# Patient Record
Sex: MALE | Race: WHITE | Employment: OTHER | URBAN - NONMETROPOLITAN AREA
[De-identification: names, ages, dates, MRNs, and addresses within clinical notes are randomized per-mention and may not be internally consistent; named-entity substitution may affect disease eponyms.]

---

## 2017-04-17 DIAGNOSIS — E11.9 TYPE 2 DIABETES MELLITUS WITHOUT COMPLICATION, WITHOUT LONG-TERM CURRENT USE OF INSULIN (HCC): ICD-10-CM

## 2017-05-05 ENCOUNTER — OFFICE VISIT (OUTPATIENT)
Dept: CARDIOLOGY | Age: 72
End: 2017-05-05
Payer: MEDICARE

## 2017-05-05 VITALS
HEIGHT: 68 IN | HEART RATE: 67 BPM | BODY MASS INDEX: 31.22 KG/M2 | DIASTOLIC BLOOD PRESSURE: 62 MMHG | SYSTOLIC BLOOD PRESSURE: 124 MMHG | RESPIRATION RATE: 16 BRPM | WEIGHT: 206 LBS

## 2017-05-05 DIAGNOSIS — E78.2 MIXED HYPERLIPIDEMIA: ICD-10-CM

## 2017-05-05 DIAGNOSIS — I77.9 BILATERAL CAROTID ARTERY DISEASE (HCC): ICD-10-CM

## 2017-05-05 DIAGNOSIS — H53.40 VISUAL FIELD DEFECTS: ICD-10-CM

## 2017-05-05 DIAGNOSIS — I10 ESSENTIAL HYPERTENSION: Primary | ICD-10-CM

## 2017-05-05 DIAGNOSIS — I25.10 CORONARY ARTERY DISEASE INVOLVING NATIVE CORONARY ARTERY OF NATIVE HEART WITHOUT ANGINA PECTORIS: ICD-10-CM

## 2017-05-05 PROCEDURE — G8419 CALC BMI OUT NRM PARAM NOF/U: HCPCS | Performed by: INTERNAL MEDICINE

## 2017-05-05 PROCEDURE — 99214 OFFICE O/P EST MOD 30 MIN: CPT | Performed by: INTERNAL MEDICINE

## 2017-05-05 PROCEDURE — 93000 ELECTROCARDIOGRAM COMPLETE: CPT | Performed by: INTERNAL MEDICINE

## 2017-05-05 PROCEDURE — 1123F ACP DISCUSS/DSCN MKR DOCD: CPT | Performed by: INTERNAL MEDICINE

## 2017-05-05 PROCEDURE — G8598 ASA/ANTIPLAT THER USED: HCPCS | Performed by: INTERNAL MEDICINE

## 2017-05-05 PROCEDURE — G8427 DOCREV CUR MEDS BY ELIG CLIN: HCPCS | Performed by: INTERNAL MEDICINE

## 2017-05-05 PROCEDURE — 1036F TOBACCO NON-USER: CPT | Performed by: INTERNAL MEDICINE

## 2017-05-05 PROCEDURE — 4040F PNEUMOC VAC/ADMIN/RCVD: CPT | Performed by: INTERNAL MEDICINE

## 2017-05-05 PROCEDURE — 3017F COLORECTAL CA SCREEN DOC REV: CPT | Performed by: INTERNAL MEDICINE

## 2017-05-05 RX ORDER — ASPIRIN 81 MG/1
81 TABLET ORAL DAILY
Status: ON HOLD | COMMUNITY
Start: 2017-05-05 | End: 2018-11-02 | Stop reason: HOSPADM

## 2017-05-05 ASSESSMENT — ENCOUNTER SYMPTOMS
ABDOMINAL DISTENTION: 0
VOMITING: 0
SHORTNESS OF BREATH: 0
CHEST TIGHTNESS: 1
NAUSEA: 0

## 2017-05-22 ENCOUNTER — TELEPHONE (OUTPATIENT)
Dept: CARDIOLOGY | Age: 72
End: 2017-05-22

## 2017-06-16 ENCOUNTER — NURSE ONLY (OUTPATIENT)
Dept: LAB | Age: 72
End: 2017-06-16
Payer: MEDICARE

## 2017-06-16 DIAGNOSIS — I10 ESSENTIAL HYPERTENSION: ICD-10-CM

## 2017-06-16 DIAGNOSIS — E11.9 TYPE 2 DIABETES MELLITUS WITHOUT COMPLICATION, WITHOUT LONG-TERM CURRENT USE OF INSULIN (HCC): ICD-10-CM

## 2017-06-16 DIAGNOSIS — R97.20 ELEVATED PSA: ICD-10-CM

## 2017-06-16 LAB
CREATININE URINE: 120.2 MG/DL (ref 39–259)
ESTIMATED AVERAGE GLUCOSE: 160 MG/DL
HBA1C MFR BLD: 7.2 % (ref 4.8–5.9)
HEMOGLOBIN: 12.3 G/DL (ref 13.5–17.5)
MICROALBUMIN/CREAT 24H UR: <12 MG/L
MICROALBUMIN/CREAT UR-RTO: NORMAL MCG/MG CREAT
PROSTATE SPECIFIC ANTIGEN: 11.17 UG/L

## 2017-06-16 PROCEDURE — 85018 HEMOGLOBIN: CPT | Performed by: INTERNAL MEDICINE

## 2017-06-16 PROCEDURE — 36415 COLL VENOUS BLD VENIPUNCTURE: CPT | Performed by: INTERNAL MEDICINE

## 2017-06-16 PROCEDURE — 84153 ASSAY OF PSA TOTAL: CPT | Performed by: UROLOGY

## 2017-06-16 PROCEDURE — 83036 HEMOGLOBIN GLYCOSYLATED A1C: CPT | Performed by: INTERNAL MEDICINE

## 2017-06-16 PROCEDURE — 82043 UR ALBUMIN QUANTITATIVE: CPT | Performed by: INTERNAL MEDICINE

## 2017-06-16 PROCEDURE — 82570 ASSAY OF URINE CREATININE: CPT | Performed by: INTERNAL MEDICINE

## 2017-06-23 ENCOUNTER — OFFICE VISIT (OUTPATIENT)
Dept: INTERNAL MEDICINE | Age: 72
End: 2017-06-23
Payer: MEDICARE

## 2017-06-23 VITALS
HEART RATE: 62 BPM | BODY MASS INDEX: 30.45 KG/M2 | SYSTOLIC BLOOD PRESSURE: 132 MMHG | DIASTOLIC BLOOD PRESSURE: 68 MMHG | HEIGHT: 68 IN | WEIGHT: 200.9 LBS | RESPIRATION RATE: 17 BRPM

## 2017-06-23 DIAGNOSIS — E11.9 TYPE 2 DIABETES MELLITUS WITHOUT COMPLICATION, WITHOUT LONG-TERM CURRENT USE OF INSULIN (HCC): Primary | ICD-10-CM

## 2017-06-23 DIAGNOSIS — I10 ESSENTIAL HYPERTENSION: ICD-10-CM

## 2017-06-23 DIAGNOSIS — D64.9 MILD ANEMIA: ICD-10-CM

## 2017-06-23 DIAGNOSIS — E78.5 HYPERLIPIDEMIA, UNSPECIFIED HYPERLIPIDEMIA TYPE: ICD-10-CM

## 2017-06-23 PROCEDURE — G8427 DOCREV CUR MEDS BY ELIG CLIN: HCPCS | Performed by: INTERNAL MEDICINE

## 2017-06-23 PROCEDURE — 3046F HEMOGLOBIN A1C LEVEL >9.0%: CPT | Performed by: INTERNAL MEDICINE

## 2017-06-23 PROCEDURE — 99214 OFFICE O/P EST MOD 30 MIN: CPT | Performed by: INTERNAL MEDICINE

## 2017-06-23 PROCEDURE — 4040F PNEUMOC VAC/ADMIN/RCVD: CPT | Performed by: INTERNAL MEDICINE

## 2017-06-23 PROCEDURE — G8598 ASA/ANTIPLAT THER USED: HCPCS | Performed by: INTERNAL MEDICINE

## 2017-06-23 PROCEDURE — 1036F TOBACCO NON-USER: CPT | Performed by: INTERNAL MEDICINE

## 2017-06-23 PROCEDURE — 1123F ACP DISCUSS/DSCN MKR DOCD: CPT | Performed by: INTERNAL MEDICINE

## 2017-06-23 PROCEDURE — 3017F COLORECTAL CA SCREEN DOC REV: CPT | Performed by: INTERNAL MEDICINE

## 2017-06-23 PROCEDURE — G8417 CALC BMI ABV UP PARAM F/U: HCPCS | Performed by: INTERNAL MEDICINE

## 2017-06-23 ASSESSMENT — ENCOUNTER SYMPTOMS
DIARRHEA: 0
SHORTNESS OF BREATH: 0
EYE PAIN: 0
VOMITING: 0
NAUSEA: 0
CONSTIPATION: 0
BLOOD IN STOOL: 0
COUGH: 0
ABDOMINAL PAIN: 0
BACK PAIN: 0

## 2017-06-23 ASSESSMENT — PATIENT HEALTH QUESTIONNAIRE - PHQ9
1. LITTLE INTEREST OR PLEASURE IN DOING THINGS: 0
SUM OF ALL RESPONSES TO PHQ QUESTIONS 1-9: 0
SUM OF ALL RESPONSES TO PHQ9 QUESTIONS 1 & 2: 0
2. FEELING DOWN, DEPRESSED OR HOPELESS: 0

## 2017-07-21 ENCOUNTER — OFFICE VISIT (OUTPATIENT)
Dept: UROLOGY | Age: 72
End: 2017-07-21
Payer: MEDICARE

## 2017-07-21 VITALS
HEIGHT: 68 IN | SYSTOLIC BLOOD PRESSURE: 118 MMHG | WEIGHT: 200 LBS | BODY MASS INDEX: 30.31 KG/M2 | DIASTOLIC BLOOD PRESSURE: 76 MMHG | HEART RATE: 60 BPM

## 2017-07-21 DIAGNOSIS — R97.20 ABNORMAL PSA: Primary | ICD-10-CM

## 2017-07-21 DIAGNOSIS — N40.0 BENIGN NON-NODULAR PROSTATIC HYPERPLASIA WITHOUT LOWER URINARY TRACT SYMPTOMS: ICD-10-CM

## 2017-07-21 PROCEDURE — G8598 ASA/ANTIPLAT THER USED: HCPCS | Performed by: UROLOGY

## 2017-07-21 PROCEDURE — 1123F ACP DISCUSS/DSCN MKR DOCD: CPT | Performed by: UROLOGY

## 2017-07-21 PROCEDURE — 3017F COLORECTAL CA SCREEN DOC REV: CPT | Performed by: UROLOGY

## 2017-07-21 PROCEDURE — G8417 CALC BMI ABV UP PARAM F/U: HCPCS | Performed by: UROLOGY

## 2017-07-21 PROCEDURE — 4040F PNEUMOC VAC/ADMIN/RCVD: CPT | Performed by: UROLOGY

## 2017-07-21 PROCEDURE — 99213 OFFICE O/P EST LOW 20 MIN: CPT | Performed by: UROLOGY

## 2017-07-21 PROCEDURE — 1036F TOBACCO NON-USER: CPT | Performed by: UROLOGY

## 2017-07-21 PROCEDURE — G8427 DOCREV CUR MEDS BY ELIG CLIN: HCPCS | Performed by: UROLOGY

## 2017-07-21 RX ORDER — ALFUZOSIN HYDROCHLORIDE 10 MG/1
10 TABLET, EXTENDED RELEASE ORAL DAILY
COMMUNITY
Start: 2017-06-13 | End: 2017-12-18 | Stop reason: SDUPTHER

## 2017-07-28 RX ORDER — NITROGLYCERIN 0.4 MG/1
0.4 TABLET SUBLINGUAL EVERY 5 MIN PRN
Qty: 25 TABLET | Refills: 3 | Status: SHIPPED | OUTPATIENT
Start: 2017-07-28 | End: 2018-07-30 | Stop reason: SDUPTHER

## 2017-08-07 ENCOUNTER — TELEPHONE (OUTPATIENT)
Dept: INTERNAL MEDICINE | Age: 72
End: 2017-08-07

## 2017-11-17 ENCOUNTER — TELEPHONE (OUTPATIENT)
Dept: INTERNAL MEDICINE | Age: 72
End: 2017-11-17

## 2017-11-17 VITALS — DIASTOLIC BLOOD PRESSURE: 66 MMHG | SYSTOLIC BLOOD PRESSURE: 138 MMHG

## 2017-12-11 ENCOUNTER — HOSPITAL ENCOUNTER (OUTPATIENT)
Dept: LAB | Age: 72
Setting detail: SPECIMEN
Discharge: HOME | End: 2017-12-11
Payer: MEDICARE

## 2017-12-11 DIAGNOSIS — E11.9 TYPE 2 DIABETES MELLITUS WITHOUT COMPLICATION, WITHOUT LONG-TERM CURRENT USE OF INSULIN (HCC): ICD-10-CM

## 2017-12-11 DIAGNOSIS — I10 ESSENTIAL HYPERTENSION: ICD-10-CM

## 2017-12-11 DIAGNOSIS — E78.5 HYPERLIPIDEMIA, UNSPECIFIED HYPERLIPIDEMIA TYPE: ICD-10-CM

## 2017-12-11 DIAGNOSIS — D64.9 MILD ANEMIA: ICD-10-CM

## 2017-12-11 LAB
ESTIMATED AVERAGE GLUCOSE: 163 MG/DL
HBA1C MFR BLD: 7.3 % (ref 4.8–5.9)
HEMOGLOBIN: 12.8 G/DL (ref 13.5–17.5)
IRON SATURATION: 36 % (ref 20–55)
IRON: 90 UG/DL (ref 59–158)
TOTAL IRON BINDING CAPACITY: 251 UG/DL (ref 250–450)
UNSATURATED IRON BINDING CAPACITY: 161 UG/DL (ref 112–347)

## 2017-12-11 PROCEDURE — 83036 HEMOGLOBIN GLYCOSYLATED A1C: CPT

## 2017-12-11 PROCEDURE — 83550 IRON BINDING TEST: CPT

## 2017-12-11 PROCEDURE — 83540 ASSAY OF IRON: CPT

## 2017-12-11 PROCEDURE — 36415 COLL VENOUS BLD VENIPUNCTURE: CPT

## 2017-12-11 PROCEDURE — 85018 HEMOGLOBIN: CPT

## 2017-12-11 RX ORDER — ALFUZOSIN HYDROCHLORIDE 10 MG/1
10 TABLET, EXTENDED RELEASE ORAL DAILY
Qty: 30 TABLET | Status: CANCELLED | OUTPATIENT
Start: 2017-12-11

## 2017-12-18 ENCOUNTER — OFFICE VISIT (OUTPATIENT)
Dept: INTERNAL MEDICINE | Age: 72
End: 2017-12-18
Payer: MEDICARE

## 2017-12-18 VITALS
WEIGHT: 201 LBS | BODY MASS INDEX: 30.46 KG/M2 | HEIGHT: 68 IN | HEART RATE: 60 BPM | SYSTOLIC BLOOD PRESSURE: 126 MMHG | DIASTOLIC BLOOD PRESSURE: 76 MMHG | RESPIRATION RATE: 16 BRPM

## 2017-12-18 DIAGNOSIS — E78.5 HYPERLIPIDEMIA, UNSPECIFIED HYPERLIPIDEMIA TYPE: ICD-10-CM

## 2017-12-18 DIAGNOSIS — D64.9 MILD ANEMIA: ICD-10-CM

## 2017-12-18 DIAGNOSIS — M10.00 IDIOPATHIC GOUT, UNSPECIFIED CHRONICITY, UNSPECIFIED SITE: ICD-10-CM

## 2017-12-18 DIAGNOSIS — E11.9 TYPE 2 DIABETES MELLITUS WITHOUT COMPLICATION, WITHOUT LONG-TERM CURRENT USE OF INSULIN (HCC): Primary | ICD-10-CM

## 2017-12-18 DIAGNOSIS — N40.0 BENIGN NON-NODULAR PROSTATIC HYPERPLASIA WITHOUT LOWER URINARY TRACT SYMPTOMS: ICD-10-CM

## 2017-12-18 DIAGNOSIS — I10 ESSENTIAL HYPERTENSION: ICD-10-CM

## 2017-12-18 DIAGNOSIS — E11.69 TYPE 2 DIABETES MELLITUS WITH OTHER SPECIFIED COMPLICATION, WITHOUT LONG-TERM CURRENT USE OF INSULIN (HCC): ICD-10-CM

## 2017-12-18 PROCEDURE — G8427 DOCREV CUR MEDS BY ELIG CLIN: HCPCS | Performed by: INTERNAL MEDICINE

## 2017-12-18 PROCEDURE — 1123F ACP DISCUSS/DSCN MKR DOCD: CPT | Performed by: INTERNAL MEDICINE

## 2017-12-18 PROCEDURE — 3017F COLORECTAL CA SCREEN DOC REV: CPT | Performed by: INTERNAL MEDICINE

## 2017-12-18 PROCEDURE — 4040F PNEUMOC VAC/ADMIN/RCVD: CPT | Performed by: INTERNAL MEDICINE

## 2017-12-18 PROCEDURE — G8598 ASA/ANTIPLAT THER USED: HCPCS | Performed by: INTERNAL MEDICINE

## 2017-12-18 PROCEDURE — 1036F TOBACCO NON-USER: CPT | Performed by: INTERNAL MEDICINE

## 2017-12-18 PROCEDURE — 3045F PR MOST RECENT HEMOGLOBIN A1C LEVEL 7.0-9.0%: CPT | Performed by: INTERNAL MEDICINE

## 2017-12-18 PROCEDURE — G8417 CALC BMI ABV UP PARAM F/U: HCPCS | Performed by: INTERNAL MEDICINE

## 2017-12-18 PROCEDURE — 99214 OFFICE O/P EST MOD 30 MIN: CPT | Performed by: INTERNAL MEDICINE

## 2017-12-18 PROCEDURE — G8484 FLU IMMUNIZE NO ADMIN: HCPCS | Performed by: INTERNAL MEDICINE

## 2017-12-18 RX ORDER — BETHANECHOL CHLORIDE 25 MG/1
25 TABLET ORAL 4 TIMES DAILY
Qty: 360 TABLET | Refills: 3 | Status: SHIPPED | OUTPATIENT
Start: 2017-12-18 | End: 2018-07-30

## 2017-12-18 RX ORDER — HYDROCHLOROTHIAZIDE 12.5 MG/1
12.5 TABLET ORAL DAILY
Qty: 90 TABLET | Refills: 3 | Status: SHIPPED | OUTPATIENT
Start: 2017-12-18 | End: 2018-12-05 | Stop reason: SDUPTHER

## 2017-12-18 RX ORDER — ATORVASTATIN CALCIUM 80 MG/1
80 TABLET, FILM COATED ORAL DAILY
Qty: 90 TABLET | Refills: 3 | Status: SHIPPED | OUTPATIENT
Start: 2017-12-18 | End: 2018-12-05 | Stop reason: SDUPTHER

## 2017-12-18 RX ORDER — METOPROLOL TARTRATE 50 MG/1
50 TABLET, FILM COATED ORAL 2 TIMES DAILY
Qty: 180 TABLET | Refills: 3 | Status: SHIPPED | OUTPATIENT
Start: 2017-12-18 | End: 2018-12-05 | Stop reason: SDUPTHER

## 2017-12-18 RX ORDER — LISINOPRIL 40 MG/1
40 TABLET ORAL DAILY
Qty: 90 TABLET | Refills: 3 | Status: SHIPPED | OUTPATIENT
Start: 2017-12-18 | End: 2018-12-05 | Stop reason: SDUPTHER

## 2017-12-18 RX ORDER — ALLOPURINOL 300 MG/1
300 TABLET ORAL DAILY
Qty: 90 TABLET | Refills: 3 | Status: SHIPPED | OUTPATIENT
Start: 2017-12-18 | End: 2018-12-05 | Stop reason: SDUPTHER

## 2017-12-18 RX ORDER — GLIMEPIRIDE 2 MG/1
2 TABLET ORAL EVERY MORNING
Qty: 90 TABLET | Refills: 3 | Status: SHIPPED | OUTPATIENT
Start: 2017-12-18 | End: 2018-07-18 | Stop reason: DRUGHIGH

## 2017-12-18 RX ORDER — GLIMEPIRIDE 1 MG/1
1 TABLET ORAL
Qty: 90 TABLET | Refills: 3 | Status: SHIPPED | OUTPATIENT
Start: 2017-12-18 | End: 2018-07-18 | Stop reason: DRUGHIGH

## 2017-12-18 RX ORDER — ALFUZOSIN HYDROCHLORIDE 10 MG/1
10 TABLET, EXTENDED RELEASE ORAL DAILY
Qty: 90 TABLET | Refills: 3 | Status: SHIPPED | OUTPATIENT
Start: 2017-12-18 | End: 2018-12-05 | Stop reason: ALTCHOICE

## 2017-12-18 ASSESSMENT — ENCOUNTER SYMPTOMS
NAUSEA: 0
EYE PAIN: 0
DIARRHEA: 0
ABDOMINAL PAIN: 0
COUGH: 0
BLOOD IN STOOL: 0
SHORTNESS OF BREATH: 0
BACK PAIN: 0
CONSTIPATION: 0
VOMITING: 0

## 2017-12-18 NOTE — PROGRESS NOTES
 Diabetic microalbuminuria test  06/16/2018    Diabetic hemoglobin A1C test  12/11/2018    Colon cancer screen colonoscopy  10/20/2020    Zostavax vaccine  Completed    Flu vaccine  Completed    Pneumococcal low/med risk  Completed

## 2017-12-18 NOTE — PROGRESS NOTES
PSA.    BPPV (benign paroxysmal positional vertigo) 2/2008    Coronary heart disease     Status post CABG.  Diverticulosis     Dupuytren's contracture     Mid finger, right hand.  Erectile dysfunction     Gout     Quiescent.  Hyperlipidemia     Hypertension     Type II or unspecified type diabetes mellitus without mention of complication, not stated as uncontrolled       Past Surgical History:   Procedure Laterality Date    CARDIAC CATHETERIZATION  6/2005    Triple vessel CAD with patent LIMA to LAD, patent saphenous vein graft to first obtuse marginal, patent saphenous vein graft to posterior descending artery, occluded jump graft from posterior descending artery to posterior left ventricular branch.  CARDIAC CATHETERIZATION  3/16/16    CATARACT REMOVAL Bilateral Nov. and Dec 2014    COLONOSCOPY  5/27/2008    Distal ileoscopy. Scattered diverticulosis and extensive diverticular disease of sigmoid colon and internal hemorrhoids.  CORONARY ARTERY BYPASS GRAFT      ELBOW SURGERY Right     Surgical repair.  KNEE ARTHROSCOPY Right 3/16/2007    Partial medial meniscectomy, partial medial and lateral synovectomy, light chondroplasty.     PROSTATE BIOPSY Bilateral 9/15/2010, 2007    [facility] Dr. Israel Glynn    PROSTATE BIOPSY Bilateral 05/10/2001    Benign-Dr. Natalya Beatty    PROSTATE BIOPSY Bilateral 04/08/1999    Benign-Dr. Carmen Hurley    PROSTATE BIOPSY Bilateral 06/18/1998    Benign-Dr. Vasquez Cap    PROSTATE BIOPSY Bilateral 01/07/1998    Benign-Dr. Vasquez Cap    PROSTATE BIOPSY Bilateral 10/04/1996    Benign-Dr. Breezy Godfrey    VASECTOMY         Family History   Problem Relation Age of Onset    Diabetes Mother     Kidney Disease Mother      Renal failure    Coronary Art Dis Mother     Hypertension Mother     Lung Cancer Father     Coronary Art Dis Father     Hypertension Brother     Other Brother      Hypernephroma       Social History   Substance Use Topics    Smoking status: Former Smoker     Types: Cigarettes     Quit date: 10/11/1992    Smokeless tobacco: Never Used    Alcohol use 0.6 oz/week     1 Standard drinks or equivalent per week      Comment: Social alcohol intake. Current Outpatient Prescriptions   Medication Sig Dispense Refill    glimepiride (AMARYL) 1 MG tablet Take 1 tablet by mouth Daily with supper 90 tablet 3    allopurinol (ZYLOPRIM) 300 MG tablet Take 1 tablet by mouth daily 90 tablet 3    lisinopril (PRINIVIL;ZESTRIL) 40 MG tablet Take 1 tablet by mouth daily 90 tablet 3    metoprolol tartrate (LOPRESSOR) 50 MG tablet Take 1 tablet by mouth 2 times daily 180 tablet 3    bethanechol (URECHOLINE) 25 MG tablet Take 1 tablet by mouth 4 times daily 360 tablet 3    atorvastatin (LIPITOR) 80 MG tablet Take 1 tablet by mouth daily 90 tablet 3    hydrochlorothiazide (HYDRODIURIL) 12.5 MG tablet Take 1 tablet by mouth daily 90 tablet 3    glimepiride (AMARYL) 2 MG tablet Take 1 tablet by mouth every morning 90 tablet 3    alfuzosin (UROXATRAL) 10 MG extended release tablet Take 1 tablet by mouth daily 90 tablet 3    nitroGLYCERIN (NITROSTAT) 0.4 MG SL tablet Place 1 tablet under the tongue every 5 minutes as needed for Chest pain 25 tablet 3    aspirin 81 MG EC tablet Take 4 tablets by mouth daily      glucose blood VI test strips (DURGA CONTOUR TEST) strip Tests blood sugar daily. 100 each 11     No current facility-administered medications for this visit.       No Known Allergies    Health Maintenance   Topic Date Due    AAA screen  1945    Lipid screen  12/14/2017    Diabetic foot exam  12/23/2017    DTaP/Tdap/Td vaccine (1 - Tdap) 06/23/2018 (Originally 7/23/1964)    Hepatitis C screen  12/18/2018 (Originally 1945)    Diabetic retinal exam  05/04/2018    Diabetic microalbuminuria test  06/16/2018    Diabetic hemoglobin A1C test  12/11/2018    Colon cancer screen colonoscopy  10/20/2020    Zostavax vaccine  Completed    Flu vaccine  Completed    Sitting, Cuff Size: Large Adult)   Pulse 60   Resp 16   Ht 5' 8\" (1.727 m)   Wt 201 lb (91.2 kg)   BMI 30.56 kg/m²     Assessment:      1. Type 2 diabetes mellitus without complication, without long-term current use of insulin ([facility])  Comprehensive Metabolic Panel    Lipid Panel    Hemoglobin A1C    Microalbumin, Ur    Hemoglobin    Iron And TIBC    glimepiride (AMARYL) 1 MG tablet   2. Essential hypertension  Comprehensive Metabolic Panel    Lipid Panel    Hemoglobin A1C    Microalbumin, Ur    Hemoglobin    Iron And TIBC    lisinopril (PRINIVIL;ZESTRIL) 40 MG tablet    metoprolol tartrate (LOPRESSOR) 50 MG tablet    hydrochlorothiazide (HYDRODIURIL) 12.5 MG tablet   3. Hyperlipidemia, unspecified hyperlipidemia type  Comprehensive Metabolic Panel    Lipid Panel    Hemoglobin A1C    Microalbumin, Ur    atorvastatin (LIPITOR) 80 MG tablet   4. Mild anemia  Comprehensive Metabolic Panel    Lipid Panel    Hemoglobin A1C    Microalbumin, Ur    Hemoglobin    Iron And TIBC   5. Idiopathic gout, unspecified chronicity, unspecified site  allopurinol (ZYLOPRIM) 300 MG tablet   6. Benign non-nodular prostatic hyperplasia without lower urinary tract symptoms  bethanechol (URECHOLINE) 25 MG tablet   7. Type 2 diabetes mellitus with other specified complication, without long-term current use of insulin (HCC)  glimepiride (AMARYL) 2 MG tablet             Plan:      Return in about 6 months (around 6/18/2018) for Diabetes, Hyperlipidemia, Hypertension.     Orders Placed This Encounter   Procedures    Comprehensive Metabolic Panel     Standing Status:   Future     Standing Expiration Date:   12/18/2018    Lipid Panel     Standing Status:   Future     Standing Expiration Date:   12/18/2018     Order Specific Question:   Is Patient Fasting?/# of Hours     Answer:   12    Hemoglobin A1C     Standing Status:   Future     Standing Expiration Date:   12/18/2018    Microalbumin, Ur     Standing Status:   Future     Standing Expiration Date:   12/18/2018    Hemoglobin     Standing Status:   Future     Standing Expiration Date:   12/18/2018    Iron And TIBC     Standing Status:   Future     Standing Expiration Date:   12/18/2018     Order Specific Question:   Is Patient Fasting? Answer:   no     Order Specific Question:   No of Hours? Answer:   no     Orders Placed This Encounter   Medications    glimepiride (AMARYL) 1 MG tablet     Sig: Take 1 tablet by mouth Daily with supper     Dispense:  90 tablet     Refill:  3    allopurinol (ZYLOPRIM) 300 MG tablet     Sig: Take 1 tablet by mouth daily     Dispense:  90 tablet     Refill:  3    lisinopril (PRINIVIL;ZESTRIL) 40 MG tablet     Sig: Take 1 tablet by mouth daily     Dispense:  90 tablet     Refill:  3    metoprolol tartrate (LOPRESSOR) 50 MG tablet     Sig: Take 1 tablet by mouth 2 times daily     Dispense:  180 tablet     Refill:  3    bethanechol (URECHOLINE) 25 MG tablet     Sig: Take 1 tablet by mouth 4 times daily     Dispense:  360 tablet     Refill:  3    atorvastatin (LIPITOR) 80 MG tablet     Sig: Take 1 tablet by mouth daily     Dispense:  90 tablet     Refill:  3    hydrochlorothiazide (HYDRODIURIL) 12.5 MG tablet     Sig: Take 1 tablet by mouth daily     Dispense:  90 tablet     Refill:  3    glimepiride (AMARYL) 2 MG tablet     Sig: Take 1 tablet by mouth every morning     Dispense:  90 tablet     Refill:  3    alfuzosin (UROXATRAL) 10 MG extended release tablet     Sig: Take 1 tablet by mouth daily     Dispense:  90 tablet     Refill:  3       Patient given educational materials - see patient instructions. Discussed use, benefit, and side effects of prescribed medications. All patient questions answered. Pt voiced understanding. Reviewed health maintenance. Instructed to continue current medications, diet and exercise. Patient agreed with treatment plan. Follow up as directed.      Electronically signed by Dagmar Zamorano MD on 12/18/2017 at 10:01

## 2018-02-13 DIAGNOSIS — E11.9 TYPE 2 DIABETES MELLITUS WITHOUT COMPLICATION, WITHOUT LONG-TERM CURRENT USE OF INSULIN (HCC): ICD-10-CM

## 2018-05-07 ENCOUNTER — HOSPITAL ENCOUNTER (OUTPATIENT)
Dept: INTERVENTIONAL RADIOLOGY/VASCULAR | Age: 73
Discharge: HOME | End: 2018-05-09
Payer: MEDICARE

## 2018-05-07 DIAGNOSIS — I65.23 CAROTID STENOSIS, BILATERAL: ICD-10-CM

## 2018-05-07 PROCEDURE — 93880 EXTRACRANIAL BILAT STUDY: CPT

## 2018-05-11 ENCOUNTER — OFFICE VISIT (OUTPATIENT)
Dept: CARDIOLOGY | Age: 73
End: 2018-05-11
Payer: MEDICARE

## 2018-05-11 VITALS
SYSTOLIC BLOOD PRESSURE: 138 MMHG | BODY MASS INDEX: 30.77 KG/M2 | WEIGHT: 203 LBS | HEART RATE: 52 BPM | HEIGHT: 68 IN | DIASTOLIC BLOOD PRESSURE: 70 MMHG

## 2018-05-11 DIAGNOSIS — I10 ESSENTIAL HYPERTENSION: Primary | ICD-10-CM

## 2018-05-11 DIAGNOSIS — I25.10 CORONARY ARTERY DISEASE INVOLVING NATIVE CORONARY ARTERY OF NATIVE HEART WITHOUT ANGINA PECTORIS: ICD-10-CM

## 2018-05-11 PROCEDURE — 99213 OFFICE O/P EST LOW 20 MIN: CPT | Performed by: INTERNAL MEDICINE

## 2018-05-11 PROCEDURE — 93000 ELECTROCARDIOGRAM COMPLETE: CPT | Performed by: INTERNAL MEDICINE

## 2018-05-11 ASSESSMENT — ENCOUNTER SYMPTOMS
NAUSEA: 0
SHORTNESS OF BREATH: 0
CHEST TIGHTNESS: 0
ABDOMINAL PAIN: 0
VOMITING: 0

## 2018-07-17 ENCOUNTER — HOSPITAL ENCOUNTER (OUTPATIENT)
Dept: LAB | Age: 73
Setting detail: SPECIMEN
Discharge: HOME | End: 2018-07-17
Payer: MEDICARE

## 2018-07-17 DIAGNOSIS — E78.5 HYPERLIPIDEMIA, UNSPECIFIED HYPERLIPIDEMIA TYPE: ICD-10-CM

## 2018-07-17 DIAGNOSIS — I10 ESSENTIAL HYPERTENSION: ICD-10-CM

## 2018-07-17 DIAGNOSIS — E11.9 TYPE 2 DIABETES MELLITUS WITHOUT COMPLICATION, WITHOUT LONG-TERM CURRENT USE OF INSULIN (HCC): ICD-10-CM

## 2018-07-17 DIAGNOSIS — N40.0 BENIGN NON-NODULAR PROSTATIC HYPERPLASIA WITHOUT LOWER URINARY TRACT SYMPTOMS: ICD-10-CM

## 2018-07-17 DIAGNOSIS — D64.9 MILD ANEMIA: ICD-10-CM

## 2018-07-17 DIAGNOSIS — R97.20 ABNORMAL PSA: ICD-10-CM

## 2018-07-17 LAB
ALBUMIN SERPL-MCNC: 4.2 G/DL (ref 3.5–5.2)
ALBUMIN/GLOBULIN RATIO: 1.6 (ref 1–2.5)
ALP BLD-CCNC: 66 U/L (ref 40–129)
ALT SERPL-CCNC: 29 U/L (ref 5–41)
ANION GAP SERPL CALCULATED.3IONS-SCNC: 11 MMOL/L (ref 9–17)
AST SERPL-CCNC: 26 U/L
BILIRUB SERPL-MCNC: 1.55 MG/DL (ref 0.3–1.2)
BUN BLDV-MCNC: 18 MG/DL (ref 8–23)
BUN/CREAT BLD: 18 (ref 9–20)
CALCIUM SERPL-MCNC: 9.2 MG/DL (ref 8.6–10.4)
CHLORIDE BLD-SCNC: 104 MMOL/L (ref 98–107)
CHOLESTEROL/HDL RATIO: 2.7
CHOLESTEROL: 92 MG/DL
CO2: 27 MMOL/L (ref 20–31)
CREAT SERPL-MCNC: 1 MG/DL (ref 0.7–1.2)
CREATININE URINE: 187.8 MG/DL (ref 39–259)
ESTIMATED AVERAGE GLUCOSE: 183 MG/DL
GFR AFRICAN AMERICAN: >60 ML/MIN
GFR NON-AFRICAN AMERICAN: >60 ML/MIN
GFR SERPL CREATININE-BSD FRML MDRD: ABNORMAL ML/MIN/{1.73_M2}
GFR SERPL CREATININE-BSD FRML MDRD: ABNORMAL ML/MIN/{1.73_M2}
GLUCOSE BLD-MCNC: 235 MG/DL (ref 70–99)
HBA1C MFR BLD: 8 % (ref 4.8–5.9)
HDLC SERPL-MCNC: 34 MG/DL
HEMOGLOBIN: 13.1 G/DL (ref 13.5–17.5)
IRON SATURATION: 38 % (ref 20–55)
IRON: 94 UG/DL (ref 59–158)
LDL CHOLESTEROL: 37 MG/DL (ref 0–130)
MICROALBUMIN/CREAT 24H UR: <12 MG/L
MICROALBUMIN/CREAT UR-RTO: NORMAL MCG/MG CREAT
POTASSIUM SERPL-SCNC: 4.3 MMOL/L (ref 3.7–5.3)
SODIUM BLD-SCNC: 142 MMOL/L (ref 135–144)
TOTAL IRON BINDING CAPACITY: 250 UG/DL (ref 250–450)
TOTAL PROTEIN: 6.9 G/DL (ref 6.4–8.3)
TRIGL SERPL-MCNC: 104 MG/DL
UNSATURATED IRON BINDING CAPACITY: 156 UG/DL (ref 112–347)
VLDLC SERPL CALC-MCNC: ABNORMAL MG/DL (ref 1–30)

## 2018-07-17 PROCEDURE — 84154 ASSAY OF PSA FREE: CPT

## 2018-07-17 PROCEDURE — 83550 IRON BINDING TEST: CPT

## 2018-07-17 PROCEDURE — 85018 HEMOGLOBIN: CPT

## 2018-07-17 PROCEDURE — 36415 COLL VENOUS BLD VENIPUNCTURE: CPT

## 2018-07-17 PROCEDURE — 83036 HEMOGLOBIN GLYCOSYLATED A1C: CPT

## 2018-07-17 PROCEDURE — 80061 LIPID PANEL: CPT

## 2018-07-17 PROCEDURE — 82043 UR ALBUMIN QUANTITATIVE: CPT

## 2018-07-17 PROCEDURE — 83540 ASSAY OF IRON: CPT

## 2018-07-17 PROCEDURE — 80053 COMPREHEN METABOLIC PANEL: CPT

## 2018-07-17 PROCEDURE — 82570 ASSAY OF URINE CREATININE: CPT

## 2018-07-18 LAB
PROSTATE SPECIFIC ANTIGEN FREE: 3 UG/L
PROSTATE SPECIFIC ANTIGEN PERCENT FREE: 27 %
PROSTATE SPECIFIC ANTIGEN: 11.1 UG/L (ref 0–4)

## 2018-07-18 RX ORDER — GLIMEPIRIDE 2 MG/1
2 TABLET ORAL 2 TIMES DAILY
COMMUNITY
End: 2018-08-31 | Stop reason: SDUPTHER

## 2018-07-30 ENCOUNTER — OFFICE VISIT (OUTPATIENT)
Dept: UROLOGY | Age: 73
End: 2018-07-30
Payer: MEDICARE

## 2018-07-30 ENCOUNTER — OFFICE VISIT (OUTPATIENT)
Dept: INTERNAL MEDICINE | Age: 73
End: 2018-07-30
Payer: MEDICARE

## 2018-07-30 VITALS
SYSTOLIC BLOOD PRESSURE: 132 MMHG | HEIGHT: 69 IN | RESPIRATION RATE: 16 BRPM | DIASTOLIC BLOOD PRESSURE: 70 MMHG | WEIGHT: 200.4 LBS | HEART RATE: 76 BPM | BODY MASS INDEX: 29.68 KG/M2

## 2018-07-30 VITALS
OXYGEN SATURATION: 99 % | WEIGHT: 200 LBS | SYSTOLIC BLOOD PRESSURE: 124 MMHG | HEART RATE: 54 BPM | HEIGHT: 68 IN | BODY MASS INDEX: 30.31 KG/M2 | DIASTOLIC BLOOD PRESSURE: 80 MMHG

## 2018-07-30 DIAGNOSIS — E11.9 TYPE 2 DIABETES MELLITUS WITHOUT COMPLICATION, WITHOUT LONG-TERM CURRENT USE OF INSULIN (HCC): Primary | ICD-10-CM

## 2018-07-30 DIAGNOSIS — N40.0 BENIGN PROSTATIC HYPERPLASIA WITHOUT LOWER URINARY TRACT SYMPTOMS: Primary | ICD-10-CM

## 2018-07-30 DIAGNOSIS — I10 ESSENTIAL HYPERTENSION: ICD-10-CM

## 2018-07-30 DIAGNOSIS — I25.119 CORONARY ARTERY DISEASE INVOLVING NATIVE HEART WITH ANGINA PECTORIS, UNSPECIFIED VESSEL OR LESION TYPE (HCC): ICD-10-CM

## 2018-07-30 DIAGNOSIS — E78.5 HYPERLIPIDEMIA, UNSPECIFIED HYPERLIPIDEMIA TYPE: ICD-10-CM

## 2018-07-30 DIAGNOSIS — N52.9 ERECTILE DYSFUNCTION, UNSPECIFIED ERECTILE DYSFUNCTION TYPE: ICD-10-CM

## 2018-07-30 DIAGNOSIS — R97.20 ELEVATED PSA: ICD-10-CM

## 2018-07-30 PROCEDURE — G8598 ASA/ANTIPLAT THER USED: HCPCS | Performed by: UROLOGY

## 2018-07-30 PROCEDURE — 4040F PNEUMOC VAC/ADMIN/RCVD: CPT | Performed by: UROLOGY

## 2018-07-30 PROCEDURE — G8417 CALC BMI ABV UP PARAM F/U: HCPCS | Performed by: UROLOGY

## 2018-07-30 PROCEDURE — 1036F TOBACCO NON-USER: CPT | Performed by: INTERNAL MEDICINE

## 2018-07-30 PROCEDURE — 99214 OFFICE O/P EST MOD 30 MIN: CPT | Performed by: INTERNAL MEDICINE

## 2018-07-30 PROCEDURE — 1123F ACP DISCUSS/DSCN MKR DOCD: CPT | Performed by: UROLOGY

## 2018-07-30 PROCEDURE — 99214 OFFICE O/P EST MOD 30 MIN: CPT | Performed by: UROLOGY

## 2018-07-30 PROCEDURE — 1101F PT FALLS ASSESS-DOCD LE1/YR: CPT | Performed by: INTERNAL MEDICINE

## 2018-07-30 PROCEDURE — 3045F PR MOST RECENT HEMOGLOBIN A1C LEVEL 7.0-9.0%: CPT | Performed by: INTERNAL MEDICINE

## 2018-07-30 PROCEDURE — G8427 DOCREV CUR MEDS BY ELIG CLIN: HCPCS | Performed by: UROLOGY

## 2018-07-30 PROCEDURE — 3017F COLORECTAL CA SCREEN DOC REV: CPT | Performed by: UROLOGY

## 2018-07-30 PROCEDURE — 2022F DILAT RTA XM EVC RTNOPTHY: CPT | Performed by: INTERNAL MEDICINE

## 2018-07-30 PROCEDURE — 1101F PT FALLS ASSESS-DOCD LE1/YR: CPT | Performed by: UROLOGY

## 2018-07-30 PROCEDURE — 3017F COLORECTAL CA SCREEN DOC REV: CPT | Performed by: INTERNAL MEDICINE

## 2018-07-30 PROCEDURE — G8417 CALC BMI ABV UP PARAM F/U: HCPCS | Performed by: INTERNAL MEDICINE

## 2018-07-30 PROCEDURE — 1123F ACP DISCUSS/DSCN MKR DOCD: CPT | Performed by: INTERNAL MEDICINE

## 2018-07-30 PROCEDURE — 4040F PNEUMOC VAC/ADMIN/RCVD: CPT | Performed by: INTERNAL MEDICINE

## 2018-07-30 PROCEDURE — 1036F TOBACCO NON-USER: CPT | Performed by: UROLOGY

## 2018-07-30 PROCEDURE — G8427 DOCREV CUR MEDS BY ELIG CLIN: HCPCS | Performed by: INTERNAL MEDICINE

## 2018-07-30 PROCEDURE — G8598 ASA/ANTIPLAT THER USED: HCPCS | Performed by: INTERNAL MEDICINE

## 2018-07-30 RX ORDER — NITROGLYCERIN 0.4 MG/1
0.4 TABLET SUBLINGUAL EVERY 5 MIN PRN
Qty: 25 TABLET | Refills: 3 | Status: SHIPPED | OUTPATIENT
Start: 2018-07-30 | End: 2020-01-11 | Stop reason: SDUPTHER

## 2018-07-30 ASSESSMENT — PATIENT HEALTH QUESTIONNAIRE - PHQ9
SUM OF ALL RESPONSES TO PHQ9 QUESTIONS 1 & 2: 0
SUM OF ALL RESPONSES TO PHQ QUESTIONS 1-9: 0
2. FEELING DOWN, DEPRESSED OR HOPELESS: 0
1. LITTLE INTEREST OR PLEASURE IN DOING THINGS: 0

## 2018-07-30 ASSESSMENT — ENCOUNTER SYMPTOMS
CONSTIPATION: 0
SHORTNESS OF BREATH: 0
EYE PAIN: 0
NAUSEA: 0
BLOOD IN STOOL: 0
VOMITING: 0
COUGH: 0
ABDOMINAL PAIN: 0
DIARRHEA: 0
BACK PAIN: 0

## 2018-07-30 NOTE — PROGRESS NOTES
Kasi Morgan MD        [address] [facility] [address] [id number]  Dept: [phone]  Dept Fax: [phone]  Loc: [phone]      [facility] Office Note  Follow up Visit    Patient:  Fuad Sesay  YOB: 1945  Date: 8/5/2018    The patient is a 68 y.o. male who presents today for evaluation of the following problems: elevated psa  Chief Complaint   Patient presents with    Elevated PSA     annual PSA- previous pt of [facility]         HISTORY OF PRESENT ILLNESS:     Onset was  Years ago  Overall, the problem(s) are unchanged. Severity is described as mild-moderate. Associated Symptoms: No dysuria, no gross hematuria. Current Pain Severity: 0    Overall the PSA level is: oscillating. Range 11-12  Recent history of urinary tract infection/prostatitis? no  Previous prostate biopsy? Yes x 6. NEG  Lower urinary tract symptoms: no    Nocturia x 2  Mild hesistancy  Not bothersome  Previous patient of Dr. Sarah Gutiérrez --[facility]  Has had episodes of retention after alcohol use    Requested/reviewed records from Pratik Acevedo MD office and/or outside physician/EMR    (Patient's old records have been requested, reviewed and pertinent findings summarized in today's note.)    Procedures Today: N/A    Last several PSA's:  Lab Results   Component Value Date    PSA 11.1 (H) 07/17/2018    PSA 11.17 (H) 06/16/2017    PSA 12.57 (H) 06/16/2016       Last total testosterone:  No results found for: TESTOSTERONE    Urinalysis today:  No results found for this visit on 07/30/18. Last BUN and creatinine:  Lab Results   Component Value Date    BUN 18 07/17/2018     Lab Results   Component Value Date    CREATININE 1.00 07/17/2018       Additional Lab/Culture results: none    Imaging Reviewed during this Office Visit:   Kasi Morgan MD independently reviewed the images and verified the radiology reports from:    No results found.     PAST MEDICAL, FAMILY AND SOCIAL HISTORY:  Past Medical History:   Diagnosis Date    Benign prostatic hypertrophy     with elevated PSA.  BPPV (benign paroxysmal positional vertigo) 2/2008    Coronary heart disease     Status post CABG.  Diverticulosis     Dupuytren's contracture     Mid finger, right hand.  Erectile dysfunction     Gout     Quiescent.  Hyperlipidemia     Hypertension     Type II or unspecified type diabetes mellitus without mention of complication, not stated as uncontrolled      Past Surgical History:   Procedure Laterality Date    CARDIAC CATHETERIZATION  6/2005    Triple vessel CAD with patent LIMA to LAD, patent saphenous vein graft to first obtuse marginal, patent saphenous vein graft to posterior descending artery, occluded jump graft from posterior descending artery to posterior left ventricular branch.  CARDIAC CATHETERIZATION  3/16/16    CATARACT REMOVAL Bilateral Nov. and Dec 2014    COLONOSCOPY  5/27/2008    Distal ileoscopy. Scattered diverticulosis and extensive diverticular disease of sigmoid colon and internal hemorrhoids.  CORONARY ARTERY BYPASS GRAFT      ELBOW SURGERY Right     Surgical repair.  KNEE ARTHROSCOPY Right 3/16/2007    Partial medial meniscectomy, partial medial and lateral synovectomy, light chondroplasty.     PROSTATE BIOPSY Bilateral 9/15/2010, 2007    [facility] Dr. Dominic Frances    PROSTATE BIOPSY Bilateral 05/10/2001    Benign-Dr. Kristin Gonzalez    PROSTATE BIOPSY Bilateral 04/08/1999    Benign-Dr. Jeffrey Smith    PROSTATE BIOPSY Bilateral 06/18/1998    Benign-Dr. Patsy Mojica    PROSTATE BIOPSY Bilateral 01/07/1998    Benign-Dr. Patsy Mojica    PROSTATE BIOPSY Bilateral 10/04/1996    Benign-Dr. Mancini Deal    VASECTOMY       Family History   Problem Relation Age of Onset    Diabetes Mother     Kidney Disease Mother         Renal failure    Coronary Art Dis Mother     Hypertension Mother     Lung Cancer Father     Coronary Art Dis Father     Hypertension Brother     Other Brother Hypernephroma     No outpatient prescriptions have been marked as taking for the 7/30/18 encounter (Office Visit) with Jonathan Mcmahon MD.       Patient has no known allergies. History   Smoking Status    Former Smoker    Types: Cigarettes    Quit date: 10/11/1992   Smokeless Tobacco    Never Used      (If patient a smoker, smoking cessation counseling offered)   History   Alcohol Use    0.6 oz/week    1 Standard drinks or equivalent per week     Comment: Social alcohol intake. REVIEW OF SYSTEMS:  Constitutional: negative  Eyes: negative  Respiratory: negative  Cardiovascular: negative  Gastrointestinal: negative  Genitourinary: see HPI  Musculoskeletal: negative  Skin: negative   Neurological: negative  Hematological/Lymphatic: negative  Psychological: negative        Physical Exam:    This a 68 y.o. male  Vitals:    07/30/18 1207   BP: 124/80   Pulse: 54   SpO2: 99%     Body mass index is 30.41 kg/m². Constitutional: Patient in no acute distress;         Assessment and Plan        1. Benign prostatic hyperplasia without lower urinary tract symptoms    2. Erectile dysfunction, unspecified erectile dysfunction type    3. Elevated PSA               Plan:      Hold off on MRI  uroxatrol 10 mg  Stop bethanchol  One year with psa  If more voiding issues, consider cystoscopy    Prescriptions Ordered:  No orders of the defined types were placed in this encounter.      Orders Placed:  Orders Placed This Encounter   Procedures    PSA, Diagnostic     Standing Status:   Future     Standing Expiration Date:   8/30/2020            Renny Kehr, MD

## 2018-07-30 NOTE — PROGRESS NOTES
Cigarettes     Quit date: 10/11/1992    Smokeless tobacco: Never Used    Alcohol use 0.6 oz/week     1 Standard drinks or equivalent per week      Comment: Social alcohol intake. Current Outpatient Prescriptions   Medication Sig Dispense Refill    nitroGLYCERIN (NITROSTAT) 0.4 MG SL tablet Place 1 tablet under the tongue every 5 minutes as needed for Chest pain 25 tablet 3    glimepiride (AMARYL) 2 MG tablet Take 2 mg by mouth 2 times daily      glucose blood VI test strips (DURGA CONTOUR TEST) strip Tests blood sugar daily. 300 each 3    allopurinol (ZYLOPRIM) 300 MG tablet Take 1 tablet by mouth daily 90 tablet 3    lisinopril (PRINIVIL;ZESTRIL) 40 MG tablet Take 1 tablet by mouth daily 90 tablet 3    metoprolol tartrate (LOPRESSOR) 50 MG tablet Take 1 tablet by mouth 2 times daily 180 tablet 3    atorvastatin (LIPITOR) 80 MG tablet Take 1 tablet by mouth daily 90 tablet 3    hydrochlorothiazide (HYDRODIURIL) 12.5 MG tablet Take 1 tablet by mouth daily 90 tablet 3    alfuzosin (UROXATRAL) 10 MG extended release tablet Take 1 tablet by mouth daily 90 tablet 3    aspirin 81 MG EC tablet Take 4 tablets by mouth daily       No current facility-administered medications for this visit.       No Known Allergies    Health Maintenance   Topic Date Due    AAA screen  1945    DTaP/Tdap/Td vaccine (1 - Tdap) 07/23/1964    Shingles Vaccine (1 of 2 - 2 Dose Series) 07/23/1995    Diabetic foot exam  12/23/2017    Diabetic retinal exam  05/04/2018    Colon cancer screen colonoscopy  05/29/2018    Hepatitis C screen  12/18/2018 (Originally 1945)    Flu vaccine (1) 09/01/2018    A1C test (Diabetic or Prediabetic)  07/17/2019    Diabetic microalbuminuria test  07/17/2019    Lipid screen  07/17/2019    Potassium monitoring  07/17/2019    Creatinine monitoring  07/17/2019    Pneumococcal low/med risk  Completed       Subjective:      Review of Systems   Constitutional: Negative for chills and

## 2018-07-30 NOTE — PATIENT INSTRUCTIONS
Patient Education        Learning About Physical Activity  What is physical activity? Physical activity is any kind of activity that gets your body moving. The types of physical activity that can help you get fit and stay healthy include:  · Aerobic or \"cardio\" activities that make your heart beat faster and make you breathe harder, such as brisk walking, riding a bike, or running. Aerobic activities strengthen your heart and lungs and build up your endurance. · Strength training activities that make your muscles work against, or \"resist,\" something, such as lifting weights or doing push-ups. These activities help tone and strengthen your muscles. · Stretches that allow you to move your joints and muscles through their full range of motion. Stretching helps you be more flexible and avoid injury. What are the benefits of physical activity? Being active is one of the best things you can do to get fit and stay healthy. It helps you to:  · Feel stronger and have more energy to do all the things you like to do. · Focus better at school or work and perform better in sports. · Feel, think, and sleep better. · Reach and stay at a healthy weight. · Lose fat and build lean muscle. · Lower your risk for serious health problems. · Keep your bones, muscles, and joints strong. Being fit lets you do more physical activity. And it lets you work out harder without as much effort. How can you make physical activity part of your life? Get at least 30 minutes of exercise on most days of the week. Walking is a good choice. You also may want to do other activities, such as running, swimming, cycling, or playing tennis or team sports. Pick activities that you like-ones that make your heart beat faster, your muscles stronger, and your muscles and joints more flexible. If you find more than one thing you like doing, do them all. You don't have to do the same thing every day. Get your heart pumping every day.  Any activity

## 2018-07-30 NOTE — PROGRESS NOTES
Chronic Disease Visit Information    BP Readings from Last 3 Encounters:   07/30/18 132/70   07/30/18 124/80   05/11/18 138/70          Hemoglobin A1C (%)   Date Value   07/17/2018 8.0 (H)   12/11/2017 7.3 (H)   06/16/2017 7.2 (H)     Microalb/Crt. Ratio (mcg/mg creat)   Date Value   07/17/2018 CANNOT BE CALCULATED     LDL Cholesterol (mg/dL)   Date Value   07/17/2018 37     HDL (mg/dL)   Date Value   07/17/2018 34 (L)     BUN (mg/dL)   Date Value   07/17/2018 18     CREATININE (mg/dL)   Date Value   07/17/2018 1.00     Glucose (mg/dL)   Date Value   07/17/2018 235 (H)            Have you changed or started any medications since your last visit including any over-the-counter medicines, vitamins, or herbal medicines? no   Are you having any side effects from any of your medications? -  no  Have you stopped taking any of your medications? Is so, why? -  no    Have you seen any other physician or provider since your last visit? Yes - Records Obtained  Have you had any other diagnostic tests since your last visit? No  Have you been seen in the emergency room and/or had an admission to a hospital since we last saw you? No  Have you had your annual diabetic retinal (eye) exam? No  Have you had your routine dental cleaning in the past 6 months? no    Have you activated your [employer] account? If not, what are your barriers?  Yes     Patient Care Team:  Yg Chu MD as PCP - General (Internal Medicine)  Yg Chu MD as PCP - S Attributed Provider         Medical History Review  Past Medical, Family, and Social History reviewed and does contribute to the patient presenting condition    Health Maintenance   Topic Date Due    AAA screen  1945    DTaP/Tdap/Td vaccine (1 - Tdap) 07/23/1964    Shingles Vaccine (1 of 2 - 2 Dose Series) 07/23/1995    Diabetic foot exam  12/23/2017    Diabetic retinal exam  05/04/2018    Colon cancer screen colonoscopy  05/29/2018    Hepatitis C screen  12/18/2018 (Originally 1945)    Flu vaccine (1) 09/01/2018    A1C test (Diabetic or Prediabetic)  07/17/2019    Diabetic microalbuminuria test  07/17/2019    Lipid screen  07/17/2019    Potassium monitoring  07/17/2019    Creatinine monitoring  07/17/2019    Pneumococcal low/med risk  Completed

## 2018-08-14 LAB — DIABETIC RETINOPATHY: NEGATIVE

## 2018-08-31 RX ORDER — GLIMEPIRIDE 2 MG/1
2 TABLET ORAL 2 TIMES DAILY
Qty: 180 TABLET | Refills: 3 | Status: SHIPPED | OUTPATIENT
Start: 2018-08-31 | End: 2019-03-06 | Stop reason: DRUGHIGH

## 2018-09-27 ENCOUNTER — OFFICE VISIT (OUTPATIENT)
Dept: PRIMARY CARE CLINIC | Age: 73
End: 2018-09-27
Payer: MEDICARE

## 2018-09-27 VITALS
WEIGHT: 200.2 LBS | HEART RATE: 68 BPM | RESPIRATION RATE: 18 BRPM | SYSTOLIC BLOOD PRESSURE: 136 MMHG | BODY MASS INDEX: 30.34 KG/M2 | OXYGEN SATURATION: 97 % | HEIGHT: 68 IN | DIASTOLIC BLOOD PRESSURE: 64 MMHG

## 2018-09-27 DIAGNOSIS — R33.9 URINE RETENTION: ICD-10-CM

## 2018-09-27 DIAGNOSIS — Z46.6 ENCOUNTER FOR REMOVAL OF URINARY CATHETER: Primary | ICD-10-CM

## 2018-09-27 PROCEDURE — G8598 ASA/ANTIPLAT THER USED: HCPCS | Performed by: NURSE PRACTITIONER

## 2018-09-27 PROCEDURE — 99213 OFFICE O/P EST LOW 20 MIN: CPT | Performed by: NURSE PRACTITIONER

## 2018-09-27 PROCEDURE — 3017F COLORECTAL CA SCREEN DOC REV: CPT | Performed by: NURSE PRACTITIONER

## 2018-09-27 PROCEDURE — 1101F PT FALLS ASSESS-DOCD LE1/YR: CPT | Performed by: NURSE PRACTITIONER

## 2018-09-27 PROCEDURE — G8427 DOCREV CUR MEDS BY ELIG CLIN: HCPCS | Performed by: NURSE PRACTITIONER

## 2018-09-27 PROCEDURE — 1123F ACP DISCUSS/DSCN MKR DOCD: CPT | Performed by: NURSE PRACTITIONER

## 2018-09-27 PROCEDURE — G8417 CALC BMI ABV UP PARAM F/U: HCPCS | Performed by: NURSE PRACTITIONER

## 2018-09-27 PROCEDURE — 4040F PNEUMOC VAC/ADMIN/RCVD: CPT | Performed by: NURSE PRACTITIONER

## 2018-09-27 PROCEDURE — 1036F TOBACCO NON-USER: CPT | Performed by: NURSE PRACTITIONER

## 2018-09-27 ASSESSMENT — ENCOUNTER SYMPTOMS
NAUSEA: 0
VOMITING: 0
RESPIRATORY NEGATIVE: 1
ABDOMINAL PAIN: 0

## 2018-09-27 NOTE — PATIENT INSTRUCTIONS
immediate medical care if:    · You cannot urinate at all, or it is getting harder to urinate.     · You have symptoms of a urinary tract infection. These may include:  ¨ Pain or burning when you urinate. ¨ A frequent need to urinate without being able to pass much urine. ¨ Pain in the flank, which is just below the rib cage and above the waist on either side of the back. ¨ Blood in your urine. ¨ A fever.    Watch closely for changes in your health, and be sure to contact your doctor if:    · You have any problems with your catheter.     · You do not get better as expected. Where can you learn more? Go to [email or web address] and sign in to your [employer] account. Enter M244 in the Gamar box to learn more about \"Urinary Retention: Care Instructions. \"     If you do not have an account, please click on the \"Sign Up Now\" link. Current as of: May 12, 2017  Content Version: 11.7  © 8416-1325 [employer]. Care instructions adapted under license by [facility] ([facility]). If you have questions about a medical condition or this instruction, always ask your healthcare professional. [place] [address] any warranty or liability for your use of this information.

## 2018-09-27 NOTE — PROGRESS NOTES
[facility]             [address] [place] [address], [facility] [place], [address]                        Telephone [phone]             Fax [phone]     Moiz De La O  1945  MRN:  [id number]   Date of visit:  9/27/2018    Subjective:    Moiz De La O is a 68 y.o.  male who presents to [facility] today (9/27/2018) for evaluation of:    Chief Complaint   Patient presents with    Other     was in the ER last friday for difficulty urinating, they placed a cath, has an appointment with pcp tomorrow, they called said to come to urgent care to remove the cath. also has an appiontment with the urologist 10/01/18       Other   This is a new problem. The current episode started 1 to 4 weeks ago (09/21/2018 urinary retention and urinary catheter placed in ER; Patient requested to have catheter removed due to increased irritation). The problem occurs constantly. The problem has been unchanged. Pertinent negatives include no abdominal pain, fever, nausea, rash, urinary symptoms or vomiting. He has tried nothing for the symptoms. The treatment provided no relief.        He has the following problem list:  Patient Active Problem List   Diagnosis    Hyperlipidemia    Erectile dysfunction    Coronary heart disease    Benign prostatic hyperplasia    Gout    History of elevated PSA    Hypertension    Benign prostatic hyperplasia    Elevated PSA    Type 2 diabetes mellitus without complication (HCC)    S/P CABG x 3    Coronary artery disease involving native coronary artery of native heart without angina pectoris    Coronary artery disease involving native heart with angina pectoris (HCC)        Current medications are:  Current Outpatient Prescriptions   Medication Sig Dispense Refill    glimepiride (AMARYL) 2 MG tablet Take 1 tablet by mouth 2 times daily 180 tablet 3    nitroGLYCERIN (NITROSTAT) 0.4 MG SL tablet Place 1 tablet under the tongue every 5 minutes as needed for Chest pain 25 tablet 3    glucose blood VI test strips (DURGA CONTOUR TEST) strip Tests blood sugar daily. 300 each 3    allopurinol (ZYLOPRIM) 300 MG tablet Take 1 tablet by mouth daily 90 tablet 3    lisinopril (PRINIVIL;ZESTRIL) 40 MG tablet Take 1 tablet by mouth daily 90 tablet 3    metoprolol tartrate (LOPRESSOR) 50 MG tablet Take 1 tablet by mouth 2 times daily 180 tablet 3    atorvastatin (LIPITOR) 80 MG tablet Take 1 tablet by mouth daily 90 tablet 3    hydrochlorothiazide (HYDRODIURIL) 12.5 MG tablet Take 1 tablet by mouth daily 90 tablet 3    alfuzosin (UROXATRAL) 10 MG extended release tablet Take 1 tablet by mouth daily 90 tablet 3    aspirin 81 MG EC tablet Take 4 tablets by mouth daily       No current facility-administered medications for this visit. He has No Known Allergies. Mame Vidal He  reports that he quit smoking about 25 years ago. His smoking use included Cigarettes. He has never used smokeless tobacco.      Objective:    Vitals:    09/27/18 1631   BP: 136/64   Pulse: 68   Resp: 18   SpO2: 97%     Body mass index is 30.44 kg/m². Review of Systems   Constitutional: Negative. Negative for fever. Respiratory: Negative. Cardiovascular: Negative. Gastrointestinal: Negative for abdominal pain, nausea and vomiting. Genitourinary: Negative for discharge, penile pain, penile swelling, scrotal swelling and testicular pain. Urinary catheter causing irritation and discomfort. Skin: Negative for rash. Physical Exam   Constitutional: He is oriented to person, place, and time. He appears well-developed and well-nourished. HENT:   Head: Normocephalic. Eyes: Pupils are equal, round, and reactive to light. Neck: Normal range of motion. Neck supple. Cardiovascular: Normal rate, regular rhythm and normal heart sounds. Pulmonary/Chest: Effort normal and breath sounds normal.   Abdominal: Soft.  Normal appearance and

## 2018-09-28 ENCOUNTER — HOSPITAL ENCOUNTER (EMERGENCY)
Age: 73
Discharge: HOME | End: 2018-09-28
Attending: EMERGENCY MEDICINE
Payer: MEDICARE

## 2018-09-28 VITALS
RESPIRATION RATE: 14 BRPM | DIASTOLIC BLOOD PRESSURE: 82 MMHG | HEART RATE: 71 BPM | OXYGEN SATURATION: 97 % | TEMPERATURE: 98.1 F | SYSTOLIC BLOOD PRESSURE: 164 MMHG

## 2018-09-28 DIAGNOSIS — R33.8 URINARY RETENTION DUE TO BENIGN PROSTATIC HYPERPLASIA: Primary | ICD-10-CM

## 2018-09-28 DIAGNOSIS — N40.1 URINARY RETENTION DUE TO BENIGN PROSTATIC HYPERPLASIA: Primary | ICD-10-CM

## 2018-09-28 LAB
-: ABNORMAL
AMORPHOUS: ABNORMAL
BACTERIA: ABNORMAL
BILIRUBIN URINE: NEGATIVE
CASTS UA: ABNORMAL /LPF (ref 0–2)
COLOR: ABNORMAL
COMMENT UA: ABNORMAL
CRYSTALS, UA: ABNORMAL /HPF
EPITHELIAL CELLS UA: ABNORMAL /HPF (ref 0–5)
GLUCOSE URINE: ABNORMAL
KETONES, URINE: NEGATIVE
LEUKOCYTE ESTERASE, URINE: NEGATIVE
MUCUS: ABNORMAL
NITRITE, URINE: NEGATIVE
OTHER OBSERVATIONS UA: ABNORMAL
PH UA: 5.5 (ref 5–6)
PROTEIN UA: ABNORMAL
RBC UA: >50 /HPF (ref 0–4)
RENAL EPITHELIAL, UA: ABNORMAL /HPF
SPECIFIC GRAVITY UA: 1.02 (ref 1.01–1.02)
TRICHOMONAS: ABNORMAL
TURBIDITY: ABNORMAL
URINE HGB: ABNORMAL
UROBILINOGEN, URINE: NORMAL
WBC UA: ABNORMAL /HPF (ref 0–4)
YEAST: ABNORMAL

## 2018-09-28 PROCEDURE — 99283 EMERGENCY DEPT VISIT LOW MDM: CPT

## 2018-09-28 PROCEDURE — 81001 URINALYSIS AUTO W/SCOPE: CPT

## 2018-09-28 PROCEDURE — 51702 INSERT TEMP BLADDER CATH: CPT

## 2018-09-28 ASSESSMENT — PAIN DESCRIPTION - FREQUENCY: FREQUENCY: CONTINUOUS

## 2018-09-28 ASSESSMENT — PAIN DESCRIPTION - ORIENTATION: ORIENTATION: LOWER

## 2018-09-28 ASSESSMENT — PAIN SCALES - GENERAL: PAINLEVEL_OUTOF10: 5

## 2018-09-28 ASSESSMENT — PAIN DESCRIPTION - ONSET: ONSET: ON-GOING

## 2018-09-28 ASSESSMENT — PAIN DESCRIPTION - LOCATION: LOCATION: ABDOMEN

## 2018-09-28 ASSESSMENT — PAIN DESCRIPTION - PAIN TYPE: TYPE: ACUTE PAIN

## 2018-09-28 ASSESSMENT — PAIN DESCRIPTION - DESCRIPTORS: DESCRIPTORS: PRESSURE

## 2018-10-01 ENCOUNTER — OFFICE VISIT (OUTPATIENT)
Dept: UROLOGY | Age: 73
End: 2018-10-01
Payer: MEDICARE

## 2018-10-01 VITALS
BODY MASS INDEX: 30.68 KG/M2 | WEIGHT: 202.4 LBS | DIASTOLIC BLOOD PRESSURE: 80 MMHG | HEART RATE: 62 BPM | OXYGEN SATURATION: 97 % | HEIGHT: 68 IN | SYSTOLIC BLOOD PRESSURE: 132 MMHG

## 2018-10-01 DIAGNOSIS — R33.9 URINARY RETENTION: Primary | ICD-10-CM

## 2018-10-01 PROCEDURE — 4040F PNEUMOC VAC/ADMIN/RCVD: CPT | Performed by: UROLOGY

## 2018-10-01 PROCEDURE — 1101F PT FALLS ASSESS-DOCD LE1/YR: CPT | Performed by: UROLOGY

## 2018-10-01 PROCEDURE — G8427 DOCREV CUR MEDS BY ELIG CLIN: HCPCS | Performed by: UROLOGY

## 2018-10-01 PROCEDURE — 1036F TOBACCO NON-USER: CPT | Performed by: UROLOGY

## 2018-10-01 PROCEDURE — 3017F COLORECTAL CA SCREEN DOC REV: CPT | Performed by: UROLOGY

## 2018-10-01 PROCEDURE — G8598 ASA/ANTIPLAT THER USED: HCPCS | Performed by: UROLOGY

## 2018-10-01 PROCEDURE — G8484 FLU IMMUNIZE NO ADMIN: HCPCS | Performed by: UROLOGY

## 2018-10-01 PROCEDURE — G8417 CALC BMI ABV UP PARAM F/U: HCPCS | Performed by: UROLOGY

## 2018-10-01 PROCEDURE — 99214 OFFICE O/P EST MOD 30 MIN: CPT | Performed by: UROLOGY

## 2018-10-01 PROCEDURE — 1123F ACP DISCUSS/DSCN MKR DOCD: CPT | Performed by: UROLOGY

## 2018-10-04 ENCOUNTER — TELEPHONE (OUTPATIENT)
Dept: PRIMARY CARE CLINIC | Age: 73
End: 2018-10-04

## 2018-10-04 ENCOUNTER — HOSPITAL ENCOUNTER (EMERGENCY)
Age: 73
Discharge: HOME | End: 2018-10-04
Attending: EMERGENCY MEDICINE
Payer: MEDICARE

## 2018-10-04 VITALS
TEMPERATURE: 98.1 F | SYSTOLIC BLOOD PRESSURE: 169 MMHG | DIASTOLIC BLOOD PRESSURE: 70 MMHG | OXYGEN SATURATION: 94 % | HEIGHT: 68 IN | WEIGHT: 200 LBS | BODY MASS INDEX: 30.31 KG/M2 | HEART RATE: 70 BPM | RESPIRATION RATE: 16 BRPM

## 2018-10-04 DIAGNOSIS — T83.9XXA PROBLEM WITH FOLEY CATHETER, INITIAL ENCOUNTER (HCC): Primary | ICD-10-CM

## 2018-10-04 LAB
-: ABNORMAL
AMORPHOUS: ABNORMAL
BACTERIA: ABNORMAL
BILIRUBIN URINE: NEGATIVE
CASTS UA: ABNORMAL /LPF (ref 0–2)
COLOR: ABNORMAL
COMMENT UA: ABNORMAL
CRYSTALS, UA: ABNORMAL /HPF
EPITHELIAL CELLS UA: ABNORMAL /HPF (ref 0–5)
GLUCOSE URINE: NEGATIVE
KETONES, URINE: NEGATIVE
LEUKOCYTE ESTERASE, URINE: ABNORMAL
MUCUS: ABNORMAL
NITRITE, URINE: NEGATIVE
OTHER OBSERVATIONS UA: ABNORMAL
PH UA: 5.5 (ref 5–6)
PROTEIN UA: ABNORMAL
RBC UA: ABNORMAL /HPF (ref 0–4)
RENAL EPITHELIAL, UA: ABNORMAL /HPF
SPECIFIC GRAVITY UA: 1.01 (ref 1.01–1.02)
TRICHOMONAS: ABNORMAL
TURBIDITY: ABNORMAL
URINE HGB: ABNORMAL
UROBILINOGEN, URINE: NORMAL
WBC UA: ABNORMAL /HPF (ref 0–4)
YEAST: ABNORMAL

## 2018-10-04 PROCEDURE — 99283 EMERGENCY DEPT VISIT LOW MDM: CPT

## 2018-10-04 PROCEDURE — 87186 SC STD MICRODIL/AGAR DIL: CPT

## 2018-10-04 PROCEDURE — 86403 PARTICLE AGGLUT ANTBDY SCRN: CPT

## 2018-10-04 PROCEDURE — 81001 URINALYSIS AUTO W/SCOPE: CPT

## 2018-10-04 PROCEDURE — 87086 URINE CULTURE/COLONY COUNT: CPT

## 2018-10-04 PROCEDURE — 87077 CULTURE AEROBIC IDENTIFY: CPT

## 2018-10-04 NOTE — TELEPHONE ENCOUNTER
Patient came to the window requesting an appointment. Patient stated he has a cathiter that was placed in ER last Thursday. Patient stated urine was leaking all around tubing but was not actually going down the tube into the bag, he was not sure if it needed removed or just flashed. Patient was taken to [facility] ER for evaluation and treatment.

## 2018-10-04 NOTE — ED PROVIDER NOTES
Coronary artery bypass graft; Cardiac catheterization (2005); Cataract removal (Bilateral, Nov. and Dec 2014); Cardiac catheterization (3/16/16); Prostate Biopsy (Bilateral, 1999); Prostate Biopsy (Bilateral, 1998); Prostate Biopsy (Bilateral, 1998); and Prostate Biopsy (Bilateral, 10/04/1996). CURRENT MEDICATIONS       Previous Medications    ALFUZOSIN (UROXATRAL) 10 MG EXTENDED RELEASE TABLET    Take 1 tablet by mouth daily    ALLOPURINOL (ZYLOPRIM) 300 MG TABLET    Take 1 tablet by mouth daily    ASPIRIN 81 MG EC TABLET    Take 4 tablets by mouth daily    ATORVASTATIN (LIPITOR) 80 MG TABLET    Take 1 tablet by mouth daily    GLIMEPIRIDE (AMARYL) 2 MG TABLET    Take 1 tablet by mouth 2 times daily    GLUCOSE BLOOD VI TEST STRIPS (DURGA CONTOUR TEST) STRIP    Tests blood sugar daily. HYDROCHLOROTHIAZIDE (HYDRODIURIL) 12.5 MG TABLET    Take 1 tablet by mouth daily    LISINOPRIL (PRINIVIL;ZESTRIL) 40 MG TABLET    Take 1 tablet by mouth daily    METOPROLOL TARTRATE (LOPRESSOR) 50 MG TABLET    Take 1 tablet by mouth 2 times daily    NITROGLYCERIN (NITROSTAT) 0.4 MG SL TABLET    Place 1 tablet under the tongue every 5 minutes as needed for Chest pain       ALLERGIES     has No Known Allergies. FAMILY HISTORY     indicated that his mother is . He indicated that his father is . family history includes Coronary Art Dis in his father and mother; Diabetes in his mother; Hypertension in his brother and mother; Kidney Disease in his mother; Kae Meehan in his father; Other in his brother. SOCIAL HISTORY      reports that he quit smoking about 25 years ago. His smoking use included Cigarettes. He has never used smokeless tobacco. He reports that he drinks about 0.6 oz of alcohol per week . He reports that he does not use drugs.     PHYSICAL EXAM       ED Triage Vitals [10/04/18 1716]   BP Temp Temp Source Pulse Resp SpO2 Height Weight   (!) 188/88 98.1 °F (36.7 °C) Tympanic 68 PM        PATIENT REFERRED TO:  Cristel Nicholas MD  [address]  [phone]    Go on 10/8/2018  as scheduled      DISCHARGE MEDICATIONS:  New Prescriptions    No medications on file       (Please note that portions of this note were completed with a voice recognition program.  Efforts were made to edit the dictations but occasionally words are mis-transcribed.)    Obed Shaw,   Attending Emergency Physician       Obed Shaw,   10/04/18 Robby Gonzalez DO  10/04/18 1762

## 2018-10-07 LAB
CULTURE: ABNORMAL
Lab: ABNORMAL
ORGANISM: ABNORMAL
SPECIMEN DESCRIPTION: ABNORMAL
STATUS: ABNORMAL

## 2018-10-08 ENCOUNTER — HOSPITAL ENCOUNTER (EMERGENCY)
Age: 73
Discharge: HOME | End: 2018-10-08
Attending: EMERGENCY MEDICINE
Payer: MEDICARE

## 2018-10-08 ENCOUNTER — HOSPITAL ENCOUNTER (OUTPATIENT)
Age: 73
Setting detail: SPECIMEN
Discharge: HOME | End: 2018-10-08
Payer: MEDICARE

## 2018-10-08 ENCOUNTER — PROCEDURE VISIT (OUTPATIENT)
Dept: UROLOGY | Age: 73
End: 2018-10-08
Payer: MEDICARE

## 2018-10-08 VITALS
HEART RATE: 77 BPM | BODY MASS INDEX: 29.4 KG/M2 | OXYGEN SATURATION: 99 % | DIASTOLIC BLOOD PRESSURE: 88 MMHG | SYSTOLIC BLOOD PRESSURE: 156 MMHG | WEIGHT: 194 LBS | HEIGHT: 68 IN

## 2018-10-08 VITALS
OXYGEN SATURATION: 95 % | RESPIRATION RATE: 14 BRPM | TEMPERATURE: 98.2 F | HEART RATE: 79 BPM | DIASTOLIC BLOOD PRESSURE: 89 MMHG | SYSTOLIC BLOOD PRESSURE: 148 MMHG

## 2018-10-08 DIAGNOSIS — N39.0 URINARY TRACT INFECTION WITHOUT HEMATURIA, SITE UNSPECIFIED: Primary | ICD-10-CM

## 2018-10-08 DIAGNOSIS — N40.0 BENIGN PROSTATIC HYPERPLASIA WITHOUT LOWER URINARY TRACT SYMPTOMS: ICD-10-CM

## 2018-10-08 DIAGNOSIS — R33.9 URINARY RETENTION: Primary | ICD-10-CM

## 2018-10-08 DIAGNOSIS — R33.9 INCOMPLETE BLADDER EMPTYING: ICD-10-CM

## 2018-10-08 DIAGNOSIS — N39.0 URINARY TRACT INFECTION WITHOUT HEMATURIA, SITE UNSPECIFIED: ICD-10-CM

## 2018-10-08 PROCEDURE — 51701 INSERT BLADDER CATHETER: CPT

## 2018-10-08 PROCEDURE — 51798 US URINE CAPACITY MEASURE: CPT | Performed by: UROLOGY

## 2018-10-08 PROCEDURE — 99999 PR OFFICE/OUTPT VISIT,PROCEDURE ONLY: CPT | Performed by: UROLOGY

## 2018-10-08 PROCEDURE — 87186 SC STD MICRODIL/AGAR DIL: CPT

## 2018-10-08 PROCEDURE — 52000 CYSTOURETHROSCOPY: CPT | Performed by: UROLOGY

## 2018-10-08 PROCEDURE — 87086 URINE CULTURE/COLONY COUNT: CPT

## 2018-10-08 PROCEDURE — 99283 EMERGENCY DEPT VISIT LOW MDM: CPT

## 2018-10-08 PROCEDURE — 51741 ELECTRO-UROFLOWMETRY FIRST: CPT | Performed by: UROLOGY

## 2018-10-08 PROCEDURE — 87077 CULTURE AEROBIC IDENTIFY: CPT

## 2018-10-08 RX ORDER — SULFAMETHOXAZOLE AND TRIMETHOPRIM 800; 160 MG/1; MG/1
1 TABLET ORAL 2 TIMES DAILY
COMMUNITY
End: 2018-10-24 | Stop reason: ALTCHOICE

## 2018-10-08 ASSESSMENT — PAIN SCALES - GENERAL: PAINLEVEL_OUTOF10: 9

## 2018-10-08 ASSESSMENT — PAIN DESCRIPTION - PAIN TYPE: TYPE: ACUTE PAIN

## 2018-10-08 ASSESSMENT — PAIN DESCRIPTION - DESCRIPTORS: DESCRIPTORS: PRESSURE

## 2018-10-08 ASSESSMENT — PAIN DESCRIPTION - ORIENTATION: ORIENTATION: LOWER

## 2018-10-08 ASSESSMENT — PAIN DESCRIPTION - LOCATION: LOCATION: ABDOMEN

## 2018-10-08 NOTE — ED PROVIDER NOTES
tablet by mouth 2 times daily, Disp-180 tablet, R-3Normal      nitroGLYCERIN (NITROSTAT) 0.4 MG SL tablet Place 1 tablet under the tongue every 5 minutes as needed for Chest pain, Disp-25 tablet, R-3Normal      allopurinol (ZYLOPRIM) 300 MG tablet Take 1 tablet by mouth daily, Disp-90 tablet, R-3Normal      lisinopril (PRINIVIL;ZESTRIL) 40 MG tablet Take 1 tablet by mouth daily, Disp-90 tablet, R-3Normal      metoprolol tartrate (LOPRESSOR) 50 MG tablet Take 1 tablet by mouth 2 times daily, Disp-180 tablet, R-3Normal      atorvastatin (LIPITOR) 80 MG tablet Take 1 tablet by mouth daily, Disp-90 tablet, R-3Normal      hydrochlorothiazide (HYDRODIURIL) 12.5 MG tablet Take 1 tablet by mouth daily, Disp-90 tablet, R-3Normal      alfuzosin (UROXATRAL) 10 MG extended release tablet Take 1 tablet by mouth daily, Disp-90 tablet, R-3Normal      aspirin 81 MG EC tablet Take 81 mg by mouth daily Historical Med      glucose blood VI test strips (DURGA CONTOUR TEST) strip Disp-300 each, R-3, NormalTests blood sugar daily. ALLERGIES     has No Known Allergies. FAMILY HISTORY     indicated that his mother is . He indicated that his father is . family history includes Coronary Art Dis in his father and mother; Diabetes in his mother; Hypertension in his brother and mother; Kidney Disease in his mother; Butch Felling in his father; Other in his brother. SOCIAL HISTORY      reports that he quit smoking about 26 years ago. His smoking use included Cigarettes. He has never used smokeless tobacco. He reports that he drinks about 0.6 oz of alcohol per week . He reports that he does not use drugs. PHYSICAL EXAM     INITIAL VITALS:  temperature is 98.2 °F (36.8 °C). His blood pressure is 148/89 (abnormal) and his pulse is 79. His respiration is 14 and oxygen saturation is 95%. Physical Exam   Constitutional: He is oriented to person, place, and time.  He appears well-developed and

## 2018-10-10 LAB
CULTURE: ABNORMAL
Lab: ABNORMAL
ORGANISM: ABNORMAL
SPECIMEN DESCRIPTION: ABNORMAL
STATUS: ABNORMAL

## 2018-10-14 NOTE — PROGRESS NOTES
Amina Durant MD        [address] [address]  Dept: [phone]  Dept Fax: [phone]  Loc: [facility] [facility] Urology Office Note - Follow up Visit    Patient:  Sudheer Vences  YOB: 1945  Date: 10/14/2018    The patient is a 68 y.o. male who presents today for evaluation of the following problems: elevated psa  Chief Complaint   Patient presents with    Benign Prostatic Hypertrophy     [place] had to have cath inserted, was in urgent care to hvae cath pulled got it pulled, but was in the ER due to not being able to urinate        HISTORY OF PRESENT ILLNESS:     Onset was  Years ago  Overall, the problem(s) are worsening  Severity is described as mild-moderate. Associated Symptoms: No dysuria, no gross hematuria. Current Pain Severity: 4      Overall the PSA level is: oscillating. Range 11-12  Recent history of urinary tract infection/prostatitis? no  Previous prostate biopsy? Yes x 6. NEG  Lower urinary tract symptoms: no    Previous patient of Dr. Adriana Cristobal --[facility]  Has had episodes of retention after alcohol use in past    Was in [place] on vacation and went into retention. Failed voiding trial. Complaining of painful bladder spasms today. Requested/reviewed records from Rachele Pollack MD office and/or outside physician/EMR    (Patient's old records have been requested, reviewed and pertinent findings summarized in today's note.)    Procedures Today: N/A    Last several PSA's:  Lab Results   Component Value Date    PSA 11.1 (H) 07/17/2018    PSA 11.17 (H) 06/16/2017    PSA 12.57 (H) 06/16/2016       Last total testosterone:  No results found for: TESTOSTERONE    Urinalysis today:  No results found for this visit on 10/01/18.     Last BUN and creatinine:  Lab Results   Component Value Date    BUN 18 07/17/2018     Lab Results   Component Value Date    CREATININE 1.00 07/17/2018 Age of Onset    Diabetes Mother     Kidney Disease Mother         Renal failure    Coronary Art Dis Mother     Hypertension Mother     Lung Cancer Father     Coronary Art Dis Father     Hypertension Brother     Other Brother         Hypernephroma     Outpatient Prescriptions Marked as Taking for the 10/1/18 encounter (Office Visit) with Alli Cabello MD   Medication Sig Dispense Refill    glimepiride (AMARYL) 2 MG tablet Take 1 tablet by mouth 2 times daily 180 tablet 3    nitroGLYCERIN (NITROSTAT) 0.4 MG SL tablet Place 1 tablet under the tongue every 5 minutes as needed for Chest pain 25 tablet 3    glucose blood VI test strips (DURGA CONTOUR TEST) strip Tests blood sugar daily. 300 each 3    allopurinol (ZYLOPRIM) 300 MG tablet Take 1 tablet by mouth daily 90 tablet 3    lisinopril (PRINIVIL;ZESTRIL) 40 MG tablet Take 1 tablet by mouth daily 90 tablet 3    metoprolol tartrate (LOPRESSOR) 50 MG tablet Take 1 tablet by mouth 2 times daily 180 tablet 3    atorvastatin (LIPITOR) 80 MG tablet Take 1 tablet by mouth daily 90 tablet 3    hydrochlorothiazide (HYDRODIURIL) 12.5 MG tablet Take 1 tablet by mouth daily 90 tablet 3    alfuzosin (UROXATRAL) 10 MG extended release tablet Take 1 tablet by mouth daily 90 tablet 3    aspirin 81 MG EC tablet Take 81 mg by mouth daily          Patient has no known allergies. History   Smoking Status    Former Smoker    Types: Cigarettes    Quit date: 10/11/1992   Smokeless Tobacco    Never Used      (If patient a smoker, smoking cessation counseling offered)   History   Alcohol Use    0.6 oz/week    1 Standard drinks or equivalent per week     Comment: Social alcohol intake.        REVIEW OF SYSTEMS:  Constitutional: negative  Eyes: negative  Respiratory: negative  Cardiovascular: negative  Gastrointestinal: negative  Genitourinary: see HPI  Musculoskeletal: negative  Skin: negative   Neurological: negative  Hematological/Lymphatic: negative  Psychological:

## 2018-10-22 ENCOUNTER — TELEPHONE (OUTPATIENT)
Dept: UROLOGY | Age: 73
End: 2018-10-22

## 2018-10-22 ENCOUNTER — HOSPITAL ENCOUNTER (OUTPATIENT)
Age: 73
Setting detail: SPECIMEN
Discharge: HOME | End: 2018-10-22
Payer: MEDICARE

## 2018-10-22 DIAGNOSIS — N39.0 URINARY TRACT INFECTION WITHOUT HEMATURIA, SITE UNSPECIFIED: Primary | ICD-10-CM

## 2018-10-22 DIAGNOSIS — N39.0 URINARY TRACT INFECTION WITHOUT HEMATURIA, SITE UNSPECIFIED: ICD-10-CM

## 2018-10-22 LAB
-: ABNORMAL
AMORPHOUS: ABNORMAL
BACTERIA: ABNORMAL
BILIRUBIN URINE: NEGATIVE
CASTS UA: ABNORMAL /LPF (ref 0–2)
COLOR: ABNORMAL
COMMENT UA: ABNORMAL
CRYSTALS, UA: ABNORMAL /HPF
EPITHELIAL CELLS UA: ABNORMAL /HPF (ref 0–5)
GLUCOSE URINE: ABNORMAL
KETONES, URINE: ABNORMAL
LEUKOCYTE ESTERASE, URINE: ABNORMAL
MUCUS: ABNORMAL
NITRITE, URINE: NEGATIVE
OTHER OBSERVATIONS UA: ABNORMAL
PH UA: 5.5 (ref 5–6)
PROTEIN UA: ABNORMAL
RBC UA: >100 /HPF (ref 0–4)
RENAL EPITHELIAL, UA: ABNORMAL /HPF
SPECIFIC GRAVITY UA: 1.01 (ref 1.01–1.02)
TRICHOMONAS: ABNORMAL
TURBIDITY: ABNORMAL
URINE HGB: ABNORMAL
UROBILINOGEN, URINE: NORMAL
WBC UA: ABNORMAL /HPF (ref 0–4)
YEAST: PRESENT

## 2018-10-22 PROCEDURE — 87086 URINE CULTURE/COLONY COUNT: CPT

## 2018-10-22 PROCEDURE — 87077 CULTURE AEROBIC IDENTIFY: CPT

## 2018-10-22 PROCEDURE — 81001 URINALYSIS AUTO W/SCOPE: CPT

## 2018-10-22 PROCEDURE — 87186 SC STD MICRODIL/AGAR DIL: CPT

## 2018-10-23 ENCOUNTER — HOSPITAL ENCOUNTER (EMERGENCY)
Age: 73
Discharge: HOME | End: 2018-10-23
Attending: SPECIALIST
Payer: MEDICARE

## 2018-10-23 VITALS
HEIGHT: 68 IN | TEMPERATURE: 99.6 F | RESPIRATION RATE: 14 BRPM | SYSTOLIC BLOOD PRESSURE: 152 MMHG | WEIGHT: 195 LBS | OXYGEN SATURATION: 96 % | HEART RATE: 98 BPM | DIASTOLIC BLOOD PRESSURE: 83 MMHG | BODY MASS INDEX: 29.55 KG/M2

## 2018-10-23 DIAGNOSIS — T83.511A URINARY TRACT INFECTION ASSOCIATED WITH INDWELLING URETHRAL CATHETER, INITIAL ENCOUNTER (HCC): Primary | ICD-10-CM

## 2018-10-23 DIAGNOSIS — N39.0 URINARY TRACT INFECTION ASSOCIATED WITH INDWELLING URETHRAL CATHETER, INITIAL ENCOUNTER (HCC): Primary | ICD-10-CM

## 2018-10-23 DIAGNOSIS — R31.0 GROSS HEMATURIA: ICD-10-CM

## 2018-10-23 PROCEDURE — 6370000000 HC RX 637 (ALT 250 FOR IP): Performed by: SPECIALIST

## 2018-10-23 PROCEDURE — 99282 EMERGENCY DEPT VISIT SF MDM: CPT

## 2018-10-23 RX ORDER — NITROFURANTOIN 25; 75 MG/1; MG/1
100 CAPSULE ORAL ONCE
Status: COMPLETED | OUTPATIENT
Start: 2018-10-23 | End: 2018-10-23

## 2018-10-23 RX ORDER — NITROFURANTOIN 25; 75 MG/1; MG/1
100 CAPSULE ORAL 2 TIMES DAILY
Qty: 20 CAPSULE | Refills: 0 | Status: SHIPPED | OUTPATIENT
Start: 2018-10-23 | End: 2018-10-26 | Stop reason: CLARIF

## 2018-10-23 RX ADMIN — NITROFURANTOIN MONOHYDRATE AND NITROFURANTOIN MACROCRYSTALLINE 100 MG: 75; 25 CAPSULE ORAL at 22:59

## 2018-10-24 ENCOUNTER — HOSPITAL ENCOUNTER (OUTPATIENT)
Dept: PREADMISSION TESTING | Age: 73
Discharge: HOME | End: 2018-10-28
Payer: MEDICARE

## 2018-10-24 ENCOUNTER — TELEPHONE (OUTPATIENT)
Dept: UROLOGY | Age: 73
End: 2018-10-24

## 2018-10-24 VITALS
HEART RATE: 75 BPM | BODY MASS INDEX: 29.07 KG/M2 | TEMPERATURE: 97.5 F | WEIGHT: 191.8 LBS | DIASTOLIC BLOOD PRESSURE: 82 MMHG | SYSTOLIC BLOOD PRESSURE: 159 MMHG | HEIGHT: 68 IN | RESPIRATION RATE: 18 BRPM | OXYGEN SATURATION: 96 %

## 2018-10-24 DIAGNOSIS — T83.511A URINARY TRACT INFECTION ASSOCIATED WITH INDWELLING URETHRAL CATHETER, INITIAL ENCOUNTER (HCC): Primary | ICD-10-CM

## 2018-10-24 DIAGNOSIS — N39.0 URINARY TRACT INFECTION ASSOCIATED WITH INDWELLING URETHRAL CATHETER, INITIAL ENCOUNTER (HCC): Primary | ICD-10-CM

## 2018-10-24 LAB
ANION GAP SERPL CALCULATED.3IONS-SCNC: 17 MMOL/L (ref 9–17)
BUN BLDV-MCNC: 20 MG/DL (ref 8–23)
CHLORIDE BLD-SCNC: 101 MMOL/L (ref 98–107)
CO2: 23 MMOL/L (ref 20–31)
CREAT SERPL-MCNC: 1.19 MG/DL (ref 0.7–1.2)
CULTURE: ABNORMAL
EKG ATRIAL RATE: 69 BPM
EKG P AXIS: 52 DEGREES
EKG P-R INTERVAL: 174 MS
EKG Q-T INTERVAL: 400 MS
EKG QRS DURATION: 86 MS
EKG QTC CALCULATION (BAZETT): 428 MS
EKG R AXIS: 2 DEGREES
EKG T AXIS: 11 DEGREES
EKG VENTRICULAR RATE: 69 BPM
GFR AFRICAN AMERICAN: >60 ML/MIN
GFR NON-AFRICAN AMERICAN: 60 ML/MIN
GFR SERPL CREATININE-BSD FRML MDRD: ABNORMAL ML/MIN/{1.73_M2}
GFR SERPL CREATININE-BSD FRML MDRD: ABNORMAL ML/MIN/{1.73_M2}
GLUCOSE BLD-MCNC: 295 MG/DL (ref 70–99)
HCT VFR BLD CALC: 38.8 % (ref 40.7–50.3)
HEMOGLOBIN: 12.8 G/DL (ref 13–17)
INR BLD: 0.9
Lab: ABNORMAL
MCH RBC QN AUTO: 30.8 PG (ref 25.2–33.5)
MCHC RBC AUTO-ENTMCNC: 33 G/DL (ref 28.4–34.8)
MCV RBC AUTO: 93.3 FL (ref 82.6–102.9)
NRBC AUTOMATED: 0 PER 100 WBC
ORGANISM: ABNORMAL
PARTIAL THROMBOPLASTIN TIME: 24.3 SEC (ref 20.5–30.5)
PDW BLD-RTO: 13.2 % (ref 11.8–14.4)
PLATELET # BLD: 166 K/UL (ref 138–453)
PMV BLD AUTO: 9.5 FL (ref 8.1–13.5)
POTASSIUM SERPL-SCNC: 4.5 MMOL/L (ref 3.7–5.3)
PROTHROMBIN TIME: 9.7 SEC (ref 9–12)
RBC # BLD: 4.16 M/UL (ref 4.21–5.77)
SODIUM BLD-SCNC: 141 MMOL/L (ref 135–144)
SPECIMEN DESCRIPTION: ABNORMAL
STATUS: ABNORMAL
WBC # BLD: 7.7 K/UL (ref 3.5–11.3)

## 2018-10-24 PROCEDURE — 85027 COMPLETE CBC AUTOMATED: CPT

## 2018-10-24 PROCEDURE — 36415 COLL VENOUS BLD VENIPUNCTURE: CPT

## 2018-10-24 PROCEDURE — 82565 ASSAY OF CREATININE: CPT

## 2018-10-24 PROCEDURE — 80051 ELECTROLYTE PANEL: CPT

## 2018-10-24 PROCEDURE — 93005 ELECTROCARDIOGRAM TRACING: CPT

## 2018-10-24 PROCEDURE — 82947 ASSAY GLUCOSE BLOOD QUANT: CPT

## 2018-10-24 PROCEDURE — 85730 THROMBOPLASTIN TIME PARTIAL: CPT

## 2018-10-24 PROCEDURE — 84520 ASSAY OF UREA NITROGEN: CPT

## 2018-10-24 PROCEDURE — 85610 PROTHROMBIN TIME: CPT

## 2018-10-24 RX ORDER — CIPROFLOXACIN 500 MG/1
500 TABLET, FILM COATED ORAL 2 TIMES DAILY
Qty: 14 TABLET | Refills: 0 | Status: SHIPPED | OUTPATIENT
Start: 2018-10-24 | End: 2018-10-31

## 2018-10-24 RX ORDER — SODIUM CHLORIDE, SODIUM LACTATE, POTASSIUM CHLORIDE, CALCIUM CHLORIDE 600; 310; 30; 20 MG/100ML; MG/100ML; MG/100ML; MG/100ML
1000 INJECTION, SOLUTION INTRAVENOUS CONTINUOUS
Status: CANCELLED | OUTPATIENT
Start: 2018-10-24

## 2018-10-24 ASSESSMENT — ENCOUNTER SYMPTOMS
ABDOMINAL DISTENTION: 0
NAUSEA: 0
ABDOMINAL PAIN: 0

## 2018-10-25 LAB
BUN BLDV-MCNC: 20 MG/DL (ref 8–23)
CREAT SERPL-MCNC: 1.19 MG/DL (ref 0.7–1.2)
GFR AFRICAN AMERICAN: >60 ML/MIN
GFR NON-AFRICAN AMERICAN: 60 ML/MIN
GFR SERPL CREATININE-BSD FRML MDRD: ABNORMAL ML/MIN/{1.73_M2}
GFR SERPL CREATININE-BSD FRML MDRD: ABNORMAL ML/MIN/{1.73_M2}

## 2018-10-26 ENCOUNTER — OFFICE VISIT (OUTPATIENT)
Dept: CARDIOLOGY CLINIC | Age: 73
End: 2018-10-26
Payer: MEDICARE

## 2018-10-26 VITALS
WEIGHT: 191.4 LBS | HEART RATE: 76 BPM | DIASTOLIC BLOOD PRESSURE: 72 MMHG | SYSTOLIC BLOOD PRESSURE: 138 MMHG | BODY MASS INDEX: 29.1 KG/M2

## 2018-10-26 DIAGNOSIS — I10 ESSENTIAL HYPERTENSION: ICD-10-CM

## 2018-10-26 DIAGNOSIS — Z01.810 PREOPERATIVE CARDIOVASCULAR EXAMINATION: ICD-10-CM

## 2018-10-26 DIAGNOSIS — Z95.1 S/P CABG X 3: Primary | ICD-10-CM

## 2018-10-26 DIAGNOSIS — R97.20 ELEVATED PSA: ICD-10-CM

## 2018-10-26 DIAGNOSIS — I25.10 CORONARY ARTERY DISEASE INVOLVING NATIVE CORONARY ARTERY OF NATIVE HEART WITHOUT ANGINA PECTORIS: ICD-10-CM

## 2018-10-26 DIAGNOSIS — E78.5 HYPERLIPIDEMIA, UNSPECIFIED HYPERLIPIDEMIA TYPE: ICD-10-CM

## 2018-10-26 PROCEDURE — 99213 OFFICE O/P EST LOW 20 MIN: CPT | Performed by: INTERNAL MEDICINE

## 2018-10-26 NOTE — PROGRESS NOTES
[address]  [address]  Dept: [phone]  Loc: [phone]    Subjective: The patient is a 68y.o. year old, , male is in the office for a follow up visit. Pt is getting prostrate  Surgery next wk. Patient is doing quit well from cardiac stand point. He blossom any chest pain or discomfort. No orthopnea or PND. Blossom any palpitation, dizziness or syncope. He is completely asymptomatic from cardiac stand point. Past Medical History:   has a past medical history of Benign prostatic hypertrophy; BPPV (benign paroxysmal positional vertigo); Coronary heart disease; Diverticulosis; Dupuytren's contracture; Erectile dysfunction; Gout; History of blood transfusion; Hyperlipidemia; Hypertension; and Type II or unspecified type diabetes mellitus without mention of complication, not stated as uncontrolled. Past Surgical History:   has a past surgical history that includes Prostate biopsy (Bilateral, 9/15/2010, 2007); Colonoscopy (5/27/2008); Prostate biopsy (Bilateral, 05/10/2001); Knee arthroscopy (Right, 3/16/2007); Elbow surgery (Right); Vasectomy; Coronary artery bypass graft; Cardiac catheterization (6/2005); Cataract removal (Bilateral, Nov. and Dec 2014); Cardiac catheterization (3/16/16); Prostate Biopsy (Bilateral, 04/08/1999); Prostate Biopsy (Bilateral, 06/18/1998); Prostate Biopsy (Bilateral, 01/07/1998); and Prostate Biopsy (Bilateral, 10/04/1996). Home Medications:  Prior to Admission medications    Medication Sig Start Date End Date Taking? Authorizing Provider   ciprofloxacin (CIPRO) 500 MG tablet Take 1 tablet by mouth 2 times daily for 7 days 10/24/18 10/31/18 Yes Josiane Arshad MD   glimepiride (AMARYL) 2 MG tablet Take 1 tablet by mouth 2 times daily 8/31/18  Yes Xochilt Thapa MD   glucose blood VI test strips (DURGA CONTOUR TEST) strip Tests blood sugar daily.  2/13/18  Yes Xochilt Thapa MD allopurinol (ZYLOPRIM) 300 MG tablet Take 1 tablet by mouth daily 12/18/17  Yes Alberto Adames MD   lisinopril (PRINIVIL;ZESTRIL) 40 MG tablet Take 1 tablet by mouth daily 12/18/17  Yes Alberto Adames MD   metoprolol tartrate (LOPRESSOR) 50 MG tablet Take 1 tablet by mouth 2 times daily 12/18/17  Yes Alberto Adames MD   atorvastatin (LIPITOR) 80 MG tablet Take 1 tablet by mouth daily 12/18/17  Yes Alberto Adames MD   hydrochlorothiazide (HYDRODIURIL) 12.5 MG tablet Take 1 tablet by mouth daily 12/18/17  Yes Alberto Adames MD   alfuzosin (UROXATRAL) 10 MG extended release tablet Take 1 tablet by mouth daily 12/18/17  Yes Alberto Adames MD   aspirin 81 MG EC tablet Take 81 mg by mouth daily  5/5/17  Yes Ubaldo Domingo MD   nitroGLYCERIN (NITROSTAT) 0.4 MG SL tablet Place 1 tablet under the tongue every 5 minutes as needed for Chest pain 7/30/18   Alberto Adames MD       Allergies:  Patient has no known allergies. Social History:   reports that he quit smoking about 26 years ago. His smoking use included Cigarettes. He has never used smokeless tobacco. He reports that he drinks about 0.6 oz of alcohol per week . He reports that he does not use drugs. Review of Systems:  · Constitutional: there has been no unanticipated weight loss. There's been No change in energy level, No change in activity level. · Eyes: No visual changes or diplopia. No scleral icterus. · ENT: No Headaches, hearing loss or vertigo. No mouth sores or sore throat. · Cardiovascular: As above. · Respiratory: No SOB, cough or hemoptysis. · Gastrointestinal: No abdominal pain, appetite loss, blood in stools. No change in bowel or bladder habits. · Genitourinary: No dysuria, trouble voiding, or hematuria. · Musculoskeletal:  No gait disturbance, No weakness or joint complaints. · Integumentary: No rash or pruritis. · Psychiatric: No anxiety, or depression.   · Hematologic/Lymphatic: No abnormal bruising or bleeding, blood clots or swollen lymph nodes. · Allergic/Immunologic: No nasal congestion or hives. Physical Exam:  /72   Pulse 76   Wt 191 lb 6.4 oz (86.8 kg)   BMI 29.10 kg/m²     Constitutional and General Appearance: alert, cooperative, no distress and appears stated age  HEENT: PERRL, no cervical lymphadenopathy. No masses palpable. Normal oral mucosa  Respiratory:  · Normal excursion and expansion without use of accessory muscles  · Resp Auscultation: Good respiratory effort. No for increased work of breathing. On auscultation: clear to auscultation bilaterally  Cardiovascular:  · The apical impulse is not displaced  · Heart tones are crisp and normal. regular S1 and S2.  · Jugular venous pulsation Normal  · The carotid upstroke is normal in amplitude and contour without delay or bruit  · Peripheral pulses are symmetrical and full   Abdomen:   · No masses or tenderness  · Bowel sounds present  Extremities:  ·  No Cyanosis or Clubbing  ·  Lower extremity edema: No  ·  Skin: Warm and dry    Cardiac Data:  EKG: 10/24/18 NSR no  st-t changes     Labs:     CBC:   Recent Labs      10/24/18   1206   WBC  7.7   HGB  12.8*   HCT  38.8*   PLT  166     BMP:   Recent Labs      10/24/18   1206   NA  141   K  4.5   CO2  23   BUN  20  20   CREATININE  1.19  1.19   LABGLOM  60*  60*   GLUCOSE  295*     PT/INR:   Recent Labs      10/24/18   1206   PROTIME  9.7   INR  0.9     FASTING LIPID PANEL:  Lab Results   Component Value Date    CHOL 92 07/17/2018    HDL 34 07/17/2018    LDLCHOLESTEROL 37 07/17/2018    TRIG 104 07/17/2018    CHOLHDLRATIO 2.7 07/17/2018     LIVER PROFILE:No results for input(s): AST, ALT, LABALBU in the last 72 hours.       IMPRESSION:    Patient Active Problem List   Diagnosis    Hyperlipidemia    Erectile dysfunction    Coronary heart disease    Benign prostatic hyperplasia    Gout    History of elevated PSA    Hypertension    Benign prostatic hyperplasia    Elevated PSA    Type

## 2018-11-02 ENCOUNTER — ANESTHESIA (OUTPATIENT)
Dept: OPERATING ROOM | Age: 73
End: 2018-11-02
Payer: MEDICARE

## 2018-11-02 ENCOUNTER — ANESTHESIA EVENT (OUTPATIENT)
Dept: OPERATING ROOM | Age: 73
End: 2018-11-02
Payer: MEDICARE

## 2018-11-02 ENCOUNTER — HOSPITAL ENCOUNTER (OUTPATIENT)
Age: 73
Setting detail: OUTPATIENT SURGERY
Discharge: HOME | End: 2018-11-02
Attending: UROLOGY | Admitting: UROLOGY
Payer: MEDICARE

## 2018-11-02 VITALS
RESPIRATION RATE: 16 BRPM | BODY MASS INDEX: 28.95 KG/M2 | DIASTOLIC BLOOD PRESSURE: 66 MMHG | TEMPERATURE: 97.7 F | HEIGHT: 68 IN | WEIGHT: 191 LBS | SYSTOLIC BLOOD PRESSURE: 150 MMHG | HEART RATE: 60 BPM | OXYGEN SATURATION: 95 %

## 2018-11-02 VITALS — TEMPERATURE: 94.2 F | OXYGEN SATURATION: 100 % | SYSTOLIC BLOOD PRESSURE: 135 MMHG | DIASTOLIC BLOOD PRESSURE: 77 MMHG

## 2018-11-02 DIAGNOSIS — N40.0 BENIGN PROSTATIC HYPERPLASIA WITHOUT LOWER URINARY TRACT SYMPTOMS: Primary | ICD-10-CM

## 2018-11-02 LAB
GLUCOSE BLD-MCNC: 151 MG/DL (ref 75–110)
GLUCOSE BLD-MCNC: 196 MG/DL (ref 75–110)

## 2018-11-02 PROCEDURE — 7100000001 HC PACU RECOVERY - ADDTL 15 MIN: Performed by: UROLOGY

## 2018-11-02 PROCEDURE — 82947 ASSAY GLUCOSE BLOOD QUANT: CPT

## 2018-11-02 PROCEDURE — 6360000002 HC RX W HCPCS: Performed by: ANESTHESIOLOGY

## 2018-11-02 PROCEDURE — 7100000010 HC PHASE II RECOVERY - FIRST 15 MIN: Performed by: UROLOGY

## 2018-11-02 PROCEDURE — 3600000014 HC SURGERY LEVEL 4 ADDTL 15MIN: Performed by: UROLOGY

## 2018-11-02 PROCEDURE — 2580000003 HC RX 258: Performed by: ANESTHESIOLOGY

## 2018-11-02 PROCEDURE — 87086 URINE CULTURE/COLONY COUNT: CPT

## 2018-11-02 PROCEDURE — 6360000002 HC RX W HCPCS: Performed by: STUDENT IN AN ORGANIZED HEALTH CARE EDUCATION/TRAINING PROGRAM

## 2018-11-02 PROCEDURE — 6360000002 HC RX W HCPCS: Performed by: NURSE ANESTHETIST, CERTIFIED REGISTERED

## 2018-11-02 PROCEDURE — 3700000000 HC ANESTHESIA ATTENDED CARE: Performed by: UROLOGY

## 2018-11-02 PROCEDURE — 2580000003 HC RX 258: Performed by: UROLOGY

## 2018-11-02 PROCEDURE — 2500000003 HC RX 250 WO HCPCS: Performed by: NURSE ANESTHETIST, CERTIFIED REGISTERED

## 2018-11-02 PROCEDURE — 7100000000 HC PACU RECOVERY - FIRST 15 MIN: Performed by: UROLOGY

## 2018-11-02 PROCEDURE — 2709999900 HC NON-CHARGEABLE SUPPLY: Performed by: UROLOGY

## 2018-11-02 PROCEDURE — 7100000011 HC PHASE II RECOVERY - ADDTL 15 MIN: Performed by: UROLOGY

## 2018-11-02 PROCEDURE — 3700000001 HC ADD 15 MINUTES (ANESTHESIA): Performed by: UROLOGY

## 2018-11-02 PROCEDURE — 2720000010 HC SURG SUPPLY STERILE: Performed by: UROLOGY

## 2018-11-02 PROCEDURE — 3600000004 HC SURGERY LEVEL 4 BASE: Performed by: UROLOGY

## 2018-11-02 RX ORDER — DOCUSATE SODIUM 100 MG/1
100 CAPSULE, LIQUID FILLED ORAL 2 TIMES DAILY
Qty: 30 CAPSULE | Refills: 0 | Status: SHIPPED | OUTPATIENT
Start: 2018-11-02 | End: 2018-12-05 | Stop reason: ALTCHOICE

## 2018-11-02 RX ORDER — ONDANSETRON 2 MG/ML
INJECTION INTRAMUSCULAR; INTRAVENOUS PRN
Status: DISCONTINUED | OUTPATIENT
Start: 2018-11-02 | End: 2018-11-02 | Stop reason: SDUPTHER

## 2018-11-02 RX ORDER — CIPROFLOXACIN 2 MG/ML
400 INJECTION, SOLUTION INTRAVENOUS
Status: COMPLETED | OUTPATIENT
Start: 2018-11-02 | End: 2018-11-02

## 2018-11-02 RX ORDER — CIPROFLOXACIN 500 MG/1
500 TABLET, FILM COATED ORAL 2 TIMES DAILY
Qty: 10 TABLET | Refills: 0 | Status: SHIPPED | OUTPATIENT
Start: 2018-11-02 | End: 2018-11-07

## 2018-11-02 RX ORDER — MEPERIDINE HYDROCHLORIDE 50 MG/ML
12.5 INJECTION INTRAMUSCULAR; INTRAVENOUS; SUBCUTANEOUS EVERY 5 MIN PRN
Status: DISCONTINUED | OUTPATIENT
Start: 2018-11-02 | End: 2018-11-02 | Stop reason: HOSPADM

## 2018-11-02 RX ORDER — FENTANYL CITRATE 50 UG/ML
INJECTION, SOLUTION INTRAMUSCULAR; INTRAVENOUS PRN
Status: DISCONTINUED | OUTPATIENT
Start: 2018-11-02 | End: 2018-11-02 | Stop reason: SDUPTHER

## 2018-11-02 RX ORDER — SODIUM CHLORIDE, SODIUM LACTATE, POTASSIUM CHLORIDE, CALCIUM CHLORIDE 600; 310; 30; 20 MG/100ML; MG/100ML; MG/100ML; MG/100ML
1000 INJECTION, SOLUTION INTRAVENOUS CONTINUOUS
Status: DISCONTINUED | OUTPATIENT
Start: 2018-11-02 | End: 2018-11-02 | Stop reason: HOSPADM

## 2018-11-02 RX ORDER — FENTANYL CITRATE 50 UG/ML
25 INJECTION, SOLUTION INTRAMUSCULAR; INTRAVENOUS EVERY 5 MIN PRN
Status: DISCONTINUED | OUTPATIENT
Start: 2018-11-02 | End: 2018-11-02 | Stop reason: HOSPADM

## 2018-11-02 RX ORDER — ROCURONIUM BROMIDE 10 MG/ML
INJECTION, SOLUTION INTRAVENOUS PRN
Status: DISCONTINUED | OUTPATIENT
Start: 2018-11-02 | End: 2018-11-02 | Stop reason: SDUPTHER

## 2018-11-02 RX ORDER — PROPOFOL 10 MG/ML
INJECTION, EMULSION INTRAVENOUS PRN
Status: DISCONTINUED | OUTPATIENT
Start: 2018-11-02 | End: 2018-11-02 | Stop reason: SDUPTHER

## 2018-11-02 RX ORDER — GLYCOPYRROLATE 1 MG/5 ML
SYRINGE (ML) INTRAVENOUS PRN
Status: DISCONTINUED | OUTPATIENT
Start: 2018-11-02 | End: 2018-11-02 | Stop reason: SDUPTHER

## 2018-11-02 RX ORDER — FENTANYL CITRATE 50 UG/ML
50 INJECTION, SOLUTION INTRAMUSCULAR; INTRAVENOUS EVERY 5 MIN PRN
Status: COMPLETED | OUTPATIENT
Start: 2018-11-02 | End: 2018-11-02

## 2018-11-02 RX ORDER — ONDANSETRON 2 MG/ML
4 INJECTION INTRAMUSCULAR; INTRAVENOUS
Status: DISCONTINUED | OUTPATIENT
Start: 2018-11-02 | End: 2018-11-02 | Stop reason: HOSPADM

## 2018-11-02 RX ORDER — MAGNESIUM HYDROXIDE 1200 MG/15ML
LIQUID ORAL CONTINUOUS PRN
Status: DISCONTINUED | OUTPATIENT
Start: 2018-11-02 | End: 2018-11-02 | Stop reason: HOSPADM

## 2018-11-02 RX ORDER — HYDROCODONE BITARTRATE AND ACETAMINOPHEN 5; 325 MG/1; MG/1
1 TABLET ORAL EVERY 6 HOURS PRN
Qty: 12 TABLET | Refills: 0 | Status: SHIPPED | OUTPATIENT
Start: 2018-11-02 | End: 2018-11-05

## 2018-11-02 RX ADMIN — ROCURONIUM BROMIDE 30 MG: 10 INJECTION INTRAVENOUS at 14:44

## 2018-11-02 RX ADMIN — FENTANYL CITRATE 50 MCG: 50 INJECTION, SOLUTION INTRAMUSCULAR; INTRAVENOUS at 16:50

## 2018-11-02 RX ADMIN — FENTANYL CITRATE 25 MCG: 50 INJECTION, SOLUTION INTRAMUSCULAR; INTRAVENOUS at 17:30

## 2018-11-02 RX ADMIN — FENTANYL CITRATE 100 MCG: 50 INJECTION INTRAMUSCULAR; INTRAVENOUS at 14:44

## 2018-11-02 RX ADMIN — ONDANSETRON 4 MG: 2 INJECTION, SOLUTION INTRAMUSCULAR; INTRAVENOUS at 16:08

## 2018-11-02 RX ADMIN — ROCURONIUM BROMIDE 10 MG: 10 INJECTION INTRAVENOUS at 15:30

## 2018-11-02 RX ADMIN — SODIUM CHLORIDE, POTASSIUM CHLORIDE, SODIUM LACTATE AND CALCIUM CHLORIDE 1000 ML: 600; 310; 30; 20 INJECTION, SOLUTION INTRAVENOUS at 13:17

## 2018-11-02 RX ADMIN — PROPOFOL 200 MG: 10 INJECTION, EMULSION INTRAVENOUS at 14:44

## 2018-11-02 RX ADMIN — FENTANYL CITRATE 50 MCG: 50 INJECTION, SOLUTION INTRAMUSCULAR; INTRAVENOUS at 16:45

## 2018-11-02 RX ADMIN — Medication 0.4 MG: at 15:38

## 2018-11-02 RX ADMIN — NEOSTIGMINE METHYLSULFATE 3 MG: 1 INJECTION, SOLUTION INTRAMUSCULAR; INTRAVENOUS; SUBCUTANEOUS at 16:22

## 2018-11-02 RX ADMIN — Medication 0.6 MG: at 16:22

## 2018-11-02 RX ADMIN — MEPERIDINE HYDROCHLORIDE 12.5 MG: 50 INJECTION, SOLUTION INTRAMUSCULAR; INTRAVENOUS; SUBCUTANEOUS at 17:38

## 2018-11-02 RX ADMIN — FENTANYL CITRATE 50 MCG: 50 INJECTION, SOLUTION INTRAMUSCULAR; INTRAVENOUS at 16:55

## 2018-11-02 RX ADMIN — PHENYLEPHRINE HYDROCHLORIDE 100 MCG: 10 INJECTION INTRAVENOUS at 15:56

## 2018-11-02 RX ADMIN — FENTANYL CITRATE 50 MCG: 50 INJECTION, SOLUTION INTRAMUSCULAR; INTRAVENOUS at 17:00

## 2018-11-02 RX ADMIN — CIPROFLOXACIN 400 MG: 2 INJECTION, SOLUTION INTRAVENOUS at 14:45

## 2018-11-02 ASSESSMENT — PAIN SCALES - GENERAL: PAINLEVEL_OUTOF10: 0

## 2018-11-02 ASSESSMENT — PULMONARY FUNCTION TESTS
PIF_VALUE: 16
PIF_VALUE: 5
PIF_VALUE: 17
PIF_VALUE: 3
PIF_VALUE: 19
PIF_VALUE: 19
PIF_VALUE: 17
PIF_VALUE: 17
PIF_VALUE: 19
PIF_VALUE: 19
PIF_VALUE: 4
PIF_VALUE: 17
PIF_VALUE: 16
PIF_VALUE: 17
PIF_VALUE: 19
PIF_VALUE: 17
PIF_VALUE: 16
PIF_VALUE: 16
PIF_VALUE: 24
PIF_VALUE: 17
PIF_VALUE: 18
PIF_VALUE: 17
PIF_VALUE: 19
PIF_VALUE: 19
PIF_VALUE: 17
PIF_VALUE: 0
PIF_VALUE: 16
PIF_VALUE: 17
PIF_VALUE: 19
PIF_VALUE: 1
PIF_VALUE: 19
PIF_VALUE: 18
PIF_VALUE: 1
PIF_VALUE: 17
PIF_VALUE: 19
PIF_VALUE: 17
PIF_VALUE: 18
PIF_VALUE: 17
PIF_VALUE: 19
PIF_VALUE: 19
PIF_VALUE: 2
PIF_VALUE: 17
PIF_VALUE: 19
PIF_VALUE: 19
PIF_VALUE: 17
PIF_VALUE: 19
PIF_VALUE: 17
PIF_VALUE: 18
PIF_VALUE: 17
PIF_VALUE: 17
PIF_VALUE: 23
PIF_VALUE: 2
PIF_VALUE: 16
PIF_VALUE: 17
PIF_VALUE: 4
PIF_VALUE: 1
PIF_VALUE: 19
PIF_VALUE: 17
PIF_VALUE: 17
PIF_VALUE: 19
PIF_VALUE: 17
PIF_VALUE: 3
PIF_VALUE: 16
PIF_VALUE: 17
PIF_VALUE: 16
PIF_VALUE: 16
PIF_VALUE: 1
PIF_VALUE: 17
PIF_VALUE: 17
PIF_VALUE: 19
PIF_VALUE: 17
PIF_VALUE: 26
PIF_VALUE: 17
PIF_VALUE: 18
PIF_VALUE: 18
PIF_VALUE: 17
PIF_VALUE: 16
PIF_VALUE: 18
PIF_VALUE: 19
PIF_VALUE: 19
PIF_VALUE: 2
PIF_VALUE: 19
PIF_VALUE: 18
PIF_VALUE: 17
PIF_VALUE: 18
PIF_VALUE: 17
PIF_VALUE: 27
PIF_VALUE: 2
PIF_VALUE: 17
PIF_VALUE: 28
PIF_VALUE: 19
PIF_VALUE: 16
PIF_VALUE: 20
PIF_VALUE: 17
PIF_VALUE: 20
PIF_VALUE: 17
PIF_VALUE: 16
PIF_VALUE: 19
PIF_VALUE: 17
PIF_VALUE: 18
PIF_VALUE: 17
PIF_VALUE: 0
PIF_VALUE: 1
PIF_VALUE: 18

## 2018-11-02 ASSESSMENT — PAIN SCALES - WONG BAKER
WONGBAKER_NUMERICALRESPONSE: 8

## 2018-11-02 ASSESSMENT — PAIN - FUNCTIONAL ASSESSMENT: PAIN_FUNCTIONAL_ASSESSMENT: 0-10

## 2018-11-02 NOTE — PROGRESS NOTES
Dr. Abilio Lake at bedside and updates patient and family. Discharge orders received. Instructions given to patient and wife. Discharged home with written instructions, leg bag, and prescriptions for cipro, colace, and noro. To follow up in physicians office on Monday.  Gayathri Magana

## 2018-11-02 NOTE — H&P
Bakari Gudino MD  History and Physical    Patient:  Celine Geronimo  MRN: [id number]  YOB: 1945    HISTORY OF PRESENT ILLNESS:     The patient is a 68 y.o. male who presents with BPH. Here for procedure. Patient's old records, notes and chart reviewed and summarized above. Bakari Gudino MD independently reviewed the images and verified the radiology reports from:    No results found. Past Medical History:    Past Medical History:   Diagnosis Date    Benign prostatic hypertrophy     with elevated PSA.  BPPV (benign paroxysmal positional vertigo) 2/2008    Coronary heart disease     Status post CABG.  Diverticulosis     Dupuytren's contracture     Mid finger, right hand.  Erectile dysfunction     Bowens catheter in place 11/02/2018    has had one in place for 6weeks, this one in place x 2 weeks    Gout     Quiescent.  History of blood transfusion     possible with open heart surgery 26years ago    Hyperlipidemia     Hypertension     Snores     Type II or unspecified type diabetes mellitus without mention of complication, not stated as uncontrolled        Past Surgical History:    Past Surgical History:   Procedure Laterality Date    CARDIAC CATHETERIZATION  6/2005    Triple vessel CAD with patent LIMA to LAD, patent saphenous vein graft to first obtuse marginal, patent saphenous vein graft to posterior descending artery, occluded jump graft from posterior descending artery to posterior left ventricular branch.  CARDIAC CATHETERIZATION  3/16/16    CATARACT REMOVAL Bilateral Nov. and Dec 2014    COLONOSCOPY  5/27/2008    Distal ileoscopy. Scattered diverticulosis and extensive diverticular disease of sigmoid colon and internal hemorrhoids.  CORONARY ARTERY BYPASS GRAFT      ELBOW SURGERY Right     Surgical repair.  KNEE ARTHROSCOPY Right 3/16/2007    Partial medial meniscectomy, partial medial and lateral synovectomy, light chondroplasty.     PROSTATE BIOPSY Bilateral 9/15/2010, 2007    [facility] Dr. Mary Alvares PROSTATE BIOPSY Bilateral 05/10/2001    Benign-Dr. Dhruv Barber    PROSTATE BIOPSY Bilateral 04/08/1999    Benign-Dr. Atif Rodriguez    PROSTATE BIOPSY Bilateral 06/18/1998    Benign-Dr. Deepthi Yip    PROSTATE BIOPSY Bilateral 01/07/1998    Benign-Dr. Deepthi Yip    PROSTATE BIOPSY Bilateral 10/04/1996    Benign-Dr. Lauren Carbajal    VASECTOMY         Medications Prior to Admission:    Prior to Admission medications    Medication Sig Start Date End Date Taking? Authorizing Provider   glimepiride (AMARYL) 2 MG tablet Take 1 tablet by mouth 2 times daily 8/31/18  Yes Gracy Higgins MD   allopurinol (ZYLOPRIM) 300 MG tablet Take 1 tablet by mouth daily 12/18/17  Yes Gracy Higgins MD   lisinopril (PRINIVIL;ZESTRIL) 40 MG tablet Take 1 tablet by mouth daily 12/18/17  Yes Gracy Higgins MD   metoprolol tartrate (LOPRESSOR) 50 MG tablet Take 1 tablet by mouth 2 times daily 12/18/17  Yes Gracy Higgins MD   atorvastatin (LIPITOR) 80 MG tablet Take 1 tablet by mouth daily 12/18/17  Yes Gracy Higgins MD   hydrochlorothiazide (HYDRODIURIL) 12.5 MG tablet Take 1 tablet by mouth daily 12/18/17  Yes Gracy Higgins MD   alfuzosin (UROXATRAL) 10 MG extended release tablet Take 1 tablet by mouth daily 12/18/17  Yes Gracy Higgins MD   nitroGLYCERIN (NITROSTAT) 0.4 MG SL tablet Place 1 tablet under the tongue every 5 minutes as needed for Chest pain 7/30/18   Gracy Higgins MD   glucose blood VI test strips (DURGA CONTOUR TEST) strip Tests blood sugar daily. 2/13/18   Gracy Higgins MD   aspirin 81 MG EC tablet Take 81 mg by mouth daily  5/5/17   Aviva Colon MD       Allergies:  Patient has no known allergies. Social History:    Social History     Social History    Marital status:      Spouse name: N/A    Number of children: N/A    Years of education: N/A     Occupational History    Not on file.      Social History Main Topics    Smoking status: Former Smoker

## 2018-11-03 LAB
CULTURE: NO GROWTH
Lab: NORMAL
SPECIMEN DESCRIPTION: NORMAL
STATUS: NORMAL

## 2018-11-05 ENCOUNTER — OFFICE VISIT (OUTPATIENT)
Dept: UROLOGY | Age: 73
End: 2018-11-05
Payer: MEDICARE

## 2018-11-05 VITALS
OXYGEN SATURATION: 98 % | WEIGHT: 190.92 LBS | DIASTOLIC BLOOD PRESSURE: 78 MMHG | SYSTOLIC BLOOD PRESSURE: 122 MMHG | BODY MASS INDEX: 28.94 KG/M2 | HEIGHT: 68 IN | HEART RATE: 75 BPM

## 2018-11-05 DIAGNOSIS — R33.9 INCOMPLETE BLADDER EMPTYING: Primary | ICD-10-CM

## 2018-11-05 PROCEDURE — 99024 POSTOP FOLLOW-UP VISIT: CPT | Performed by: UROLOGY

## 2018-11-05 PROCEDURE — 51798 US URINE CAPACITY MEASURE: CPT | Performed by: UROLOGY

## 2018-11-05 NOTE — PROGRESS NOTES
Jigar Tabor MD        [address] [address]  Dept: [phone]  Dept Fax: [phone]  Loc: [phone]      [facility] Urology Office Note - Follow up Visit    Patient:  Margarette Aquino  YOB: 1945  Date: 11/6/2018    The patient is a 68 y.o. male who presents today for evaluation of the following problems: elevated psa  Chief Complaint   Patient presents with    Other     post greenllight         HISTORY OF PRESENT ILLNESS:     Onset was  Years ago  Overall, the problem(s) are worsening  Severity is described as mild-moderate. Associated Symptoms: No dysuria, no gross hematuria. Current Pain Severity: 4      S/p greenlight. Here for catheter removal      Previous records:    Overall the PSA level is: oscillating. Range 11-12  Recent history of urinary tract infection/prostatitis? no  Previous prostate biopsy? Yes x 6. NEG  Lower urinary tract symptoms: no    Previous patient of Dr. Kolton Lay --[facility]  Has had episodes of retention after alcohol use in past    Was in [place] vacation and went into retention. Failed voiding trial. Complaining of painful bladder spasms today. Requested/reviewed records from Franklin Folres MD office and/or outside physician/EMR    (Patient's old records have been requested, reviewed and pertinent findings summarized in today's note.)    Procedures Today: N/A    Last several PSA's:  Lab Results   Component Value Date    PSA 11.1 (H) 07/17/2018    PSA 11.17 (H) 06/16/2017    PSA 12.57 (H) 06/16/2016       Last total testosterone:  No results found for: TESTOSTERONE    Urinalysis today:  No results found for this visit on 11/05/18.     Last BUN and creatinine:  Lab Results   Component Value Date    BUN 20 10/24/2018    BUN 20 10/24/2018     Lab Results   Component Value Date    CREATININE 1.19 10/24/2018    CREATININE 1.19 10/24/2018       Additional Lab/Culture results: none    Imaging Reviewed encounter.      Orders Placed:  Orders Placed This Encounter   Procedures   Robinson Fish MD

## 2018-11-06 NOTE — OP NOTE
Patient:  Leon Pascal  MRN: [id number]  YOB: 1945    FACILITY: [address] [place] [address]    DATE: 11/2/2018    SURGEON: Elias Castro MD     ASSISTANT: none    PREOPERATIVE DIAGNOSIS: BPH     BPH    PROCEDURE PERFORMED:     1. Cystourethroscopy  2. Greenlight photovaporization of prostate    ANESTHESIA: General    ESTIMATED BLOOD LOSS: * No values recorded between 11/2/2018  2:38 PM and 11/2/2018  5:21 PM *     COMPLICATIONS: None immediate    DRAINS: [address]    SPECIMENS:   ID Type Source Tests Collected by Time Destination   1 : [facility] Urine Bladder URINE CULTURE Veronica Snell MD 11/2/2018 1511        INDICATIONS FOR PROCEDURE:  The patient is a 68 y.o. male who presents today with BPH  here for CYSTOSCOPY, TRANSURETHRAL RESECTION PROSTATE [facility] # [id number] HAILEE). After risks, benefits and alternatives of the procedure were discussed with the patient, the patient elected to proceed. OPERATIVE SUMMARY:  The risks and benefits of the procedure were explained to the patient in the preoperative area. After informed consent was obtained, the patient was taken back to the operating room. The patient was transferred to the operating table and placed in a supine position. General anesthesia was induced and the patient was placed in the dorsal lithotomy position. He was prepped and draped in a sterile fashion and a time-out was performed to confirm patient identity and procedure. Prior to induction of anesthesia the patient was administered preoperative antibiotics and EPC cuffs were on and functioning. Our continuous flow sheath with obturator and lens was inserted through the patient's urethra and into the bladder. Upon entering the bladder we located both ureteral orifices, they were at a safe distance from the vesical neck. There were no stones noted in the bladder.  On evaluation of the prostate the patient was noted to have  Trilobar hyperplasia. The GreenLight fiber was then inserted after we removed our obturator and placed our working bridge through out continous flow sheath. GreenLight photovaporization was initiated beginning with the median lobe. After the median lobe was taken down, we then turned our attention to the patient's left lateral lobe. At the 5 O'clock position we made a groove from the vesical neck down to the level of the verumontanum, which was used as our distal landmark to protect the external sphincter. We vaporized adenomatous tissue down to the level of the capsule. During this part of the procedure the power level was increased to 180 raygoza. This was used as a guide of depth, and carried around in a counter-clockwise fashion to the 12 O'clock position. The process was then repeated on the contralateral side starting at the 7 O'clock position. We vaporized adenomatous tissue down to the level of the capsule, which again was used as a guide of depth, and carried around in a clockwise fashion to the 12 O'clock position. Finally we turned our attention to the apical tissue. Extensive photovaporization of the prostate was performed. We then incised the bladder neck with our laser. At this time the irrigation was turned off and the prostatic urethra appeared to be wide open and no longer obstructed. Hemostasis was achieved and persistent. Both ureteral orifices were noted to be uninvolved in the resection. There was no scatter of laser fiber into the bladder. We did not go distal to the verumontanum. We worked at 80 raygoza of power at the bladder neck and near the apex of the prostate. The scope was removed and a [address] 3-way Bowens catheter was inserted and irrigated to confirm position. Continuous bladder irrigation with normal saline was then initiated and 3 L of normal saline were allowed to infuse before the catheter was clamped. The patient was awoken and transferred to PACU.  All instruments and

## 2018-11-28 ENCOUNTER — HOSPITAL ENCOUNTER (OUTPATIENT)
Dept: LAB | Age: 73
Discharge: HOME | End: 2018-11-28
Payer: MEDICARE

## 2018-11-28 DIAGNOSIS — E11.9 TYPE 2 DIABETES MELLITUS WITHOUT COMPLICATION, WITHOUT LONG-TERM CURRENT USE OF INSULIN (HCC): ICD-10-CM

## 2018-11-28 LAB
ESTIMATED AVERAGE GLUCOSE: 197 MG/DL
HBA1C MFR BLD: 8.5 % (ref 4.8–5.9)

## 2018-11-28 PROCEDURE — 83036 HEMOGLOBIN GLYCOSYLATED A1C: CPT

## 2018-11-28 PROCEDURE — 36415 COLL VENOUS BLD VENIPUNCTURE: CPT

## 2018-12-05 ENCOUNTER — OFFICE VISIT (OUTPATIENT)
Dept: INTERNAL MEDICINE | Age: 73
End: 2018-12-05
Payer: MEDICARE

## 2018-12-05 VITALS
BODY MASS INDEX: 29.1 KG/M2 | RESPIRATION RATE: 16 BRPM | HEART RATE: 64 BPM | HEIGHT: 68 IN | DIASTOLIC BLOOD PRESSURE: 88 MMHG | WEIGHT: 192 LBS | SYSTOLIC BLOOD PRESSURE: 156 MMHG

## 2018-12-05 DIAGNOSIS — M10.00 IDIOPATHIC GOUT, UNSPECIFIED CHRONICITY, UNSPECIFIED SITE: ICD-10-CM

## 2018-12-05 DIAGNOSIS — I10 ESSENTIAL HYPERTENSION: ICD-10-CM

## 2018-12-05 DIAGNOSIS — E78.5 HYPERLIPIDEMIA, UNSPECIFIED HYPERLIPIDEMIA TYPE: ICD-10-CM

## 2018-12-05 DIAGNOSIS — E11.9 TYPE 2 DIABETES MELLITUS WITHOUT COMPLICATION, WITHOUT LONG-TERM CURRENT USE OF INSULIN (HCC): ICD-10-CM

## 2018-12-05 DIAGNOSIS — Z00.00 ROUTINE GENERAL MEDICAL EXAMINATION AT A HEALTH CARE FACILITY: Primary | ICD-10-CM

## 2018-12-05 DIAGNOSIS — Z00.00 MEDICARE ANNUAL WELLNESS VISIT, SUBSEQUENT: ICD-10-CM

## 2018-12-05 DIAGNOSIS — D64.9 MILD ANEMIA: ICD-10-CM

## 2018-12-05 PROCEDURE — 3017F COLORECTAL CA SCREEN DOC REV: CPT | Performed by: INTERNAL MEDICINE

## 2018-12-05 PROCEDURE — 99214 OFFICE O/P EST MOD 30 MIN: CPT | Performed by: INTERNAL MEDICINE

## 2018-12-05 PROCEDURE — 3045F PR MOST RECENT HEMOGLOBIN A1C LEVEL 7.0-9.0%: CPT | Performed by: INTERNAL MEDICINE

## 2018-12-05 PROCEDURE — 2022F DILAT RTA XM EVC RTNOPTHY: CPT | Performed by: INTERNAL MEDICINE

## 2018-12-05 PROCEDURE — G0439 PPPS, SUBSEQ VISIT: HCPCS | Performed by: INTERNAL MEDICINE

## 2018-12-05 PROCEDURE — G8427 DOCREV CUR MEDS BY ELIG CLIN: HCPCS | Performed by: INTERNAL MEDICINE

## 2018-12-05 PROCEDURE — G8482 FLU IMMUNIZE ORDER/ADMIN: HCPCS | Performed by: INTERNAL MEDICINE

## 2018-12-05 PROCEDURE — 1101F PT FALLS ASSESS-DOCD LE1/YR: CPT | Performed by: INTERNAL MEDICINE

## 2018-12-05 PROCEDURE — 1123F ACP DISCUSS/DSCN MKR DOCD: CPT | Performed by: INTERNAL MEDICINE

## 2018-12-05 PROCEDURE — 1036F TOBACCO NON-USER: CPT | Performed by: INTERNAL MEDICINE

## 2018-12-05 PROCEDURE — 4040F PNEUMOC VAC/ADMIN/RCVD: CPT | Performed by: INTERNAL MEDICINE

## 2018-12-05 PROCEDURE — G8417 CALC BMI ABV UP PARAM F/U: HCPCS | Performed by: INTERNAL MEDICINE

## 2018-12-05 PROCEDURE — G8598 ASA/ANTIPLAT THER USED: HCPCS | Performed by: INTERNAL MEDICINE

## 2018-12-05 RX ORDER — LISINOPRIL 40 MG/1
40 TABLET ORAL DAILY
Qty: 90 TABLET | Refills: 3 | Status: SHIPPED | OUTPATIENT
Start: 2018-12-05 | End: 2019-03-06 | Stop reason: SDUPTHER

## 2018-12-05 RX ORDER — GLIMEPIRIDE 4 MG/1
4 TABLET ORAL 2 TIMES DAILY
Qty: 180 TABLET | Refills: 3 | Status: SHIPPED | OUTPATIENT
Start: 2018-12-05 | End: 2019-03-06 | Stop reason: SDUPTHER

## 2018-12-05 RX ORDER — HYDROCHLOROTHIAZIDE 12.5 MG/1
12.5 TABLET ORAL DAILY
Qty: 90 TABLET | Refills: 3 | Status: SHIPPED | OUTPATIENT
Start: 2018-12-05 | End: 2019-03-06 | Stop reason: SDUPTHER

## 2018-12-05 RX ORDER — METOPROLOL TARTRATE 50 MG/1
50 TABLET, FILM COATED ORAL 2 TIMES DAILY
Qty: 180 TABLET | Refills: 3 | Status: SHIPPED | OUTPATIENT
Start: 2018-12-05 | End: 2019-03-06 | Stop reason: SDUPTHER

## 2018-12-05 RX ORDER — ATORVASTATIN CALCIUM 80 MG/1
80 TABLET, FILM COATED ORAL DAILY
Qty: 90 TABLET | Refills: 3 | Status: SHIPPED | OUTPATIENT
Start: 2018-12-05 | End: 2019-03-06 | Stop reason: SDUPTHER

## 2018-12-05 RX ORDER — ASPIRIN 81 MG/1
81 TABLET ORAL DAILY
COMMUNITY

## 2018-12-05 RX ORDER — ALLOPURINOL 300 MG/1
300 TABLET ORAL DAILY
Qty: 90 TABLET | Refills: 3 | Status: SHIPPED | OUTPATIENT
Start: 2018-12-05 | End: 2019-03-06 | Stop reason: SDUPTHER

## 2018-12-05 ASSESSMENT — LIFESTYLE VARIABLES
AUDIT-C TOTAL SCORE: 2
HOW OFTEN DURING THE LAST YEAR HAVE YOU FOUND THAT YOU WERE NOT ABLE TO STOP DRINKING ONCE YOU HAD STARTED: 0
HOW OFTEN DO YOU HAVE SIX OR MORE DRINKS ON ONE OCCASION: 0
HOW OFTEN DURING THE LAST YEAR HAVE YOU BEEN UNABLE TO REMEMBER WHAT HAPPENED THE NIGHT BEFORE BECAUSE YOU HAD BEEN DRINKING: 0
HOW MANY STANDARD DRINKS CONTAINING ALCOHOL DO YOU HAVE ON A TYPICAL DAY: 0
HAVE YOU OR SOMEONE ELSE BEEN INJURED AS A RESULT OF YOUR DRINKING: 0
HOW OFTEN DURING THE LAST YEAR HAVE YOU HAD A FEELING OF GUILT OR REMORSE AFTER DRINKING: 0
AUDIT TOTAL SCORE: 2
HOW OFTEN DURING THE LAST YEAR HAVE YOU NEEDED AN ALCOHOLIC DRINK FIRST THING IN THE MORNING TO GET YOURSELF GOING AFTER A NIGHT OF HEAVY DRINKING: 0
HOW OFTEN DO YOU HAVE A DRINK CONTAINING ALCOHOL: 2
HAS A RELATIVE, FRIEND, DOCTOR, OR ANOTHER HEALTH PROFESSIONAL EXPRESSED CONCERN ABOUT YOUR DRINKING OR SUGGESTED YOU CUT DOWN: 0
HOW OFTEN DURING THE LAST YEAR HAVE YOU FAILED TO DO WHAT WAS NORMALLY EXPECTED FROM YOU BECAUSE OF DRINKING: 0

## 2018-12-05 ASSESSMENT — ENCOUNTER SYMPTOMS
NAUSEA: 0
VOMITING: 0
DIARRHEA: 0
SHORTNESS OF BREATH: 0
EYE PAIN: 0
BACK PAIN: 0
COUGH: 0
ABDOMINAL PAIN: 0
BLOOD IN STOOL: 0
CONSTIPATION: 0

## 2018-12-05 ASSESSMENT — PATIENT HEALTH QUESTIONNAIRE - PHQ9
SUM OF ALL RESPONSES TO PHQ QUESTIONS 1-9: 0
SUM OF ALL RESPONSES TO PHQ QUESTIONS 1-9: 0

## 2018-12-05 ASSESSMENT — ANXIETY QUESTIONNAIRES: GAD7 TOTAL SCORE: 0

## 2018-12-05 NOTE — PROGRESS NOTES
[address] [employer] [facility] INTERNAL MED  [address] [address]  Dept: [phone]  Dept Fax: [phone]  Loc: [phone]    Jennifer Yanez is a 68 y.o. male who presents today for his medical conditions/complaintsas noted below. Jennifer Yanez is c/o of   Chief Complaint   Patient presents with    Medicare AWV    Diabetes    Hypertension    Hyperlipidemia         HPI:     Diabetes   He presents for his follow-up diabetic visit. He has type 2 (Without complication, without insulin use) diabetes mellitus. His disease course has been fluctuating. Pertinent negatives for hypoglycemia include no confusion, dizziness, headaches, nervousness/anxiousness or pallor. Pertinent negatives for diabetes include no chest pain, no polydipsia, no polyuria and no weakness. Hypertension   This is a chronic problem. The current episode started more than 1 year ago. The problem has been waxing and waning since onset. The problem is controlled. Pertinent negatives include no chest pain, headaches, neck pain, palpitations or shortness of breath. Hyperlipidemia   This is a chronic problem. The current episode started more than 1 year ago. The problem is controlled. Recent lipid tests were reviewed and are variable. Pertinent negatives include no chest pain or shortness of breath. Other   This is a recurrent (4-.  Gen. medical exam) problem. The current episode started today. The problem occurs intermittently. The problem has been waxing and waning. Pertinent negatives include no abdominal pain, arthralgias, chest pain, chills, coughing, fever, headaches, nausea, neck pain, numbness, rash, vomiting or weakness. Hemoglobin A1C (%)   Date Value   11/28/2018 8.5 (H)   07/17/2018 8.0 (H)   12/11/2017 7.3 (H)            Microalb/Crt.  Ratio (mcg/mg creat)   Date Value   07/17/2018 CANNOT BE CALCULATED     LDL Cholesterol (mg/dL)   Date Value   07/17/2018 37   12/14/2016 48   12/17/2015 50         AST light. EOM are normal.   Neck: Neck supple. Cardiovascular: Normal rate and regular rhythm. Exam reveals no gallop and no friction rub. No murmur heard. Pulmonary/Chest: Effort normal and breath sounds normal. He has no wheezes. He has no rales. Abdominal: Soft. He exhibits no distension and no mass. There is no tenderness. There is no rebound. Musculoskeletal: Normal range of motion. He exhibits no edema. Lymphadenopathy:     He has no cervical adenopathy. Neurological: He is alert and oriented to person, place, and time. No cranial nerve deficit (grossly). Diabetic foot exam is normal, including normal monofilament sensory testing normal vibration sense and normal pulses bilaterally   Skin: Skin is warm and dry. No rash noted. Psychiatric: He has a normal mood and affect. Thought content normal.   Vitals reviewed. BP (!) 156/88 (Site: Left Upper Arm, Position: Sitting, Cuff Size: Large Adult)   Pulse 64   Resp 16   Ht 5' 8\" (1.727 m)   Wt 192 lb (87.1 kg)   BMI 29.19 kg/m²     Assessment:       Diagnosis Orders   1. Routine general medical examination at a health care facility     2. Medicare annual wellness visit, subsequent     3. Type 2 diabetes mellitus without complication, without long-term current use of insulin ([facility])  Hemoglobin A1C    Hemoglobin    Iron And TIBC     DIABETES FOOT EXAM    glimepiride (AMARYL) 4 MG tablet   4. Essential hypertension  Hemoglobin A1C    Hemoglobin    Iron And TIBC    lisinopril (PRINIVIL;ZESTRIL) 40 MG tablet    metoprolol tartrate (LOPRESSOR) 50 MG tablet    hydrochlorothiazide (HYDRODIURIL) 12.5 MG tablet   5. Hyperlipidemia, unspecified hyperlipidemia type  Hemoglobin A1C    atorvastatin (LIPITOR) 80 MG tablet   6. Mild anemia  Hemoglobin    Iron And TIBC   7.  Idiopathic gout, unspecified chronicity, unspecified site  allopurinol (ZYLOPRIM) 300 MG tablet             Plan:      Return in about 3 months (around 3/5/2019) for Diabetes,

## 2018-12-05 NOTE — PROGRESS NOTES
hand.    Erectile dysfunction     Bowens catheter in place 11/02/2018    has had one in place for 6weeks, this one in place x 2 weeks    Gout     Quiescent.  History of blood transfusion     possible with open heart surgery 26years ago    Hyperlipidemia     Hypertension     Snores     Type II or unspecified type diabetes mellitus without mention of complication, not stated as uncontrolled      Past Surgical History:   Procedure Laterality Date    CARDIAC CATHETERIZATION  6/2005    Triple vessel CAD with patent LIMA to LAD, patent saphenous vein graft to first obtuse marginal, patent saphenous vein graft to posterior descending artery, occluded jump graft from posterior descending artery to posterior left ventricular branch.  CARDIAC CATHETERIZATION  3/16/16    CATARACT REMOVAL Bilateral Nov. and Dec 2014    COLONOSCOPY  5/27/2008    Distal ileoscopy. Scattered diverticulosis and extensive diverticular disease of sigmoid colon and internal hemorrhoids.  CORONARY ARTERY BYPASS GRAFT      ELBOW SURGERY Right     Surgical repair.  KNEE ARTHROSCOPY Right 3/16/2007    Partial medial meniscectomy, partial medial and lateral synovectomy, light chondroplasty.     CT LASER VAPORIZATION SURGERY PROSTATE, COMPLETE N/A 11/2/2018    CYSTOSCOPY, TRANSURETHRAL RESECTION PROSTATE - [facility] # [id number] HAILEE) performed by Elizabeth Mckenzie MD at [facility] Bilateral 9/15/2010, 2007    [facility] Dr. Patsy Morrison PROSTATE BIOPSY Bilateral 05/10/2001    Benign-Dr. Karen Guzman    PROSTATE BIOPSY Bilateral 04/08/1999    Benign-Dr. Teofilo Stern    PROSTATE BIOPSY Bilateral 06/18/1998    Benign-Dr. Chiquita Roberts    PROSTATE BIOPSY Bilateral 01/07/1998    Benign-Dr. Chiquita Roberts    PROSTATE BIOPSY Bilateral 10/04/1996    Benign-Dr. Valeri Zacarias    PROSTATE SURGERY  11/02/2018    CYSTOSCOPY, TRANSURETHRAL RESECTION PROSTATE - GREENLIGHT     VASECTOMY         Family History   Problem Relation Age of Onset    Diabetes

## 2018-12-10 ENCOUNTER — OFFICE VISIT (OUTPATIENT)
Dept: UROLOGY | Age: 73
End: 2018-12-10
Payer: MEDICARE

## 2018-12-10 VITALS
HEIGHT: 68 IN | BODY MASS INDEX: 28.4 KG/M2 | WEIGHT: 187.4 LBS | HEART RATE: 55 BPM | SYSTOLIC BLOOD PRESSURE: 124 MMHG | OXYGEN SATURATION: 99 % | DIASTOLIC BLOOD PRESSURE: 82 MMHG

## 2018-12-10 DIAGNOSIS — N40.1 BENIGN PROSTATIC HYPERPLASIA WITH INCOMPLETE BLADDER EMPTYING: ICD-10-CM

## 2018-12-10 DIAGNOSIS — R39.14 BENIGN PROSTATIC HYPERPLASIA WITH INCOMPLETE BLADDER EMPTYING: ICD-10-CM

## 2018-12-10 DIAGNOSIS — N40.0 BENIGN PROSTATIC HYPERPLASIA, UNSPECIFIED WHETHER LOWER URINARY TRACT SYMPTOMS PRESENT: Primary | ICD-10-CM

## 2018-12-10 PROCEDURE — 99024 POSTOP FOLLOW-UP VISIT: CPT | Performed by: UROLOGY

## 2018-12-10 PROCEDURE — 51798 US URINE CAPACITY MEASURE: CPT | Performed by: UROLOGY

## 2018-12-10 NOTE — PROGRESS NOTES
0.6 oz/week    1 Standard drinks or equivalent per week     Comment: 1 time per week       REVIEW OF SYSTEMS:  Constitutional: negative  Eyes: negative  Respiratory: negative  Cardiovascular: negative  Gastrointestinal: negative  Genitourinary: see HPI  Musculoskeletal: negative  Skin: negative   Neurological: negative  Hematological/Lymphatic: negative  Psychological: negative        Physical Exam:    This a 68 y.o. male  Vitals:    12/10/18 1127   BP: 124/82   Pulse: 55   SpO2: 99%     Body mass index is 28.5 kg/m². Constitutional: Patient in no acute distress;   Catheter in place draining      Assessment and Plan        1. Benign prostatic hyperplasia, unspecified whether lower urinary tract symptoms present    2. Benign prostatic hyperplasia with incomplete bladder emptying               Plan:      Some irritative symptoms. Otherwise doing well  Follow up in 6 months     Prescriptions Ordered:  No orders of the defined types were placed in this encounter.      Orders Placed:  Orders Placed This Encounter   Procedures   Hung Braun MD

## 2019-02-27 ENCOUNTER — HOSPITAL ENCOUNTER (OUTPATIENT)
Dept: LAB | Age: 74
Discharge: HOME | End: 2019-02-27
Payer: MEDICARE

## 2019-02-27 DIAGNOSIS — I10 ESSENTIAL HYPERTENSION: ICD-10-CM

## 2019-02-27 DIAGNOSIS — E11.9 TYPE 2 DIABETES MELLITUS WITHOUT COMPLICATION, WITHOUT LONG-TERM CURRENT USE OF INSULIN (HCC): ICD-10-CM

## 2019-02-27 DIAGNOSIS — E78.5 HYPERLIPIDEMIA, UNSPECIFIED HYPERLIPIDEMIA TYPE: ICD-10-CM

## 2019-02-27 DIAGNOSIS — D64.9 MILD ANEMIA: ICD-10-CM

## 2019-02-27 LAB
ESTIMATED AVERAGE GLUCOSE: 260 MG/DL
HBA1C MFR BLD: 10.7 % (ref 4.8–5.9)
HEMOGLOBIN: 13.6 G/DL (ref 13.5–17.5)
IRON SATURATION: 32 % (ref 20–55)
IRON: 87 UG/DL (ref 59–158)
TOTAL IRON BINDING CAPACITY: 275 UG/DL (ref 250–450)
UNSATURATED IRON BINDING CAPACITY: 188 UG/DL (ref 112–347)

## 2019-02-27 PROCEDURE — 83540 ASSAY OF IRON: CPT

## 2019-02-27 PROCEDURE — 83550 IRON BINDING TEST: CPT

## 2019-02-27 PROCEDURE — 83036 HEMOGLOBIN GLYCOSYLATED A1C: CPT

## 2019-02-27 PROCEDURE — 85018 HEMOGLOBIN: CPT

## 2019-02-27 PROCEDURE — 36415 COLL VENOUS BLD VENIPUNCTURE: CPT

## 2019-03-06 ENCOUNTER — OFFICE VISIT (OUTPATIENT)
Dept: INTERNAL MEDICINE | Age: 74
End: 2019-03-06
Payer: MEDICARE

## 2019-03-06 VITALS
HEART RATE: 60 BPM | BODY MASS INDEX: 28.79 KG/M2 | WEIGHT: 190 LBS | RESPIRATION RATE: 16 BRPM | DIASTOLIC BLOOD PRESSURE: 82 MMHG | TEMPERATURE: 97.8 F | SYSTOLIC BLOOD PRESSURE: 116 MMHG | HEIGHT: 68 IN

## 2019-03-06 DIAGNOSIS — I10 ESSENTIAL HYPERTENSION: ICD-10-CM

## 2019-03-06 DIAGNOSIS — I25.119 CORONARY ARTERY DISEASE INVOLVING NATIVE HEART WITH ANGINA PECTORIS, UNSPECIFIED VESSEL OR LESION TYPE (HCC): ICD-10-CM

## 2019-03-06 DIAGNOSIS — E78.5 HYPERLIPIDEMIA, UNSPECIFIED HYPERLIPIDEMIA TYPE: ICD-10-CM

## 2019-03-06 DIAGNOSIS — E11.9 TYPE 2 DIABETES MELLITUS WITHOUT COMPLICATION, WITHOUT LONG-TERM CURRENT USE OF INSULIN (HCC): Primary | ICD-10-CM

## 2019-03-06 DIAGNOSIS — M10.00 IDIOPATHIC GOUT, UNSPECIFIED CHRONICITY, UNSPECIFIED SITE: ICD-10-CM

## 2019-03-06 DIAGNOSIS — E78.2 MIXED HYPERLIPIDEMIA: ICD-10-CM

## 2019-03-06 PROCEDURE — G8427 DOCREV CUR MEDS BY ELIG CLIN: HCPCS | Performed by: INTERNAL MEDICINE

## 2019-03-06 PROCEDURE — 3046F HEMOGLOBIN A1C LEVEL >9.0%: CPT | Performed by: INTERNAL MEDICINE

## 2019-03-06 PROCEDURE — 99214 OFFICE O/P EST MOD 30 MIN: CPT | Performed by: INTERNAL MEDICINE

## 2019-03-06 PROCEDURE — 3017F COLORECTAL CA SCREEN DOC REV: CPT | Performed by: INTERNAL MEDICINE

## 2019-03-06 PROCEDURE — G8417 CALC BMI ABV UP PARAM F/U: HCPCS | Performed by: INTERNAL MEDICINE

## 2019-03-06 PROCEDURE — 1123F ACP DISCUSS/DSCN MKR DOCD: CPT | Performed by: INTERNAL MEDICINE

## 2019-03-06 PROCEDURE — 2022F DILAT RTA XM EVC RTNOPTHY: CPT | Performed by: INTERNAL MEDICINE

## 2019-03-06 PROCEDURE — G8598 ASA/ANTIPLAT THER USED: HCPCS | Performed by: INTERNAL MEDICINE

## 2019-03-06 PROCEDURE — 1101F PT FALLS ASSESS-DOCD LE1/YR: CPT | Performed by: INTERNAL MEDICINE

## 2019-03-06 PROCEDURE — 1036F TOBACCO NON-USER: CPT | Performed by: INTERNAL MEDICINE

## 2019-03-06 PROCEDURE — G8482 FLU IMMUNIZE ORDER/ADMIN: HCPCS | Performed by: INTERNAL MEDICINE

## 2019-03-06 PROCEDURE — 4040F PNEUMOC VAC/ADMIN/RCVD: CPT | Performed by: INTERNAL MEDICINE

## 2019-03-06 RX ORDER — GLIMEPIRIDE 4 MG/1
4 TABLET ORAL 2 TIMES DAILY
Qty: 180 TABLET | Refills: 3 | Status: SHIPPED | OUTPATIENT
Start: 2019-03-06 | End: 2019-03-07 | Stop reason: SDUPTHER

## 2019-03-06 RX ORDER — LISINOPRIL 40 MG/1
40 TABLET ORAL DAILY
Qty: 90 TABLET | Refills: 3 | Status: SHIPPED | OUTPATIENT
Start: 2019-03-06 | End: 2019-03-07 | Stop reason: SDUPTHER

## 2019-03-06 RX ORDER — METOPROLOL TARTRATE 50 MG/1
50 TABLET, FILM COATED ORAL 2 TIMES DAILY
Qty: 180 TABLET | Refills: 3 | Status: SHIPPED | OUTPATIENT
Start: 2019-03-06 | End: 2019-03-07 | Stop reason: SDUPTHER

## 2019-03-06 RX ORDER — ATORVASTATIN CALCIUM 80 MG/1
80 TABLET, FILM COATED ORAL DAILY
Qty: 90 TABLET | Refills: 3 | Status: SHIPPED | OUTPATIENT
Start: 2019-03-06 | End: 2019-03-07 | Stop reason: SDUPTHER

## 2019-03-06 RX ORDER — HYDROCHLOROTHIAZIDE 12.5 MG/1
12.5 TABLET ORAL DAILY
Qty: 90 TABLET | Refills: 3 | Status: SHIPPED | OUTPATIENT
Start: 2019-03-06 | End: 2019-03-07 | Stop reason: SDUPTHER

## 2019-03-06 RX ORDER — ALLOPURINOL 300 MG/1
300 TABLET ORAL DAILY
Qty: 90 TABLET | Refills: 3 | Status: SHIPPED | OUTPATIENT
Start: 2019-03-06 | End: 2019-03-07 | Stop reason: SDUPTHER

## 2019-03-06 ASSESSMENT — ENCOUNTER SYMPTOMS
DIARRHEA: 0
SHORTNESS OF BREATH: 0
CONSTIPATION: 0
NAUSEA: 0
EYE PAIN: 0
BLOOD IN STOOL: 0
ABDOMINAL PAIN: 0
BACK PAIN: 0
VOMITING: 0
COUGH: 0

## 2019-03-06 ASSESSMENT — PATIENT HEALTH QUESTIONNAIRE - PHQ9
2. FEELING DOWN, DEPRESSED OR HOPELESS: 0
SUM OF ALL RESPONSES TO PHQ QUESTIONS 1-9: 0
SUM OF ALL RESPONSES TO PHQ QUESTIONS 1-9: 0
SUM OF ALL RESPONSES TO PHQ9 QUESTIONS 1 & 2: 0
1. LITTLE INTEREST OR PLEASURE IN DOING THINGS: 0

## 2019-03-07 DIAGNOSIS — E78.5 HYPERLIPIDEMIA, UNSPECIFIED HYPERLIPIDEMIA TYPE: ICD-10-CM

## 2019-03-07 DIAGNOSIS — M10.00 IDIOPATHIC GOUT, UNSPECIFIED CHRONICITY, UNSPECIFIED SITE: ICD-10-CM

## 2019-03-07 DIAGNOSIS — E11.9 TYPE 2 DIABETES MELLITUS WITHOUT COMPLICATION, WITHOUT LONG-TERM CURRENT USE OF INSULIN (HCC): ICD-10-CM

## 2019-03-07 DIAGNOSIS — I10 ESSENTIAL HYPERTENSION: ICD-10-CM

## 2019-03-07 RX ORDER — ATORVASTATIN CALCIUM 80 MG/1
80 TABLET, FILM COATED ORAL DAILY
Qty: 90 TABLET | Refills: 3 | Status: SHIPPED | OUTPATIENT
Start: 2019-03-07 | End: 2020-01-11 | Stop reason: SDUPTHER

## 2019-03-07 RX ORDER — LISINOPRIL 40 MG/1
40 TABLET ORAL DAILY
Qty: 90 TABLET | Refills: 3 | Status: SHIPPED | OUTPATIENT
Start: 2019-03-07 | End: 2020-01-11 | Stop reason: SDUPTHER

## 2019-03-07 RX ORDER — HYDROCHLOROTHIAZIDE 12.5 MG/1
12.5 TABLET ORAL DAILY
Qty: 90 TABLET | Refills: 3 | Status: SHIPPED | OUTPATIENT
Start: 2019-03-07 | End: 2020-01-11 | Stop reason: SDUPTHER

## 2019-03-07 RX ORDER — METOPROLOL TARTRATE 50 MG/1
50 TABLET, FILM COATED ORAL 2 TIMES DAILY
Qty: 180 TABLET | Refills: 3 | Status: SHIPPED | OUTPATIENT
Start: 2019-03-07 | End: 2020-01-11 | Stop reason: SDUPTHER

## 2019-03-07 RX ORDER — ALLOPURINOL 300 MG/1
300 TABLET ORAL DAILY
Qty: 90 TABLET | Refills: 3 | Status: SHIPPED | OUTPATIENT
Start: 2019-03-07 | End: 2019-06-10

## 2019-03-07 RX ORDER — GLIMEPIRIDE 4 MG/1
4 TABLET ORAL 2 TIMES DAILY
Qty: 180 TABLET | Refills: 3 | Status: SHIPPED | OUTPATIENT
Start: 2019-03-07 | End: 2020-01-11 | Stop reason: SDUPTHER

## 2019-04-08 DIAGNOSIS — E11.9 TYPE 2 DIABETES MELLITUS WITHOUT COMPLICATION, WITHOUT LONG-TERM CURRENT USE OF INSULIN (HCC): ICD-10-CM

## 2019-04-17 ENCOUNTER — TELEPHONE (OUTPATIENT)
Dept: INTERNAL MEDICINE | Age: 74
End: 2019-04-17

## 2019-04-17 DIAGNOSIS — E11.9 TYPE 2 DIABETES MELLITUS WITHOUT COMPLICATION, WITHOUT LONG-TERM CURRENT USE OF INSULIN (HCC): ICD-10-CM

## 2019-05-15 ENCOUNTER — CARE COORDINATION (OUTPATIENT)
Dept: CARE COORDINATION | Age: 74
End: 2019-05-15

## 2019-05-15 NOTE — LETTER
5/15/2019    [address]      Dear Raymon Gibbs,    My name is Krystal Jhaveri and I am a registered nurse who partners with Samira Moore MD to improve patients' health. Samira Moore MD believes you would benefit from working with me. As a member of your health care team, I would work with other providers involved in your care, offer education for your specific health conditions, and connect you with additional resources as needed. I will collaborate with Samira Moore MD to support you in following your treatment plan. The additional support I provide is no additional cost to you. My primary focus is to help you achieve specific goals and improve your health. We are committed to walk with you on this journey and look forward to working with you. Please call me to further discuss your healthcare needs. I am available by phone or for appointments at the office. You can reach me at [phone].     In good health,     Krystal Jhaveri RN

## 2019-05-15 NOTE — CARE COORDINATION
Laurence Hoff was referred for [facility]. He has Type II Diabetes- uncontrolled- not on insulin, HTN, Hypertension, CAD, S/P CABG x 3, Hyperlipidemia. He was seen by PCP 2/17/2019 and diabetic medication was adjusted. He is due for labs 5/31/2019 and PCP f/u. Plan of Care : Enroll in [facility]    5/15/2019 2:21 pm  Unable to reach by phone. LM requesting return call. Letter mailed.      Lab Results   Component Value Date    LABA1C 10.7 (H) 02/27/2019    LABA1C 8.5 (H) 11/28/2018    LABA1C 8.0 (H) 07/17/2018     Lab Results   Component Value Date     02/27/2019     11/28/2018     07/17/2018     Future Appointments   Date Time Provider Kelly Brennan   5/31/2019  7:55 AM SCHEDULE, Gabriel Collins [address] [place] [facility] [place]   6/10/2019 11:00 AM MD SHY [place] DP   6/14/2019  1:00 PM MD JUJU Thrasher DP   7/29/2019 10:00 AM SCHEDULE, Gabriel Collins [address] [facility] [place] [facility] [place]

## 2019-05-15 NOTE — CARE COORDINATION
Ambulatory Care Coordination Note  5/15/2019  CM Risk Score: 9  Jayesh Mortality Risk Score: 6    ACC: Fan Belle RN    Summary Note: Yu Nuñez was referred for [facility]. He has Type II Diabetes- uncontrolled- not on insulin, HTN, Hypertension, CAD, S/P CABG x 3, Hyperlipidemia.      He was seen by PCP 2/17/2019 and diabetic medication was adjusted. He is due for labs 5/31/2019 and PCP f/u.         Plan of Care : Enrolled in [facility]    Continue assessments, education, and support     Ed returned call. Discussed care coordination. He is in agreement. Enrolled in [facility]. He is testing BS once every other day. Ed was restarted on Metformin at a lower dose- 500 mg BID (previously was on 1000 mg and had to stop d/t diarrhea). He continues on Amaryl. Lab Results   Component Value Date    LABA1C 10.7 (H) 02/27/2019    LABA1C 8.5 (H) 11/28/2018    LABA1C 8.0 (H) 07/17/2018     Lab Results   Component Value Date     02/27/2019     11/28/2018     07/17/2018       Ambulatory Care Coordination Assessment    Care Coordination Protocol  Program Enrollment:  Complex Care  Referral from Primary Care Provider:  No  Week 1 - Initial Assessment     Do you have all of your prescriptions and are they filled?:  Yes  Barriers to medication adherence:  None  Are you able to afford your medications?:  Yes  How often do you have trouble taking your medications the way you have been told to take them?:  I always take them as prescribed. Do you have Home O2 Therapy?:  No      Ability to seek help/take action for Emergent Urgent situations i.e. fire, crime, inclement weather or health crisis. :  Independent  Ability to ambulate to restroom:  Independent  Ability handle personal hygeine needs (bathing/dressing/grooming): Independent  Ability to manage Medications: Independent  Ability to prepare Food Preparation:  Independent  Ability to maintain home (clean home, laundry):   Independent  Ability to drive and/or has does the patient now understand their health and well-being (symptoms, signs or risk factors) and what they need to do to manage their health?:  Reasonable to good understanding and already engages in managing health or is willing to undertake better management   How well do you think your patient can engage in healthcare discussions? (Barriers include language, deafness, aphasia, alcohol or drug problems, learning difficulties, concentration):  Clear and open communication, no identified barriers   Do other services need to be involved to help this patient?:  Other care/services not required at this time   Are current services involved with this patient well-coordinated? (Include coordination with other services you are now recommendation): All required care/services in place and well-coordinated   Suggested Interventions and Community Resources   Registered Dietician:  Completed   Zone Management Tools:  Completed         Set up/Review Goals, Set up/Review an Education Plan              Prior to Admission medications    Medication Sig Start Date End Date Taking?  Authorizing Provider   glimepiride (AMARYL) 4 MG tablet Take 1 tablet by mouth 2 times daily 3/7/19  Yes Samira Moore MD   allopurinol (ZYLOPRIM) 300 MG tablet Take 1 tablet by mouth daily 3/7/19  Yes Samira Moore MD   hydrochlorothiazide (HYDRODIURIL) 12.5 MG tablet Take 1 tablet by mouth daily 3/7/19  Yes Samira Moore MD   atorvastatin (LIPITOR) 80 MG tablet Take 1 tablet by mouth daily 3/7/19  Yes Samira Moore MD   metoprolol tartrate (LOPRESSOR) 50 MG tablet Take 1 tablet by mouth 2 times daily 3/7/19  Yes Samira Moore MD   lisinopril (PRINIVIL;ZESTRIL) 40 MG tablet Take 1 tablet by mouth daily 3/7/19  Yes Samira Moore MD   metFORMIN (GLUCOPHAGE) 500 MG tablet Take 1 tablet by mouth 2 times daily (with meals) 3/7/19  Yes Samira Moore MD   aspirin 81 MG EC tablet Take 81 mg by mouth daily   Yes Historical Provider, MD nitroGLYCERIN (NITROSTAT) 0.4 MG SL tablet Place 1 tablet under the tongue every 5 minutes as needed for Chest pain 7/30/18  Yes Iza Langford MD   blood glucose test strips (DURGA CONTOUR TEST) strip Tests blood sugar daily.   DX: E11.09 4/17/19   Iza Langford MD       Future Appointments   Date Time Provider Kelly Anu   5/31/2019  7:55 AM SCHEDULE, Gabriel Collins [address] [facility] [place] [facility] [place]   6/10/2019 11:00 AM MD SHY Jimenez [facility]   6/14/2019  1:00 PM MD JUJU Mallory [facility]   7/29/2019 10:00 AM SCHEDULE, Gabriel Collins [address] [facility] [place] [facility] [place]

## 2019-05-23 ENCOUNTER — CARE COORDINATION (OUTPATIENT)
Dept: CARE COORDINATION | Age: 74
End: 2019-05-23

## 2019-05-23 NOTE — CARE COORDINATION
Ambulatory Care Coordination Note  5/31/2019  CM Risk Score: 9  Jayesh Mortality Risk Score: 6    ACC: Chelsey Holm, RN    Summary Note: Carlos Holden was referred for [facility]. He has Type II Diabetes- uncontrolled- not on insulin, HTN, Hypertension, CAD, S/P CABG x 3, Hyperlipidemia.      He was seen by PCP 2/17/2019 and diabetic medication was adjusted. He is due for labs 5/31/2019 and PCP f/u.         Plan of Care : Continue assessments, education, and support    F/U on cardiology appointment- needs for October. F/U lab work 5/31/2019. Spoke with spouse. Ed was not available at this time. No concerns. This writer will f/u next week with Ed.     5/28/2019 LM for Ed to return call. 5/29/2019- Called and spoke with spouse. Ed was out [employer]. This writer will try latter. 5/31/2019 Spoke with Ed. He had labs drawn today. He is to see cardiologist once a year- due in October per Patient. BS 2 hrs after meal- 180. Updated care team.   He denied any concerns at this time.        General Assessment    Do you have any symptoms that are causing concern?:  No       Diabetes Assessment    How often do you test your blood sugar?:  Daily (Comment: QOD)   Do you have barriers with adherence to non-pharmacologic self-management interventions?  (Nutrition/Exercise/Self-Monitoring):  No   Have you ever had to go to the ED for symptoms of low blood sugar?:  No       No patient-reported symptoms, Increase or Decrease trend in Blood Sugars   Do you have hyperglycemia symptoms?:  No   Do you have hypoglycemia symptoms?:  No   Last Blood Sugar Value:  180   Blood Sugar Monitoring Regimen:  2 Hours Post Meal   Blood Sugar Trends:  Steady Decrease            Care Coordination Interventions    Program Enrollment:  Complex Care  Referral from Primary Care Provider:  No  Suggested Interventions and Community Resources  Registered Dietician:  Completed (Comment: in the past )  Zone Management Tools:  Completed (Comment: DM ) Goals Addressed     None          Prior to Admission medications    Medication Sig Start Date End Date Taking? Authorizing Provider   blood glucose test strips (DURGA CONTOUR TEST) strip Tests blood sugar daily.   DX: E11.09 4/17/19   Moira Wick MD   glimepiride (AMARYL) 4 MG tablet Take 1 tablet by mouth 2 times daily 3/7/19   Moira Wick MD   allopurinol (ZYLOPRIM) 300 MG tablet Take 1 tablet by mouth daily 3/7/19   Moira Wick MD   hydrochlorothiazide (HYDRODIURIL) 12.5 MG tablet Take 1 tablet by mouth daily 3/7/19   Moira Wick MD   atorvastatin (LIPITOR) 80 MG tablet Take 1 tablet by mouth daily 3/7/19   Moira Wick MD   metoprolol tartrate (LOPRESSOR) 50 MG tablet Take 1 tablet by mouth 2 times daily 3/7/19   Moira Wick MD   lisinopril (PRINIVIL;ZESTRIL) 40 MG tablet Take 1 tablet by mouth daily 3/7/19   Moira Wick MD   metFORMIN (GLUCOPHAGE) 500 MG tablet Take 1 tablet by mouth 2 times daily (with meals) 3/7/19   Moira Wick MD   aspirin 81 MG EC tablet Take 81 mg by mouth daily    Historical Provider, MD   nitroGLYCERIN (NITROSTAT) 0.4 MG SL tablet Place 1 tablet under the tongue every 5 minutes as needed for Chest pain 7/30/18   Moira Wick MD       Future Appointments   Date Time Provider Kelly Brennan   6/10/2019 11:00 AM MD SHY Alcantar DPP   6/14/2019  1:00 PM MD JUJU Palomo CLARA   7/29/2019 10:00 AM SCHEDULE, Gabriel Collins [facility] LAB [address] [place] [address] LAB [place]

## 2019-05-31 ENCOUNTER — HOSPITAL ENCOUNTER (OUTPATIENT)
Dept: LAB | Age: 74
Discharge: HOME | End: 2019-05-31
Payer: MEDICARE

## 2019-05-31 DIAGNOSIS — R97.20 ELEVATED PSA: ICD-10-CM

## 2019-05-31 DIAGNOSIS — E11.9 TYPE 2 DIABETES MELLITUS WITHOUT COMPLICATION, WITHOUT LONG-TERM CURRENT USE OF INSULIN (HCC): ICD-10-CM

## 2019-05-31 DIAGNOSIS — I10 ESSENTIAL HYPERTENSION: ICD-10-CM

## 2019-05-31 DIAGNOSIS — E78.2 MIXED HYPERLIPIDEMIA: ICD-10-CM

## 2019-05-31 LAB
ESTIMATED AVERAGE GLUCOSE: 169 MG/DL
HBA1C MFR BLD: 7.5 % (ref 4.8–5.9)
PROSTATE SPECIFIC ANTIGEN: 14.17 UG/L

## 2019-05-31 PROCEDURE — 36415 COLL VENOUS BLD VENIPUNCTURE: CPT

## 2019-05-31 PROCEDURE — 83036 HEMOGLOBIN GLYCOSYLATED A1C: CPT

## 2019-05-31 PROCEDURE — 84153 ASSAY OF PSA TOTAL: CPT

## 2019-06-10 ENCOUNTER — OFFICE VISIT (OUTPATIENT)
Dept: UROLOGY | Age: 74
End: 2019-06-10
Payer: MEDICARE

## 2019-06-10 VITALS
WEIGHT: 193.2 LBS | BODY MASS INDEX: 29.28 KG/M2 | DIASTOLIC BLOOD PRESSURE: 70 MMHG | SYSTOLIC BLOOD PRESSURE: 132 MMHG | HEIGHT: 68 IN

## 2019-06-10 DIAGNOSIS — R97.20 ELEVATED PSA: ICD-10-CM

## 2019-06-10 DIAGNOSIS — N40.0 BENIGN PROSTATIC HYPERPLASIA, UNSPECIFIED WHETHER LOWER URINARY TRACT SYMPTOMS PRESENT: Primary | ICD-10-CM

## 2019-06-10 DIAGNOSIS — R39.14 BENIGN PROSTATIC HYPERPLASIA WITH INCOMPLETE BLADDER EMPTYING: ICD-10-CM

## 2019-06-10 DIAGNOSIS — N41.1 CHRONIC PROSTATITIS: ICD-10-CM

## 2019-06-10 DIAGNOSIS — N40.1 BENIGN PROSTATIC HYPERPLASIA WITH INCOMPLETE BLADDER EMPTYING: ICD-10-CM

## 2019-06-10 PROCEDURE — 4040F PNEUMOC VAC/ADMIN/RCVD: CPT | Performed by: UROLOGY

## 2019-06-10 PROCEDURE — 51798 US URINE CAPACITY MEASURE: CPT | Performed by: UROLOGY

## 2019-06-10 PROCEDURE — G8417 CALC BMI ABV UP PARAM F/U: HCPCS | Performed by: UROLOGY

## 2019-06-10 PROCEDURE — 3017F COLORECTAL CA SCREEN DOC REV: CPT | Performed by: UROLOGY

## 2019-06-10 PROCEDURE — G8427 DOCREV CUR MEDS BY ELIG CLIN: HCPCS | Performed by: UROLOGY

## 2019-06-10 PROCEDURE — 99214 OFFICE O/P EST MOD 30 MIN: CPT | Performed by: UROLOGY

## 2019-06-10 PROCEDURE — 1036F TOBACCO NON-USER: CPT | Performed by: UROLOGY

## 2019-06-10 PROCEDURE — G8598 ASA/ANTIPLAT THER USED: HCPCS | Performed by: UROLOGY

## 2019-06-10 PROCEDURE — 1123F ACP DISCUSS/DSCN MKR DOCD: CPT | Performed by: UROLOGY

## 2019-06-10 NOTE — PROGRESS NOTES
Liliane Aquino MD        [place] [address]  [address] [facility] [address] [address]  Dept: [phone]  Dept Fax: [phone]  Loc: [phone]      [facility] Urology Office Note - Follow up Visit    Patient:  Ketty Mason  YOB: 1945  Date: 6/10/2019    The patient is a 68 y.o. male who presents today for evaluation of the following problems: BPH  Chief Complaint   Patient presents with    Benign Prostatic Hypertrophy     4 mo         HISTORY OF PRESENT ILLNESS:     Onset was  Years ago  Overall, the problem(s) are stable  Severity is described as mild-moderate. Associated Symptoms: No dysuria, no gross hematuria. Current Pain Severity: 0    Some irritative voiding symptoms  No incontinence  Stream is strong. PVR 47cc in office today. Previous records:  Overall the PSA level is: oscillating. Range 11-12  Recent history of urinary tract infection/prostatitis? no  Previous prostate biopsy? Yes x 6. NEG  Lower urinary tract symptoms: no  Previous patient of Dr. Clare Sadler --[facility]  Has had episodes of retention after alcohol use in past    Was in [place] and went into retention. Failed voiding trial.    Requested/reviewed records from Luis Gonzalez MD office and/or outside physician/EMR    (Patient's old records have been requested, reviewed and pertinent findings summarized in today's note.)    Procedures Today: N/A    Last several PSA's:  Lab Results   Component Value Date    PSA 14.17 (H) 05/31/2019    PSA 11.1 (H) 07/17/2018    PSA 11.17 (H) 06/16/2017       Last total testosterone:  No results found for: TESTOSTERONE    Urinalysis today:  No results found for this visit on 06/10/19.     Last BUN and creatinine:  Lab Results   Component Value Date    BUN 20 10/24/2018    BUN 20 10/24/2018     Lab Results   Component Value Date    CREATININE 1.19 10/24/2018    CREATININE 1.19 10/24/2018       Additional Lab/Culture results: none    Imaging Reviewed Used      (If patient a smoker, smoking cessation counseling offered)   Social History     Substance and Sexual Activity   Alcohol Use Yes    Alcohol/week: 0.6 oz    Types: 1 Standard drinks or equivalent per week    Comment: 1 time per week       REVIEW OF SYSTEMS:  Constitutional: negative  Eyes: negative  Respiratory: negative  Cardiovascular: negative  Gastrointestinal: negative  Genitourinary: see HPI  Musculoskeletal: negative  Skin: negative   Neurological: negative  Hematological/Lymphatic: negative  Psychological: negative        Physical Exam:    This a 68 y.o. male  Vitals:    06/10/19 1121   BP: 132/70     Body mass index is 29.38 kg/m². Constitutional: Patient in no acute distress;         Assessment and Plan        1. Benign prostatic hyperplasia, unspecified whether lower urinary tract symptoms present    2. Benign prostatic hyperplasia with incomplete bladder emptying    3. Elevated PSA    4. Chronic prostatitis               Plan:      Some irritative symptoms. Otherwise doing well  PSA in six months. PSA rising. Did discuss this with patient. If still elevated in 14 range or above will get prostate MRI in six months      Prescriptions Ordered:  No orders of the defined types were placed in this encounter.      Orders Placed:  Orders Placed This Encounter   Procedures   Randall Cortez MD

## 2019-06-12 ENCOUNTER — CARE COORDINATION (OUTPATIENT)
Dept: CARE COORDINATION | Age: 74
End: 2019-06-12

## 2019-06-12 NOTE — CARE COORDINATION
Ambulatory Care Coordination Note  6/12/2019  CM Risk Score: 9  Jayesh Mortality Risk Score: 6    ACC: Chelsey Holm RN    Summary Note:  Carlos Holden was referred for [facility]. He has Type II Diabetes- uncontrolled- not on insulin, HTN, Hypertension, CAD, S/P CABG x 3, Hyperlipidemia. He follows with cardiologist, urologist     He was seen by PCP 2/17/2019 and diabetic medication was adjusted.        Plan of Care : Continue assessments, education, and support                          F/U on cardiology appointment- needs for October. Hgb A1c has improved. F/U PCP appt 6/14/2019    Per chart review- he did see urologist- f/u scheduled in 6 months. Unable to reach by phone- LM requesting return call and also information on PCP appt 6/14/2019. He is aware that this writer will not be at appt- scheduling conflict. Lab Results   Component Value Date    LABA1C 7.5 (H) 05/31/2019    LABA1C 10.7 (H) 02/27/2019    LABA1C 8.5 (H) 11/28/2018     Lab Results   Component Value Date     05/31/2019     02/27/2019     11/28/2018       Care Coordination Interventions    Program Enrollment:  Complex Care  Referral from Primary Care Provider:  No  Suggested Interventions and Community Resources  Registered Dietician:  Completed (Comment: in the past )  Zone Management Tools:  Completed (Comment: DM )           Prior to Admission medications    Medication Sig Start Date End Date Taking? Authorizing Provider   blood glucose test strips (DURGA CONTOUR TEST) strip Tests blood sugar daily.   DX: E11.09 4/17/19   Tadeo Schreiber MD   glimepiride (AMARYL) 4 MG tablet Take 1 tablet by mouth 2 times daily 3/7/19   Tadeo Schreiber MD   hydrochlorothiazide (HYDRODIURIL) 12.5 MG tablet Take 1 tablet by mouth daily 3/7/19   Tadeo Schreiber MD   atorvastatin (LIPITOR) 80 MG tablet Take 1 tablet by mouth daily 3/7/19   Tadeo Schreiber MD   metoprolol tartrate (LOPRESSOR) 50 MG tablet Take 1 tablet by mouth 2 times daily 3/7/19   Roney Gloria MD   lisinopril (PRINIVIL;ZESTRIL) 40 MG tablet Take 1 tablet by mouth daily 3/7/19   Roney Gloria MD   metFORMIN (GLUCOPHAGE) 500 MG tablet Take 1 tablet by mouth 2 times daily (with meals) 3/7/19   Roney Gloria MD   aspirin 81 MG EC tablet Take 81 mg by mouth daily    Historical Provider, MD   nitroGLYCERIN (NITROSTAT) 0.4 MG SL tablet Place 1 tablet under the tongue every 5 minutes as needed for Chest pain 7/30/18   Roney Gloria MD       Future Appointments   Date Time Provider Kelly Janseni   6/14/2019  1:00 PM MD JUJU Ramírez [facility]   7/29/2019 10:00 AM SCHEDULE, Gabriel Collins [facility] LAB MD LAB [place]   12/9/2019  9:00 AM SCHEDULE, Gabriel Collins 112 LAB MD LAB [place]   12/16/2019  8:40 AM MD SYH Velarde [facility]

## 2019-06-14 ENCOUNTER — TELEPHONE (OUTPATIENT)
Dept: SURGERY | Age: 74
End: 2019-06-14

## 2019-06-14 ENCOUNTER — OFFICE VISIT (OUTPATIENT)
Dept: INTERNAL MEDICINE | Age: 74
End: 2019-06-14
Payer: MEDICARE

## 2019-06-14 VITALS
HEIGHT: 68 IN | BODY MASS INDEX: 29.4 KG/M2 | HEART RATE: 52 BPM | DIASTOLIC BLOOD PRESSURE: 82 MMHG | RESPIRATION RATE: 16 BRPM | WEIGHT: 194 LBS | SYSTOLIC BLOOD PRESSURE: 138 MMHG

## 2019-06-14 DIAGNOSIS — Z12.11 ENCOUNTER FOR SCREENING COLONOSCOPY: ICD-10-CM

## 2019-06-14 DIAGNOSIS — I10 ESSENTIAL HYPERTENSION: ICD-10-CM

## 2019-06-14 DIAGNOSIS — D64.9 ANEMIA, UNSPECIFIED TYPE: ICD-10-CM

## 2019-06-14 DIAGNOSIS — I25.10 CORONARY ARTERY DISEASE INVOLVING NATIVE CORONARY ARTERY OF NATIVE HEART WITHOUT ANGINA PECTORIS: ICD-10-CM

## 2019-06-14 DIAGNOSIS — E78.5 HYPERLIPIDEMIA, UNSPECIFIED HYPERLIPIDEMIA TYPE: ICD-10-CM

## 2019-06-14 DIAGNOSIS — E11.9 TYPE 2 DIABETES MELLITUS WITHOUT COMPLICATION, WITHOUT LONG-TERM CURRENT USE OF INSULIN (HCC): Primary | ICD-10-CM

## 2019-06-14 PROCEDURE — 3017F COLORECTAL CA SCREEN DOC REV: CPT | Performed by: INTERNAL MEDICINE

## 2019-06-14 PROCEDURE — G8598 ASA/ANTIPLAT THER USED: HCPCS | Performed by: INTERNAL MEDICINE

## 2019-06-14 PROCEDURE — 3045F PR MOST RECENT HEMOGLOBIN A1C LEVEL 7.0-9.0%: CPT | Performed by: INTERNAL MEDICINE

## 2019-06-14 PROCEDURE — G8417 CALC BMI ABV UP PARAM F/U: HCPCS | Performed by: INTERNAL MEDICINE

## 2019-06-14 PROCEDURE — G8427 DOCREV CUR MEDS BY ELIG CLIN: HCPCS | Performed by: INTERNAL MEDICINE

## 2019-06-14 PROCEDURE — 99214 OFFICE O/P EST MOD 30 MIN: CPT | Performed by: INTERNAL MEDICINE

## 2019-06-14 PROCEDURE — 1123F ACP DISCUSS/DSCN MKR DOCD: CPT | Performed by: INTERNAL MEDICINE

## 2019-06-14 PROCEDURE — 1036F TOBACCO NON-USER: CPT | Performed by: INTERNAL MEDICINE

## 2019-06-14 PROCEDURE — 4040F PNEUMOC VAC/ADMIN/RCVD: CPT | Performed by: INTERNAL MEDICINE

## 2019-06-14 PROCEDURE — 2022F DILAT RTA XM EVC RTNOPTHY: CPT | Performed by: INTERNAL MEDICINE

## 2019-06-14 ASSESSMENT — ENCOUNTER SYMPTOMS
BACK PAIN: 0
NAUSEA: 0
BLOOD IN STOOL: 0
CONSTIPATION: 0
SHORTNESS OF BREATH: 0
DIARRHEA: 0
EYE PAIN: 0
COUGH: 0
VOMITING: 0
ABDOMINAL PAIN: 0

## 2019-06-14 ASSESSMENT — PATIENT HEALTH QUESTIONNAIRE - PHQ9
SUM OF ALL RESPONSES TO PHQ QUESTIONS 1-9: 0
2. FEELING DOWN, DEPRESSED OR HOPELESS: 0
SUM OF ALL RESPONSES TO PHQ QUESTIONS 1-9: 0
1. LITTLE INTEREST OR PLEASURE IN DOING THINGS: 0
SUM OF ALL RESPONSES TO PHQ9 QUESTIONS 1 & 2: 0

## 2019-06-14 NOTE — PROGRESS NOTES
[address] [facility]  [place] [address] [address]  Dept: [phone]  Dept Fax: [phone]  Loc: [phone]     Angelita Holter is a 68 y.o. male who presents today for his medical conditions/complaintsas noted below. Angelita Holter is c/o of   Chief Complaint   Patient presents with    Diabetes     3 month appt    Hypertension    Hyperlipidemia         HPI:     Diabetes   He presents for his follow-up diabetic visit. He has type 2 (Without complication and without insulin use) diabetes mellitus. His disease course has been fluctuating. Pertinent negatives for hypoglycemia include no confusion, dizziness, headaches, nervousness/anxiousness or pallor. Pertinent negatives for diabetes include no chest pain, no polydipsia, no polyuria and no weakness. Hypertension   This is a chronic problem. The current episode started more than 1 year ago. The problem has been waxing and waning since onset. The problem is controlled. Pertinent negatives include no chest pain, headaches, neck pain, palpitations or shortness of breath. Hyperlipidemia   This is a chronic problem. The current episode started more than 1 year ago. The problem is controlled. Recent lipid tests were reviewed and are variable. Pertinent negatives include no chest pain or shortness of breath. Hemoglobin A1C (%)   Date Value   05/31/2019 7.5 (H)   02/27/2019 10.7 (H)   11/28/2018 8.5 (H)            Microalb/Crt.  Ratio (mcg/mg creat)   Date Value   07/17/2018 CANNOT BE CALCULATED     LDL Cholesterol (mg/dL)   Date Value   07/17/2018 37   12/14/2016 48   12/17/2015 50         AST (U/L)   Date Value   07/17/2018 26     ALT (U/L)   Date Value   07/17/2018 29     BUN (mg/dL)   Date Value   10/24/2018 20   10/24/2018 20     BP Readings from Last 3 Encounters:   06/14/19 138/82   06/10/19 132/70   03/06/19 116/82              Past Medical History:   Diagnosis Date    Benign prostatic hypertrophy     with elevated Mene    PROSTATE SURGERY  2018    CYSTOSCOPY, TRANSURETHRAL RESECTION PROSTATE - GREENLIGHT     VASECTOMY         Family History   Problem Relation Age of Onset    Diabetes Mother     Kidney Disease Mother         Renal failure    Coronary Art Dis Mother     Hypertension Mother     Lung Cancer Father     Coronary Art Dis Father     Hypertension Brother     Other Brother         Hypernephroma          Social History     Tobacco Use    Smoking status: Former Smoker     Packs/day: 0.50     Years: 27.00     Pack years: 13.50     Types: Cigarettes     Last attempt to quit: 10/11/1992     Years since quittin.6    Smokeless tobacco: Never Used   Substance Use Topics    Alcohol use: Yes     Alcohol/week: 0.6 oz     Types: 1 Standard drinks or equivalent per week     Comment: 1 time per week         Current Outpatient Medications   Medication Sig Dispense Refill    blood glucose test strips (DURGA CONTOUR TEST) strip Tests blood sugar daily. DX: E11.09 300 each 3    glimepiride (AMARYL) 4 MG tablet Take 1 tablet by mouth 2 times daily 180 tablet 3    hydrochlorothiazide (HYDRODIURIL) 12.5 MG tablet Take 1 tablet by mouth daily 90 tablet 3    atorvastatin (LIPITOR) 80 MG tablet Take 1 tablet by mouth daily 90 tablet 3    metoprolol tartrate (LOPRESSOR) 50 MG tablet Take 1 tablet by mouth 2 times daily 180 tablet 3    lisinopril (PRINIVIL;ZESTRIL) 40 MG tablet Take 1 tablet by mouth daily 90 tablet 3    metFORMIN (GLUCOPHAGE) 500 MG tablet Take 1 tablet by mouth 2 times daily (with meals) 180 tablet 3    aspirin 81 MG EC tablet Take 81 mg by mouth daily      nitroGLYCERIN (NITROSTAT) 0.4 MG SL tablet Place 1 tablet under the tongue every 5 minutes as needed for Chest pain 25 tablet 3     No current facility-administered medications for this visit.       No Known Allergies    Health Maintenance   Topic Date Due    DTaP/Tdap/Td vaccine (1 - Tdap) 1964    Colon cancer screen colonoscopy 05/29/2018    Shingles Vaccine (2 of 3) 03/06/2020 (Originally 6/23/2008)    Hepatitis C screen  03/06/2020 (Originally 1945)    Diabetic microalbuminuria test  07/17/2019    Lipid screen  07/17/2019    Potassium monitoring  10/24/2019    Creatinine monitoring  10/24/2019    Diabetic foot exam  12/05/2019    A1C test (Diabetic or Prediabetic)  05/31/2020    Diabetic retinal exam  08/14/2020    Flu vaccine  Completed    Pneumococcal 65+ years Vaccine  Completed    AAA screen  Completed       Subjective:      Review of Systems   Constitutional: Negative for chills and fever. HENT: Negative for hearing loss. Eyes: Negative for pain and visual disturbance. Respiratory: Negative for cough and shortness of breath. Cardiovascular: Negative for chest pain, palpitations and leg swelling. Gastrointestinal: Negative for abdominal pain, blood in stool, constipation, diarrhea, nausea and vomiting. Endocrine: Negative for cold intolerance, polydipsia and polyuria. Genitourinary: Negative for difficulty urinating, dysuria and hematuria. Musculoskeletal: Negative for arthralgias, back pain, gait problem and neck pain. Skin: Negative for pallor and rash. Neurological: Negative for dizziness, weakness, numbness and headaches. Hematological: Negative for adenopathy. Does not bruise/bleed easily. Psychiatric/Behavioral: Negative for confusion. The patient is not nervous/anxious. Objective:     Physical Exam   Constitutional: He is oriented to person, place, and time. He appears well-developed and well-nourished. HENT:   Head: Normocephalic and atraumatic. Eyes: Pupils are equal, round, and reactive to light. EOM are normal.   Neck: Neck supple. Cardiovascular: Normal rate and regular rhythm. Exam reveals no gallop and no friction rub. No murmur heard. Pulmonary/Chest: Effort normal and breath sounds normal. He has no wheezes. He has no rales. Abdominal: Soft.  He exhibits no

## 2019-06-14 NOTE — PATIENT INSTRUCTIONS
that will allow you to sit while showering. · Get into a tub or shower by putting the weaker leg in first. Get out of a tub or shower with your strong side first.  · Repair loose toilet seats and consider installing a raised toilet seat to make getting on and off the toilet easier. · Keep your bathroom door unlocked while you are in the shower. Where can you learn more? Go to [email or web address] and sign in to your [employer] account. Enter [phone] in the MakeSpace box to learn more about \"Preventing Falls: Care Instructions. \"     If you do not have an account, please click on the \"Sign Up Now\" link. Current as of: November 7, 2018  Content Version: 12.0  © 5478-1626 Healthwise, Incorporated. Care instructions adapted under license by [facility] ([facility]). If you have questions about a medical condition or this instruction, always ask your healthcare professional. [address] any warranty or liability for your use of this information.

## 2019-06-14 NOTE — PROGRESS NOTES
Will Sink received counseling on the following healthy behaviors: medication adherence  Reviewed prior labs and health maintenance  Continue current medications except where noted below, diet and exercise. Discussed use, benefit, and side effects of prescribed medications. Barriers to medication compliance addressed. Patient given educational materials - see patient instructions  Was a self-tracking handout given in paper form or via [employer]? No:     Requested Prescriptions      No prescriptions requested or ordered in this encounter       All patient questions answered. Patient voiced understanding. Quality Measures    Body mass index is 29.5 kg/m². Elevated. Weight control planned discussed: healthy diet and regular exercise. BP: 138/82. Blood pressure is normal. Treatment plan consists of: see progress note below. Fall Risk 6/14/2019 5/15/2019 3/6/2019 12/5/2018 7/30/2018 6/23/2017 12/23/2015   2 or more falls in past year? no no no no no no no   Fall with injury in past year? no no no no no no no     The patient does not have a history of falls. I did , complete a risk assessment for falls.  A plan of care for falls home safety tips provided    Lab Results   Component Value Date    LDLCHOLESTEROL 37 07/17/2018    (goal LDL reduction with dx if diabetes is 50% LDL reduction)    PHQ Scores 6/14/2019 3/6/2019 12/5/2018 7/30/2018 6/23/2017 12/23/2015 12/23/2015   PHQ2 Score 0 0 0 0 0 0 0   PHQ9 Score 0 0 0 0 0 0 0     Interpretation of Total Score Depression Severity: 1-4 = Minimal depression, 5-9 = Mild depression, 10-14 = Moderate depression, 15-19 = Moderately severe depression, 20-27 = Severe depression

## 2019-06-18 ENCOUNTER — CARE COORDINATION (OUTPATIENT)
Dept: CARE COORDINATION | Age: 74
End: 2019-06-18

## 2019-06-18 NOTE — CARE COORDINATION
Ambulatory Care Coordination Note  6/18/2019  CM Risk Score: 9  Jayesh Mortality Risk Score: 6    ACC: Saritha Rosenthal, RN    Summary Note: Lori Pedersen was referred for [facility]. He has Type II Diabetes- uncontrolled- not on insulin, HTN, Hypertension, CAD, S/P CABG x 3, Hyperlipidemia.      He follows with cardiologist, urologist     He was seen by PCP 2/17/2019 and diabetic medication was adjusted.       Plan of Care : Continue assessments, education, and support                          F/U on cardiology appointment- needs for October.                           F/U on colonoscopy- screening- he is to be scheduling with Dr. Bettina Artis after he completes paperwork. Spoke with Ed. He saw PCP- referred for screening colonoscopy. He is awaiting paperwork from Dr. Cary Ontiveros office and then he can schedule. He stated he was doing well. He is golfing later today. Lab Results   Component Value Date    LABA1C 7.5 (H) 05/31/2019    LABA1C 10.7 (H) 02/27/2019    LABA1C 8.5 (H) 11/28/2018     Lab Results   Component Value Date     05/31/2019     02/27/2019     11/28/2018     General Assessment    Do you have any symptoms that are causing concern?:  No       Diabetes Assessment    How often do you test your blood sugar?:  Daily (Comment: QOD)   Do you have barriers with adherence to non-pharmacologic self-management interventions?  (Nutrition/Exercise/Self-Monitoring):  No   Have you ever had to go to the ED for symptoms of low blood sugar?:  No       No patient-reported symptoms   Do you have hyperglycemia symptoms?:  No   Do you have hypoglycemia symptoms?:  No   Last Blood Sugar Value:  150   Blood Sugar Monitoring Regimen:  Once a Day   Blood Sugar Trends:  No Change          Care Coordination Interventions    Program Enrollment:  Complex Care  Referral from Primary Care Provider:  No  Suggested Interventions and Community Resources  Registered Dietician:  Completed (Comment: in the past )  Zone Management Tools:  Completed (Comment: DM )         Goals Addressed                 This Visit's Progress       Patient Stated     Conditions and Symptoms (pt-stated)   On track     I will schedule office visits, as directed by my provider. I will keep my appointment or reschedule if I have to cancel. I will notify my provider of any barriers to my plan of care. I will follow my Zone Management tool to seek urgent or emergent care. I will notify my provider of any symptoms that indicate a worsening of my condition. Barriers: lack of education  Plan for overcoming my barriers: Care Coordination Intervention  Confidence: 8/10  Anticipated Goal Completion Date: 8/15/2019              Prior to Admission medications    Medication Sig Start Date End Date Taking? Authorizing Provider   blood glucose test strips (DURGA CONTOUR TEST) strip Tests blood sugar daily.   DX: E11.09 4/17/19   Bowen Jimenez MD   glimepiride (AMARYL) 4 MG tablet Take 1 tablet by mouth 2 times daily 3/7/19   Bowen Jimenez MD   hydrochlorothiazide (HYDRODIURIL) 12.5 MG tablet Take 1 tablet by mouth daily 3/7/19   Bowen Jimenez MD   atorvastatin (LIPITOR) 80 MG tablet Take 1 tablet by mouth daily 3/7/19   Bowen Jimenez MD   metoprolol tartrate (LOPRESSOR) 50 MG tablet Take 1 tablet by mouth 2 times daily 3/7/19   Bowen Jimenez MD   lisinopril (PRINIVIL;ZESTRIL) 40 MG tablet Take 1 tablet by mouth daily 3/7/19   Bowen Jimenez MD   metFORMIN (GLUCOPHAGE) 500 MG tablet Take 1 tablet by mouth 2 times daily (with meals) 3/7/19   Bowen Jimenez MD   aspirin 81 MG EC tablet Take 81 mg by mouth daily    Historical Provider, MD   nitroGLYCERIN (NITROSTAT) 0.4 MG SL tablet Place 1 tablet under the tongue every 5 minutes as needed for Chest pain 7/30/18   Bowen Jimenez MD       Future Appointments   Date Time Provider Kelly Brennan   7/29/2019 10:00 AM SCHEDULE, Gabriel Collins 112 LAB MDH[facility] LAB [place]   9/10/2019  9:00 AM SCHEDULE, Gabriel Collins 112 LAB MD LAB [place]   9/17/2019  1:00 PM MD JUJU Salcido [facility]   12/9/2019  9:00 AM SCHEDULE, Gabriel Collins 112 LAB MD LAB [place]   12/16/2019  8:40 AM MD SHY Hayden [facility]

## 2019-07-03 ENCOUNTER — CARE COORDINATION (OUTPATIENT)
Dept: CARE COORDINATION | Age: 74
End: 2019-07-03

## 2019-07-10 ENCOUNTER — TELEPHONE (OUTPATIENT)
Dept: SURGERY | Age: 74
End: 2019-07-10

## 2019-07-10 DIAGNOSIS — Z12.11 COLON CANCER SCREENING: Primary | ICD-10-CM

## 2019-07-10 RX ORDER — POLYETHYLENE GLYCOL 3350, SODIUM CHLORIDE, SODIUM BICARBONATE, POTASSIUM CHLORIDE 420; 11.2; 5.72; 1.48 G/4L; G/4L; G/4L; G/4L
4000 POWDER, FOR SOLUTION ORAL ONCE
Qty: 4000 ML | Refills: 0 | Status: SHIPPED | OUTPATIENT
Start: 2019-07-10 | End: 2019-07-10

## 2019-07-10 NOTE — TELEPHONE ENCOUNTER
Patient is low risk for procedure. Hold aspirin and NSAIDs x7 days before procedure. Hold Amaryl and metformin morning of procedure.

## 2019-07-17 ENCOUNTER — CARE COORDINATION (OUTPATIENT)
Dept: CARE COORDINATION | Age: 74
End: 2019-07-17

## 2019-08-13 LAB — DIABETIC RETINOPATHY: NEGATIVE

## 2019-09-12 ENCOUNTER — HOSPITAL ENCOUNTER (OUTPATIENT)
Dept: LAB | Age: 74
Discharge: HOME | End: 2019-09-12
Payer: MEDICARE

## 2019-09-12 DIAGNOSIS — I10 ESSENTIAL HYPERTENSION: ICD-10-CM

## 2019-09-12 DIAGNOSIS — E11.9 TYPE 2 DIABETES MELLITUS WITHOUT COMPLICATION, WITHOUT LONG-TERM CURRENT USE OF INSULIN (HCC): ICD-10-CM

## 2019-09-12 DIAGNOSIS — E78.5 HYPERLIPIDEMIA, UNSPECIFIED HYPERLIPIDEMIA TYPE: ICD-10-CM

## 2019-09-12 DIAGNOSIS — D64.9 ANEMIA, UNSPECIFIED TYPE: ICD-10-CM

## 2019-09-12 DIAGNOSIS — I25.10 CORONARY ARTERY DISEASE INVOLVING NATIVE CORONARY ARTERY OF NATIVE HEART WITHOUT ANGINA PECTORIS: ICD-10-CM

## 2019-09-12 LAB
ALBUMIN SERPL-MCNC: 4.3 G/DL (ref 3.5–5.2)
ALBUMIN/GLOBULIN RATIO: 1.8 (ref 1–2.5)
ALP BLD-CCNC: 72 U/L (ref 40–129)
ALT SERPL-CCNC: 26 U/L (ref 5–41)
ANION GAP SERPL CALCULATED.3IONS-SCNC: 10 MMOL/L (ref 9–17)
AST SERPL-CCNC: 22 U/L
BILIRUB SERPL-MCNC: 1.58 MG/DL (ref 0.3–1.2)
BUN BLDV-MCNC: 24 MG/DL (ref 8–23)
BUN/CREAT BLD: 24 (ref 9–20)
CALCIUM SERPL-MCNC: 9.4 MG/DL (ref 8.6–10.4)
CHLORIDE BLD-SCNC: 106 MMOL/L (ref 98–107)
CHOLESTEROL/HDL RATIO: 3.1
CHOLESTEROL: 96 MG/DL
CO2: 26 MMOL/L (ref 20–31)
CREAT SERPL-MCNC: 1.02 MG/DL (ref 0.7–1.2)
CREATININE URINE: 201.5 MG/DL (ref 39–259)
ESTIMATED AVERAGE GLUCOSE: 157 MG/DL
GFR AFRICAN AMERICAN: >60 ML/MIN
GFR NON-AFRICAN AMERICAN: >60 ML/MIN
GFR SERPL CREATININE-BSD FRML MDRD: ABNORMAL ML/MIN/{1.73_M2}
GFR SERPL CREATININE-BSD FRML MDRD: ABNORMAL ML/MIN/{1.73_M2}
GLUCOSE BLD-MCNC: 255 MG/DL (ref 70–99)
HBA1C MFR BLD: 7.1 % (ref 4.8–5.9)
HDLC SERPL-MCNC: 31 MG/DL
HEMOGLOBIN: 13.4 G/DL (ref 13.5–17.5)
IRON SATURATION: 45 % (ref 20–55)
IRON: 112 UG/DL (ref 59–158)
LDL CHOLESTEROL: 43 MG/DL (ref 0–130)
MICROALBUMIN/CREAT 24H UR: 254 MG/L
MICROALBUMIN/CREAT UR-RTO: 126 MCG/MG CREAT
POTASSIUM SERPL-SCNC: 4.3 MMOL/L (ref 3.7–5.3)
SODIUM BLD-SCNC: 142 MMOL/L (ref 135–144)
TOTAL IRON BINDING CAPACITY: 247 UG/DL (ref 250–450)
TOTAL PROTEIN: 6.7 G/DL (ref 6.4–8.3)
TRIGL SERPL-MCNC: 110 MG/DL
UNSATURATED IRON BINDING CAPACITY: 135 UG/DL (ref 112–347)
VLDLC SERPL CALC-MCNC: ABNORMAL MG/DL (ref 1–30)

## 2019-09-12 PROCEDURE — 83540 ASSAY OF IRON: CPT

## 2019-09-12 PROCEDURE — 83036 HEMOGLOBIN GLYCOSYLATED A1C: CPT

## 2019-09-12 PROCEDURE — 80061 LIPID PANEL: CPT

## 2019-09-12 PROCEDURE — 82570 ASSAY OF URINE CREATININE: CPT

## 2019-09-12 PROCEDURE — 36415 COLL VENOUS BLD VENIPUNCTURE: CPT

## 2019-09-12 PROCEDURE — 85018 HEMOGLOBIN: CPT

## 2019-09-12 PROCEDURE — 80053 COMPREHEN METABOLIC PANEL: CPT

## 2019-09-12 PROCEDURE — 83550 IRON BINDING TEST: CPT

## 2019-09-12 PROCEDURE — 82043 UR ALBUMIN QUANTITATIVE: CPT

## 2019-09-17 ENCOUNTER — OFFICE VISIT (OUTPATIENT)
Dept: INTERNAL MEDICINE | Age: 74
End: 2019-09-17
Payer: MEDICARE

## 2019-09-17 VITALS
BODY MASS INDEX: 28.79 KG/M2 | RESPIRATION RATE: 16 BRPM | SYSTOLIC BLOOD PRESSURE: 138 MMHG | HEART RATE: 68 BPM | WEIGHT: 190 LBS | HEIGHT: 68 IN | DIASTOLIC BLOOD PRESSURE: 84 MMHG

## 2019-09-17 DIAGNOSIS — Z86.2 HISTORY OF ANEMIA: ICD-10-CM

## 2019-09-17 DIAGNOSIS — E11.9 TYPE 2 DIABETES MELLITUS WITHOUT COMPLICATION, WITHOUT LONG-TERM CURRENT USE OF INSULIN (HCC): Primary | ICD-10-CM

## 2019-09-17 DIAGNOSIS — E78.5 HYPERLIPIDEMIA, UNSPECIFIED HYPERLIPIDEMIA TYPE: ICD-10-CM

## 2019-09-17 DIAGNOSIS — I10 ESSENTIAL HYPERTENSION: ICD-10-CM

## 2019-09-17 PROCEDURE — 4040F PNEUMOC VAC/ADMIN/RCVD: CPT | Performed by: INTERNAL MEDICINE

## 2019-09-17 PROCEDURE — G8598 ASA/ANTIPLAT THER USED: HCPCS | Performed by: INTERNAL MEDICINE

## 2019-09-17 PROCEDURE — 3045F PR MOST RECENT HEMOGLOBIN A1C LEVEL 7.0-9.0%: CPT | Performed by: INTERNAL MEDICINE

## 2019-09-17 PROCEDURE — G8417 CALC BMI ABV UP PARAM F/U: HCPCS | Performed by: INTERNAL MEDICINE

## 2019-09-17 PROCEDURE — 2022F DILAT RTA XM EVC RTNOPTHY: CPT | Performed by: INTERNAL MEDICINE

## 2019-09-17 PROCEDURE — 3017F COLORECTAL CA SCREEN DOC REV: CPT | Performed by: INTERNAL MEDICINE

## 2019-09-17 PROCEDURE — 1123F ACP DISCUSS/DSCN MKR DOCD: CPT | Performed by: INTERNAL MEDICINE

## 2019-09-17 PROCEDURE — 1036F TOBACCO NON-USER: CPT | Performed by: INTERNAL MEDICINE

## 2019-09-17 PROCEDURE — 99214 OFFICE O/P EST MOD 30 MIN: CPT | Performed by: INTERNAL MEDICINE

## 2019-09-17 PROCEDURE — G8427 DOCREV CUR MEDS BY ELIG CLIN: HCPCS | Performed by: INTERNAL MEDICINE

## 2019-09-17 ASSESSMENT — ENCOUNTER SYMPTOMS
BLOOD IN STOOL: 0
DIARRHEA: 0
EYE PAIN: 0
COUGH: 0
CONSTIPATION: 0
NAUSEA: 0
ABDOMINAL PAIN: 0
VOMITING: 0
SHORTNESS OF BREATH: 0
BACK PAIN: 0

## 2019-09-17 ASSESSMENT — PATIENT HEALTH QUESTIONNAIRE - PHQ9
SUM OF ALL RESPONSES TO PHQ QUESTIONS 1-9: 0
SUM OF ALL RESPONSES TO PHQ QUESTIONS 1-9: 0
1. LITTLE INTEREST OR PLEASURE IN DOING THINGS: 0
SUM OF ALL RESPONSES TO PHQ9 QUESTIONS 1 & 2: 0
2. FEELING DOWN, DEPRESSED OR HOPELESS: 0

## 2019-09-17 NOTE — PROGRESS NOTES
[address] [employer] [facility] INTERNAL [facility]  [address]  [facility] [address]  Dept: [phone]  Dept Fax: [phone]  Loc: [phone]     Valentino Myers is a 76 y.o. male who presents today for his medical conditions/complaintsas noted below. Valentino Myers is c/o of   Chief Complaint   Patient presents with    Diabetes     3 month appt    Hypertension    Hyperlipidemia         HPI:     Diabetes   He presents for his follow-up diabetic visit. He has type 2 (Without complication and without insulin) diabetes mellitus. His disease course has been fluctuating. Pertinent negatives for hypoglycemia include no confusion, dizziness, headaches, nervousness/anxiousness or pallor. Pertinent negatives for diabetes include no chest pain, no polydipsia, no polyuria and no weakness. Hypertension   This is a chronic problem. The current episode started more than 1 year ago. The problem has been waxing and waning since onset. The problem is controlled. Pertinent negatives include no chest pain, headaches, neck pain, palpitations or shortness of breath. Hyperlipidemia   This is a chronic problem. The current episode started more than 1 year ago. The problem is controlled. Recent lipid tests were reviewed and are variable. Pertinent negatives include no chest pain or shortness of breath. Hemoglobin A1C (%)   Date Value   09/12/2019 7.1 (H)   05/31/2019 7.5 (H)   02/27/2019 10.7 (H)            Microalb/Crt. Ratio (mcg/mg creat)   Date Value   09/12/2019 126 (H)     LDL Cholesterol (mg/dL)   Date Value   09/12/2019 43   07/17/2018 37   12/14/2016 48         AST (U/L)   Date Value   09/12/2019 22     ALT (U/L)   Date Value   09/12/2019 26     BUN (mg/dL)   Date Value   09/12/2019 24 (H)     BP Readings from Last 3 Encounters:   09/17/19 138/84   06/14/19 138/82   06/10/19 132/70              Past Medical History:   Diagnosis Date    Benign prostatic hypertrophy     with elevated PSA.     BPPV (benign questions answered. Ptvoiced understanding. Reviewed health maintenance. Instructed to continue currentmedications, diet and exercise. Patient agreed with treatment plan. Follow up asdirected.      Electronically signed by Biju Le MD on 9/17/2019 at 1:16 PM

## 2019-09-17 NOTE — PATIENT INSTRUCTIONS
you can lose your balance and fall. · Talk to your doctor if you have numbness in your feet. Preventing falls at home  · Remove raised doorway thresholds, throw rugs, and clutter. Repair loose carpet or raised areas in the floor. · Move furniture and electrical cords to keep them out of walking paths. · Use nonskid floor wax, and wipe up spills right away, especially on ceramic tile floors. · If you use a walker or cane, put rubber tips on it. If you use crutches, clean the bottoms of them regularly with an abrasive pad, such as steel wool. · Keep your house well lit, especially [place]uc[place] Standard, and outside walkways. Use night-lights in areas such as hallways and bathrooms. Add extra light switches or use remote switches (such as switches that go on or off when you clap your hands) to make it easier to turn lights on if you have to get up during the night. · Install sturdy handrails on stairways. · Move items in your cabinets so that the things you use a lot are on the lower shelves (about waist level). · Keep a cordless phone and a flashlight with new batteries by your bed. If possible, put a phone in each of the main rooms of your house, or carry a cell phone in case you fall and cannot reach a phone. Or, you can wear a device around your neck or wrist. You push a button that sends a signal for help. · Wear low-heeled shoes that fit well and give your feet good support. Use footwear with nonskid soles. Check the heels and soles of your shoes for wear. Repair or replace worn heels or soles. · Do not wear socks without shoes on wood floors. · Walk on the grass when the sidewalks are slippery. If you live in an area that gets snow and ice in the winter, sprinkle salt on slippery steps and sidewalks. Preventing falls in the bath  · Install grab bars and nonskid mats inside and outside your shower or tub and near the toilet and sinks. · Use shower chairs and bath benches.   · Use a hand-held shower head

## 2019-10-21 ENCOUNTER — TELEPHONE (OUTPATIENT)
Dept: SURGERY | Age: 74
End: 2019-10-21

## 2019-10-28 ENCOUNTER — HOSPITAL ENCOUNTER (OUTPATIENT)
Age: 74
Setting detail: OUTPATIENT SURGERY
Discharge: HOME | End: 2019-10-28
Attending: SURGERY | Admitting: SURGERY
Payer: MEDICARE

## 2019-10-28 ENCOUNTER — ANESTHESIA EVENT (OUTPATIENT)
Dept: OPERATING ROOM | Age: 74
End: 2019-10-28
Payer: MEDICARE

## 2019-10-28 ENCOUNTER — ANESTHESIA (OUTPATIENT)
Dept: OPERATING ROOM | Age: 74
End: 2019-10-28
Payer: MEDICARE

## 2019-10-28 VITALS
WEIGHT: 189.2 LBS | RESPIRATION RATE: 16 BRPM | SYSTOLIC BLOOD PRESSURE: 154 MMHG | BODY MASS INDEX: 28.67 KG/M2 | OXYGEN SATURATION: 95 % | TEMPERATURE: 97.6 F | HEIGHT: 68 IN | DIASTOLIC BLOOD PRESSURE: 67 MMHG | HEART RATE: 66 BPM

## 2019-10-28 VITALS — SYSTOLIC BLOOD PRESSURE: 149 MMHG | OXYGEN SATURATION: 100 % | DIASTOLIC BLOOD PRESSURE: 63 MMHG

## 2019-10-28 LAB — GLUCOSE BLD-MCNC: 101 MG/DL (ref 75–110)

## 2019-10-28 PROCEDURE — 82947 ASSAY GLUCOSE BLOOD QUANT: CPT

## 2019-10-28 PROCEDURE — 7100000011 HC PHASE II RECOVERY - ADDTL 15 MIN: Performed by: SURGERY

## 2019-10-28 PROCEDURE — 3609027000 HC COLONOSCOPY: Performed by: SURGERY

## 2019-10-28 PROCEDURE — 7100000010 HC PHASE II RECOVERY - FIRST 15 MIN: Performed by: SURGERY

## 2019-10-28 PROCEDURE — 3700000000 HC ANESTHESIA ATTENDED CARE: Performed by: SURGERY

## 2019-10-28 PROCEDURE — 2500000003 HC RX 250 WO HCPCS: Performed by: NURSE ANESTHETIST, CERTIFIED REGISTERED

## 2019-10-28 PROCEDURE — 2580000003 HC RX 258: Performed by: SURGERY

## 2019-10-28 PROCEDURE — 6360000002 HC RX W HCPCS: Performed by: NURSE ANESTHETIST, CERTIFIED REGISTERED

## 2019-10-28 PROCEDURE — 2709999900 HC NON-CHARGEABLE SUPPLY: Performed by: SURGERY

## 2019-10-28 PROCEDURE — G0121 COLON CA SCRN NOT HI RSK IND: HCPCS | Performed by: SURGERY

## 2019-10-28 PROCEDURE — 3700000001 HC ADD 15 MINUTES (ANESTHESIA): Performed by: SURGERY

## 2019-10-28 RX ORDER — PROPOFOL 10 MG/ML
INJECTION, EMULSION INTRAVENOUS PRN
Status: DISCONTINUED | OUTPATIENT
Start: 2019-10-28 | End: 2019-10-28 | Stop reason: SDUPTHER

## 2019-10-28 RX ORDER — LIDOCAINE HYDROCHLORIDE 40 MG/ML
INJECTION, SOLUTION RETROBULBAR; TOPICAL PRN
Status: DISCONTINUED | OUTPATIENT
Start: 2019-10-28 | End: 2019-10-28 | Stop reason: SDUPTHER

## 2019-10-28 RX ORDER — SODIUM CHLORIDE, SODIUM LACTATE, POTASSIUM CHLORIDE, CALCIUM CHLORIDE 600; 310; 30; 20 MG/100ML; MG/100ML; MG/100ML; MG/100ML
INJECTION, SOLUTION INTRAVENOUS CONTINUOUS
Status: DISCONTINUED | OUTPATIENT
Start: 2019-10-28 | End: 2019-10-28 | Stop reason: HOSPADM

## 2019-10-28 RX ADMIN — PROPOFOL 30 MG: 10 INJECTION, EMULSION INTRAVENOUS at 08:14

## 2019-10-28 RX ADMIN — PROPOFOL 30 MG: 10 INJECTION, EMULSION INTRAVENOUS at 08:19

## 2019-10-28 RX ADMIN — PROPOFOL 100 MG: 10 INJECTION, EMULSION INTRAVENOUS at 08:08

## 2019-10-28 RX ADMIN — LIDOCAINE HYDROCHLORIDE 80 MG: 40 INJECTION, SOLUTION RETROBULBAR; TOPICAL at 08:05

## 2019-10-28 RX ADMIN — PROPOFOL 80 MG: 10 INJECTION, EMULSION INTRAVENOUS at 08:06

## 2019-10-28 RX ADMIN — SODIUM CHLORIDE, SODIUM LACTATE, POTASSIUM CHLORIDE, AND CALCIUM CHLORIDE: .6; .31; .03; .02 INJECTION, SOLUTION INTRAVENOUS at 07:35

## 2019-10-28 RX ADMIN — PROPOFOL 30 MG: 10 INJECTION, EMULSION INTRAVENOUS at 08:10

## 2019-10-28 ASSESSMENT — PAIN - FUNCTIONAL ASSESSMENT: PAIN_FUNCTIONAL_ASSESSMENT: 0-10

## 2019-10-28 ASSESSMENT — PAIN SCALES - GENERAL
PAINLEVEL_OUTOF10: 0
PAINLEVEL_OUTOF10: 0

## 2019-11-13 ENCOUNTER — TELEPHONE (OUTPATIENT)
Dept: SURGERY | Age: 74
End: 2019-11-13

## 2019-12-09 ENCOUNTER — HOSPITAL ENCOUNTER (OUTPATIENT)
Dept: LAB | Age: 74
Discharge: HOME | End: 2019-12-09
Payer: MEDICARE

## 2019-12-09 DIAGNOSIS — R97.20 ELEVATED PSA: ICD-10-CM

## 2019-12-09 LAB — PROSTATE SPECIFIC ANTIGEN: 11.03 UG/L

## 2019-12-09 PROCEDURE — 36415 COLL VENOUS BLD VENIPUNCTURE: CPT

## 2019-12-09 PROCEDURE — 84153 ASSAY OF PSA TOTAL: CPT

## 2019-12-16 ENCOUNTER — OFFICE VISIT (OUTPATIENT)
Dept: UROLOGY | Age: 74
End: 2019-12-16
Payer: MEDICARE

## 2019-12-16 VITALS
HEIGHT: 67 IN | OXYGEN SATURATION: 99 % | RESPIRATION RATE: 16 BRPM | DIASTOLIC BLOOD PRESSURE: 82 MMHG | SYSTOLIC BLOOD PRESSURE: 128 MMHG | WEIGHT: 189.15 LBS | HEART RATE: 74 BPM | BODY MASS INDEX: 29.69 KG/M2

## 2019-12-16 DIAGNOSIS — R97.20 ELEVATED PSA: ICD-10-CM

## 2019-12-16 DIAGNOSIS — N40.0 BENIGN PROSTATIC HYPERPLASIA, UNSPECIFIED WHETHER LOWER URINARY TRACT SYMPTOMS PRESENT: Primary | ICD-10-CM

## 2019-12-16 DIAGNOSIS — N41.1 CHRONIC PROSTATITIS: ICD-10-CM

## 2019-12-16 DIAGNOSIS — N52.9 ERECTILE DYSFUNCTION, UNSPECIFIED ERECTILE DYSFUNCTION TYPE: ICD-10-CM

## 2019-12-16 PROCEDURE — 4040F PNEUMOC VAC/ADMIN/RCVD: CPT | Performed by: UROLOGY

## 2019-12-16 PROCEDURE — 3017F COLORECTAL CA SCREEN DOC REV: CPT | Performed by: UROLOGY

## 2019-12-16 PROCEDURE — 99214 OFFICE O/P EST MOD 30 MIN: CPT | Performed by: UROLOGY

## 2019-12-16 PROCEDURE — 1123F ACP DISCUSS/DSCN MKR DOCD: CPT | Performed by: UROLOGY

## 2019-12-16 PROCEDURE — G8427 DOCREV CUR MEDS BY ELIG CLIN: HCPCS | Performed by: UROLOGY

## 2019-12-16 PROCEDURE — G8417 CALC BMI ABV UP PARAM F/U: HCPCS | Performed by: UROLOGY

## 2019-12-16 PROCEDURE — 1036F TOBACCO NON-USER: CPT | Performed by: UROLOGY

## 2019-12-16 PROCEDURE — G8598 ASA/ANTIPLAT THER USED: HCPCS | Performed by: UROLOGY

## 2019-12-16 PROCEDURE — G8484 FLU IMMUNIZE NO ADMIN: HCPCS | Performed by: UROLOGY

## 2019-12-16 SDOH — HEALTH STABILITY: MENTAL HEALTH: HOW MANY STANDARD DRINKS CONTAINING ALCOHOL DO YOU HAVE ON A TYPICAL DAY?: 1 OR 2

## 2019-12-16 SDOH — HEALTH STABILITY: MENTAL HEALTH: HOW OFTEN DO YOU HAVE A DRINK CONTAINING ALCOHOL?: MONTHLY OR LESS

## 2020-01-13 RX ORDER — HYDROCHLOROTHIAZIDE 12.5 MG/1
12.5 TABLET ORAL DAILY
Qty: 90 TABLET | Refills: 3 | Status: SHIPPED | OUTPATIENT
Start: 2020-01-13 | End: 2020-08-06 | Stop reason: DRUGHIGH

## 2020-01-13 RX ORDER — NITROGLYCERIN 0.4 MG/1
0.4 TABLET SUBLINGUAL EVERY 5 MIN PRN
Qty: 25 TABLET | Refills: 3 | Status: SHIPPED | OUTPATIENT
Start: 2020-01-13 | End: 2020-05-11 | Stop reason: SDUPTHER

## 2020-01-13 RX ORDER — METOPROLOL TARTRATE 50 MG/1
50 TABLET, FILM COATED ORAL 2 TIMES DAILY
Qty: 180 TABLET | Refills: 3 | Status: SHIPPED | OUTPATIENT
Start: 2020-01-13 | End: 2021-02-22 | Stop reason: SDUPTHER

## 2020-01-13 RX ORDER — GLIMEPIRIDE 4 MG/1
4 TABLET ORAL 2 TIMES DAILY
Qty: 180 TABLET | Refills: 3 | Status: SHIPPED | OUTPATIENT
Start: 2020-01-13 | End: 2021-02-22 | Stop reason: SDUPTHER

## 2020-01-13 RX ORDER — ATORVASTATIN CALCIUM 80 MG/1
80 TABLET, FILM COATED ORAL DAILY
Qty: 90 TABLET | Refills: 3 | Status: SHIPPED | OUTPATIENT
Start: 2020-01-13 | End: 2021-02-22 | Stop reason: SDUPTHER

## 2020-01-13 RX ORDER — LISINOPRIL 40 MG/1
40 TABLET ORAL DAILY
Qty: 90 TABLET | Refills: 3 | Status: SHIPPED | OUTPATIENT
Start: 2020-01-13 | End: 2021-02-22 | Stop reason: SDUPTHER

## 2020-03-16 ENCOUNTER — HOSPITAL ENCOUNTER (OUTPATIENT)
Dept: LAB | Age: 75
Discharge: HOME | End: 2020-03-16
Payer: MEDICARE

## 2020-03-16 LAB
ESTIMATED AVERAGE GLUCOSE: 192 MG/DL
HBA1C MFR BLD: 8.3 % (ref 4.8–5.9)
HEMOGLOBIN: 13.7 G/DL (ref 13–17)
IRON SATURATION: 20 % (ref 20–55)
IRON: 51 UG/DL (ref 59–158)
TOTAL IRON BINDING CAPACITY: 249 UG/DL (ref 250–450)
UNSATURATED IRON BINDING CAPACITY: 198 UG/DL (ref 112–347)

## 2020-03-16 PROCEDURE — 83036 HEMOGLOBIN GLYCOSYLATED A1C: CPT

## 2020-03-16 PROCEDURE — 36415 COLL VENOUS BLD VENIPUNCTURE: CPT

## 2020-03-16 PROCEDURE — 83540 ASSAY OF IRON: CPT

## 2020-03-16 PROCEDURE — 85018 HEMOGLOBIN: CPT

## 2020-03-16 PROCEDURE — 83550 IRON BINDING TEST: CPT

## 2020-03-17 ENCOUNTER — TELEPHONE (OUTPATIENT)
Dept: INTERNAL MEDICINE | Age: 75
End: 2020-03-17

## 2020-03-17 NOTE — TELEPHONE ENCOUNTER
Patient notified and would like to discuss this at his appt next week. Labs pended     ----- Message from Minnie Honeycutt MD sent at 3/17/2020 10:43 AM EDT -----  Slightly low iron .  hemoglobin still in the low normal range. He Already had colonoscopy last October. .  Tell Patient to increase iron rich foods, such as spinach and lean red meats.   Repeat hemoglobin and iron/TIBC in 3 months

## 2020-05-11 RX ORDER — LANCETS
1 EACH MISCELLANEOUS DAILY
Qty: 100 EACH | Refills: 3 | Status: SHIPPED | OUTPATIENT
Start: 2020-05-11 | End: 2022-01-03 | Stop reason: SDUPTHER

## 2020-05-11 RX ORDER — NITROGLYCERIN 0.4 MG/1
0.4 TABLET SUBLINGUAL EVERY 5 MIN PRN
Qty: 25 TABLET | Refills: 3 | Status: SHIPPED | OUTPATIENT
Start: 2020-05-11 | End: 2022-01-03 | Stop reason: SDUPTHER

## 2020-05-18 ENCOUNTER — OFFICE VISIT (OUTPATIENT)
Dept: INTERNAL MEDICINE | Age: 75
End: 2020-05-18
Payer: MEDICARE

## 2020-05-18 VITALS
HEIGHT: 68 IN | HEART RATE: 64 BPM | BODY MASS INDEX: 29.25 KG/M2 | SYSTOLIC BLOOD PRESSURE: 146 MMHG | DIASTOLIC BLOOD PRESSURE: 86 MMHG | WEIGHT: 193 LBS | RESPIRATION RATE: 16 BRPM

## 2020-05-18 PROCEDURE — 1036F TOBACCO NON-USER: CPT | Performed by: INTERNAL MEDICINE

## 2020-05-18 PROCEDURE — 3052F HG A1C>EQUAL 8.0%<EQUAL 9.0%: CPT | Performed by: INTERNAL MEDICINE

## 2020-05-18 PROCEDURE — 4040F PNEUMOC VAC/ADMIN/RCVD: CPT | Performed by: INTERNAL MEDICINE

## 2020-05-18 PROCEDURE — 1123F ACP DISCUSS/DSCN MKR DOCD: CPT | Performed by: INTERNAL MEDICINE

## 2020-05-18 PROCEDURE — 99214 OFFICE O/P EST MOD 30 MIN: CPT | Performed by: INTERNAL MEDICINE

## 2020-05-18 PROCEDURE — 99212 OFFICE O/P EST SF 10 MIN: CPT

## 2020-05-18 PROCEDURE — 3017F COLORECTAL CA SCREEN DOC REV: CPT | Performed by: INTERNAL MEDICINE

## 2020-05-18 PROCEDURE — G0439 PPPS, SUBSEQ VISIT: HCPCS | Performed by: INTERNAL MEDICINE

## 2020-05-18 PROCEDURE — G8427 DOCREV CUR MEDS BY ELIG CLIN: HCPCS | Performed by: INTERNAL MEDICINE

## 2020-05-18 PROCEDURE — 2022F DILAT RTA XM EVC RTNOPTHY: CPT | Performed by: INTERNAL MEDICINE

## 2020-05-18 PROCEDURE — G8417 CALC BMI ABV UP PARAM F/U: HCPCS | Performed by: INTERNAL MEDICINE

## 2020-05-18 ASSESSMENT — LIFESTYLE VARIABLES
HOW OFTEN DURING THE LAST YEAR HAVE YOU HAD A FEELING OF GUILT OR REMORSE AFTER DRINKING: 0
AUDIT-C TOTAL SCORE: 2
HOW OFTEN DURING THE LAST YEAR HAVE YOU NEEDED AN ALCOHOLIC DRINK FIRST THING IN THE MORNING TO GET YOURSELF GOING AFTER A NIGHT OF HEAVY DRINKING: 0
HOW OFTEN DO YOU HAVE SIX OR MORE DRINKS ON ONE OCCASION: 0
AUDIT TOTAL SCORE: 2
HOW MANY STANDARD DRINKS CONTAINING ALCOHOL DO YOU HAVE ON A TYPICAL DAY: 0
HOW OFTEN DURING THE LAST YEAR HAVE YOU FAILED TO DO WHAT WAS NORMALLY EXPECTED FROM YOU BECAUSE OF DRINKING: 0
HOW OFTEN DURING THE LAST YEAR HAVE YOU FOUND THAT YOU WERE NOT ABLE TO STOP DRINKING ONCE YOU HAD STARTED: 0
HOW OFTEN DO YOU HAVE A DRINK CONTAINING ALCOHOL: 2
HAS A RELATIVE, FRIEND, DOCTOR, OR ANOTHER HEALTH PROFESSIONAL EXPRESSED CONCERN ABOUT YOUR DRINKING OR SUGGESTED YOU CUT DOWN: 0
HOW OFTEN DURING THE LAST YEAR HAVE YOU BEEN UNABLE TO REMEMBER WHAT HAPPENED THE NIGHT BEFORE BECAUSE YOU HAD BEEN DRINKING: 0
HAVE YOU OR SOMEONE ELSE BEEN INJURED AS A RESULT OF YOUR DRINKING: 0

## 2020-05-18 ASSESSMENT — ENCOUNTER SYMPTOMS
VOMITING: 0
BACK PAIN: 0
CONSTIPATION: 0
SHORTNESS OF BREATH: 0
ABDOMINAL PAIN: 0
BLOOD IN STOOL: 0
DIARRHEA: 0
EYE PAIN: 0
COUGH: 0
NAUSEA: 0

## 2020-05-18 ASSESSMENT — PATIENT HEALTH QUESTIONNAIRE - PHQ9
SUM OF ALL RESPONSES TO PHQ QUESTIONS 1-9: 0
SUM OF ALL RESPONSES TO PHQ QUESTIONS 1-9: 0

## 2020-05-18 NOTE — PROGRESS NOTES
[place] [address]  Dept: [phone]  Dept Fax: [phone]  Loc: [phone]     Regina Espinoza is a 76 y.o. male who presents today for his medical conditions/complaintsas noted below. Regina Espinoza is c/o of   Chief Complaint   Patient presents with    Medicare AWV    Diabetes    Hypertension    Hyperlipidemia         HPI:     Diabetes   He presents for his follow-up diabetic visit. He has type 2 (Without complication and without insulin use) diabetes mellitus. His disease course has been fluctuating. Pertinent negatives for hypoglycemia include no confusion, dizziness, headaches, nervousness/anxiousness or pallor. Pertinent negatives for diabetes include no chest pain, no polydipsia, no polyuria and no weakness. Hypertension   This is a chronic problem. The current episode started more than 1 year ago. The problem has been waxing and waning since onset. The problem is controlled. Pertinent negatives include no chest pain, headaches, neck pain, palpitations or shortness of breath. Hyperlipidemia   This is a chronic problem. The current episode started more than 1 year ago. The problem is controlled. Recent lipid tests were reviewed and are variable. Pertinent negatives include no chest pain or shortness of breath. Other   This is a recurrent (4-General medical exam) problem. The current episode started today. The problem occurs intermittently. The problem has been waxing and waning. Pertinent negatives include no abdominal pain, arthralgias, chest pain, chills, coughing, fever, headaches, nausea, neck pain, numbness, rash, vomiting or weakness. Hemoglobin A1C (%)   Date Value   03/16/2020 8.3 (H)   09/12/2019 7.1 (H)   05/31/2019 7.5 (H)            Microalb/Crt.  Ratio (mcg/mg creat)   Date Value   09/12/2019 126 (H)     LDL Cholesterol (mg/dL)   Date Value   09/12/2019 43 07/17/2018 37   12/14/2016 48         AST (U/L)   Date Value   09/12/2019 22     ALT (U/L)   Date Value   09/12/2019 26     BUN (mg/dL)   Date Value   09/12/2019 24 (H)     BP Readings from Last 3 Encounters:   05/18/20 (!) 146/86   12/16/19 128/82   10/28/19 (!) 154/67              Past Medical History:   Diagnosis Date    Benign prostatic hypertrophy     with elevated PSA.  BPPV (benign paroxysmal positional vertigo) 2/2008    Coronary heart disease     Status post CABG.  Diverticulosis     Dupuytren's contracture     Mid finger, right hand.  Erectile dysfunction     Bownes catheter in place 11/02/2018    has had one in place for 6weeks, this one in place x 2 weeks    Gout     Quiescent.  History of blood transfusion     possible with open heart surgery 26years ago    Hyperlipidemia     Hypertension     Snores     Type II or unspecified type diabetes mellitus without mention of complication, not stated as uncontrolled       Past Surgical History:   Procedure Laterality Date    CARDIAC CATHETERIZATION  6/2005    Triple vessel CAD with patent LIMA to LAD, patent saphenous vein graft to first obtuse marginal, patent saphenous vein graft to posterior descending artery, occluded jump graft from posterior descending artery to posterior left ventricular branch.  CARDIAC CATHETERIZATION  3/16/16    CATARACT REMOVAL Bilateral Nov. and Dec 2014    COLONOSCOPY  5/27/2008    Distal ileoscopy. Scattered diverticulosis and extensive diverticular disease of sigmoid colon and internal hemorrhoids.  COLONOSCOPY N/A 10/28/2019    COL[facility] OR  severe diverticulosis    CORONARY ARTERY BYPASS GRAFT      ELBOW SURGERY Right     Surgical repair.  KNEE ARTHROSCOPY Right 3/16/2007    Partial medial meniscectomy, partial medial and lateral synovectomy, light chondroplasty.     [place] LASER VAPORIZATION SURGERY PROSTATE, COMPLETE N/A 11/2/2018    CYSTOSCOPY, TRANSURETHRAL RESECTION PROSTATE - [facility] # [id number] HAILEE) performed by Eulalio Candelario MD at [facility] Bilateral 9/15/2010, 2007    [facility] BIOPSY Bilateral 05/10/2001    Benign-Dr. Elvis Baker    PROSTATE BIOPSY Bilateral 1999    Benign-Dr. Brian Jewell    PROSTATE BIOPSY Bilateral 1998    Benign-Dr. Collin Vásquez    PROSTATE BIOPSY Bilateral 1998    Benign-Dr. Collin Vásquez    PROSTATE BIOPSY Bilateral 10/04/1996    Benign-Dr. Bharat Boykin    PROSTATE SURGERY  2018    CYSTOSCOPY, TRANSURETHRAL RESECTION PROSTATE - [facility]     VASECTOMY         Family History   Problem Relation Age of Onset    Diabetes Mother     Kidney Disease Mother         Renal failure    Coronary Art Dis Mother     Hypertension Mother     Lung Cancer Father     Coronary Art Dis Father     Hypertension Brother     Other Brother         Hypernephroma          Social History     Tobacco Use    Smoking status: Former Smoker     Packs/day: 0.50     Years: 27.00     Pack years: 13.50     Types: Cigarettes     Start date: 1968     Last attempt to quit: 10/11/1992     Years since quittin.6    Smokeless tobacco: Never Used   Substance Use Topics    Alcohol use:  Yes     Alcohol/week: 1.0 standard drinks     Types: 1 Standard drinks or equivalent per week     Frequency: Monthly or less     Drinks per session: 1 or 2     Binge frequency: Never     Comment: 1 time per week         Current Outpatient Medications   Medication Sig Dispense Refill    metFORMIN (GLUCOPHAGE) 1000 MG tablet Take 1 tablet by mouth 2 times daily (with meals) 180 tablet 3    glimepiride (AMARYL) 4 MG tablet Take 1 tablet by mouth 2 times daily 180 tablet 3    hydrochlorothiazide (HYDRODIURIL) 12.5 MG tablet Take 1 tablet by mouth daily 90 tablet 3    atorvastatin (LIPITOR) 80 MG tablet Take 1 tablet by mouth daily 90 tablet 3    metoprolol tartrate (LOPRESSOR) 50 MG tablet Take 1 tablet by mouth diabetes mellitus without complication, without long-term current use of insulin ([facility])   DIABETES FOOT EXAM    metFORMIN (GLUCOPHAGE) 1000 MG tablet   4. Other hyperlipidemia     5. Medicare annual wellness visit, subsequent     6. Other iron deficiency anemia  Hemoglobin    Iron and TIBC             Plan:       Return in about 3 months (around 8/18/2020) for Hypertension, Diabetes, Hyperlipidemia. Orders Placed This Encounter   Procedures    Hemoglobin     Standing Status:   Future     Standing Expiration Date:   5/18/2021    Iron and TIBC     Standing Status:   Future     Standing Expiration Date:   5/18/2021     Order Specific Question:   Is Patient Fasting? Answer:   no     Order Specific Question:   No of Hours? Answer:   no     DIABETES FOOT EXAM     Orders Placed This Encounter   Medications    metFORMIN (GLUCOPHAGE) 1000 MG tablet     Sig: Take 1 tablet by mouth 2 times daily (with meals)     Dispense:  180 tablet     Refill:  3       Patientgiven educational materials - see patient instructions. Discussed use, benefit,and side effects of prescribed medications. All patient questions answered. Ptvoiced understanding. Reviewed health maintenance. Instructed to continue currentmedications, diet and exercise. Patient agreed with treatment plan. Follow up asdirected.      Electronically signed by Ca Hayden MD on 5/18/2020 at 11:09 AM

## 2020-05-18 NOTE — PATIENT INSTRUCTIONS
Personalized Preventive Plan for Satnam Lindsey - 5/18/2020  Medicare offers a range of preventive health benefits. Some of the tests and screenings are paid in full while other may be subject to a deductible, co-insurance, and/or copay. Some of these benefits include a comprehensive review of your medical history including lifestyle, illnesses that may run in your family, and various assessments and screenings as appropriate. After reviewing your medical record and screening and assessments performed today your provider may have ordered immunizations, labs, imaging, and/or referrals for you. A list of these orders (if applicable) as well as your Preventive Care list are included within your After Visit Summary for your review. Other Preventive Recommendations:    · A preventive eye exam performed by an eye specialist is recommended every 1-2 years to screen for glaucoma; cataracts, macular degeneration, and other eye disorders. · A preventive dental visit is recommended every 6 months. · Try to get at least 150 minutes of exercise per week or 10,000 steps per day on a pedometer . · Order or download the FREE \"Exercise & Physical Activity: Your Everyday Guide\" from [employer]. Call 7-343.692.5499 or search [employer] online. · You need 4207-6027 mg of calcium and 6217-4277 IU of vitamin D per day. It is possible to meet your calcium requirement with diet alone, but a vitamin D supplement is usually necessary to meet this goal.  · When exposed to the sun, use a sunscreen that protects against both UVA and UVB radiation with an SPF of 30 or greater. Reapply every 2 to 3 hours or after sweating, drying off with a towel, or swimming. · Always wear a seat belt when traveling in a car. Always wear a helmet when riding a bicycle or motorcycle.

## 2020-05-18 NOTE — PROGRESS NOTES
 PROSTATE SURGERY  11/02/2018    CYSTOSCOPY, TRANSURETHRAL RESECTION PROSTATE - [facility]     VASECTOMY           Family History   Problem Relation Age of Onset    Diabetes Mother     Kidney Disease Mother         Renal failure    Coronary Art Dis Mother     Hypertension Mother     Lung Cancer Father     Coronary Art Dis Father     Hypertension Brother     Other Brother         Hypernephroma       CareTeam (Including outside providers/suppliers regularly involved in providing care):   Patient Care Team:  Constantine Cushing, MD as PCP - General (Internal Medicine)  Constantine Cushing, MD as PCP [facility]  Blayne De Los Santos MD as Consulting Physician (Urology)  Raudel Oliver MD as Consulting Physician (Cardiology)    Wt Readings from Last 3 Encounters:   05/18/20 193 lb (87.5 kg)   12/16/19 189 lb 2.5 oz (85.8 kg)   10/28/19 189 lb 3.2 oz (85.8 kg)     Vitals:    05/18/20 1030 05/18/20 1044   BP: (!) 162/74 (!) 146/86   Site: Left Upper Arm Left Upper Arm   Position: Sitting Sitting   Cuff Size: Medium Adult Medium Adult   Pulse: 64    Resp: 16    Weight: 193 lb (87.5 kg)    Height: 5' 8\" (1.727 m)      Body mass index is 29.35 kg/m². Based upon direct observation of the patient, evaluation of cognition reveals none. Patient's complete Health Risk Assessment and screening values have been reviewed and are found in Flowsheets. The following problems were reviewed today and where indicated follow up appointments were made and/or referrals ordered. Positive Risk Factor Screenings with Interventions:     General Health:  General  In general, how would you say your health is?: Very Good  In the past 7 days, have you experienced any of the following?  New or Increased Pain, New or Increased Fatigue, Loneliness, Social Isolation, Stress or Anger?: None of These  Do you get the social and emotional support that you need?: Yes  Do you have a Living Will?: (!) No  General Health Risk

## 2020-08-06 ENCOUNTER — TELEPHONE (OUTPATIENT)
Dept: INTERNAL MEDICINE | Age: 75
End: 2020-08-06

## 2020-08-06 VITALS — HEART RATE: 64 BPM | DIASTOLIC BLOOD PRESSURE: 80 MMHG | SYSTOLIC BLOOD PRESSURE: 158 MMHG

## 2020-08-06 RX ORDER — HYDROCHLOROTHIAZIDE 25 MG/1
25 TABLET ORAL EVERY MORNING
Qty: 90 TABLET | Refills: 1 | Status: SHIPPED | OUTPATIENT
Start: 2020-08-06 | End: 2021-02-08 | Stop reason: SDUPTHER

## 2020-08-06 NOTE — TELEPHONE ENCOUNTER
Pended new medication. Updated med list. Left patient voice message to return our call. Please instruct to take 2 tablets of his hydrochlorothiazide until new prescription comes in.

## 2020-08-06 NOTE — TELEPHONE ENCOUNTER
Pt presented to office for a BP check. Pt reports his readings \"have been running high at home. \" See readings (scanned). Pt denies HA, dizziness, chest pain, palpitations, or SOB. He does report having tinnitus, but says this has been \"going on for a long time\"- approx 6 months. Rested in waiting room x 10 minutes. PT checked own BP with home meter in office (left arm)- 164/75 p. 66  Nurse performed manual BP (left arm) after resting another 5 minutes- 158/80, p 64    All meds confirmed on EMR. Pt uses [employer] if you make any changes.   Call pt back on cell- [phone]

## 2020-09-11 ENCOUNTER — HOSPITAL ENCOUNTER (OUTPATIENT)
Dept: LAB | Age: 75
Discharge: HOME | End: 2020-09-11
Payer: MEDICARE

## 2020-09-11 LAB
ALBUMIN SERPL-MCNC: 4.2 G/DL (ref 3.5–5.2)
ALBUMIN/GLOBULIN RATIO: 1.7 (ref 1–2.5)
ALP BLD-CCNC: 58 U/L (ref 40–129)
ALT SERPL-CCNC: 31 U/L (ref 5–41)
ANION GAP SERPL CALCULATED.3IONS-SCNC: 13 MMOL/L (ref 9–17)
AST SERPL-CCNC: 24 U/L
BILIRUB SERPL-MCNC: 1.39 MG/DL (ref 0.3–1.2)
BUN BLDV-MCNC: 24 MG/DL (ref 8–23)
BUN/CREAT BLD: 18 (ref 9–20)
CALCIUM SERPL-MCNC: 9.6 MG/DL (ref 8.6–10.4)
CHLORIDE BLD-SCNC: 103 MMOL/L (ref 98–107)
CHOLESTEROL/HDL RATIO: 3.1
CHOLESTEROL: 100 MG/DL
CO2: 25 MMOL/L (ref 20–31)
CREAT SERPL-MCNC: 1.32 MG/DL (ref 0.7–1.2)
ESTIMATED AVERAGE GLUCOSE: 177 MG/DL
GFR AFRICAN AMERICAN: >60 ML/MIN
GFR NON-AFRICAN AMERICAN: 53 ML/MIN
GFR SERPL CREATININE-BSD FRML MDRD: ABNORMAL ML/MIN/{1.73_M2}
GFR SERPL CREATININE-BSD FRML MDRD: ABNORMAL ML/MIN/{1.73_M2}
GLUCOSE BLD-MCNC: 167 MG/DL (ref 70–99)
HBA1C MFR BLD: 7.8 % (ref 4.8–5.9)
HDLC SERPL-MCNC: 32 MG/DL
HEMOGLOBIN: 12.6 G/DL (ref 13–17)
IRON SATURATION: 35 % (ref 20–55)
IRON: 89 UG/DL (ref 59–158)
LDL CHOLESTEROL: 51 MG/DL (ref 0–130)
POTASSIUM SERPL-SCNC: 3.9 MMOL/L (ref 3.7–5.3)
SODIUM BLD-SCNC: 141 MMOL/L (ref 135–144)
TOTAL IRON BINDING CAPACITY: 257 UG/DL (ref 250–450)
TOTAL PROTEIN: 6.7 G/DL (ref 6.4–8.3)
TRIGL SERPL-MCNC: 85 MG/DL
UNSATURATED IRON BINDING CAPACITY: 168 UG/DL (ref 112–347)
VLDLC SERPL CALC-MCNC: ABNORMAL MG/DL (ref 1–30)

## 2020-09-11 PROCEDURE — 36415 COLL VENOUS BLD VENIPUNCTURE: CPT

## 2020-09-11 PROCEDURE — 85018 HEMOGLOBIN: CPT

## 2020-09-11 PROCEDURE — 80061 LIPID PANEL: CPT

## 2020-09-11 PROCEDURE — 83036 HEMOGLOBIN GLYCOSYLATED A1C: CPT

## 2020-09-11 PROCEDURE — 83550 IRON BINDING TEST: CPT

## 2020-09-11 PROCEDURE — 80053 COMPREHEN METABOLIC PANEL: CPT

## 2020-09-11 PROCEDURE — 83540 ASSAY OF IRON: CPT

## 2020-09-21 ENCOUNTER — OFFICE VISIT (OUTPATIENT)
Dept: INTERNAL MEDICINE | Age: 75
End: 2020-09-21
Payer: MEDICARE

## 2020-09-21 VITALS
HEIGHT: 68 IN | RESPIRATION RATE: 16 BRPM | WEIGHT: 187 LBS | HEART RATE: 68 BPM | BODY MASS INDEX: 28.34 KG/M2 | SYSTOLIC BLOOD PRESSURE: 118 MMHG | DIASTOLIC BLOOD PRESSURE: 72 MMHG

## 2020-09-21 PROCEDURE — 1123F ACP DISCUSS/DSCN MKR DOCD: CPT | Performed by: INTERNAL MEDICINE

## 2020-09-21 PROCEDURE — 90694 VACC AIIV4 NO PRSRV 0.5ML IM: CPT | Performed by: INTERNAL MEDICINE

## 2020-09-21 PROCEDURE — 99214 OFFICE O/P EST MOD 30 MIN: CPT | Performed by: INTERNAL MEDICINE

## 2020-09-21 PROCEDURE — G0008 ADMIN INFLUENZA VIRUS VAC: HCPCS | Performed by: INTERNAL MEDICINE

## 2020-09-21 PROCEDURE — 1036F TOBACCO NON-USER: CPT | Performed by: INTERNAL MEDICINE

## 2020-09-21 PROCEDURE — 2022F DILAT RTA XM EVC RTNOPTHY: CPT | Performed by: INTERNAL MEDICINE

## 2020-09-21 PROCEDURE — G8417 CALC BMI ABV UP PARAM F/U: HCPCS | Performed by: INTERNAL MEDICINE

## 2020-09-21 PROCEDURE — 99213 OFFICE O/P EST LOW 20 MIN: CPT

## 2020-09-21 PROCEDURE — 3051F HG A1C>EQUAL 7.0%<8.0%: CPT | Performed by: INTERNAL MEDICINE

## 2020-09-21 PROCEDURE — G8427 DOCREV CUR MEDS BY ELIG CLIN: HCPCS | Performed by: INTERNAL MEDICINE

## 2020-09-21 PROCEDURE — 4040F PNEUMOC VAC/ADMIN/RCVD: CPT | Performed by: INTERNAL MEDICINE

## 2020-09-21 PROCEDURE — 3017F COLORECTAL CA SCREEN DOC REV: CPT | Performed by: INTERNAL MEDICINE

## 2020-09-21 ASSESSMENT — ENCOUNTER SYMPTOMS
SHORTNESS OF BREATH: 0
BLOOD IN STOOL: 0
CONSTIPATION: 0
EYE PAIN: 0
NAUSEA: 0
COUGH: 0
VOMITING: 0
DIARRHEA: 0
ABDOMINAL PAIN: 0
BACK PAIN: 0

## 2020-09-21 NOTE — PROGRESS NOTES
[place] [address]  Dept: [phone]  Dept Fax: [phone]  Loc: [phone]     Jair Gardner is a 76 y.o. male who presents today for his medical conditions/complaintsas noted below. Jair Gardner is c/o of   Chief Complaint   Patient presents with    Hypertension    Diabetes    Hyperlipidemia         HPI:     Diabetes   He presents for his follow-up diabetic visit. He has type 2 (Without complication and without insulin) diabetes mellitus. His disease course has been fluctuating. Pertinent negatives for hypoglycemia include no confusion, dizziness, headaches, nervousness/anxiousness or pallor. Pertinent negatives for diabetes include no chest pain, no polydipsia, no polyuria and no weakness. Hypertension   This is a chronic problem. The current episode started more than 1 year ago. The problem has been waxing and waning since onset. The problem is controlled. Pertinent negatives include no chest pain, headaches, neck pain, palpitations or shortness of breath. Hyperlipidemia   This is a chronic problem. The current episode started more than 1 year ago. The problem is controlled. Recent lipid tests were reviewed and are variable. Pertinent negatives include no chest pain or shortness of breath. Coronary Artery Disease   Presents for follow-up visit. Pertinent negatives include no chest pain, chest pressure, dizziness, leg swelling, palpitations or shortness of breath. Risk factors include hyperlipidemia. The symptoms have been stable. Compliance with diet is good. Compliance with exercise is good. Compliance with medications is good. Hemoglobin A1C (%)   Date Value   09/11/2020 7.8 (H)   03/16/2020 8.3 (H)   09/12/2019 7.1 (H)            Microalb/Crt.  Ratio (mcg/mg creat)   Date Value   09/12/2019 126 (H)     LDL Cholesterol (mg/dL)   Date Value   09/11/2020 51   09/12/2019 43 07/17/2018 37         AST (U/L)   Date Value   09/11/2020 24     ALT (U/L)   Date Value   09/11/2020 31     BUN (mg/dL)   Date Value   09/11/2020 24 (H)     BP Readings from Last 3 Encounters:   09/21/20 118/72   08/06/20 (!) 158/80   05/18/20 (!) 146/86              Past Medical History:   Diagnosis Date    Benign prostatic hypertrophy     with elevated PSA.  BPPV (benign paroxysmal positional vertigo) 2/2008    Coronary heart disease     Status post CABG.  Diverticulosis     Dupuytren's contracture     Mid finger, right hand.  Erectile dysfunction     Bowens catheter in place 11/02/2018    has had one in place for 6weeks, this one in place x 2 weeks    Gout     Quiescent.  History of blood transfusion     possible with open heart surgery 26years ago    Hyperlipidemia     Hypertension     Snores     Type II or unspecified type diabetes mellitus without mention of complication, not stated as uncontrolled       Past Surgical History:   Procedure Laterality Date    CARDIAC CATHETERIZATION  6/2005    Triple vessel CAD with patent LIMA to LAD, patent saphenous vein graft to first obtuse marginal, patent saphenous vein graft to posterior descending artery, occluded jump graft from posterior descending artery to posterior left ventricular branch.  CARDIAC CATHETERIZATION  3/16/16    CATARACT REMOVAL Bilateral Nov. and Dec 2014    COLONOSCOPY  5/27/2008    Distal ileoscopy. Scattered diverticulosis and extensive diverticular disease of sigmoid colon and internal hemorrhoids.  COLONOSCOPY N/A 10/28/2019    COLONOSCOPY performed by Jimmy Danielson MD at [facility] OR  severe diverticulosis    CORONARY ARTERY BYPASS GRAFT      ELBOW SURGERY Right     Surgical repair.  KNEE ARTHROSCOPY Right 3/16/2007    Partial medial meniscectomy, partial medial and lateral synovectomy, light chondroplasty.     [place] LASER VAPORIZATION SURGERY PROSTATE, COMPLETE N/A 11/2/2018    CYSTOSCOPY, TRANSURETHRAL RESECTION PROSTATE - [facility] # [id number] HAILEE) performed by Juan C Stiles MD at [address] [place] [address] Bilateral 9/15/2010, 2007    [facility] Dr. Kellie Kaye BIOPSY Bilateral 05/10/2001    Benign-Dr. Trinidad Cleveland    PROSTATE BIOPSY Bilateral 1999    Benign-Dr. Nikolas Shearer    PROSTATE BIOPSY Bilateral 1998    Benign-Dr. Pagan Pulse    PROSTATE BIOPSY Bilateral 1998    Benign-Dr. Latasha Collins    PROSTATE BIOPSY Bilateral 10/04/1996    Benign-Dr. Les Mart    PROSTATE SURGERY  2018    CYSTOSCOPY, TRANSURETHRAL RESECTION PROSTATE - [facility]     VASECTOMY         Family History   Problem Relation Age of Onset    Diabetes Mother     Kidney Disease Mother         Renal failure    Coronary Art Dis Mother     Hypertension Mother     Lung Cancer Father     Coronary Art Dis Father     Hypertension Brother     Other Brother         Hypernephroma          Social History     Tobacco Use    Smoking status: Former Smoker     Packs/day: 0.50     Years: 27.00     Pack years: 13.50     Types: Cigarettes     Start date: 1968     Last attempt to quit: 10/11/1992     Years since quittin.9    Smokeless tobacco: Never Used   Substance Use Topics    Alcohol use:  Yes     Alcohol/week: 1.0 standard drinks     Types: 1 Standard drinks or equivalent per week     Frequency: Monthly or less     Drinks per session: 1 or 2     Binge frequency: Never     Comment: 1 time per week         Current Outpatient Medications   Medication Sig Dispense Refill    hydroCHLOROthiazide (HYDRODIURIL) 25 MG tablet Take 1 tablet by mouth every morning 90 tablet 1    metFORMIN (GLUCOPHAGE) 1000 MG tablet Take 1 tablet by mouth 2 times daily (with meals) 180 tablet 3    glimepiride (AMARYL) 4 MG tablet Take 1 tablet by mouth 2 times daily 180 tablet 3    atorvastatin (LIPITOR) 80 MG tablet Take 1 tablet by mouth daily 90 tablet 3    metoprolol tartrate (LOPRESSOR) 50 MG tablet Take 1 tablet by mouth 2 times daily 180 tablet 3    lisinopril (PRINIVIL;ZESTRIL) 40 MG tablet Take 1 tablet by mouth daily 90 tablet 3    aspirin 81 MG EC tablet Take 81 mg by mouth daily      blood glucose test strips (DURGA CONTOUR TEST) strip Tests blood sugar daily. 300 each 3    nitroGLYCERIN (NITROSTAT) 0.4 MG SL tablet Place 1 tablet under the tongue every 5 minutes as needed for Chest pain (Patient not taking: Reported on 5/18/2020) 25 tablet 3    ONE TOUCH ULTRASOFT LANCETS MISC 1 each by Does not apply route daily 100 each 3     No current facility-administered medications for this visit. No Known Allergies    Health Maintenance   Topic Date Due    DTaP/Tdap/Td vaccine (1 - Tdap) 07/23/1964    Shingles Vaccine (2 of 3) 06/23/2008    Hepatitis C screen  05/18/2021 (Originally 1945)    PSA counseling  12/09/2020    Diabetic foot exam  05/18/2021    Annual Wellness Visit (AWV)  05/19/2021    Diabetic retinal exam  08/13/2021    A1C test (Diabetic or Prediabetic)  09/11/2021    Lipid screen  09/11/2021    Potassium monitoring  09/11/2021    Creatinine monitoring  09/11/2021    Colon cancer screen colonoscopy  10/28/2029    Flu vaccine  Completed    Pneumococcal 65+ years Vaccine  Completed    AAA screen  Completed    Hepatitis A vaccine  Aged Out    Hib vaccine  Aged Out    Meningococcal (ACWY) vaccine  Aged Out       Subjective:      Review of Systems   Constitutional: Negative for chills and fever. HENT: Negative for hearing loss. Eyes: Negative for pain and visual disturbance. Respiratory: Negative for cough and shortness of breath. Cardiovascular: Negative for chest pain, palpitations and leg swelling. Gastrointestinal: Negative for abdominal pain, blood in stool, constipation, diarrhea, nausea and vomiting. Endocrine: Negative for cold intolerance, polydipsia and polyuria. Genitourinary: Negative for difficulty urinating, dysuria and hematuria.    Musculoskeletal: Negative for arthralgias, back pain, gait problem and neck pain. Skin: Negative for pallor and rash. Neurological: Negative for dizziness, weakness, numbness and headaches. Hematological: Negative for adenopathy. Does not bruise/bleed easily. Psychiatric/Behavioral: Negative for confusion. The patient is not nervous/anxious. Objective:     Physical Exam  Vitals signs reviewed. Constitutional:       Appearance: He is well-developed. HENT:      Head: Normocephalic and atraumatic. Eyes:      Pupils: Pupils are equal, round, and reactive to light. Neck:      Musculoskeletal: Neck supple. Cardiovascular:      Rate and Rhythm: Normal rate and regular rhythm. Heart sounds: No murmur. No friction rub. No gallop. Pulmonary:      Effort: Pulmonary effort is normal.      Breath sounds: Normal breath sounds. No wheezing or rales. Abdominal:      General: There is no distension. Palpations: Abdomen is soft. There is no mass. Tenderness: There is no abdominal tenderness. There is no rebound. Musculoskeletal: Normal range of motion. Lymphadenopathy:      Cervical: No cervical adenopathy. Skin:     General: Skin is warm and dry. Findings: No rash. Neurological:      Mental Status: He is alert and oriented to person, place, and time. Cranial Nerves: No cranial nerve deficit (grossly). Psychiatric:         Thought Content: Thought content normal.        /72 (Site: Left Upper Arm, Position: Sitting, Cuff Size: Medium Adult)   Pulse 68   Resp 16   Ht 5' 8\" (1.727 m)   Wt 187 lb (84.8 kg)   BMI 28.43 kg/m²     Assessment:       Diagnosis Orders   1. Type 2 diabetes mellitus without complication, without long-term current use of insulin (HCC)  Hemoglobin A1C    Microalbumin, Ur   2. Essential hypertension     3. Coronary artery disease involving native coronary artery of native heart without angina pectoris  [facility], [place]   4.  Other hyperlipidemia

## 2020-10-20 ENCOUNTER — OFFICE VISIT (OUTPATIENT)
Dept: CARDIOLOGY | Age: 75
End: 2020-10-20
Payer: MEDICARE

## 2020-10-20 VITALS
SYSTOLIC BLOOD PRESSURE: 143 MMHG | HEART RATE: 59 BPM | BODY MASS INDEX: 28.34 KG/M2 | HEIGHT: 68 IN | WEIGHT: 187 LBS | DIASTOLIC BLOOD PRESSURE: 65 MMHG

## 2020-10-20 PROCEDURE — 1123F ACP DISCUSS/DSCN MKR DOCD: CPT | Performed by: INTERNAL MEDICINE

## 2020-10-20 PROCEDURE — 1036F TOBACCO NON-USER: CPT | Performed by: INTERNAL MEDICINE

## 2020-10-20 PROCEDURE — G8427 DOCREV CUR MEDS BY ELIG CLIN: HCPCS | Performed by: INTERNAL MEDICINE

## 2020-10-20 PROCEDURE — 99214 OFFICE O/P EST MOD 30 MIN: CPT

## 2020-10-20 PROCEDURE — G8417 CALC BMI ABV UP PARAM F/U: HCPCS | Performed by: INTERNAL MEDICINE

## 2020-10-20 PROCEDURE — 93010 ELECTROCARDIOGRAM REPORT: CPT | Performed by: INTERNAL MEDICINE

## 2020-10-20 PROCEDURE — 93005 ELECTROCARDIOGRAM TRACING: CPT | Performed by: INTERNAL MEDICINE

## 2020-10-20 PROCEDURE — G8484 FLU IMMUNIZE NO ADMIN: HCPCS | Performed by: INTERNAL MEDICINE

## 2020-10-20 PROCEDURE — 3017F COLORECTAL CA SCREEN DOC REV: CPT | Performed by: INTERNAL MEDICINE

## 2020-10-20 PROCEDURE — 99214 OFFICE O/P EST MOD 30 MIN: CPT | Performed by: INTERNAL MEDICINE

## 2020-10-20 PROCEDURE — 4040F PNEUMOC VAC/ADMIN/RCVD: CPT | Performed by: INTERNAL MEDICINE

## 2020-10-20 RX ORDER — ISOSORBIDE MONONITRATE 30 MG/1
30 TABLET, EXTENDED RELEASE ORAL DAILY
Qty: 90 TABLET | Refills: 3 | Status: SHIPPED | OUTPATIENT
Start: 2020-10-20 | End: 2021-04-26

## 2020-10-20 NOTE — PROGRESS NOTES
[address] [facility] [address]  Dept: [phone]  Loc: [phone]    CC: follow up for CAD, CABG    HPI:  The patient is a 76y.o. year old, , male is in the office for a follow up visit. Has been exercising few hours, 3 days a week. Occasionally during his walks after 0.5 miles gets some angina, it goes away with rest, but then is able to complete his work out. Past Medical History:   has a past medical history of Benign prostatic hypertrophy, BPPV (benign paroxysmal positional vertigo), Coronary heart disease, Diverticulosis, Dupuytren's contracture, Erectile dysfunction, Bowens catheter in place, Gout, History of blood transfusion, Hyperlipidemia, Hypertension, Snores, and Type II or unspecified type diabetes mellitus without mention of complication, not stated as uncontrolled. Past Surgical History:   has a past surgical history that includes Prostate biopsy (Bilateral, 9/15/2010, 2007); Colonoscopy (5/27/2008); Prostate biopsy (Bilateral, 05/10/2001); Knee arthroscopy (Right, 3/16/2007); Elbow surgery (Right); Vasectomy; Coronary artery bypass graft; Cardiac catheterization (6/2005); Cataract removal (Bilateral, Nov. and Dec 2014); Cardiac catheterization (3/16/16); Prostate Biopsy (Bilateral, 04/08/1999); Prostate Biopsy (Bilateral, 06/18/1998); Prostate Biopsy (Bilateral, 01/07/1998); Prostate Biopsy (Bilateral, 10/04/1996); Prostate surgery (11/02/2018); pr laser vaporization surgery prostate, complete (N/A, 11/2/2018); and Colonoscopy (N/A, 10/28/2019). Home Medications:  Prior to Admission medications    Medication Sig Start Date End Date Taking?  Authorizing Provider   hydroCHLOROthiazide (HYDRODIURIL) 25 MG tablet Take 1 tablet by mouth every morning 8/6/20  Yes Kiesha Mooney MD   metFORMIN (GLUCOPHAGE) 1000 MG tablet Take 1 tablet by mouth 2 times daily (with meals) 5/18/20 Yes Nitza Rodriguez MD   blood glucose test strips (DURGA CONTOUR TEST) strip Tests blood sugar daily. 5/11/20  Yes Nitza Rodriguez MD   nitroGLYCERIN (NITROSTAT) 0.4 MG SL tablet Place 1 tablet under the tongue every 5 minutes as needed for Chest pain 5/11/20  Yes Nitza Rodriguez MD   ONE TOUCH ULTRASOFT LANCETS MISC 1 each by Does not apply route daily 5/11/20  Yes Nitza Rodriguez MD   glimepiride (AMARYL) 4 MG tablet Take 1 tablet by mouth 2 times daily 1/13/20  Yes Nitza Rodriguez MD   atorvastatin (LIPITOR) 80 MG tablet Take 1 tablet by mouth daily 1/13/20  Yes Nitza Rodriguez MD   metoprolol tartrate (LOPRESSOR) 50 MG tablet Take 1 tablet by mouth 2 times daily 1/13/20  Yes Nitza Rodriguez MD   lisinopril (PRINIVIL;ZESTRIL) 40 MG tablet Take 1 tablet by mouth daily 1/13/20  Yes Nitza Rodriguez MD   aspirin 81 MG EC tablet Take 81 mg by mouth daily   Yes Historical Provider, MD       Allergies:  Patient has no known allergies. Social History:   reports that he quit smoking about 28 years ago. His smoking use included cigarettes. He started smoking about 52 years ago. He has a 13.50 pack-year smoking history. He has never used smokeless tobacco. He reports current alcohol use of about 1.0 standard drinks of alcohol per week. He reports that he does not use drugs. Review of Systems:  · Constitutional: there has been no unanticipated weight loss. There's been No change in energy level, No change in activity level. · Eyes: No visual changes or diplopia. No scleral icterus. · ENT: No Headaches, hearing loss or vertigo. No mouth sores or sore throat. · Cardiovascular: As above. · Respiratory: as hpi  · Gastrointestinal: No abdominal pain, appetite loss, blood in stools. No change in bowel or bladder habits. · Genitourinary: No dysuria, trouble voiding, or hematuria. · Musculoskeletal:  No gait disturbance, No weakness or joint complaints.   · Integumentary: No rash or pruritis. · Psychiatric: No anxiety, or depression. · Hematologic/Lymphatic: No abnormal bruising or bleeding, blood clots or swollen lymph nodes. · Allergic/Immunologic: No nasal congestion or hives. Physical Exam:  BP (!) 143/65   Pulse 59   Ht 5' 8\" (1.727 m)   Wt 187 lb (84.8 kg)   BMI 28.43 kg/m²     Constitutional and General Appearance: alert, cooperative, no distress and appears stated age  [de-identified]: PERRL, no cervical lymphadenopathy. No masses palpable. Normal oral mucosa  Respiratory:  · Normal excursion and expansion without use of accessory muscles  · Resp Auscultation: Good respiratory effort. No for increased work of breathing. On auscultation: clear to auscultation bilaterally  Cardiovascular:  · The apical impulse is not displaced  · Heart tones are crisp and normal. regular S1 and S2.  · Jugular venous pulsation Normal  · The carotid upstroke is normal in amplitude and contour without delay or bruit  · Peripheral pulses are symmetrical and full   Abdomen:   · No masses or tenderness  · Bowel sounds present  Extremities:  ·  No Cyanosis or Clubbing  ·  Lower extremity edema: No  ·  Skin: Warm and dry    Cardiac Data:  EKG: Sinus  Bradycardia   WITHIN NORMAL LIMITS    Cath 3/2016:    Severe native vessel disease   Patent LIMA-LAD and SVG-OM   Occluded SVG-RCA   CECY was not grafted.      Labs:   Lab Results   Component Value Date    CHOL 100 09/11/2020    TRIG 85 09/11/2020    HDL 32 (L) 09/11/2020    LDLCHOLESTEROL 51 09/11/2020    VLDL NOT REPORTED 09/11/2020    CHOLHDLRATIO 3.1 09/11/2020       Lab Results   Component Value Date     09/11/2020    K 3.9 09/11/2020     09/11/2020    CO2 25 09/11/2020    BUN 24 (H) 09/11/2020    CREATININE 1.32 (H) 09/11/2020    GLUCOSE 167 (H) 09/11/2020    CALCIUM 9.6 09/11/2020    PROT 6.7 09/11/2020    LABALBU 4.2 09/11/2020    BILITOT 1.39 (H) 09/11/2020    ALKPHOS 58 09/11/2020    AST 24 09/11/2020    ALT 31 09/11/2020    LABGLOM 53 (L) 09/11/2020    GFRAA >60 09/11/2020     IMPRESSION / RECOMMENDATIONS:  1. CP consistent with stable angina   - offered stress testing, doesn't want to proceed at this time, and understands risks  - will add imdur 30 mg po qd  - is still exercising 3x week for 2 hours a time    2. CAD- History of CABG- cath 2016-Severe native vessel disease, Patent LIMA-LAD and SVG-OM, Occluded SVG-RCA  - see plan above    3. HTN- a bit higher here, but controlled at home  - continue current meds    4. DL- LDL 51 on 9/20  - on statin     5. DM2- on metformin  - will talk to pcp about jardiance (for secondary prevention)    The patient is to continue heart healthy diet, weight loss and exercise as tolerated. Patient's medications and side effects were discussed. Medication refills were provided if needed. Follow up appointment timing was discussed. All questions and concerns were addressed to patient's satisfaction. The patient is to follow up in 6 months or sooner if necessary. Thank you for allowing me to participate in the care of this patient, please do not hesitate to call if you have any questions. Krisyt Pratt DO, [facility], [place] [facility]  [facility] [employer]  [phone]

## 2020-12-09 ENCOUNTER — HOSPITAL ENCOUNTER (OUTPATIENT)
Dept: LAB | Age: 75
Discharge: HOME | End: 2020-12-09
Payer: MEDICARE

## 2020-12-09 LAB
CREATININE URINE: 148.4 MG/DL (ref 39–259)
ESTIMATED AVERAGE GLUCOSE: 157 MG/DL
HBA1C MFR BLD: 7.1 % (ref 4.8–5.9)
MICROALBUMIN/CREAT 24H UR: 135 MG/L
MICROALBUMIN/CREAT UR-RTO: 91 MCG/MG CREAT
PROSTATE SPECIFIC ANTIGEN: 12.99 UG/L

## 2020-12-09 PROCEDURE — 36415 COLL VENOUS BLD VENIPUNCTURE: CPT

## 2020-12-09 PROCEDURE — 82043 UR ALBUMIN QUANTITATIVE: CPT

## 2020-12-09 PROCEDURE — 83036 HEMOGLOBIN GLYCOSYLATED A1C: CPT

## 2020-12-09 PROCEDURE — 82570 ASSAY OF URINE CREATININE: CPT

## 2020-12-09 PROCEDURE — 84153 ASSAY OF PSA TOTAL: CPT

## 2020-12-14 ENCOUNTER — OFFICE VISIT (OUTPATIENT)
Dept: UROLOGY | Age: 75
End: 2020-12-14
Payer: MEDICARE

## 2020-12-14 VITALS
HEIGHT: 68 IN | BODY MASS INDEX: 28.92 KG/M2 | WEIGHT: 190.8 LBS | DIASTOLIC BLOOD PRESSURE: 64 MMHG | HEART RATE: 61 BPM | OXYGEN SATURATION: 96 % | SYSTOLIC BLOOD PRESSURE: 118 MMHG

## 2020-12-14 PROCEDURE — 99214 OFFICE O/P EST MOD 30 MIN: CPT | Performed by: UROLOGY

## 2020-12-14 PROCEDURE — 3017F COLORECTAL CA SCREEN DOC REV: CPT | Performed by: UROLOGY

## 2020-12-14 PROCEDURE — G8417 CALC BMI ABV UP PARAM F/U: HCPCS | Performed by: UROLOGY

## 2020-12-14 PROCEDURE — 4040F PNEUMOC VAC/ADMIN/RCVD: CPT | Performed by: UROLOGY

## 2020-12-14 PROCEDURE — 99213 OFFICE O/P EST LOW 20 MIN: CPT

## 2020-12-14 PROCEDURE — G8484 FLU IMMUNIZE NO ADMIN: HCPCS | Performed by: UROLOGY

## 2020-12-14 PROCEDURE — 1123F ACP DISCUSS/DSCN MKR DOCD: CPT | Performed by: UROLOGY

## 2020-12-14 PROCEDURE — 1036F TOBACCO NON-USER: CPT | Performed by: UROLOGY

## 2020-12-14 PROCEDURE — G8427 DOCREV CUR MEDS BY ELIG CLIN: HCPCS | Performed by: UROLOGY

## 2020-12-14 NOTE — PROGRESS NOTES
Aranza Hanley MD        [address] [facility] [address] [address]  Dept: [phone]  Dept Fax: [phone]  Loc: [phone]      [facility] Urology Office Note  Follow up Visit    Patient:  Stone Mcdermott  YOB: 1945  Date: 12/14/2020    The patient is a 76 y.o. male who presents today for evaluation of the following problems: BPH  Chief Complaint   Patient presents with    Elevated PSA     1 year PSA          HISTORY OF PRESENT ILLNESS:     BPH  Onset was  Years ago  Overall, the problem(s) are stable  Severity is described as mild-moderate. Associated Symptoms: No dysuria, no gross hematuria. Current Pain Severity: 0    Some irritative voiding symptoms. Very satisfied. No new infections. No incontinence. Nocturia x 1. Stream is strong. greenlight in past      Summary of Previous Records:    Elevated PSA- stable. Oscillating    ED-  Not bothered. Tried medications in the past with no improvement. Previous records:  Overall the PSA level is: oscillating. Range 11-12  Recent history of urinary tract infection/prostatitis? no  Previous prostate biopsy? Yes x 6. NEG  Lower urinary tract symptoms: no  Previous patient of Dr. Vidya Mendoza --[facility]  Has had episodes of retention after alcohol use in past    Was in [place] on vacation and went into retention. Failed voiding trial.    Requested/reviewed records from Mikayla Cristobal MD office and/or outside physician/EMR    (Patient's old records have been requested, reviewed and pertinent findings summarized in today's note.)    Procedures Today: N/A    Last several PSA's:  Lab Results   Component Value Date    PSA 12.99 (H) 12/09/2020    PSA 11.03 (H) 12/09/2019    PSA 14.17 (H) 05/31/2019       Last total testosterone:  No results found for: TESTOSTERONE    Urinalysis today:  No results found for this visit on 12/14/20.     Last BUN and creatinine:  Lab Results   Component Value Date BUN 24 (H) 09/11/2020     Lab Results   Component Value Date    CREATININE 1.32 (H) 09/11/2020       Additional Lab/Culture results: none    Imaging Reviewed during this Office Visit:   Jose Maria Ramos MD independently reviewed the images and verified the radiology reports from:    No results found. PAST MEDICAL, FAMILY AND SOCIAL HISTORY:  Past Medical History:   Diagnosis Date    Benign prostatic hypertrophy     with elevated PSA.  BPPV (benign paroxysmal positional vertigo) 2/2008    Coronary heart disease     Status post CABG.  Diverticulosis     Dupuytren's contracture     Mid finger, right hand.  Erectile dysfunction     Bowens catheter in place 11/02/2018    has had one in place for 6weeks, this one in place x 2 weeks    Gout     Quiescent.  History of blood transfusion     possible with open heart surgery 26years ago    Hyperlipidemia     Hypertension     Snores     Type II or unspecified type diabetes mellitus without mention of complication, not stated as uncontrolled      Past Surgical History:   Procedure Laterality Date    CARDIAC CATHETERIZATION  6/2005    Triple vessel CAD with patent LIMA to LAD, patent saphenous vein graft to first obtuse marginal, patent saphenous vein graft to posterior descending artery, occluded jump graft from posterior descending artery to posterior left ventricular branch.  CARDIAC CATHETERIZATION  3/16/16    CATARACT REMOVAL Bilateral Nov. and Dec 2014    COLONOSCOPY  5/27/2008    Distal ileoscopy. Scattered diverticulosis and extensive diverticular disease of sigmoid colon and internal hemorrhoids.  COLONOSCOPY N/A 10/28/2019    COL[facility] OR  severe diverticulosis    CORONARY ARTERY BYPASS GRAFT      ELBOW SURGERY Right     Surgical repair.  KNEE ARTHROSCOPY Right 3/16/2007    Partial medial meniscectomy, partial medial and lateral synovectomy, light chondroplasty.  LA LASER VAPORIZATION SURGERY PROSTATE, COMPLETE N/A 2018    CYSTOSCOPY, TRANSURETHRAL RESECTION PROSTATE - [facility] # [id number] HAILEE) performed by Debra England MD at [facility] Bilateral 9/15/2010,     [facility] Dr. Tobias Speak BIOPSY Bilateral 05/10/2001    Benign-Dr. Stanislaw Ferreira    PROSTATE BIOPSY Bilateral 1999    Benign-Dr. Rupinder Schwartz    PROSTATE BIOPSY Bilateral 1998    Benign-Dr. Fiorella Sutton    PROSTATE BIOPSY Bilateral 1998    Benign-Dr. Fiorella Sutton    PROSTATE BIOPSY Bilateral 10/04/1996    Benign-Dr. Chana Irizarry    PROSTATE SURGERY  2018    CYSTOSCOPY, TRANSURETHRAL RESECTION PROSTATE - [facility]     VASECTOMY       Family History   Problem Relation Age of Onset    Diabetes Mother     Kidney Disease Mother         Renal failure    Coronary Art Dis Mother     Hypertension Mother     Lung Cancer Father     Coronary Art Dis Father     Hypertension Brother     Other Brother         Hypernephroma     No outpatient medications have been marked as taking for the 20 encounter (Office Visit) with Debra England MD.       Patient has no known allergies.   Social History     Tobacco Use   Smoking Status Former Smoker    Packs/day: 0.50    Years: 27.00    Pack years: 13.50    Types: Cigarettes    Start date: 1968   [facility] Quit date: 10/11/1992    Years since quittin.1   Smokeless Tobacco Never Used      (If patient a smoker, smoking cessation counseling offered)   Social History     Substance and Sexual Activity   Alcohol Use Yes    Alcohol/week: 1.0 standard drinks    Types: 1 Standard drinks or equivalent per week    Frequency: Monthly or less    Drinks per session: 1 or 2    Binge frequency: Never    Comment: 1 time per week       REVIEW OF SYSTEMS:  Constitutional: negative  Eyes: negative  Respiratory: negative  Cardiovascular: negative  Gastrointestinal: negative  Genitourinary: see HPI  Musculoskeletal: negative Skin: negative   Neurological: negative  Hematological/Lymphatic: negative  Psychological: negative        Physical Exam:    This a 76 y.o. male  Vitals:    12/14/20 1400   BP: 118/64   Pulse: 61   SpO2: 96%     Body mass index is 29.01 kg/m². Constitutional: Patient in no acute distress;         Assessment and Plan        1. Benign prostatic hyperplasia, unspecified whether lower urinary tract symptoms present    2. Elevated PSA    3. Chronic prostatitis    4. Erectile dysfunction, unspecified erectile dysfunction type               Plan:      Some irritative symptoms. Otherwise doing well  No treatment for ED. Follow up with PSA in one year. PSA oscillating. Did discuss this with patient. If still elevated in 15-20 range or above will get prostate MRI in six months      Prescriptions Ordered:  No orders of the defined types were placed in this encounter. Orders Placed:  No orders of the defined types were placed in this encounter.            Tino Sharma MD

## 2020-12-22 ENCOUNTER — OFFICE VISIT (OUTPATIENT)
Dept: INTERNAL MEDICINE | Age: 75
End: 2020-12-22
Payer: MEDICARE

## 2020-12-22 VITALS
SYSTOLIC BLOOD PRESSURE: 122 MMHG | HEIGHT: 68 IN | HEART RATE: 62 BPM | BODY MASS INDEX: 28.95 KG/M2 | WEIGHT: 191 LBS | RESPIRATION RATE: 16 BRPM | DIASTOLIC BLOOD PRESSURE: 70 MMHG

## 2020-12-22 PROCEDURE — 3017F COLORECTAL CA SCREEN DOC REV: CPT | Performed by: INTERNAL MEDICINE

## 2020-12-22 PROCEDURE — 3051F HG A1C>EQUAL 7.0%<8.0%: CPT | Performed by: INTERNAL MEDICINE

## 2020-12-22 PROCEDURE — G8427 DOCREV CUR MEDS BY ELIG CLIN: HCPCS | Performed by: INTERNAL MEDICINE

## 2020-12-22 PROCEDURE — 99214 OFFICE O/P EST MOD 30 MIN: CPT | Performed by: INTERNAL MEDICINE

## 2020-12-22 PROCEDURE — 2022F DILAT RTA XM EVC RTNOPTHY: CPT | Performed by: INTERNAL MEDICINE

## 2020-12-22 PROCEDURE — 4040F PNEUMOC VAC/ADMIN/RCVD: CPT | Performed by: INTERNAL MEDICINE

## 2020-12-22 PROCEDURE — G8484 FLU IMMUNIZE NO ADMIN: HCPCS | Performed by: INTERNAL MEDICINE

## 2020-12-22 PROCEDURE — 1036F TOBACCO NON-USER: CPT | Performed by: INTERNAL MEDICINE

## 2020-12-22 PROCEDURE — G8417 CALC BMI ABV UP PARAM F/U: HCPCS | Performed by: INTERNAL MEDICINE

## 2020-12-22 PROCEDURE — 99214 OFFICE O/P EST MOD 30 MIN: CPT

## 2020-12-22 PROCEDURE — 1123F ACP DISCUSS/DSCN MKR DOCD: CPT | Performed by: INTERNAL MEDICINE

## 2020-12-22 ASSESSMENT — ENCOUNTER SYMPTOMS
NAUSEA: 0
CONSTIPATION: 0
COUGH: 0
SHORTNESS OF BREATH: 0
BACK PAIN: 0
VOMITING: 0
ABDOMINAL PAIN: 0
EYE PAIN: 0
DIARRHEA: 0
BLOOD IN STOOL: 0

## 2020-12-22 NOTE — PROGRESS NOTES
AST (U/L)   Date Value   09/11/2020 24     ALT (U/L)   Date Value   09/11/2020 31     BUN (mg/dL)   Date Value   09/11/2020 24 (H)     BP Readings from Last 3 Encounters:   12/22/20 122/70   12/14/20 118/64   10/20/20 (!) 143/65              Past Medical History:   Diagnosis Date    Benign prostatic hypertrophy     with elevated PSA.  BPPV (benign paroxysmal positional vertigo) 2/2008    Coronary heart disease     Status post CABG.  Diverticulosis     Dupuytren's contracture     Mid finger, right hand.  Erectile dysfunction     Bowens catheter in place 11/02/2018    has had one in place for 6weeks, this one in place x 2 weeks    Gout     Quiescent.  History of blood transfusion     possible with open heart surgery 26years ago    Hyperlipidemia     Hypertension     Snores     Type II or unspecified type diabetes mellitus without mention of complication, not stated as uncontrolled       Past Surgical History:   Procedure Laterality Date    CARDIAC CATHETERIZATION  6/2005    Triple vessel CAD with patent LIMA to LAD, patent saphenous vein graft to first obtuse marginal, patent saphenous vein graft to posterior descending artery, occluded jump graft from posterior descending artery to posterior left ventricular branch.  CARDIAC CATHETERIZATION  3/16/16    CATARACT REMOVAL Bilateral Nov. and Dec 2014    COLONOSCOPY  5/27/2008    Distal ileoscopy. Scattered diverticulosis and extensive diverticular disease of sigmoid colon and internal hemorrhoids.  COLONOSCOPY N/A 10/28/2019    [facility] OR  severe diverticulosis    CORONARY ARTERY BYPASS GRAFT      ELBOW SURGERY Right     Surgical repair.  KNEE ARTHROSCOPY Right 3/16/2007    Partial medial meniscectomy, partial medial and lateral synovectomy, light chondroplasty.     LA LASER VAPORIZATION SURGERY PROSTATE, COMPLETE N/A 11/2/2018 CYSTOSCOPY, TRANSURETHRAL RESECTION PROSTATE - [facility] # [id number] HAILEE) performed by Rafaela Parra MD at [facility] [place], operated by [facility] Bilateral 9/15/2010, 2007    [facility] Dr. Natalia Porter BIOPSY Bilateral 05/10/2001    Benign-Dr. Wilfredo Mcnair    PROSTATE BIOPSY Bilateral 1999    Benign-Dr. Pranav Garza    PROSTATE BIOPSY Bilateral 1998    Benign-Dr. Jacob Gardner    PROSTATE BIOPSY Bilateral 1998    Benign-Dr. Jacob Gardner    PROSTATE BIOPSY Bilateral 10/04/1996    Benign-Dr. Aminah Lagos    PROSTATE SURGERY  2018    CYSTOSCOPY, TRANSURETHRAL RESECTION PROSTATE - [facility]     VASECTOMY         Family History   Problem Relation Age of Onset    Diabetes Mother     Kidney Disease Mother         Renal failure    Coronary Art Dis Mother     Hypertension Mother     Lung Cancer Father     Coronary Art Dis Father     Hypertension Brother     Other Brother         Hypernephroma          Social History     Tobacco Use    Smoking status: Former Smoker     Packs/day: 0.50     Years: 27.00     Pack years: 13.50     Types: Cigarettes     Start date: 1968     Quit date: 10/11/1992     Years since quittin.2    Smokeless tobacco: Never Used   Substance Use Topics    Alcohol use: Yes     Alcohol/week: 1.0 standard drinks     Types: 1 Standard drinks or equivalent per week     Frequency: Monthly or less     Drinks per session: 1 or 2     Binge frequency: Never     Comment: 1 time per week         Current Outpatient Medications   Medication Sig Dispense Refill    isosorbide mononitrate (IMDUR) 30 MG extended release tablet Take 1 tablet by mouth daily 90 tablet 3    hydroCHLOROthiazide (HYDRODIURIL) 25 MG tablet Take 1 tablet by mouth every morning 90 tablet 1    metFORMIN (GLUCOPHAGE) 1000 MG tablet Take 1 tablet by mouth 2 times daily (with meals) 180 tablet 3    blood glucose test strips (DURGA CONTOUR TEST) strip Tests blood sugar daily.  300 each 3  glimepiride (AMARYL) 4 MG tablet Take 1 tablet by mouth 2 times daily 180 tablet 3    atorvastatin (LIPITOR) 80 MG tablet Take 1 tablet by mouth daily 90 tablet 3    metoprolol tartrate (LOPRESSOR) 50 MG tablet Take 1 tablet by mouth 2 times daily 180 tablet 3    lisinopril (PRINIVIL;ZESTRIL) 40 MG tablet Take 1 tablet by mouth daily 90 tablet 3    aspirin 81 MG EC tablet Take 81 mg by mouth daily      nitroGLYCERIN (NITROSTAT) 0.4 MG SL tablet Place 1 tablet under the tongue every 5 minutes as needed for Chest pain 25 tablet 3    ONE TOUCH ULTRASOFT LANCETS MISC 1 each by Does not apply route daily 100 each 3     No current facility-administered medications for this visit. No Known Allergies    Health Maintenance   Topic Date Due    DTaP/Tdap/Td vaccine (1 - Tdap) 07/23/1964    Shingles Vaccine (2 of 3) 06/23/2008    Hepatitis C screen  05/18/2021 (Originally 1945)    Diabetic foot exam  05/18/2021    Annual Wellness Visit (AWV)  05/19/2021    Lipid screen  09/11/2021    Potassium monitoring  09/11/2021    Creatinine monitoring  09/11/2021    A1C test (Diabetic or Prediabetic)  12/09/2021    PSA counseling  12/09/2021    Diabetic retinal exam  11/09/2022    Colon cancer screen colonoscopy  10/28/2029    Flu vaccine  Completed    Pneumococcal 65+ years Vaccine  Completed    AAA screen  Completed    Hepatitis A vaccine  Aged Out    Hib vaccine  Aged Out    Meningococcal (ACWY) vaccine  Aged Out       Subjective:      Review of Systems   Constitutional: Negative for chills and fever. HENT: Negative for hearing loss. Eyes: Negative for pain and visual disturbance. Respiratory: Negative for cough and shortness of breath. Cardiovascular: Negative for chest pain, palpitations and leg swelling. Gastrointestinal: Negative for abdominal pain, blood in stool, constipation, diarrhea, nausea and vomiting. Endocrine: Negative for cold intolerance, polydipsia and polyuria. Genitourinary: Negative for difficulty urinating, dysuria and hematuria. Musculoskeletal: Negative for arthralgias, back pain, gait problem and neck pain. Skin: Negative for pallor and rash. Neurological: Negative for dizziness, weakness, numbness and headaches. Hematological: Negative for adenopathy. Does not bruise/bleed easily. Psychiatric/Behavioral: Negative for confusion. The patient is not nervous/anxious. Objective:     Physical Exam  Vitals signs reviewed. Constitutional:       Appearance: He is well-developed. HENT:      Head: Normocephalic and atraumatic. Eyes:      Pupils: Pupils are equal, round, and reactive to light. Neck:      Musculoskeletal: Neck supple. Cardiovascular:      Rate and Rhythm: Normal rate and regular rhythm. Heart sounds: No murmur. No friction rub. No gallop. Pulmonary:      Effort: Pulmonary effort is normal.      Breath sounds: Normal breath sounds. No wheezing or rales. Abdominal:      General: There is no distension. Palpations: Abdomen is soft. There is no mass. Tenderness: There is no abdominal tenderness. There is no rebound. Musculoskeletal: Normal range of motion. Lymphadenopathy:      Cervical: No cervical adenopathy. Skin:     General: Skin is warm and dry. Findings: No rash. Neurological:      Mental Status: He is alert and oriented to person, place, and time. Cranial Nerves: No cranial nerve deficit (grossly). Psychiatric:         Thought Content: Thought content normal.        /70 (Site: Left Upper Arm, Position: Sitting, Cuff Size: Medium Adult)   Pulse 62   Resp 16   Ht 5' 8\" (1.727 m)   Wt 191 lb (86.6 kg)   BMI 29.04 kg/m²     Assessment:       Diagnosis Orders   1. Essential hypertension     2.  Coronary artery disease involving native coronary artery of native heart without angina pectoris 3. Type 2 diabetes mellitus without complication, without long-term current use of insulin (HCC)  Hemoglobin A1C   4. Other hyperlipidemia     5. Iron deficiency anemia, unspecified iron deficiency anemia type  Hemoglobin    Iron And TIBC             Plan:       Return in about 3 months (around 3/22/2021) for Hypertension, Hyperlipidemia, Diabetes. Orders Placed This Encounter   Procedures    Hemoglobin     Standing Status:   Future     Standing Expiration Date:   12/22/2021    Iron And TIBC     Standing Status:   Future     Standing Expiration Date:   12/22/2021     Order Specific Question:   Is Patient Fasting? Answer:   na     Order Specific Question:   No of Hours? Answer:   na    Hemoglobin A1C     Standing Status:   Future     Standing Expiration Date:   12/22/2021     No orders of the defined types were placed in this encounter. Patientgiven educational materials - see patient instructions. Discussed use, benefit,and side effects of prescribed medications. All patient questions answered. Ptvoiced understanding. Reviewed health maintenance. Instructed to continue currentmedications, diet and exercise. Patient agreed with treatment plan. Follow up asdirected.      Electronically signed by Dorie Porter MD on 12/22/2020 at 1:27 PM

## 2021-02-09 RX ORDER — HYDROCHLOROTHIAZIDE 25 MG/1
25 TABLET ORAL EVERY MORNING
Qty: 90 TABLET | Refills: 1 | Status: SHIPPED | OUTPATIENT
Start: 2021-02-09 | End: 2021-06-28 | Stop reason: SDUPTHER

## 2021-02-22 DIAGNOSIS — E11.9 TYPE 2 DIABETES MELLITUS WITHOUT COMPLICATION, WITHOUT LONG-TERM CURRENT USE OF INSULIN (HCC): ICD-10-CM

## 2021-02-22 DIAGNOSIS — I10 ESSENTIAL HYPERTENSION: ICD-10-CM

## 2021-02-22 DIAGNOSIS — E78.5 HYPERLIPIDEMIA, UNSPECIFIED HYPERLIPIDEMIA TYPE: ICD-10-CM

## 2021-02-22 RX ORDER — LISINOPRIL 40 MG/1
40 TABLET ORAL DAILY
Qty: 90 TABLET | Refills: 3 | Status: SHIPPED | OUTPATIENT
Start: 2021-02-22 | End: 2022-04-05 | Stop reason: SDUPTHER

## 2021-02-22 RX ORDER — METOPROLOL TARTRATE 50 MG/1
50 TABLET, FILM COATED ORAL 2 TIMES DAILY
Qty: 180 TABLET | Refills: 3 | Status: SHIPPED | OUTPATIENT
Start: 2021-02-22 | End: 2022-04-05 | Stop reason: SDUPTHER

## 2021-02-22 RX ORDER — ATORVASTATIN CALCIUM 80 MG/1
80 TABLET, FILM COATED ORAL DAILY
Qty: 90 TABLET | Refills: 3 | Status: SHIPPED | OUTPATIENT
Start: 2021-02-22 | End: 2022-04-05 | Stop reason: SDUPTHER

## 2021-02-22 RX ORDER — GLIMEPIRIDE 4 MG/1
4 TABLET ORAL 2 TIMES DAILY
Qty: 180 TABLET | Refills: 3 | Status: SHIPPED | OUTPATIENT
Start: 2021-02-22 | End: 2022-01-03 | Stop reason: SDUPTHER

## 2021-03-16 ENCOUNTER — HOSPITAL ENCOUNTER (OUTPATIENT)
Dept: LAB | Age: 76
Discharge: HOME | End: 2021-03-16
Payer: MEDICARE

## 2021-03-16 DIAGNOSIS — D50.9 IRON DEFICIENCY ANEMIA, UNSPECIFIED IRON DEFICIENCY ANEMIA TYPE: ICD-10-CM

## 2021-03-16 DIAGNOSIS — E11.9 TYPE 2 DIABETES MELLITUS WITHOUT COMPLICATION, WITHOUT LONG-TERM CURRENT USE OF INSULIN (HCC): ICD-10-CM

## 2021-03-16 LAB
ESTIMATED AVERAGE GLUCOSE: 160 MG/DL
HBA1C MFR BLD: 7.2 % (ref 4–6)
HEMOGLOBIN: 11.8 G/DL (ref 13–17)
IRON SATURATION: 33 % (ref 20–55)
IRON: 75 UG/DL (ref 59–158)
TOTAL IRON BINDING CAPACITY: 226 UG/DL (ref 250–450)
UNSATURATED IRON BINDING CAPACITY: 151 UG/DL (ref 112–347)

## 2021-03-16 PROCEDURE — 83550 IRON BINDING TEST: CPT

## 2021-03-16 PROCEDURE — 83540 ASSAY OF IRON: CPT

## 2021-03-16 PROCEDURE — 83036 HEMOGLOBIN GLYCOSYLATED A1C: CPT

## 2021-03-16 PROCEDURE — 36415 COLL VENOUS BLD VENIPUNCTURE: CPT

## 2021-03-16 PROCEDURE — 85018 HEMOGLOBIN: CPT

## 2021-03-23 ENCOUNTER — OFFICE VISIT (OUTPATIENT)
Dept: INTERNAL MEDICINE | Age: 76
End: 2021-03-23
Payer: MEDICARE

## 2021-03-23 ENCOUNTER — HOSPITAL ENCOUNTER (OUTPATIENT)
Dept: LAB | Age: 76
Discharge: HOME | End: 2021-03-23
Payer: MEDICARE

## 2021-03-23 VITALS
HEIGHT: 68 IN | SYSTOLIC BLOOD PRESSURE: 122 MMHG | WEIGHT: 191 LBS | BODY MASS INDEX: 28.95 KG/M2 | DIASTOLIC BLOOD PRESSURE: 74 MMHG | HEART RATE: 62 BPM | RESPIRATION RATE: 16 BRPM

## 2021-03-23 DIAGNOSIS — E78.49 OTHER HYPERLIPIDEMIA: ICD-10-CM

## 2021-03-23 DIAGNOSIS — D64.9 ANEMIA, UNSPECIFIED TYPE: ICD-10-CM

## 2021-03-23 DIAGNOSIS — E11.9 TYPE 2 DIABETES MELLITUS WITHOUT COMPLICATION, WITHOUT LONG-TERM CURRENT USE OF INSULIN (HCC): ICD-10-CM

## 2021-03-23 DIAGNOSIS — I10 ESSENTIAL HYPERTENSION: Primary | ICD-10-CM

## 2021-03-23 DIAGNOSIS — R53.83 OTHER FATIGUE: ICD-10-CM

## 2021-03-23 LAB
HEMOGLOBIN: 12 G/DL (ref 13–17)
THYROXINE, FREE: 1.21 NG/DL (ref 0.93–1.7)
TSH SERPL DL<=0.05 MIU/L-ACNC: 2.16 MIU/L (ref 0.3–5)

## 2021-03-23 PROCEDURE — 2022F DILAT RTA XM EVC RTNOPTHY: CPT | Performed by: INTERNAL MEDICINE

## 2021-03-23 PROCEDURE — 84443 ASSAY THYROID STIM HORMONE: CPT

## 2021-03-23 PROCEDURE — 99214 OFFICE O/P EST MOD 30 MIN: CPT | Performed by: INTERNAL MEDICINE

## 2021-03-23 PROCEDURE — 3017F COLORECTAL CA SCREEN DOC REV: CPT | Performed by: INTERNAL MEDICINE

## 2021-03-23 PROCEDURE — 1036F TOBACCO NON-USER: CPT | Performed by: INTERNAL MEDICINE

## 2021-03-23 PROCEDURE — 3051F HG A1C>EQUAL 7.0%<8.0%: CPT | Performed by: INTERNAL MEDICINE

## 2021-03-23 PROCEDURE — 84439 ASSAY OF FREE THYROXINE: CPT

## 2021-03-23 PROCEDURE — 1123F ACP DISCUSS/DSCN MKR DOCD: CPT | Performed by: INTERNAL MEDICINE

## 2021-03-23 PROCEDURE — G8427 DOCREV CUR MEDS BY ELIG CLIN: HCPCS | Performed by: INTERNAL MEDICINE

## 2021-03-23 PROCEDURE — 85018 HEMOGLOBIN: CPT

## 2021-03-23 PROCEDURE — 82607 VITAMIN B-12: CPT

## 2021-03-23 PROCEDURE — 82746 ASSAY OF FOLIC ACID SERUM: CPT

## 2021-03-23 PROCEDURE — G8417 CALC BMI ABV UP PARAM F/U: HCPCS | Performed by: INTERNAL MEDICINE

## 2021-03-23 PROCEDURE — G8484 FLU IMMUNIZE NO ADMIN: HCPCS | Performed by: INTERNAL MEDICINE

## 2021-03-23 PROCEDURE — 4040F PNEUMOC VAC/ADMIN/RCVD: CPT | Performed by: INTERNAL MEDICINE

## 2021-03-23 PROCEDURE — 36415 COLL VENOUS BLD VENIPUNCTURE: CPT

## 2021-03-23 ASSESSMENT — ENCOUNTER SYMPTOMS
ABDOMINAL PAIN: 0
BACK PAIN: 0
COUGH: 0
SHORTNESS OF BREATH: 0
CONSTIPATION: 0
NAUSEA: 0
DIARRHEA: 0
BLOOD IN STOOL: 0
EYE PAIN: 0
VOMITING: 0

## 2021-03-23 ASSESSMENT — PATIENT HEALTH QUESTIONNAIRE - PHQ9
1. LITTLE INTEREST OR PLEASURE IN DOING THINGS: 0
SUM OF ALL RESPONSES TO PHQ9 QUESTIONS 1 & 2: 0
2. FEELING DOWN, DEPRESSED OR HOPELESS: 0
SUM OF ALL RESPONSES TO PHQ QUESTIONS 1-9: 0

## 2021-03-23 NOTE — PROGRESS NOTES
[place] [address]  Dept: [phone]  Dept Fax: [phone]  Loc: [phone]     Laxmi Butts is a 76 y.o. male who presents today for his medical conditions/complaintsas noted below. Laxmi Butts is c/o of   Chief Complaint   Patient presents with    Hypertension    Diabetes    Hyperlipidemia         HPI:     Hypertension  This is a chronic (essential htn) problem. The current episode started more than 1 year ago. The problem has been waxing and waning since onset. The problem is controlled. Pertinent negatives include no chest pain, headaches, neck pain, palpitations or shortness of breath. Diabetes  He presents for his follow-up diabetic visit. He has type 2 diabetes mellitus. His disease course has been fluctuating. Pertinent negatives for hypoglycemia include no confusion, dizziness, headaches, nervousness/anxiousness or pallor. Pertinent negatives for diabetes include no chest pain, no polydipsia, no polyuria and no weakness. Hyperlipidemia  This is a chronic problem. The current episode started more than 1 year ago. The problem is controlled. Recent lipid tests were reviewed and are variable. Pertinent negatives include no chest pain or shortness of breath. Hemoglobin A1C (%)   Date Value   03/16/2021 7.2 (H)   12/09/2020 7.1 (H)   09/11/2020 7.8 (H)            Microalb/Crt. Ratio (mcg/mg creat)   Date Value   12/09/2020 91 (H)     LDL Cholesterol (mg/dL)   Date Value   09/11/2020 51   09/12/2019 43   07/17/2018 37         AST (U/L)   Date Value   09/11/2020 24     ALT (U/L)   Date Value   09/11/2020 31     BUN (mg/dL)   Date Value   09/11/2020 24 (H)     BP Readings from Last 3 Encounters:   03/23/21 122/74   12/22/20 122/70   12/14/20 118/64              Past Medical History:   Diagnosis Date    Benign prostatic hypertrophy     with elevated PSA.     BPPV (benign paroxysmal positional vertigo) 2/2008    Coronary heart disease     Status post CABG.  Diverticulosis     Dupuytren's contracture     Mid finger, right hand.  Erectile dysfunction     Bowens catheter in place 11/02/2018    has had one in place for 6weeks, this one in place x 2 weeks    Gout     Quiescent.  History of blood transfusion     possible with open heart surgery 26years ago    Hyperlipidemia     Hypertension     Snores     Type II or unspecified type diabetes mellitus without mention of complication, not stated as uncontrolled       Past Surgical History:   Procedure Laterality Date    CARDIAC CATHETERIZATION  6/2005    Triple vessel CAD with patent LIMA to LAD, patent saphenous vein graft to first obtuse marginal, patent saphenous vein graft to posterior descending artery, occluded jump graft from posterior descending artery to posterior left ventricular branch.  CARDIAC CATHETERIZATION  3/16/16    CATARACT REMOVAL Bilateral Nov. and Dec 2014    COLONOSCOPY  5/27/2008    Distal ileoscopy. Scattered diverticulosis and extensive diverticular disease of sigmoid colon and internal hemorrhoids.  COLONOSCOPY N/A 10/28/2019    COLONOS[facility]ann [facility]con [facility] OR  severe diverticulosis    CORONARY ARTERY BYPASS GRAFT      ELBOW SURGERY Right     Surgical repair.  KNEE ARTHROSCOPY Right 3/16/2007    Partial medial meniscectomy, partial medial and lateral synovectomy, light chondroplasty.     FL LASER VAPORIZATION SURGERY PROSTATE, COMPLETE N/A 11/2/2018    CYSTOSCOPY, TRANSURETHRAL RESECTION PROSTATE - [facility] # [id number] HAILEE) performed by Ajay Glynn MD at [facility] Bilateral 9/15/2010, 2007    [facility] Dr. Barb Betancourt PROSTATE BIOPSY Bilateral 05/10/2001    Benign-Dr. Bernard Kim    PROSTATE BIOPSY Bilateral 04/08/1999    Benign-Dr. Jimmy Sultana    PROSTATE BIOPSY Bilateral 06/18/1998    Benign-Dr. Ronnell Kearns    PROSTATE BIOPSY Bilateral tablet Place 1 tablet under the tongue every 5 minutes as needed for Chest pain 25 tablet 3    ONE TOUCH ULTRASOFT LANCETS MISC 1 each by Does not apply route daily 100 each 3     No current facility-administered medications for this visit. No Known Allergies    Health Maintenance   Topic Date Due    DTaP/Tdap/Td vaccine (1 - Tdap) Never done    Shingles Vaccine (2 of 3) 06/23/2008    Hepatitis C screen  05/18/2021 (Originally 1945)    Diabetic foot exam  05/18/2021    Annual Wellness Visit (AWV)  05/19/2021    Lipid screen  09/11/2021    Potassium monitoring  09/11/2021    Creatinine monitoring  09/11/2021    PSA counseling  12/09/2021    A1C test (Diabetic or Prediabetic)  03/16/2022    Diabetic retinal exam  11/09/2022    Colon cancer screen colonoscopy  10/28/2029    Flu vaccine  Completed    Pneumococcal 65+ years Vaccine  Completed    COVID-19 Vaccine  Completed    AAA screen  Completed    Hepatitis A vaccine  Aged Out    Hib vaccine  Aged Out    Meningococcal (ACWY) vaccine  Aged Out       Subjective:      Review of Systems   Constitutional: Negative for chills and fever. HENT: Negative for hearing loss. Eyes: Negative for pain and visual disturbance. Respiratory: Negative for cough and shortness of breath. Cardiovascular: Negative for chest pain, palpitations and leg swelling. Gastrointestinal: Negative for abdominal pain, blood in stool, constipation, diarrhea, nausea and vomiting. Endocrine: Negative for cold intolerance, polydipsia and polyuria. Genitourinary: Negative for difficulty urinating, dysuria and hematuria. Musculoskeletal: Negative for arthralgias, back pain, gait problem and neck pain. Skin: Negative for pallor and rash. Neurological: Negative for dizziness, weakness, numbness and headaches. Hematological: Negative for adenopathy. Does not bruise/bleed easily. Psychiatric/Behavioral: Negative for confusion.  The patient is not nervous/anxious. Objective:     Physical Exam  Vitals signs reviewed. Constitutional:       Appearance: He is well-developed. HENT:      Head: Normocephalic and atraumatic. Eyes:      Pupils: Pupils are equal, round, and reactive to light. Neck:      Musculoskeletal: Neck supple. Cardiovascular:      Rate and Rhythm: Normal rate and regular rhythm. Heart sounds: No murmur. No friction rub. No gallop. Pulmonary:      Effort: Pulmonary effort is normal.      Breath sounds: Normal breath sounds. No wheezing or rales. Abdominal:      General: There is no distension. Palpations: Abdomen is soft. There is no mass. Tenderness: There is no abdominal tenderness. There is no rebound. Musculoskeletal: Normal range of motion. Lymphadenopathy:      Cervical: No cervical adenopathy. Skin:     General: Skin is warm and dry. Findings: No rash. Neurological:      Mental Status: He is alert and oriented to person, place, and time. Cranial Nerves: No cranial nerve deficit (grossly). Psychiatric:         Thought Content: Thought content normal.        /74 (Site: Left Upper Arm, Position: Sitting, Cuff Size: Medium Adult)   Pulse 62   Resp 16   Ht 5' 8\" (1.727 m)   Wt 191 lb (86.6 kg)   BMI 29.04 kg/m²     Assessment:       Diagnosis Orders   1. Essential hypertension     2. Type 2 diabetes mellitus without complication, without long-term current use of insulin ([facility])  Hemoglobin A1C   3. Other hyperlipidemia     4. Anemia, unspecified type  Vitamin B12 & Folate    TSH    T4, Free    Hemoglobin    Iron And TIBC    Hemoglobin   5. Other fatigue  TSH    T4, Free   Reviewed multiple test results for this appointment. 3/16/2021 okay hemoglobin A1c at 7.2.    1621 normal iron at 75    3/16/2021 hemoglobin slightly lower at 11.8. Patient told to continue Amaryl, Metformin and other medications.          Plan:       Return in about 3 months (around 6/23/2021) for medicare AWV, Hypertension, Hyperlipidemia, Diabetes. Orders Placed This Encounter   Procedures    Vitamin B12 & Folate     Standing Status:   Future     Standing Expiration Date:   3/23/2022    TSH     Standing Status:   Future     Standing Expiration Date:   3/23/2022    T4, Free     Standing Status:   Future     Standing Expiration Date:   3/23/2022    Hemoglobin     Standing Status:   Future     Standing Expiration Date:   3/23/2022    Iron And TIBC     Standing Status:   Future     Standing Expiration Date:   3/23/2022     Order Specific Question:   Is Patient Fasting? Answer:   na     Order Specific Question:   No of Hours? Answer:   na    Hemoglobin A1C     Standing Status:   Future     Standing Expiration Date:   3/23/2022    Hemoglobin     Standing Status:   Future     Standing Expiration Date:   3/23/2022     No orders of the defined types were placed in this encounter. Patientgiven educational materials - see patient instructions. Discussed use, benefit,and side effects of prescribed medications. All patient questions answered. Ptvoiced understanding. Reviewed health maintenance. Instructed to continue currentmedications, diet and exercise. Patient agreed with treatment plan. Follow up asdirected.      Electronically signed by Tami Painter MD on 3/23/2021 at 1:13 PM

## 2021-03-24 LAB
FOLATE: 10.4 NG/ML
VITAMIN B-12: 203 PG/ML (ref 232–1245)

## 2021-03-25 DIAGNOSIS — E53.8 VITAMIN B12 DEFICIENCY: Primary | ICD-10-CM

## 2021-04-26 ENCOUNTER — OFFICE VISIT (OUTPATIENT)
Dept: CARDIOLOGY | Age: 76
End: 2021-04-26
Payer: MEDICARE

## 2021-04-26 VITALS
OXYGEN SATURATION: 100 % | RESPIRATION RATE: 16 BRPM | DIASTOLIC BLOOD PRESSURE: 63 MMHG | SYSTOLIC BLOOD PRESSURE: 135 MMHG | BODY MASS INDEX: 28.79 KG/M2 | WEIGHT: 190 LBS | HEART RATE: 64 BPM | HEIGHT: 68 IN

## 2021-04-26 DIAGNOSIS — E78.2 MIXED HYPERLIPIDEMIA: ICD-10-CM

## 2021-04-26 DIAGNOSIS — I25.119 CHEST PAIN DUE TO CAD (HCC): ICD-10-CM

## 2021-04-26 DIAGNOSIS — Z95.1 S/P CABG X 3: ICD-10-CM

## 2021-04-26 DIAGNOSIS — I10 ESSENTIAL HYPERTENSION: Primary | ICD-10-CM

## 2021-04-26 PROCEDURE — 93005 ELECTROCARDIOGRAM TRACING: CPT | Performed by: INTERNAL MEDICINE

## 2021-04-26 PROCEDURE — 99214 OFFICE O/P EST MOD 30 MIN: CPT | Performed by: INTERNAL MEDICINE

## 2021-04-26 PROCEDURE — 1123F ACP DISCUSS/DSCN MKR DOCD: CPT | Performed by: INTERNAL MEDICINE

## 2021-04-26 PROCEDURE — 1036F TOBACCO NON-USER: CPT | Performed by: INTERNAL MEDICINE

## 2021-04-26 PROCEDURE — G8417 CALC BMI ABV UP PARAM F/U: HCPCS | Performed by: INTERNAL MEDICINE

## 2021-04-26 PROCEDURE — 4040F PNEUMOC VAC/ADMIN/RCVD: CPT | Performed by: INTERNAL MEDICINE

## 2021-04-26 PROCEDURE — 3017F COLORECTAL CA SCREEN DOC REV: CPT | Performed by: INTERNAL MEDICINE

## 2021-04-26 PROCEDURE — 93010 ELECTROCARDIOGRAM REPORT: CPT | Performed by: INTERNAL MEDICINE

## 2021-04-26 PROCEDURE — G8427 DOCREV CUR MEDS BY ELIG CLIN: HCPCS | Performed by: INTERNAL MEDICINE

## 2021-04-26 RX ORDER — ISOSORBIDE MONONITRATE 60 MG/1
60 TABLET, EXTENDED RELEASE ORAL DAILY
Qty: 90 TABLET | Refills: 1 | Status: SHIPPED | OUTPATIENT
Start: 2021-04-26 | End: 2021-05-07 | Stop reason: SDUPTHER

## 2021-04-26 NOTE — PROGRESS NOTES
[address] [facility] [address]  Dept: [phone]  Loc: [phone]    CC: follow up for CAD, CABG    HPI:  The patient is a 76y.o. year old, , male is in the office for a follow up visit. Still having some chest pain / heaviness, usually with walking, after about 1/4 mile he has to rest. Takes 1-2 minute rest, then can walk another 1/4 mile, then needs another rest.   After that it goes away with rest, but then is able to complete his work out. Past Medical History:   has a past medical history of Benign prostatic hypertrophy, BPPV (benign paroxysmal positional vertigo), Coronary heart disease, Diverticulosis, Dupuytren's contracture, Erectile dysfunction, Bowens catheter in place, Gout, History of blood transfusion, Hyperlipidemia, Hypertension, Snores, and Type II or unspecified type diabetes mellitus without mention of complication, not stated as uncontrolled. Past Surgical History:   has a past surgical history that includes Prostate biopsy (Bilateral, 9/15/2010, 2007); Colonoscopy (5/27/2008); Prostate biopsy (Bilateral, 05/10/2001); Knee arthroscopy (Right, 3/16/2007); Elbow surgery (Right); Vasectomy; Coronary artery bypass graft; Cardiac catheterization (6/2005); Cataract removal (Bilateral, Nov. and Dec 2014); Cardiac catheterization (3/16/16); Prostate Biopsy (Bilateral, 04/08/1999); Prostate Biopsy (Bilateral, 06/18/1998); Prostate Biopsy (Bilateral, 01/07/1998); Prostate Biopsy (Bilateral, 10/04/1996); Prostate surgery (11/02/2018); pr laser vaporization surgery prostate, complete (N/A, 11/2/2018); and Colonoscopy (N/A, 10/28/2019). Home Medications:  Prior to Admission medications    Medication Sig Start Date End Date Taking?  Authorizing Provider   Cyanocobalamin (VITAMIN B12 PO) Take by mouth   Yes Historical Provider, MD   glimepiride (AMARYL) 4 MG tablet Take 1 tablet by mouth 2 times daily 2/22/21  Yes Adrian Lund MD   atorvastatin (LIPITOR) 80 MG tablet Take 1 tablet by mouth daily 2/22/21  Yes Adrian Lund MD   metoprolol tartrate (LOPRESSOR) 50 MG tablet Take 1 tablet by mouth 2 times daily 2/22/21  Yes Adrian Lund MD   lisinopril (PRINIVIL;ZESTRIL) 40 MG tablet Take 1 tablet by mouth daily 2/22/21  Yes Adrian Lund MD   hydroCHLOROthiazide (HYDRODIURIL) 25 MG tablet Take 1 tablet by mouth every morning 2/9/21  Yes Adrian Lund MD   isosorbide mononitrate (IMDUR) 30 MG extended release tablet Take 1 tablet by mouth daily 10/20/20  Yes Mj Lepe DO   metFORMIN (GLUCOPHAGE) 1000 MG tablet Take 1 tablet by mouth 2 times daily (with meals) 5/18/20  Yes Adrian Lund MD   blood glucose test strips (DURGA CONTOUR TEST) strip Tests blood sugar daily. 5/11/20  Yes Adrian Lund MD   ONE TOUCH ULTRASOFT LANCETS MISC 1 each by Does not apply route daily 5/11/20  Yes Adrian Lund MD   aspirin 81 MG EC tablet Take 81 mg by mouth daily   Yes Haroldo Lux MD   nitroGLYCERIN (NITROSTAT) 0.4 MG SL tablet Place 1 tablet under the tongue every 5 minutes as needed for Chest pain  Patient not taking: Reported on 4/26/2021 5/11/20   Adrian Lund MD       Allergies:  Patient has no known allergies. Social History:   reports that he quit smoking about 28 years ago. His smoking use included cigarettes. He started smoking about 53 years ago. He has a 13.50 pack-year smoking history. He has never used smokeless tobacco. He reports current alcohol use of about 1.0 standard drinks of alcohol per week. He reports that he does not use drugs. Review of Systems:  · Constitutional: there has been no unanticipated weight loss. There's been No change in energy level, No change in activity level. · Eyes: No visual changes or diplopia. No scleral icterus. · ENT: No Headaches, hearing loss or vertigo. No mouth sores or sore throat.   · Cardiovascular: As above. · Respiratory: as hpi  · Gastrointestinal: No abdominal pain, appetite loss, blood in stools. No change in bowel or bladder habits. · Genitourinary: No dysuria, trouble voiding, or hematuria. · Musculoskeletal:  No gait disturbance, No weakness or joint complaints. · Integumentary: No rash or pruritis. · Psychiatric: No anxiety, or depression. · Hematologic/Lymphatic: No abnormal bruising or bleeding, blood clots or swollen lymph nodes. · Allergic/Immunologic: No nasal congestion or hives. Physical Exam:  /63 (Site: Right Upper Arm, Position: Sitting, Cuff Size: Large Adult)   Pulse 64   Resp 16   Ht 5' 8\" (1.727 m)   Wt 190 lb (86.2 kg)   SpO2 100%   BMI 28.89 kg/m²     Constitutional and General Appearance: alert, cooperative, no distress and appears stated age  HEENT: PERRL, no cervical lymphadenopathy. No masses palpable. Normal oral mucosa  Respiratory:  · Normal excursion and expansion without use of accessory muscles  · Resp Auscultation: Good respiratory effort. No for increased work of breathing. On auscultation: clear to auscultation bilaterally  Cardiovascular:  · The apical impulse is not displaced  · Heart tones are crisp and normal. regular S1 and S2.  · Jugular venous pulsation Normal  · The carotid upstroke is normal in amplitude and contour without delay or bruit  · Peripheral pulses are symmetrical and full   Abdomen:   · No masses or tenderness  · Bowel sounds present  Extremities:  ·  No Cyanosis or Clubbing  ·  Lower extremity edema: No  ·  Skin: Warm and dry    Cardiac Data:  EKG: Sinus  Rhythm   WITHIN NORMAL LIMITS    Cath 3/2016:    Severe native vessel disease   Patent LIMA-LAD and SVG-OM   Occluded SVG-RCA   CECY was not grafted.      Labs:   Lab Results   Component Value Date    CHOL 100 09/11/2020    TRIG 85 09/11/2020    HDL 32 (L) 09/11/2020    LDLCHOLESTEROL 51 09/11/2020    VLDL NOT REPORTED 09/11/2020    CHOLHDLRATIO 3.1 09/11/2020       Lab Results Component Value Date     09/11/2020    K 3.9 09/11/2020     09/11/2020    CO2 25 09/11/2020    BUN 24 (H) 09/11/2020    CREATININE 1.32 (H) 09/11/2020    GLUCOSE 167 (H) 09/11/2020    CALCIUM 9.6 09/11/2020    PROT 6.7 09/11/2020    LABALBU 4.2 09/11/2020    BILITOT 1.39 (H) 09/11/2020    ALKPHOS 58 09/11/2020    AST 24 09/11/2020    ALT 31 09/11/2020    LABGLOM 53 (L) 09/11/2020    GFRAA >60 09/11/2020     IMPRESSION / RECOMMENDATIONS:  1. CP consistent with stable angina   - he is now agreeable to at least a stress test (not quite yet agreeable to heart cath)  - will check treadmill myoview stress test to rule out ischemia/further risk stratify   - increase imdur 60 mg po qd    2. CAD- History of CABG- cath 2016-Severe native vessel disease, Patent LIMA-LAD and SVG-OM, Occluded SVG-RCA  - see plan above    3. HTN- at goal  - continue current meds    4. DL- LDL 51 on 9/20  - on statin     5. DM2- per pcp    The patient is to continue heart healthy diet, weight loss and exercise as tolerated. Patient's medications and side effects were discussed. Medication refills were provided if needed. Follow up appointment timing was discussed. All questions and concerns were addressed to patient's satisfaction. The patient is to follow up in 6 months or sooner if necessary. Thank you for allowing me to participate in the care of this patient, please do not hesitate to call if you have any questions. Junito Herrera DO, [facility] [place], [address] [facility]  [facility] [place]  [phone]

## 2021-05-06 ENCOUNTER — HOSPITAL ENCOUNTER (OUTPATIENT)
Dept: NUCLEAR MEDICINE | Age: 76
End: 2021-05-06
Payer: MEDICARE

## 2021-05-06 ENCOUNTER — HOSPITAL ENCOUNTER (OUTPATIENT)
Dept: NON INVASIVE DIAGNOSTICS | Age: 76
Discharge: HOME | End: 2021-05-06
Payer: MEDICARE

## 2021-05-06 ENCOUNTER — HOSPITAL ENCOUNTER (OUTPATIENT)
Dept: NUCLEAR MEDICINE | Age: 76
Discharge: HOME | End: 2021-05-08
Payer: MEDICARE

## 2021-05-06 DIAGNOSIS — I25.119 CHEST PAIN DUE TO CAD (HCC): ICD-10-CM

## 2021-05-06 DIAGNOSIS — R94.39 ABNORMAL CARDIOVASCULAR STRESS TEST: Primary | ICD-10-CM

## 2021-05-06 PROCEDURE — 78452 HT MUSCLE IMAGE SPECT MULT: CPT

## 2021-05-06 PROCEDURE — 3430000000 HC RX DIAGNOSTIC RADIOPHARMACEUTICAL: Performed by: INTERNAL MEDICINE

## 2021-05-06 PROCEDURE — A9500 TC99M SESTAMIBI: HCPCS | Performed by: INTERNAL MEDICINE

## 2021-05-06 PROCEDURE — 93017 CV STRESS TEST TRACING ONLY: CPT

## 2021-05-06 RX ADMIN — TETRAKIS(2-METHOXYISOBUTYLISOCYANIDE)COPPER(I) TETRAFLUOROBORATE 10 MILLICURIE: 1 INJECTION, POWDER, LYOPHILIZED, FOR SOLUTION INTRAVENOUS at 11:43

## 2021-05-06 RX ADMIN — TETRAKIS(2-METHOXYISOBUTYLISOCYANIDE)COPPER(I) TETRAFLUOROBORATE 30 MILLICURIE: 1 INJECTION, POWDER, LYOPHILIZED, FOR SOLUTION INTRAVENOUS at 11:43

## 2021-05-06 NOTE — PROGRESS NOTES
Roberth, DO     ------------------------------------------------------------------------------------------------    Predicted Heart Rate:  Maximum:  145   85%:  124     Heart Rate Restin   Standin     Blood Pressure Restin/76  Standin/70     Exercise Protocol Used:  DELORES    Exercise Duration/Stage:  4:13 (II)     Test terminated due to: target heart rate reached  Imaging: Cardiolite     Maximum Heart Rate:  130 (89%) Max BP: 156/70  Max Workload: 6.00 METS     Chest Pain: Yes           Onset:  Stage I  Severity:  3/10     Electronically signed by Myrtie Closs, RCP on 21 at 10:52 AM EDT    -------------------------------------------------------------------------------------------------  Baseline EKG : Normal sinus rhythm, normal ECG    EKG Changes: Diffuse ST depression with elevation in aVR suggestive of subendocardial ischemia Onset: Within 1 to 2 minutes of starting exercise. Leads with maximum changes:    Leads II, 3, aVF, V4 to V6    EKG returned to baseline 8-9 minutes in recovery. Arrhythmias: Frequent PVC on exercise     Interpretation:    1. Positive for ECG portion of stress test for severe ischemia. Diffuse ST-T depression along with ST elevation in aVR noted within 2 minutes of exercise which resolved 8 minutes into the recovery. Patient had chest heaviness and dyspnea on exercise. His symptoms totally resolved with rest.    2.  Recommend urgent left heart catheterization. Patient would like to do on Monday. Orders placed and this will be scheduled. 3.  Patient advised to go to ER in case there is recurrence or persistence of his symptoms. He verbalized understanding.     Supervising Physician: Marge Daniel MD

## 2021-05-06 NOTE — PROCEDURES
[facility] [address]                 [address] [place] [id number]                              CARDIAC STRESS TEST    PATIENT NAME: Guido Duran                     :        1945  MED REC NO:   [id number]                             ROOM:  ACCOUNT NO:   [de-identified]                           ADMIT DATE: 2021  PROVIDER:     Kayla Larsen    DATE OF STUDY:  2021    STRESS TEST    Ordering Provider:  Pauline Pace DO    Primary Care Provider:  Augusto Powers MD    Patient's Age: 76    Height: 5 feet 8 inches  Weight: 190 pounds    INDICATION:  Chest pain, coronary artery disease, angina. Predicted Heart Rate: Maximum 145     85%: 124    Heart Rate Restin   Standin    Blood pressure restin/76   Standin/70    Exercise protocol used:  Adrien  Exercise Duration/Stage: 4:13; stage II    Test terminated due to: Target heart rate reached. Imaging: Cardiolite    Maximum heart rate: 130  (89%)  Max blood pressure: 156/70  Max Workload: 6.00 METS    Chest Pain: Yes     Onset: Stage I Severity: 310    Baseline EKG:  Normal sinus rhythm, normal ECG. EKG changes:  Diffuse ST depression with elevation in aVR suggestive of  subendocardial ischemia. Onset: Within 1-2 minutes of starting  exercise. Leads with maximum changes:  II, III, aVF, V4 to V6. EKG returned to baseline 8-9 minutes in recovery. Arrhythmias:  Frequent PVCs on exercise. Interpretation:  1. Positive for ECG portion of stress test for severe ischemia. Diffuse  ST-T depression along with ST elevation in aVR noted within 2 minutes of  exercise which resolved 8 minutes into the recovery. Patient had chest  heaviness and dyspnea on exercise. His symptoms totally resolved with  rest.    2.  Recommend urgent left heart catheterization. Patient would like to  do on Monday. Orders placed and this will be scheduled.     3.  Patient advised to go to ER in case there is recurrence or  persistence of his symptoms. He verbalized understanding. Nuclear Myocardial Perfusion Imaging (SPECT)    TESTING METHOD  STRESS:   Treadmill  AGENT:    Cardiolite  REST:          Injection Date:  05/06/2021   Time: 0956   Amount: 11.49  mCi  STRESS:   Injection Date:  05/06/2021   Time: 1104   Amount: 34.7 mCi  IMAGE TIME:  Rest: 1034    Stress:  1145    EF:  60%  TID:  1.01  LHR:  0.36    FINDINGS:  Ischemia (Reversible Defect):           Yes, severe intensity, moderate  size anterior                                        and anteroseptal  Infarction (Irreversible Defect):       Yes, inferior  Ejection fraction Calculated:           Yes, 60%  Segmental wall motion:                  Yes, septal wall    High risk study. IMPRESSION:  1. Moderate size, severe intensity anterior and anteroseptal wall  hypokinesis. 2.  Normal LVEF with septal wall hypokinesis. 3.  Inferior infarct, small. 4.  High risk study.       Jay [facility]    D: 05/06/2021 14:12:08       T: 05/06/2021 14:13:23     [email or web address]  Job#: [id number]     Doc#: Unknown    CC:  Roby Lepe DO

## 2021-05-07 ENCOUNTER — HOSPITAL ENCOUNTER (OUTPATIENT)
Dept: CARDIAC CATH/INVASIVE PROCEDURES | Age: 76
Discharge: HOME | End: 2021-05-07
Payer: MEDICARE

## 2021-05-07 VITALS
DIASTOLIC BLOOD PRESSURE: 82 MMHG | RESPIRATION RATE: 20 BRPM | TEMPERATURE: 98.1 F | HEART RATE: 68 BPM | SYSTOLIC BLOOD PRESSURE: 156 MMHG | OXYGEN SATURATION: 99 % | WEIGHT: 185 LBS | BODY MASS INDEX: 28.04 KG/M2 | HEIGHT: 68 IN

## 2021-05-07 LAB
ANION GAP SERPL CALCULATED.3IONS-SCNC: 9 MMOL/L (ref 9–17)
BUN BLDV-MCNC: 40 MG/DL (ref 8–23)
BUN/CREAT BLD: ABNORMAL (ref 9–20)
CALCIUM SERPL-MCNC: 8.5 MG/DL (ref 8.6–10.4)
CHLORIDE BLD-SCNC: 111 MMOL/L (ref 98–107)
CO2: 21 MMOL/L (ref 20–31)
CREAT SERPL-MCNC: 1.72 MG/DL (ref 0.7–1.2)
GFR AFRICAN AMERICAN: 47 ML/MIN
GFR NON-AFRICAN AMERICAN: 30 ML/MIN
GFR NON-AFRICAN AMERICAN: 39 ML/MIN
GFR SERPL CREATININE-BSD FRML MDRD: 37 ML/MIN
GFR SERPL CREATININE-BSD FRML MDRD: ABNORMAL ML/MIN/{1.73_M2}
GLUCOSE BLD-MCNC: 167 MG/DL (ref 70–99)
GLUCOSE BLD-MCNC: 177 MG/DL (ref 74–100)
GLUCOSE BLD-MCNC: 227 MG/DL (ref 75–110)
PLATELET # BLD: 181 K/UL (ref 138–453)
POC BUN: 40 MG/DL (ref 8–26)
POC CHLORIDE: 110 MMOL/L (ref 98–107)
POC CREATININE: 2.14 MG/DL (ref 0.51–1.19)
POC HEMATOCRIT: 32 % (ref 41–53)
POC HEMOGLOBIN: 10.7 G/DL (ref 13.5–17.5)
POC POTASSIUM: 5 MMOL/L (ref 3.5–4.5)
POC SODIUM: 141 MMOL/L (ref 138–146)
POTASSIUM SERPL-SCNC: 5.2 MMOL/L (ref 3.7–5.3)
SODIUM BLD-SCNC: 141 MMOL/L (ref 135–144)

## 2021-05-07 PROCEDURE — 85014 HEMATOCRIT: CPT

## 2021-05-07 PROCEDURE — 6360000004 HC RX CONTRAST MEDICATION

## 2021-05-07 PROCEDURE — 2500000003 HC RX 250 WO HCPCS

## 2021-05-07 PROCEDURE — 80048 BASIC METABOLIC PNL TOTAL CA: CPT

## 2021-05-07 PROCEDURE — C1887 CATHETER, GUIDING: HCPCS

## 2021-05-07 PROCEDURE — C1894 INTRO/SHEATH, NON-LASER: HCPCS

## 2021-05-07 PROCEDURE — 6370000000 HC RX 637 (ALT 250 FOR IP): Performed by: STUDENT IN AN ORGANIZED HEALTH CARE EDUCATION/TRAINING PROGRAM

## 2021-05-07 PROCEDURE — C1769 GUIDE WIRE: HCPCS

## 2021-05-07 PROCEDURE — 7100000001 HC PACU RECOVERY - ADDTL 15 MIN

## 2021-05-07 PROCEDURE — 2709999900 HC NON-CHARGEABLE SUPPLY

## 2021-05-07 PROCEDURE — 85049 AUTOMATED PLATELET COUNT: CPT

## 2021-05-07 PROCEDURE — 84295 ASSAY OF SERUM SODIUM: CPT

## 2021-05-07 PROCEDURE — 82947 ASSAY GLUCOSE BLOOD QUANT: CPT

## 2021-05-07 PROCEDURE — 7100000000 HC PACU RECOVERY - FIRST 15 MIN

## 2021-05-07 PROCEDURE — 82435 ASSAY OF BLOOD CHLORIDE: CPT

## 2021-05-07 PROCEDURE — 84520 ASSAY OF UREA NITROGEN: CPT

## 2021-05-07 PROCEDURE — C1760 CLOSURE DEV, VASC: HCPCS

## 2021-05-07 PROCEDURE — 2580000003 HC RX 258

## 2021-05-07 PROCEDURE — 82565 ASSAY OF CREATININE: CPT

## 2021-05-07 PROCEDURE — 93459 L HRT ART/GRFT ANGIO: CPT | Performed by: INTERNAL MEDICINE

## 2021-05-07 PROCEDURE — 6360000002 HC RX W HCPCS

## 2021-05-07 PROCEDURE — 84132 ASSAY OF SERUM POTASSIUM: CPT

## 2021-05-07 RX ORDER — SODIUM CHLORIDE 0.9 % (FLUSH) 0.9 %
5-40 SYRINGE (ML) INJECTION PRN
Status: CANCELLED | OUTPATIENT
Start: 2021-05-07

## 2021-05-07 RX ORDER — DEXTROSE MONOHYDRATE 50 MG/ML
100 INJECTION, SOLUTION INTRAVENOUS PRN
Status: DISCONTINUED | OUTPATIENT
Start: 2021-05-07 | End: 2021-05-08 | Stop reason: HOSPADM

## 2021-05-07 RX ORDER — ISOSORBIDE MONONITRATE 60 MG/1
90 TABLET, EXTENDED RELEASE ORAL DAILY
Qty: 90 TABLET | Refills: 1 | Status: SHIPPED | OUTPATIENT
Start: 2021-05-07 | End: 2021-05-20 | Stop reason: SDUPTHER

## 2021-05-07 RX ORDER — SODIUM CHLORIDE 9 MG/ML
25 INJECTION, SOLUTION INTRAVENOUS PRN
Status: CANCELLED | OUTPATIENT
Start: 2021-05-07

## 2021-05-07 RX ORDER — DEXTROSE MONOHYDRATE 25 G/50ML
25 INJECTION, SOLUTION INTRAVENOUS ONCE
Status: COMPLETED | OUTPATIENT
Start: 2021-05-07 | End: 2021-05-07

## 2021-05-07 RX ORDER — RANOLAZINE 500 MG/1
500 TABLET, EXTENDED RELEASE ORAL 2 TIMES DAILY
Qty: 60 TABLET | Refills: 3 | Status: SHIPPED | OUTPATIENT
Start: 2021-05-07 | End: 2021-05-20 | Stop reason: SDUPTHER

## 2021-05-07 RX ORDER — DEXTROSE MONOHYDRATE 25 G/50ML
12.5 INJECTION, SOLUTION INTRAVENOUS PRN
Status: DISCONTINUED | OUTPATIENT
Start: 2021-05-07 | End: 2021-05-08 | Stop reason: HOSPADM

## 2021-05-07 RX ORDER — NICOTINE POLACRILEX 4 MG
15 LOZENGE BUCCAL PRN
Status: DISCONTINUED | OUTPATIENT
Start: 2021-05-07 | End: 2021-05-08 | Stop reason: HOSPADM

## 2021-05-07 RX ORDER — SODIUM CHLORIDE 9 MG/ML
INJECTION, SOLUTION INTRAVENOUS CONTINUOUS
Status: DISCONTINUED | OUTPATIENT
Start: 2021-05-07 | End: 2021-05-08 | Stop reason: HOSPADM

## 2021-05-07 RX ORDER — SODIUM CHLORIDE 0.9 % (FLUSH) 0.9 %
5-40 SYRINGE (ML) INJECTION EVERY 12 HOURS SCHEDULED
Status: CANCELLED | OUTPATIENT
Start: 2021-05-07

## 2021-05-07 RX ORDER — ACETAMINOPHEN 325 MG/1
650 TABLET ORAL EVERY 4 HOURS PRN
Status: CANCELLED | OUTPATIENT
Start: 2021-05-07

## 2021-05-07 RX ADMIN — SODIUM CHLORIDE: 9 INJECTION, SOLUTION INTRAVENOUS at 10:50

## 2021-05-07 RX ADMIN — DEXTROSE MONOHYDRATE 25 G: 25 INJECTION, SOLUTION INTRAVENOUS at 13:42

## 2021-05-07 RX ADMIN — INSULIN HUMAN 10 UNITS: 100 INJECTION, SOLUTION PARENTERAL at 13:41

## 2021-05-07 NOTE — H&P
Take 1 tablet by mouth 2 times daily 2/22/21   Honorio Geronimo MD   lisinopril (PRINIVIL;ZESTRIL) 40 MG tablet Take 1 tablet by mouth daily 2/22/21   Honorio Geronimo MD   hydroCHLOROthiazide (HYDRODIURIL) 25 MG tablet Take 1 tablet by mouth every morning 2/9/21   Honorio Geronimo MD   metFORMIN (GLUCOPHAGE) 1000 MG tablet Take 1 tablet by mouth 2 times daily (with meals) 5/18/20   Honorio Geronimo MD   blood glucose test strips (DURGA CONTOUR TEST) strip Tests blood sugar daily. 5/11/20   Honorio Geronimo MD   nitroGLYCERIN (NITROSTAT) 0.4 MG SL tablet Place 1 tablet under the tongue every 5 minutes as needed for Chest pain  Patient not taking: Reported on 4/26/2021 5/11/20   Honorio Geronimo MD   ONE TOUCH ULTRASOFT LANCETS MISC 1 each by Does not apply route daily 5/11/20   Honorio Geronimo MD   aspirin 81 MG EC tablet Take 81 mg by mouth daily    Historical Provider, MD       No Known Allergies      REVIEW OF SYSTEMS:     A detailed review of system was performed as already noted and is otherwise as above. PHYSICAL EXAM:     There were no vitals filed for this visit. Constitutional and General Appearance: alert, cooperative, no distress and appears stated age  [de-identified]: PERRL, no cervical lymphadenopathy. No masses palpable. Normal oral mucosa  Respiratory:  · Normal excursion and expansion without use of accessory muscles  · Resp Auscultation: Good respiratory effort. No for increased work of breathing.  On auscultation: clear to auscultation bilaterally  Cardiovascular:  · The apical impulse is not displaced  · Heart tones are crisp and normal. regular S1 and S2.  · Jugular venous pulsation Normal  · The carotid upstroke is normal in amplitude and contour without delay or bruit  · Peripheral pulses are symmetrical and full  Abdomen:  · No masses or tenderness  · Bowel sounds present  Extremities:  ·  No Cyanosis or Clubbing  ·  Lower extremity edema: No  · Skin: Warm and dry  Neurological:  · Alert and oriented. · Moves all extremities well  · No abnormalities of mood, affect, memory, mentation, or behavior are noted      Active Problems:    * No active hospital problems. *  Resolved Problems:    * No resolved hospital problems. *    Moderate size, severe intensity anterior and anteroseptal wall  hypokinesis  Assessment:  1. Stable angina with abnormal stress test: Moderate sized anterior and anteroseptal ischemia  2. CAD- History of CABG- cath 2016-Severe native vessel disease, Patent LIMA-LAD and SVG-OM, Occluded SVG-R  3. HTN  4. HLD  5. DM      Plan:  1. Proceed with planned cardiac cath. 2. Further orders to follow. The risks, benefits, and alternatives of the prcoedure were discussed in detail with the patient. The patient agrees to proceed with the procedure, verbalizes understanding and signed consent.      Pre Procedure Conscious Sedation Data:     ASA Class:                  [] I [] II [x] III [] IV     Mallampati Class:       [] I [] II [x] III [] Chitra Muhammad MD  Fellow, [address]

## 2021-05-07 NOTE — OP NOTE
graft that originates at the Aorta Right and attaches      to the Dist RCA. Patent with mild disease      Coronary Tree      Dominance: Right     Ventriculography Findings:  Not done due to elevated Creatinine. EBL: 10 ml      Conclusions:   1. Severe Native Multivessel CAD   2. Patent LIMA-LAD, SVG-OM and SVG-RPDA grafts with mild disease   3. No significant Change from last angiogram in 2016    Recommendation:   Routine Post Diagnostic Cath orders. optimize medical and antianginal therapy (Start Ranexa and increase Imdur)   Risk factor modification.        Electronically signed by Pete Espinoza MD on 5/7/2021 at 5:14 PM  Cardiovascular fellow  [id number]           Attending Physician    Goldie Lawson MD, [facility] [place]

## 2021-05-07 NOTE — PROGRESS NOTES
Received post procedure to [facility] to room 4. Assessment obtained. Restrictions reviewed with patient. Post procedure pathway initiated. Right femoral site soft , band aid dry and intact. Vasc band to right wrist. Site soft with no hematoma No hematoma noted. Family at side. Patient without complaints. Head of bed flat with right leg straight.

## 2021-05-07 NOTE — PROGRESS NOTES
Vasc band removed and pressure dressing and arm board applied. Femoral site soft with bandaid intact.  No C/O

## 2021-05-20 ENCOUNTER — OFFICE VISIT (OUTPATIENT)
Dept: CARDIOLOGY | Age: 76
End: 2021-05-20
Payer: MEDICARE

## 2021-05-20 VITALS
HEART RATE: 59 BPM | SYSTOLIC BLOOD PRESSURE: 144 MMHG | BODY MASS INDEX: 28.19 KG/M2 | DIASTOLIC BLOOD PRESSURE: 75 MMHG | WEIGHT: 186 LBS | HEIGHT: 68 IN

## 2021-05-20 DIAGNOSIS — I25.10 CORONARY ARTERY DISEASE INVOLVING NATIVE CORONARY ARTERY OF NATIVE HEART WITHOUT ANGINA PECTORIS: ICD-10-CM

## 2021-05-20 PROCEDURE — 4040F PNEUMOC VAC/ADMIN/RCVD: CPT | Performed by: INTERNAL MEDICINE

## 2021-05-20 PROCEDURE — 99214 OFFICE O/P EST MOD 30 MIN: CPT | Performed by: INTERNAL MEDICINE

## 2021-05-20 PROCEDURE — 3017F COLORECTAL CA SCREEN DOC REV: CPT | Performed by: INTERNAL MEDICINE

## 2021-05-20 PROCEDURE — 99213 OFFICE O/P EST LOW 20 MIN: CPT | Performed by: INTERNAL MEDICINE

## 2021-05-20 PROCEDURE — 1123F ACP DISCUSS/DSCN MKR DOCD: CPT | Performed by: INTERNAL MEDICINE

## 2021-05-20 PROCEDURE — 1036F TOBACCO NON-USER: CPT | Performed by: INTERNAL MEDICINE

## 2021-05-20 PROCEDURE — G8417 CALC BMI ABV UP PARAM F/U: HCPCS | Performed by: INTERNAL MEDICINE

## 2021-05-20 PROCEDURE — G8427 DOCREV CUR MEDS BY ELIG CLIN: HCPCS | Performed by: INTERNAL MEDICINE

## 2021-05-20 RX ORDER — RANOLAZINE 500 MG/1
500 TABLET, EXTENDED RELEASE ORAL 2 TIMES DAILY
Qty: 180 TABLET | Refills: 5 | Status: SHIPPED | OUTPATIENT
Start: 2021-05-20 | End: 2021-10-04 | Stop reason: ALTCHOICE

## 2021-05-20 RX ORDER — ISOSORBIDE MONONITRATE 60 MG/1
90 TABLET, EXTENDED RELEASE ORAL DAILY
Qty: 135 TABLET | Refills: 3 | Status: SHIPPED | OUTPATIENT
Start: 2021-05-20 | End: 2021-06-28 | Stop reason: SINTOL

## 2021-05-20 RX ORDER — RANOLAZINE 500 MG/1
500 TABLET, EXTENDED RELEASE ORAL 2 TIMES DAILY
Qty: 180 TABLET | Refills: 3 | Status: SHIPPED | OUTPATIENT
Start: 2021-05-20 | End: 2021-05-20 | Stop reason: SDUPTHER

## 2021-05-20 NOTE — PROGRESS NOTES
[address] [place] [address] [address] [address]  Dept: [phone]  Loc: [phone]    CC: follow up for CAD, CABG    HPI:  The patient is a 76y.o. year old, , male is in the office for a follow up visit post left heart catheterization for significantly abnormal stress test which showed patent 3 grafts with diffuse native vessel disease. Patient was started on Ranexa and Imdur. Symptoms have improved since then. Denies any further episodes of chest pain or discomfort  No shortness of breath unless he exerts heavy  Has started to play golf and denies any significant dyspnea. Past Medical History:   has a past medical history of Benign prostatic hypertrophy, BPPV (benign paroxysmal positional vertigo), Chest pain, Coronary heart disease, Diverticulosis, Dupuytren's contracture, Erectile dysfunction, Bowens catheter in place, Gout, History of blood transfusion, Hyperlipidemia, Hypertension, Positive cardiac stress test, Snores, and Type II or unspecified type diabetes mellitus without mention of complication, not stated as uncontrolled. Past Surgical History:   has a past surgical history that includes Prostate biopsy (Bilateral, 9/15/2010, 2007); Colonoscopy (5/27/2008); Prostate biopsy (Bilateral, 05/10/2001); Knee arthroscopy (Right, 3/16/2007); Elbow surgery (Right); Vasectomy; Coronary artery bypass graft; Cataract removal (Bilateral, Nov. and Dec 2014); Prostate Biopsy (Bilateral, 04/08/1999); Prostate Biopsy (Bilateral, 06/18/1998); Prostate Biopsy (Bilateral, 01/07/1998); Prostate Biopsy (Bilateral, 10/04/1996); Prostate surgery (11/02/2018); pr laser vaporization surgery prostate, complete (N/A, 11/2/2018); Colonoscopy (N/A, 10/28/2019); eye surgery; Cardiac catheterization (6/2005); Cardiac catheterization (3/16/16); and Cardiac catheterization (05/07/2021).     Home Medications:  Prior to Admission medications    Medication Sig Start Date End Date Taking? Authorizing Provider   isosorbide mononitrate (IMDUR) 60 MG extended release tablet Take 1.5 tablets by mouth daily 5/20/21  Yes Timothy Rodriguez MD   ranolazine (RANEXA) 500 MG extended release tablet Take 1 tablet by mouth 2 times daily 5/20/21  Yes Timothy Rodriguez MD   Cyanocobalamin (VITAMIN B12 PO) Take 1 tablet by mouth daily    Yes Historical Provider, MD   glimepiride (AMARYL) 4 MG tablet Take 1 tablet by mouth 2 times daily 2/22/21  Yes Santi Gray MD   atorvastatin (LIPITOR) 80 MG tablet Take 1 tablet by mouth daily 2/22/21  Yes Santi Gray MD   metoprolol tartrate (LOPRESSOR) 50 MG tablet Take 1 tablet by mouth 2 times daily 2/22/21  Yes Santi Gray MD   lisinopril (PRINIVIL;ZESTRIL) 40 MG tablet Take 1 tablet by mouth daily 2/22/21  Yes Santi Gray MD   hydroCHLOROthiazide (HYDRODIURIL) 25 MG tablet Take 1 tablet by mouth every morning 2/9/21  Yes Santi Gray MD   metFORMIN (GLUCOPHAGE) 1000 MG tablet Take 1 tablet by mouth 2 times daily (with meals) 5/18/20  Yes Santi Gray MD   blood glucose test strips (DURGA CONTOUR TEST) strip Tests blood sugar daily. 5/11/20  Yes Santi Gray MD   nitroGLYCERIN (NITROSTAT) 0.4 MG SL tablet Place 1 tablet under the tongue every 5 minutes as needed for Chest pain 5/11/20  Yes Santi Gray MD   ONE TOUCH ULTRASOFT LANCETS MISC 1 each by Does not apply route daily 5/11/20  Yes Santi Gray MD   aspirin 81 MG EC tablet Take 81 mg by mouth daily   Yes Historical Provider, MD       Allergies:  Patient has no known allergies. Social History:   reports that he quit smoking about 28 years ago. His smoking use included cigarettes. He started smoking about 53 years ago. He has a 13.50 pack-year smoking history. He has never used smokeless tobacco. He reports current alcohol use of about 1.0 standard drinks of alcohol per week.  He reports that he does not use drugs.    Review of Systems:  · Constitutional: there has been no unanticipated weight loss. There's been No change in energy level, No change in activity level. · Eyes: No visual changes or diplopia. No scleral icterus. · ENT: No Headaches, hearing loss or vertigo. No mouth sores or sore throat. · Cardiovascular: As above. · Respiratory: as hpi  · Gastrointestinal: No abdominal pain, appetite loss, blood in stools. No change in bowel or bladder habits. · Genitourinary: No dysuria, trouble voiding, or hematuria. · Musculoskeletal:  No gait disturbance, No weakness or joint complaints. · Integumentary: No rash or pruritis. · Psychiatric: No anxiety, or depression. · Hematologic/Lymphatic: No abnormal bruising or bleeding, blood clots or swollen lymph nodes. · Allergic/Immunologic: No nasal congestion or hives. Physical Exam:  BP (!) 153/71   Pulse 59   Ht 5' 8\" (1.727 m)   Wt 186 lb (84.4 kg)   BMI 28.28 kg/m²     Constitutional and General Appearance: alert, cooperative, no distress and appears stated age  [de-identified]: PERRL, no cervical lymphadenopathy. No masses palpable. Normal oral mucosa  Respiratory:  · Normal excursion and expansion without use of accessory muscles  · Resp Auscultation: Good respiratory effort. No for increased work of breathing. On auscultation: clear to auscultation bilaterally  Cardiovascular:  · The apical impulse is not displaced  · Heart tones are crisp and normal. regular S1 and S2.  · Jugular venous pulsation Normal  · The carotid upstroke is normal in amplitude and contour without delay or bruit  · Peripheral pulses are symmetrical and full   Abdomen:   · No masses or tenderness  · Bowel sounds present  Extremities:  ·  No Cyanosis or Clubbing  ·  Lower extremity edema: No  ·  Skin: Warm and dry    Cardiac Data:  EKG: Sinus  Rhythm, WITHIN NORMAL LIMITS    Cath 5/7/2021   1. Severe Native Multivessel CAD   2.  Patent LIMA-LAD, SVG-OM and SVG-RPDA grafts with mild

## 2021-06-17 ENCOUNTER — HOSPITAL ENCOUNTER (OUTPATIENT)
Dept: LAB | Age: 76
Discharge: HOME | End: 2021-06-17
Payer: MEDICARE

## 2021-06-17 DIAGNOSIS — E11.9 TYPE 2 DIABETES MELLITUS WITHOUT COMPLICATION, WITHOUT LONG-TERM CURRENT USE OF INSULIN (HCC): ICD-10-CM

## 2021-06-17 DIAGNOSIS — E53.8 VITAMIN B12 DEFICIENCY: ICD-10-CM

## 2021-06-17 DIAGNOSIS — D64.9 ANEMIA, UNSPECIFIED TYPE: ICD-10-CM

## 2021-06-17 LAB
ESTIMATED AVERAGE GLUCOSE: 163 MG/DL
HBA1C MFR BLD: 7.3 % (ref 4–6)
HEMOGLOBIN: 11.6 G/DL (ref 13–17)
IRON SATURATION: 37 % (ref 20–55)
IRON: 92 UG/DL (ref 59–158)
TOTAL IRON BINDING CAPACITY: 252 UG/DL (ref 250–450)
UNSATURATED IRON BINDING CAPACITY: 160 UG/DL (ref 112–347)
VITAMIN B-12: 725 PG/ML (ref 232–1245)

## 2021-06-17 PROCEDURE — 82607 VITAMIN B-12: CPT

## 2021-06-17 PROCEDURE — 85018 HEMOGLOBIN: CPT

## 2021-06-17 PROCEDURE — 83540 ASSAY OF IRON: CPT

## 2021-06-17 PROCEDURE — 83036 HEMOGLOBIN GLYCOSYLATED A1C: CPT

## 2021-06-17 PROCEDURE — 36415 COLL VENOUS BLD VENIPUNCTURE: CPT

## 2021-06-17 PROCEDURE — 83550 IRON BINDING TEST: CPT

## 2021-06-28 ENCOUNTER — OFFICE VISIT (OUTPATIENT)
Dept: INTERNAL MEDICINE | Age: 76
End: 2021-06-28
Payer: MEDICARE

## 2021-06-28 ENCOUNTER — TELEPHONE (OUTPATIENT)
Dept: CARDIOLOGY | Age: 76
End: 2021-06-28

## 2021-06-28 VITALS
RESPIRATION RATE: 16 BRPM | WEIGHT: 182 LBS | HEIGHT: 68 IN | DIASTOLIC BLOOD PRESSURE: 70 MMHG | BODY MASS INDEX: 27.58 KG/M2 | HEART RATE: 71 BPM | SYSTOLIC BLOOD PRESSURE: 118 MMHG

## 2021-06-28 DIAGNOSIS — Z00.00 ROUTINE GENERAL MEDICAL EXAMINATION AT A HEALTH CARE FACILITY: Primary | ICD-10-CM

## 2021-06-28 DIAGNOSIS — I10 ESSENTIAL HYPERTENSION: ICD-10-CM

## 2021-06-28 DIAGNOSIS — E11.9 TYPE 2 DIABETES MELLITUS WITHOUT COMPLICATION, WITHOUT LONG-TERM CURRENT USE OF INSULIN (HCC): ICD-10-CM

## 2021-06-28 DIAGNOSIS — Z00.00 MEDICARE ANNUAL WELLNESS VISIT, SUBSEQUENT: ICD-10-CM

## 2021-06-28 DIAGNOSIS — E78.49 OTHER HYPERLIPIDEMIA: ICD-10-CM

## 2021-06-28 DIAGNOSIS — I25.119 CORONARY ARTERY DISEASE INVOLVING NATIVE HEART WITH ANGINA PECTORIS, UNSPECIFIED VESSEL OR LESION TYPE (HCC): ICD-10-CM

## 2021-06-28 DIAGNOSIS — D64.9 ANEMIA, UNSPECIFIED TYPE: ICD-10-CM

## 2021-06-28 PROCEDURE — G0439 PPPS, SUBSEQ VISIT: HCPCS | Performed by: INTERNAL MEDICINE

## 2021-06-28 PROCEDURE — 4040F PNEUMOC VAC/ADMIN/RCVD: CPT | Performed by: INTERNAL MEDICINE

## 2021-06-28 PROCEDURE — 3051F HG A1C>EQUAL 7.0%<8.0%: CPT | Performed by: INTERNAL MEDICINE

## 2021-06-28 PROCEDURE — G8417 CALC BMI ABV UP PARAM F/U: HCPCS | Performed by: INTERNAL MEDICINE

## 2021-06-28 PROCEDURE — 1036F TOBACCO NON-USER: CPT | Performed by: INTERNAL MEDICINE

## 2021-06-28 PROCEDURE — 99214 OFFICE O/P EST MOD 30 MIN: CPT | Performed by: INTERNAL MEDICINE

## 2021-06-28 PROCEDURE — G8427 DOCREV CUR MEDS BY ELIG CLIN: HCPCS | Performed by: INTERNAL MEDICINE

## 2021-06-28 PROCEDURE — 2022F DILAT RTA XM EVC RTNOPTHY: CPT | Performed by: INTERNAL MEDICINE

## 2021-06-28 PROCEDURE — 1123F ACP DISCUSS/DSCN MKR DOCD: CPT | Performed by: INTERNAL MEDICINE

## 2021-06-28 PROCEDURE — G0463 HOSPITAL OUTPT CLINIC VISIT: HCPCS | Performed by: INTERNAL MEDICINE

## 2021-06-28 PROCEDURE — 99397 PER PM REEVAL EST PAT 65+ YR: CPT | Performed by: INTERNAL MEDICINE

## 2021-06-28 PROCEDURE — 3017F COLORECTAL CA SCREEN DOC REV: CPT | Performed by: INTERNAL MEDICINE

## 2021-06-28 RX ORDER — HYDROCHLOROTHIAZIDE 25 MG/1
25 TABLET ORAL EVERY MORNING
Qty: 90 TABLET | Refills: 3 | Status: SHIPPED | OUTPATIENT
Start: 2021-06-28 | End: 2022-07-07 | Stop reason: SDUPTHER

## 2021-06-28 SDOH — ECONOMIC STABILITY: FOOD INSECURITY: WITHIN THE PAST 12 MONTHS, THE FOOD YOU BOUGHT JUST DIDN'T LAST AND YOU DIDN'T HAVE MONEY TO GET MORE.: NEVER TRUE

## 2021-06-28 SDOH — ECONOMIC STABILITY: FOOD INSECURITY: WITHIN THE PAST 12 MONTHS, YOU WORRIED THAT YOUR FOOD WOULD RUN OUT BEFORE YOU GOT MONEY TO BUY MORE.: NEVER TRUE

## 2021-06-28 ASSESSMENT — LIFESTYLE VARIABLES
HOW OFTEN DO YOU HAVE A DRINK CONTAINING ALCOHOL: 3
HOW OFTEN DO YOU HAVE SIX OR MORE DRINKS ON ONE OCCASION: 0
HOW OFTEN DURING THE LAST YEAR HAVE YOU HAD A FEELING OF GUILT OR REMORSE AFTER DRINKING: 0
HOW OFTEN DURING THE LAST YEAR HAVE YOU BEEN UNABLE TO REMEMBER WHAT HAPPENED THE NIGHT BEFORE BECAUSE YOU HAD BEEN DRINKING: 0
HOW OFTEN DURING THE LAST YEAR HAVE YOU FOUND THAT YOU WERE NOT ABLE TO STOP DRINKING ONCE YOU HAD STARTED: 0
HOW MANY STANDARD DRINKS CONTAINING ALCOHOL DO YOU HAVE ON A TYPICAL DAY: 0
AUDIT-C TOTAL SCORE: 3
HOW OFTEN DURING THE LAST YEAR HAVE YOU NEEDED AN ALCOHOLIC DRINK FIRST THING IN THE MORNING TO GET YOURSELF GOING AFTER A NIGHT OF HEAVY DRINKING: 0
AUDIT TOTAL SCORE: 3
HOW OFTEN DURING THE LAST YEAR HAVE YOU FAILED TO DO WHAT WAS NORMALLY EXPECTED FROM YOU BECAUSE OF DRINKING: 0
HAS A RELATIVE, FRIEND, DOCTOR, OR ANOTHER HEALTH PROFESSIONAL EXPRESSED CONCERN ABOUT YOUR DRINKING OR SUGGESTED YOU CUT DOWN: 0
HAVE YOU OR SOMEONE ELSE BEEN INJURED AS A RESULT OF YOUR DRINKING: 0

## 2021-06-28 ASSESSMENT — ENCOUNTER SYMPTOMS
VOMITING: 0
ABDOMINAL PAIN: 0
BLOOD IN STOOL: 0
DIARRHEA: 0
BACK PAIN: 0
SHORTNESS OF BREATH: 0
CONSTIPATION: 0
COUGH: 0
NAUSEA: 0
EYE PAIN: 0

## 2021-06-28 ASSESSMENT — PATIENT HEALTH QUESTIONNAIRE - PHQ9
SUM OF ALL RESPONSES TO PHQ9 QUESTIONS 1 & 2: 0
SUM OF ALL RESPONSES TO PHQ QUESTIONS 1-9: 0
1. LITTLE INTEREST OR PLEASURE IN DOING THINGS: 0
SUM OF ALL RESPONSES TO PHQ QUESTIONS 1-9: 0
2. FEELING DOWN, DEPRESSED OR HOPELESS: 0
SUM OF ALL RESPONSES TO PHQ QUESTIONS 1-9: 0

## 2021-06-28 ASSESSMENT — SOCIAL DETERMINANTS OF HEALTH (SDOH): HOW HARD IS IT FOR YOU TO PAY FOR THE VERY BASICS LIKE FOOD, HOUSING, MEDICAL CARE, AND HEATING?: NOT HARD AT ALL

## 2021-06-28 NOTE — PROGRESS NOTES
[place] [address]  Dept: [phone]  Dept Fax: [phone]  Loc: [phone]     Aby Neumann is a 76 y.o. male who presents today for his medical conditions/complaintsas noted below. Aby Neumann is c/o of   Chief Complaint   Patient presents with    Medicare AW     3 month    Hypertension    Hyperlipidemia    Diabetes    Coronary Artery Disease         HPI:     Hypertension  This is a chronic (essential htn) problem. The current episode started more than 1 year ago. The problem has been waxing and waning since onset. The problem is controlled. Pertinent negatives include no chest pain, headaches, neck pain, palpitations or shortness of breath. Hyperlipidemia  This is a chronic problem. The current episode started more than 1 year ago. The problem is controlled. Recent lipid tests were reviewed and are variable. Pertinent negatives include no chest pain or shortness of breath. Diabetes  He presents for his follow-up diabetic visit. He has type 2 diabetes mellitus. His disease course has been fluctuating. Pertinent negatives for hypoglycemia include no confusion, dizziness, headaches, nervousness/anxiousness or pallor. Pertinent negatives for diabetes include no chest pain, no polydipsia, no polyuria and no weakness. Coronary Artery Disease  Presents for follow-up visit. Pertinent negatives include no chest pain, chest pressure, dizziness, leg swelling, palpitations or shortness of breath. Risk factors include hyperlipidemia. The symptoms have been stable. Compliance with diet is good. Compliance with exercise is good. Compliance with medications is good. Other  This is a recurrent (5-General medical exam) problem. The current episode started today. The problem occurs intermittently. The problem has been waxing and waning.  Pertinent negatives include no abdominal pain, arthralgias, chest pain, chills, coughing, fever, headaches, nausea, neck pain, numbness, rash, vomiting or weakness. Hemoglobin A1C (%)   Date Value   06/17/2021 7.3 (H)   03/16/2021 7.2 (H)   12/09/2020 7.1 (H)            Microalb/Crt. Ratio (mcg/mg creat)   Date Value   12/09/2020 91 (H)     LDL Cholesterol (mg/dL)   Date Value   09/11/2020 51   09/12/2019 43   07/17/2018 37         AST (U/L)   Date Value   09/11/2020 24     ALT (U/L)   Date Value   09/11/2020 31     BUN (mg/dL)   Date Value   05/07/2021 40 (H)     BP Readings from Last 3 Encounters:   06/28/21 118/70   05/20/21 (!) 144/75   05/07/21 (!) 156/82              Past Medical History:   Diagnosis Date    Benign prostatic hypertrophy     with elevated PSA.  BPPV (benign paroxysmal positional vertigo) 2/2008    Chest pain 05/2021    Coronary heart disease     Status post CABG.  Diverticulosis     Dupuytren's contracture     Mid finger, right hand.  Erectile dysfunction     Bowens catheter in place 11/02/2018    has had one in place for 6weeks, this one in place x 2 weeks    Gout     Quiescent.  History of blood transfusion     possible with open heart surgery 26years ago    Hyperlipidemia     Hypertension     Positive cardiac stress test 05/06/2021    Snores     Type II or unspecified type diabetes mellitus without mention of complication, not stated as uncontrolled       Past Surgical History:   Procedure Laterality Date    CARDIAC CATHETERIZATION  6/2005    Triple vessel CAD with patent LIMA to LAD, patent saphenous vein graft to first obtuse marginal, patent saphenous vein graft to posterior descending artery, occluded jump graft from posterior descending artery to posterior left ventricular branch.  CARDIAC CATHETERIZATION  3/16/16    CARDIAC CATHETERIZATION  05/07/2021    CATARACT REMOVAL Bilateral Nov. and Dec 2014    COLONOSCOPY  5/27/2008    Distal ileoscopy.  Scattered diverticulosis and extensive diverticular disease of sigmoid colon and internal hemorrhoids.  COLONOSCOPY N/A 10/28/2019    COLONOSCOPY performed [facility] Herman Castillo MD [facility]  severe diverticulosis    CORONARY ARTERY BYPASS GRAFT      ELBOW SURGERY Right     Surgical repair.  EYE SURGERY      KNEE ARTHROSCOPY Right 3/16/2007    Partial medial meniscectomy, partial medial and lateral synovectomy, light chondroplasty.  DC LASER VAPORIZATION SURGERY PROSTATE, COMPLETE N/A 2018    CYSTOSCOPY, TRANSURETHRAL RESECTION PROSTATE - [facility] # [id number] HAILEE) performed by Aden Kearns MD at [address] [place] [address] Bilateral 9/15/2010,     [facility] Dr. Jony Prabhakar Bilateral 05/10/2001    Benign-Dr. Porter Dickens    PROSTATE BIOPSY Bilateral 1999    Benign-Dr. Live Thornton    PROSTATE BIOPSY Bilateral 1998    Benign-Dr. Ibeth Doshi    PROSTATE BIOPSY Bilateral 1998    Benign-Dr. Cristina Come    PROSTATE BIOPSY Bilateral 10/04/1996    Benign-Dr. Shyann Mari    PROSTATE SURGERY  2018    CYSTOSCOPY, TRANSURETHRAL RESECTION PROSTATE - [facility]     VASECTOMY         Family History   Problem Relation Age of Onset    Diabetes Mother     Kidney Disease Mother         Renal failure    Coronary Art Dis Mother     Hypertension Mother     Lung Cancer Father     Coronary Art Dis Father     Hypertension Brother     Other Brother         Hypernephroma          Social History     Tobacco Use    Smoking status: Former Smoker     Packs/day: 0.50     Years: 27.00     Pack years: 13.50     Types: Cigarettes     Start date: 1968     Quit date: 10/11/1992     Years since quittin.7    Smokeless tobacco: Never Used   Substance Use Topics    Alcohol use:  Yes     Alcohol/week: 1.0 standard drinks     Types: 1 Standard drinks or equivalent per week     Comment: 1 time per week         Current Outpatient Medications   Medication Sig Dispense Refill    hydroCHLOROthiazide (HYDRODIURIL) 25 MG tablet Take 1 tablet by mouth every morning 90 tablet 3    metFORMIN (GLUCOPHAGE) 1000 MG tablet Take 0.5 tablets by mouth 2 times daily (with meals) 180 tablet 3    ranolazine (RANEXA) 500 MG extended release tablet Take 1 tablet by mouth 2 times daily 180 tablet 5    Cyanocobalamin (VITAMIN B12 PO) Take 1 tablet by mouth daily       glimepiride (AMARYL) 4 MG tablet Take 1 tablet by mouth 2 times daily 180 tablet 3    atorvastatin (LIPITOR) 80 MG tablet Take 1 tablet by mouth daily 90 tablet 3    metoprolol tartrate (LOPRESSOR) 50 MG tablet Take 1 tablet by mouth 2 times daily 180 tablet 3    lisinopril (PRINIVIL;ZESTRIL) 40 MG tablet Take 1 tablet by mouth daily 90 tablet 3    aspirin 81 MG EC tablet Take 81 mg by mouth daily      blood glucose test strips (DURGA CONTOUR TEST) strip Tests blood sugar daily. 300 each 3    nitroGLYCERIN (NITROSTAT) 0.4 MG SL tablet Place 1 tablet under the tongue every 5 minutes as needed for Chest pain 25 tablet 3    ONE TOUCH ULTRASOFT LANCETS MISC 1 each by Does not apply route daily 100 each 3     No current facility-administered medications for this visit.      No Known Allergies    Health Maintenance   Topic Date Due    Hepatitis C screen  Never done    DTaP/Tdap/Td vaccine (1 - Tdap) Never done    Shingles Vaccine (2 of 3) 06/23/2008    Annual Wellness Visit (AWV)  Never done    Lipid screen  09/11/2021    PSA counseling  12/09/2021    Potassium monitoring  05/07/2022    Creatinine monitoring  05/07/2022    A1C test (Diabetic or Prediabetic)  06/17/2022    Diabetic foot exam  06/28/2022    Diabetic retinal exam  11/09/2022    Colon cancer screen colonoscopy  10/28/2029    Flu vaccine  Completed    Pneumococcal 65+ years Vaccine  Completed    COVID-19 Vaccine  Completed    AAA screen  Completed    Hepatitis A vaccine  Aged Out    Hib vaccine  Aged Out    Meningococcal (ACWY) vaccine  Aged Out       Subjective:      Review of Systems   Constitutional: Negative for chills (Site: Left Upper Arm, Position: Sitting, Cuff Size: Medium Adult)   Pulse 71   Resp 16   Ht 5' 8\" (1.727 m)   Wt 182 lb (82.6 kg)   BMI 27.67 kg/m²     Assessment:       Diagnosis Orders   1. Routine general medical examination at a health care facility     2. Type 2 diabetes mellitus without complication, without long-term current use of insulin (formerly [facility])  Hemoglobin A1C     DIABETES FOOT EXAM    metFORMIN (GLUCOPHAGE) 1000 MG tablet   3. Essential hypertension  Comprehensive Metabolic Panel, Fasting    hydroCHLOROthiazide (HYDRODIURIL) 25 MG tablet   4. Coronary artery disease involving native heart with angina pectoris, unspecified vessel or lesion type ([address]     5. Other hyperlipidemia  Lipid Panel   6. Medicare annual wellness visit, subsequent     7. Anemia, unspecified type  Hemoglobin      Reviewed multiple test results for this appointment. 6/17/2021 okay hemoglobin A1c at 7.3.    6/17/2021 normal iron 92.    6/17/2021 okay hemoglobin 11.6 (was 10.7). 5/7/2021 increased creatinine at 1.78 (was 1.32). Note previously back on 5/7/2021 creatinine was 2.14). Patient told to continue Ranexa and Lipitor. He will discuss with audiology about continuing the Imdur. He was also told to continue the Amaryl but we will cut the Metformin dose in half because of the increased creatinine. Therefore he will take 500 mg twice daily instead of 1000 mg twice daily of the Metformin. Plan:       Return in about 3 months (around 9/28/2021) for Hypertension, Hyperlipidemia, Diabetes.     Orders Placed This Encounter   Procedures    Comprehensive Metabolic Panel, Fasting     Standing Status:   Future     Standing Expiration Date:   6/28/2022    Hemoglobin A1C     Standing Status:   Future     Standing Expiration Date:   6/28/2022    Hemoglobin     Standing Status:   Future     Standing Expiration Date:   6/28/2022    Lipid Panel     Standing Status:   Future     Standing Expiration Date:   6/28/2022 Order Specific Question:   Is Patient Fasting?/# of Hours     Answer:   yes    HM DIABETES FOOT EXAM     Orders Placed This Encounter   Medications    hydroCHLOROthiazide (HYDRODIURIL) 25 MG tablet     Sig: Take 1 tablet by mouth every morning     Dispense:  90 tablet     Refill:  3    metFORMIN (GLUCOPHAGE) 1000 MG tablet     Sig: Take 0.5 tablets by mouth 2 times daily (with meals)     Dispense:  180 tablet     Refill:  3       Patientgiven educational materials - see patient instructions. Discussed use, benefit,and side effects of prescribed medications. All patient questions answered. Ptvoiced understanding. Reviewed health maintenance. Instructed to continue currentmedications, diet and exercise. Patient agreed with treatment plan. Follow up asdirected.      Electronically signed by Geoffrey Baez MD on 6/28/2021 at 1:41 PM

## 2021-06-28 NOTE — PROGRESS NOTES
2007    [facility] Dr. Shruti Araiza PROSTATE BIOPSY Bilateral 05/10/2001    Benign-Dr. Juju Layton    PROSTATE BIOPSY Bilateral 04/08/1999    Benign-Dr. Kyle Quevedo    PROSTATE BIOPSY Bilateral 06/18/1998    Benign-Dr. Patsy Pastor    PROSTATE BIOPSY Bilateral 01/07/1998    Benign-Dr. Patsy Pastor    PROSTATE BIOPSY Bilateral 10/04/1996    Benign-Dr. Eun Reynolds    PROSTATE SURGERY  11/02/2018    CYSTOSCOPY, TRANSURETHRAL RESECTION PROSTATE - [facility]     VASECTOMY           Family History   Problem Relation Age of Onset    Diabetes Mother     Kidney Disease Mother         Renal failure    Coronary Art Dis Mother     Hypertension Mother     Lung Cancer Father     Coronary Art Dis Father     Hypertension Brother     Other Brother         Hypernephroma       CareTeam (Including outside providers/suppliers regularly involved in providing care):   Patient Care Team:  Afua Cooley MD as PCP - General (Internal Medicine)  Afua Cooley MD as PCP - [facility] [place] [facility]  Alvaro Breaux MD as Consulting Physician (Urology)  Alvina Rosen MD as Consulting Physician (Cardiology)    Wt Readings from Last 3 Encounters:   06/28/21 182 lb (82.6 kg)   05/20/21 186 lb (84.4 kg)   05/07/21 185 lb (83.9 kg)     Vitals:    06/28/21 1321   BP: 118/70   Site: Left Upper Arm   Position: Sitting   Cuff Size: Medium Adult   Pulse: 71   Resp: 16   Weight: 182 lb (82.6 kg)   Height: 5' 8\" (1.727 m)     Body mass index is 27.67 kg/m². Based upon direct observation of the patient, evaluation of cognition reveals none. Patient's complete Health Risk Assessment and screening values have been reviewed and are found in Flowsheets. The following problems were reviewed today and where indicated follow up appointments were made and/or referrals ordered.     Positive Risk Factor Screenings with Interventions:            General Health and ACP:  General  In general, how would you say your health is?: Good  In the past 7 days, have you experienced any of the following? New or Increased Pain, New or Increased Fatigue, Loneliness, Social Isolation, Stress or Anger?: None of These  Do you get the social and emotional support that you need?: Yes  Do you have a Living Will?: (!) No  Advance Directives     Power of [address] Will ACP-Advance Directive ACP-Power of     Not on File Not on File Not on File Not on File      General Health Risk Interventions:  · No Living Will: declines living will at this time  · .         Personalized Preventive Plan   Current Health Maintenance Status  Immunization History   Administered Date(s) Administered    COVID-19, Moderna, PF, 100mcg/0.5mL 01/28/2021, 02/25/2021    Influenza Vaccine, unspecified formulation 10/16/2015, 10/31/2016    Influenza Virus Vaccine 12/02/1993, 11/12/2009, 10/09/2014    Influenza, High Dose (Fluzone 65 yrs and older) 10/03/2017, 11/19/2018    Influenza, Quadv, adjuvanted, 65 yrs +, IM, PF (Fluad) 09/21/2020    Pneumococcal Conjugate 13-valent ([id number]) 12/22/2014    Pneumococcal Polysaccharide ([id number]) 12/23/2016    Zoster Live (Zostavax) 04/28/2008        Health Maintenance   Topic Date Due    Hepatitis C screen  Never done    DTaP/Tdap/Td vaccine (1 - Tdap) Never done    Shingles Vaccine (2 of 3) 06/23/2008    Annual Wellness Visit (AWV)  Never done    Lipid screen  09/11/2021    PSA counseling  12/09/2021    Potassium monitoring  05/07/2022    Creatinine monitoring  05/07/2022    A1C test (Diabetic or Prediabetic)  06/17/2022    Diabetic foot exam  06/28/2022    Diabetic retinal exam  11/09/2022    Colon cancer screen colonoscopy  10/28/2029    Flu vaccine  Completed    Pneumococcal 65+ years Vaccine  Completed    COVID-19 Vaccine  Completed    AAA screen  Completed    Hepatitis A vaccine  Aged Out    Hib vaccine  Aged Out    Meningococcal (ACWY) vaccine  Aged Out     Recommendations for Ravti Due: see orders and patient instructions/AVS.  . Recommended screening schedule for the next 5-10 years is provided to the patient in written form: see Patient Instructions/AVS.    I, Karolina Mora LPN, [id number]/[id number], performed the documented evaluation under the direct supervision of the attending physician. I, Dr. Robert Agosto, directly supervised the performance of this Medicare annual wellness visit.

## 2021-06-28 NOTE — TELEPHONE ENCOUNTER
Pt stopped to let the office know he has been on the isosorbide since he was in the hospital. Pt reports he is having every side effect possible, dizziness, diarrhea,upset stomach. Pt wondering if he should stop the medication? Will he get a different med?   Please call to advise [phone]    Last Appt:  5/20/2021  Next Appt:   11/18/2021  Med verified in Epic    Pt uses [facility]-and local pharm would be [facility]

## 2021-06-29 NOTE — TELEPHONE ENCOUNTER
Patient notified to stop Imdur but to continue Ranexa.  Patient verbalizes understatement and will continue ranexa

## 2021-09-28 ENCOUNTER — HOSPITAL ENCOUNTER (OUTPATIENT)
Dept: LAB | Age: 76
Discharge: HOME | End: 2021-09-28
Payer: MEDICARE

## 2021-09-28 DIAGNOSIS — D64.9 ANEMIA, UNSPECIFIED TYPE: ICD-10-CM

## 2021-09-28 DIAGNOSIS — E11.9 TYPE 2 DIABETES MELLITUS WITHOUT COMPLICATION, WITHOUT LONG-TERM CURRENT USE OF INSULIN (HCC): ICD-10-CM

## 2021-09-28 DIAGNOSIS — I10 ESSENTIAL HYPERTENSION: ICD-10-CM

## 2021-09-28 DIAGNOSIS — E78.49 OTHER HYPERLIPIDEMIA: ICD-10-CM

## 2021-09-28 LAB
ALBUMIN SERPL-MCNC: 4.2 G/DL (ref 3.5–5.2)
ALBUMIN/GLOBULIN RATIO: 1.6 (ref 1–2.5)
ALP BLD-CCNC: 59 U/L (ref 40–129)
ALT SERPL-CCNC: 40 U/L (ref 5–41)
ANION GAP SERPL CALCULATED.3IONS-SCNC: 13 MMOL/L (ref 9–17)
AST SERPL-CCNC: 33 U/L
BILIRUB SERPL-MCNC: 0.91 MG/DL (ref 0.3–1.2)
BUN BLDV-MCNC: 46 MG/DL (ref 8–23)
BUN/CREAT BLD: 21 (ref 9–20)
CALCIUM SERPL-MCNC: 8.6 MG/DL (ref 8.6–10.4)
CHLORIDE BLD-SCNC: 104 MMOL/L (ref 98–107)
CHOLESTEROL/HDL RATIO: 3
CHOLESTEROL: 92 MG/DL
CO2: 23 MMOL/L (ref 20–31)
CREAT SERPL-MCNC: 2.21 MG/DL (ref 0.7–1.2)
ESTIMATED AVERAGE GLUCOSE: 180 MG/DL
GFR AFRICAN AMERICAN: 35 ML/MIN
GFR NON-AFRICAN AMERICAN: 29 ML/MIN
GFR SERPL CREATININE-BSD FRML MDRD: ABNORMAL ML/MIN/{1.73_M2}
GFR SERPL CREATININE-BSD FRML MDRD: ABNORMAL ML/MIN/{1.73_M2}
GLUCOSE FASTING: 117 MG/DL (ref 70–99)
HBA1C MFR BLD: 7.9 % (ref 4–6)
HDLC SERPL-MCNC: 31 MG/DL
HEMOGLOBIN: 12.1 G/DL (ref 13–17)
LDL CHOLESTEROL: 40 MG/DL (ref 0–130)
POTASSIUM SERPL-SCNC: 4.1 MMOL/L (ref 3.7–5.3)
SODIUM BLD-SCNC: 140 MMOL/L (ref 135–144)
TOTAL PROTEIN: 6.8 G/DL (ref 6.4–8.3)
TRIGL SERPL-MCNC: 106 MG/DL
VLDLC SERPL CALC-MCNC: ABNORMAL MG/DL (ref 1–30)

## 2021-09-28 PROCEDURE — 36415 COLL VENOUS BLD VENIPUNCTURE: CPT

## 2021-09-28 PROCEDURE — 80053 COMPREHEN METABOLIC PANEL: CPT

## 2021-09-28 PROCEDURE — 85018 HEMOGLOBIN: CPT

## 2021-09-28 PROCEDURE — 83036 HEMOGLOBIN GLYCOSYLATED A1C: CPT

## 2021-09-28 PROCEDURE — 80061 LIPID PANEL: CPT

## 2021-09-30 DIAGNOSIS — E78.49 OTHER HYPERLIPIDEMIA: ICD-10-CM

## 2021-09-30 DIAGNOSIS — E11.9 TYPE 2 DIABETES MELLITUS WITHOUT COMPLICATION, WITHOUT LONG-TERM CURRENT USE OF INSULIN (HCC): Primary | ICD-10-CM

## 2021-09-30 DIAGNOSIS — I10 ESSENTIAL HYPERTENSION: ICD-10-CM

## 2021-10-04 ENCOUNTER — OFFICE VISIT (OUTPATIENT)
Dept: INTERNAL MEDICINE | Age: 76
End: 2021-10-04
Payer: MEDICARE

## 2021-10-04 ENCOUNTER — HOSPITAL ENCOUNTER (OUTPATIENT)
Dept: LAB | Age: 76
Discharge: HOME | End: 2021-10-04
Payer: MEDICARE

## 2021-10-04 VITALS
RESPIRATION RATE: 18 BRPM | DIASTOLIC BLOOD PRESSURE: 72 MMHG | HEIGHT: 68 IN | HEART RATE: 60 BPM | BODY MASS INDEX: 28.04 KG/M2 | TEMPERATURE: 97 F | SYSTOLIC BLOOD PRESSURE: 136 MMHG | WEIGHT: 185 LBS

## 2021-10-04 DIAGNOSIS — D64.9 ANEMIA, UNSPECIFIED TYPE: ICD-10-CM

## 2021-10-04 DIAGNOSIS — E78.49 OTHER HYPERLIPIDEMIA: ICD-10-CM

## 2021-10-04 DIAGNOSIS — I10 ESSENTIAL HYPERTENSION: ICD-10-CM

## 2021-10-04 DIAGNOSIS — I10 PRIMARY HYPERTENSION: ICD-10-CM

## 2021-10-04 DIAGNOSIS — E11.9 TYPE 2 DIABETES MELLITUS WITHOUT COMPLICATION, WITHOUT LONG-TERM CURRENT USE OF INSULIN (HCC): ICD-10-CM

## 2021-10-04 DIAGNOSIS — I10 PRIMARY HYPERTENSION: Primary | ICD-10-CM

## 2021-10-04 LAB
ANION GAP SERPL CALCULATED.3IONS-SCNC: 11 MMOL/L (ref 9–17)
BUN BLDV-MCNC: 40 MG/DL (ref 8–23)
BUN/CREAT BLD: 20 (ref 9–20)
CALCIUM SERPL-MCNC: 9.2 MG/DL (ref 8.6–10.4)
CHLORIDE BLD-SCNC: 109 MMOL/L (ref 98–107)
CO2: 22 MMOL/L (ref 20–31)
CREAT SERPL-MCNC: 2.05 MG/DL (ref 0.7–1.2)
GFR AFRICAN AMERICAN: 38 ML/MIN
GFR NON-AFRICAN AMERICAN: 32 ML/MIN
GFR SERPL CREATININE-BSD FRML MDRD: ABNORMAL ML/MIN/{1.73_M2}
GFR SERPL CREATININE-BSD FRML MDRD: ABNORMAL ML/MIN/{1.73_M2}
GLUCOSE BLD-MCNC: 221 MG/DL (ref 70–99)
POTASSIUM SERPL-SCNC: 5.6 MMOL/L (ref 3.7–5.3)
SODIUM BLD-SCNC: 142 MMOL/L (ref 135–144)
TOTAL CK: 82 U/L (ref 39–308)

## 2021-10-04 PROCEDURE — G8427 DOCREV CUR MEDS BY ELIG CLIN: HCPCS | Performed by: INTERNAL MEDICINE

## 2021-10-04 PROCEDURE — 80048 BASIC METABOLIC PNL TOTAL CA: CPT

## 2021-10-04 PROCEDURE — 3051F HG A1C>EQUAL 7.0%<8.0%: CPT | Performed by: INTERNAL MEDICINE

## 2021-10-04 PROCEDURE — 4040F PNEUMOC VAC/ADMIN/RCVD: CPT | Performed by: INTERNAL MEDICINE

## 2021-10-04 PROCEDURE — 99213 OFFICE O/P EST LOW 20 MIN: CPT

## 2021-10-04 PROCEDURE — G8417 CALC BMI ABV UP PARAM F/U: HCPCS | Performed by: INTERNAL MEDICINE

## 2021-10-04 PROCEDURE — G8484 FLU IMMUNIZE NO ADMIN: HCPCS | Performed by: INTERNAL MEDICINE

## 2021-10-04 PROCEDURE — 1123F ACP DISCUSS/DSCN MKR DOCD: CPT | Performed by: INTERNAL MEDICINE

## 2021-10-04 PROCEDURE — 82550 ASSAY OF CK (CPK): CPT

## 2021-10-04 PROCEDURE — 36415 COLL VENOUS BLD VENIPUNCTURE: CPT

## 2021-10-04 PROCEDURE — 99214 OFFICE O/P EST MOD 30 MIN: CPT | Performed by: INTERNAL MEDICINE

## 2021-10-04 PROCEDURE — 1036F TOBACCO NON-USER: CPT | Performed by: INTERNAL MEDICINE

## 2021-10-04 ASSESSMENT — ENCOUNTER SYMPTOMS
NAUSEA: 0
DIARRHEA: 0
CONSTIPATION: 0
BLOOD IN STOOL: 0
BACK PAIN: 0
EYE PAIN: 0
VOMITING: 0
COUGH: 0
ABDOMINAL PAIN: 0
SHORTNESS OF BREATH: 0

## 2021-10-04 NOTE — PROGRESS NOTES
[address]  [address] [address]  Dept: [phone]  Dept Fax: [phone]  Loc: [phone]     Manoj Madden is a 68 y.o. male who presents today for his medical conditions/complaintsas noted below. Manoj Madden is c/o of   Chief Complaint   Patient presents with    Hypertension    Hyperlipidemia    Diabetes         HPI:     Hypertension  This is a chronic problem. The current episode started more than 1 year ago. The problem has been waxing and waning since onset. The problem is controlled. Pertinent negatives include no chest pain, headaches, neck pain, palpitations or shortness of breath. Hyperlipidemia  This is a chronic problem. The current episode started more than 1 year ago. The problem is controlled. Recent lipid tests were reviewed and are variable. Pertinent negatives include no chest pain or shortness of breath. Diabetes  He presents for his follow-up diabetic visit. He has type 2 diabetes mellitus. His disease course has been fluctuating. Pertinent negatives for hypoglycemia include no confusion, dizziness, headaches, nervousness/anxiousness or pallor. Pertinent negatives for diabetes include no chest pain, no polydipsia, no polyuria and no weakness. Hemoglobin A1C (%)   Date Value   09/28/2021 7.9 (H)   06/17/2021 7.3 (H)   03/16/2021 7.2 (H)            Microalb/Crt. Ratio (mcg/mg creat)   Date Value   12/09/2020 91 (H)     LDL Cholesterol (mg/dL)   Date Value   09/28/2021 40   09/11/2020 51   09/12/2019 43         AST (U/L)   Date Value   09/28/2021 33     ALT (U/L)   Date Value   09/28/2021 40     BUN (mg/dL)   Date Value   09/28/2021 46 (H)     BP Readings from Last 3 Encounters:   10/04/21 136/72   06/28/21 118/70   05/20/21 (!) 144/75              Past Medical History:   Diagnosis Date    Benign prostatic hypertrophy     with elevated PSA.     BPPV (benign paroxysmal positional vertigo) 2/2008    Chest pain 05/2021    Coronary heart disease     Status post CABG.  Diverticulosis     Dupuytren's contracture     Mid finger, right hand.  Erectile dysfunction     Bowens catheter in place 11/02/2018    has had one in place for 6weeks, this one in place x 2 weeks    Gout     Quiescent.  History of blood transfusion     possible with open heart surgery 26years ago    Hyperlipidemia     Hypertension     Positive cardiac stress test 05/06/2021    Snores     Type II or unspecified type diabetes mellitus without mention of complication, not stated as uncontrolled       Past Surgical History:   Procedure Laterality Date    CARDIAC CATHETERIZATION  6/2005    Triple vessel CAD with patent LIMA to LAD, patent saphenous vein graft to first obtuse marginal, patent saphenous vein graft to posterior descending artery, occluded jump graft from posterior descending artery to posterior left ventricular branch.  CARDIAC CATHETERIZATION  3/16/16    CARDIAC CATHETERIZATION  05/07/2021    CATARACT REMOVAL Bilateral Nov. and Dec 2014    COLONOSCOPY  5/27/2008    Distal ileoscopy. Scattered diverticulosis and extensive diverticular disease of sigmoid colon and internal hemorrhoids.  COLONOSCOPY N/A 10/28/2019    COLONOSCOPY performed by Concepcion Villa MD at [facility] OR  severe diverticulosis    CORONARY ARTERY BYPASS GRAFT      ELBOW SURGERY Right     Surgical repair.  EYE SURGERY      KNEE ARTHROSCOPY Right 3/16/2007    Partial medial meniscectomy, partial medial and lateral synovectomy, light chondroplasty.     WI LASER VAPORIZATION SURGERY PROSTATE, COMPLETE N/A 11/2/2018    CYSTOSCOPY, TRANSURETHRAL RESECTION PROSTATE - [facility] # [id number] HAILEE) performed by Vernon Avila MD at [address] [place] [address] Bilateral 9/15/2010, 2007    [facility] Dr. Corky Baumgarten PROSTATE BIOPSY Bilateral 05/10/2001    Benign-Dr. Marivel Giordano    PROSTATE BIOPSY Bilateral 04/08/1999 Benign-Dr. Marisol Deleon    PROSTATE BIOPSY Bilateral 1998    Benign-Dr. Ida Us    PROSTATE BIOPSY Bilateral 1998    Benign-Dr. Ida Us    PROSTATE BIOPSY Bilateral 10/04/1996    Benign-Dr. Dixie Paget    PROSTATE SURGERY  2018    CYSTOSCOPY, TRANSURETHRAL RESECTION PROSTATE - [facility]     VASECTOMY         Family History   Problem Relation Age of Onset    Diabetes Mother     Kidney Disease Mother         Renal failure    Coronary Art Dis Mother     Hypertension Mother     Lung Cancer Father     Coronary Art Dis Father     Hypertension Brother     Other Brother         Hypernephroma          Social History     Tobacco Use    Smoking status: Former Smoker     Packs/day: 0.50     Years: 27.00     Pack years: 13.50     Types: Cigarettes     Start date: 1968     Quit date: 10/11/1992     Years since quittin.0    Smokeless tobacco: Never Used   Substance Use Topics    Alcohol use: Yes     Alcohol/week: 1.0 standard drinks     Types: 1 Standard drinks or equivalent per week     Comment: 1 time per week         Current Outpatient Medications   Medication Sig Dispense Refill    hydroCHLOROthiazide (HYDRODIURIL) 25 MG tablet Take 1 tablet by mouth every morning 90 tablet 3    metFORMIN (GLUCOPHAGE) 1000 MG tablet Take 0.5 tablets by mouth 2 times daily (with meals) 180 tablet 3    Cyanocobalamin (VITAMIN B12 PO) Take 1 tablet by mouth daily       glimepiride (AMARYL) 4 MG tablet Take 1 tablet by mouth 2 times daily 180 tablet 3    atorvastatin (LIPITOR) 80 MG tablet Take 1 tablet by mouth daily 90 tablet 3    metoprolol tartrate (LOPRESSOR) 50 MG tablet Take 1 tablet by mouth 2 times daily 180 tablet 3    lisinopril (PRINIVIL;ZESTRIL) 40 MG tablet Take 1 tablet by mouth daily 90 tablet 3    blood glucose test strips (DURGA CONTOUR TEST) strip Tests blood sugar daily.  300 each 3    nitroGLYCERIN (NITROSTAT) 0.4 MG SL tablet Place 1 tablet under the tongue every 5 minutes as needed for Chest pain 25 tablet 3    ONE TOUCH ULTRASOFT LANCETS MISC 1 each by Does not apply route daily 100 each 3    aspirin 81 MG EC tablet Take 81 mg by mouth daily       No current facility-administered medications for this visit. No Known Allergies    Health Maintenance   Topic Date Due    Hepatitis C screen  Never done    DTaP/Tdap/Td vaccine (1 - Tdap) Never done    Shingles Vaccine (2 of 3) 06/23/2008    PSA counseling  12/09/2021    Annual Wellness Visit (AWV)  06/29/2022    Lipid screen  09/28/2022    Potassium monitoring  09/28/2022    Creatinine monitoring  09/28/2022    Flu vaccine  Completed    Pneumococcal 65+ years Vaccine  Completed    COVID-19 Vaccine  Completed    Hepatitis A vaccine  Aged Out    Hib vaccine  Aged Out    Meningococcal (ACWY) vaccine  Aged Out       Subjective:      Review of Systems   Constitutional: Negative for chills and fever. HENT: Negative for hearing loss. Eyes: Negative for pain and visual disturbance. Respiratory: Negative for cough and shortness of breath. Cardiovascular: Negative for chest pain, palpitations and leg swelling. Gastrointestinal: Negative for abdominal pain, blood in stool, constipation, diarrhea, nausea and vomiting. Endocrine: Negative for cold intolerance, polydipsia and polyuria. Genitourinary: Negative for difficulty urinating, dysuria and hematuria. Musculoskeletal: Negative for arthralgias, back pain, gait problem and neck pain. Skin: Negative for pallor and rash. Neurological: Negative for dizziness, weakness, numbness and headaches. Hematological: Negative for adenopathy. Does not bruise/bleed easily. Psychiatric/Behavioral: Negative for confusion. The patient is not nervous/anxious. Objective:     Physical Exam  Vitals reviewed. Constitutional:       Appearance: He is well-developed. HENT:      Head: Normocephalic and atraumatic.    Eyes:      Pupils: Pupils are equal, round, and reactive to light. Cardiovascular:      Rate and Rhythm: Normal rate and regular rhythm. Heart sounds: No murmur heard. No friction rub. No gallop. Pulmonary:      Effort: Pulmonary effort is normal.      Breath sounds: Normal breath sounds. No wheezing or rales. Abdominal:      General: There is no distension. Palpations: Abdomen is soft. There is no mass. Tenderness: There is no abdominal tenderness. There is no rebound. Musculoskeletal:         General: Normal range of motion. Cervical back: Neck supple. Lymphadenopathy:      Cervical: No cervical adenopathy. Skin:     General: Skin is warm and dry. Findings: No rash. Neurological:      Mental Status: He is alert and oriented to person, place, and time. Cranial Nerves: No cranial nerve deficit (grossly). Psychiatric:         Thought Content: Thought content normal.        /72   Pulse 60   Temp 97 °F (36.1 °C) (Tympanic)   Resp 18   Ht 5' 8\" (1.727 m)   Wt 185 lb (83.9 kg)   BMI 28.13 kg/m²     Assessment:       Diagnosis Orders   1. Primary hypertension  Creatinine, Serum    Basic Metabolic Panel   2. Type 2 diabetes mellitus without complication, without long-term current use of insulin ([facility])  Hemoglobin A1C    Microalbumin, Ur   3. Other hyperlipidemia     4. Anemia, unspecified type  Hemoglobin      Reviewed multiple test results for this appointment. 9/28/2021 creatinine was higher at 2.21 (but patient reported being somewhat dehydrated after nausea vomiting and diarrhea episode),    9/28/2021 normal total cholesterol 92.    9/28/2021 hemoglobin A1c higher than usual at 7.9 (was 7.3). Patient told to continue Lipitor Ranexa etc.    We will recheck the creatinine today. Plan:       Return in about 3 months (around 1/4/2022) for Hypertension, Hyperlipidemia, Diabetes.     Orders Placed This Encounter   Procedures    Creatinine, Serum     Standing Status:   Future     Standing Expiration Date: 10/4/2022    Hemoglobin A1C     Standing Status:   Future     Standing Expiration Date:   10/4/2022    Basic Metabolic Panel     Standing Status:   Future     Standing Expiration Date:   10/4/2022    Hemoglobin     Standing Status:   Future     Standing Expiration Date:   10/4/2022    Microalbumin, Ur     Standing Status:   Future     Standing Expiration Date:   10/4/2022     No orders of the defined types were placed in this encounter. Patientgiven educational materials - see patient instructions. Discussed use, benefit,and side effects of prescribed medications. All patient questions answered. Ptvoiced understanding. Reviewed health maintenance. Instructed to continue currentmedications, diet and exercise. Patient agreed with treatment plan. Follow up asdirected.      Electronically signed by Celina Marks MD on 10/4/2021 at 1:29 PM

## 2021-11-02 ENCOUNTER — HOSPITAL ENCOUNTER (OUTPATIENT)
Dept: LAB | Age: 76
Discharge: HOME | End: 2021-11-02
Payer: MEDICARE

## 2021-11-02 DIAGNOSIS — E11.9 TYPE 2 DIABETES MELLITUS WITHOUT COMPLICATION, WITHOUT LONG-TERM CURRENT USE OF INSULIN (HCC): ICD-10-CM

## 2021-11-02 DIAGNOSIS — E11.9 TYPE 2 DIABETES MELLITUS WITHOUT COMPLICATION, WITHOUT LONG-TERM CURRENT USE OF INSULIN (HCC): Primary | ICD-10-CM

## 2021-11-02 LAB
ANION GAP SERPL CALCULATED.3IONS-SCNC: 11 MMOL/L (ref 9–17)
BUN BLDV-MCNC: 29 MG/DL (ref 8–23)
BUN/CREAT BLD: 14 (ref 9–20)
CALCIUM SERPL-MCNC: 9.4 MG/DL (ref 8.6–10.4)
CHLORIDE BLD-SCNC: 106 MMOL/L (ref 98–107)
CO2: 24 MMOL/L (ref 20–31)
CREAT SERPL-MCNC: 2.04 MG/DL (ref 0.7–1.2)
GFR AFRICAN AMERICAN: 39 ML/MIN
GFR NON-AFRICAN AMERICAN: 32 ML/MIN
GFR SERPL CREATININE-BSD FRML MDRD: ABNORMAL ML/MIN/{1.73_M2}
GFR SERPL CREATININE-BSD FRML MDRD: ABNORMAL ML/MIN/{1.73_M2}
GLUCOSE BLD-MCNC: 369 MG/DL (ref 70–99)
POTASSIUM SERPL-SCNC: 4.9 MMOL/L (ref 3.7–5.3)
SODIUM BLD-SCNC: 141 MMOL/L (ref 135–144)

## 2021-11-02 PROCEDURE — 80048 BASIC METABOLIC PNL TOTAL CA: CPT

## 2021-11-02 PROCEDURE — 36415 COLL VENOUS BLD VENIPUNCTURE: CPT

## 2021-11-18 ENCOUNTER — OFFICE VISIT (OUTPATIENT)
Dept: CARDIOLOGY | Age: 76
End: 2021-11-18
Payer: MEDICARE

## 2021-11-18 VITALS
WEIGHT: 190 LBS | DIASTOLIC BLOOD PRESSURE: 65 MMHG | BODY MASS INDEX: 28.79 KG/M2 | HEART RATE: 51 BPM | HEIGHT: 68 IN | SYSTOLIC BLOOD PRESSURE: 143 MMHG

## 2021-11-18 DIAGNOSIS — I25.10 CORONARY ARTERY DISEASE INVOLVING NATIVE CORONARY ARTERY OF NATIVE HEART WITHOUT ANGINA PECTORIS: Primary | ICD-10-CM

## 2021-11-18 PROCEDURE — 93010 ELECTROCARDIOGRAM REPORT: CPT | Performed by: INTERNAL MEDICINE

## 2021-11-18 PROCEDURE — 4040F PNEUMOC VAC/ADMIN/RCVD: CPT | Performed by: INTERNAL MEDICINE

## 2021-11-18 PROCEDURE — 99214 OFFICE O/P EST MOD 30 MIN: CPT | Performed by: INTERNAL MEDICINE

## 2021-11-18 PROCEDURE — G8417 CALC BMI ABV UP PARAM F/U: HCPCS | Performed by: INTERNAL MEDICINE

## 2021-11-18 PROCEDURE — 93005 ELECTROCARDIOGRAM TRACING: CPT | Performed by: INTERNAL MEDICINE

## 2021-11-18 PROCEDURE — G8484 FLU IMMUNIZE NO ADMIN: HCPCS | Performed by: INTERNAL MEDICINE

## 2021-11-18 PROCEDURE — G8427 DOCREV CUR MEDS BY ELIG CLIN: HCPCS | Performed by: INTERNAL MEDICINE

## 2021-11-18 PROCEDURE — 1036F TOBACCO NON-USER: CPT | Performed by: INTERNAL MEDICINE

## 2021-11-18 PROCEDURE — 1123F ACP DISCUSS/DSCN MKR DOCD: CPT | Performed by: INTERNAL MEDICINE

## 2021-11-18 RX ORDER — AMLODIPINE BESYLATE 2.5 MG/1
2.5 TABLET ORAL DAILY
Qty: 30 TABLET | Refills: 3 | Status: SHIPPED | OUTPATIENT
Start: 2021-11-18 | End: 2021-12-01

## 2021-11-18 RX ORDER — RANOLAZINE 500 MG/1
500 TABLET, EXTENDED RELEASE ORAL 2 TIMES DAILY
COMMUNITY
End: 2022-04-05

## 2021-11-18 NOTE — PROGRESS NOTES
[address]  [facility] [address]  Dept: [phone]  Loc: [phone]    CC: follow up for CAD, CABG    HPI:  The patient is a 68y.o. year old, , male is in the office for follow up   Stable from cardiac standpoint  Denies any further episodes of chest pain or discomfort after starting ranexa  Could not tolerate imdur   Functionally remains active, exercising three times a week  Reports occasional chest heaviness only on strenuous exercise   No shortness of breath     Past Medical History:   has a past medical history of Benign prostatic hypertrophy, BPPV (benign paroxysmal positional vertigo), Chest pain, Coronary heart disease, Diverticulosis, Dupuytren's contracture, Erectile dysfunction, Bowens catheter in place, Gout, History of blood transfusion, Hyperlipidemia, Hypertension, Positive cardiac stress test, Snores, and Type II or unspecified type diabetes mellitus without mention of complication, not stated as uncontrolled. Past Surgical History:   has a past surgical history that includes Prostate biopsy (Bilateral, 9/15/2010, 2007); Colonoscopy (5/27/2008); Prostate biopsy (Bilateral, 05/10/2001); Knee arthroscopy (Right, 3/16/2007); Elbow surgery (Right); Vasectomy; Coronary artery bypass graft; Cataract removal (Bilateral, Nov. and Dec 2014); Prostate Biopsy (Bilateral, 04/08/1999); Prostate Biopsy (Bilateral, 06/18/1998); Prostate Biopsy (Bilateral, 01/07/1998); Prostate Biopsy (Bilateral, 10/04/1996); Prostate surgery (11/02/2018); pr laser vaporization surgery prostate, complete (N/A, 11/2/2018); Colonoscopy (N/A, 10/28/2019); eye surgery; Cardiac catheterization (6/2005); Cardiac catheterization (3/16/16); and Cardiac catheterization (05/07/2021). Home Medications:  Prior to Admission medications    Medication Sig Start Date End Date Taking?  Authorizing Provider   ranolazine (RANEXA) 500 MG extended release tablet Take 500 mg by mouth 2 times daily   Yes Historical Provider, MD   hydroCHLOROthiazide (HYDRODIURIL) 25 MG tablet Take 1 tablet by mouth every morning 6/28/21  Yes Isabella Banks MD   Cyanocobalamin (VITAMIN B12 PO) Take 1 tablet by mouth daily    Yes Historical Provider, MD   glimepiride (AMARYL) 4 MG tablet Take 1 tablet by mouth 2 times daily 2/22/21  Yes Isabella Banks MD   atorvastatin (LIPITOR) 80 MG tablet Take 1 tablet by mouth daily 2/22/21  Yes Isabella Banks MD   metoprolol tartrate (LOPRESSOR) 50 MG tablet Take 1 tablet by mouth 2 times daily 2/22/21  Yes Isabella Banks MD   lisinopril (PRINIVIL;ZESTRIL) 40 MG tablet Take 1 tablet by mouth daily 2/22/21  Yes Isabella Banks MD   blood glucose test strips (DURGA CONTOUR TEST) strip Tests blood sugar daily. 5/11/20  Yes Isabella Banks MD   nitroGLYCERIN (NITROSTAT) 0.4 MG SL tablet Place 1 tablet under the tongue every 5 minutes as needed for Chest pain 5/11/20  Yes Isabella Banks MD   ONE TOUCH ULTRASOFT LANCETS MISC 1 each by Does not apply route daily 5/11/20  Yes Isabella Banks MD   aspirin 81 MG EC tablet Take 81 mg by mouth daily   Yes Historical Provider, MD       Allergies:  Patient has no known allergies. Social History:   reports that he quit smoking about 29 years ago. His smoking use included cigarettes. He started smoking about 53 years ago. He has a 13.50 pack-year smoking history. He has never used smokeless tobacco. He reports current alcohol use of about 1.0 standard drink of alcohol per week. He reports that he does not use drugs. Review of Systems:  · Constitutional: there has been no unanticipated weight loss. There's been No change in energy level, No change in activity level. · Eyes: No visual changes or diplopia. No scleral icterus. · ENT: No Headaches, hearing loss or vertigo. No mouth sores or sore throat. · Cardiovascular: As above.   · Respiratory: as hpi  · Gastrointestinal: No abdominal pain, appetite loss, blood in stools. No change in bowel or bladder habits. · Genitourinary: No dysuria, trouble voiding, or hematuria. · Musculoskeletal:  No gait disturbance, No weakness or joint complaints. · Integumentary: No rash or pruritis. · Psychiatric: No anxiety, or depression. · Hematologic/Lymphatic: No abnormal bruising or bleeding, blood clots or swollen lymph nodes. · Allergic/Immunologic: No nasal congestion or hives. Physical Exam:  BP (!) 167/69   Pulse 53   Ht 5' 8\" (1.727 m)   Wt 190 lb (86.2 kg)   BMI 28.89 kg/m²     Constitutional and General Appearance: alert, cooperative, no distress and appears stated age  [de-identified]: PERRL, no cervical lymphadenopathy. No masses palpable. Normal oral mucosa  Respiratory:  · Normal excursion and expansion without use of accessory muscles  · Resp Auscultation: Good respiratory effort. No for increased work of breathing. On auscultation: clear to auscultation bilaterally  Cardiovascular:  · The apical impulse is not displaced  · Heart tones are crisp and normal. regular S1 and S2.  · Jugular venous pulsation Normal  · The carotid upstroke is normal in amplitude and contour without delay or bruit  · Peripheral pulses are symmetrical and full   Abdomen:   · No masses or tenderness  · Bowel sounds present  Extremities:  ·  No Cyanosis or Clubbing  ·  Lower extremity edema: No  ·  Skin: Warm and dry    Cardiac Data:    EKG: Sinus Bradycardia, otherwise nORMAL LIMITS    Cath 5/7/2021   1. Severe Native Multivessel CAD   2. Patent LIMA-LAD, SVG-OM and SVG-RPDA grafts with mild disease   3.  No significant Change from last angiogram in 2016     Labs:   Lab Results   Component Value Date    CHOL 92 09/28/2021    TRIG 106 09/28/2021    HDL 31 (L) 09/28/2021    LDLCHOLESTEROL 40 09/28/2021    VLDL NOT REPORTED 09/28/2021    CHOLHDLRATIO 3.0 09/28/2021       Lab Results   Component Value Date     11/02/2021    K 4.9 11/02/2021     11/02/2021    CO2 24 11/02/2021    BUN 29 (H) 11/02/2021    CREATININE 2.04 (H) 11/02/2021    GLUCOSE 369 (H) 11/02/2021    CALCIUM 9.4 11/02/2021    PROT 6.8 09/28/2021    LABALBU 4.2 09/28/2021    BILITOT 0.91 09/28/2021    ALKPHOS 59 09/28/2021    AST 33 09/28/2021    ALT 40 09/28/2021    LABGLOM 32 (L) 11/02/2021    GFRAA 39 (L) 11/02/2021     IMPRESSION / RECOMMENDATIONS:  1. Multivessel CAD s/p CABG, recent cardiac cath for abnormal stress test in 05/2021 showed Severe native vessel disease but Patent LIMA-LAD, SVG-OM and SVG-RCA. Currently stable with no further angina after adding Ranexa. We will continue current medical therapy with aspirin, BB, high intensity Lipitor, Imdur and Ranexa. Aggressive risk factor modification discussed with patient. 2. HTN, suboptimally controlled, will add low dose CCB    3. DL, controlled, LDL 40, chol 92 in 09/2021, continue statin therapy     4. DM2- per pcp    5. Chronic kidney disease, Last cr 2.04 at baseline. Following with Nephrology. The patient is to continue heart healthy diet, weight loss and exercise as tolerated. Patient's medications and side effects were discussed. Medication refills were provided if needed. Follow up appointment timing was discussed. All questions and concerns were addressed to patient's satisfaction. The patient is to follow up in 6 months or sooner if necessary. Twyla Joy MD [facility]  [facility]  [phone]

## 2021-12-01 ENCOUNTER — TELEPHONE (OUTPATIENT)
Dept: CARDIOLOGY | Age: 76
End: 2021-12-01

## 2021-12-01 RX ORDER — AMLODIPINE BESYLATE 10 MG/1
10 TABLET ORAL DAILY
Qty: 30 TABLET | Refills: 3 | Status: SHIPPED | OUTPATIENT
Start: 2021-12-01 | End: 2022-04-05

## 2021-12-01 NOTE — TELEPHONE ENCOUNTER
Patient called stating that ever since he last seen Dr Alejandro West on 11/18/21 he has still been having increased BP that has been running upper 160s - 170s. Pt stated he has not had any chest pain, dizziness and sob.      Last Appt:  11/18/2021  Next Appt:   5/19/2022  Med verified in Epic

## 2021-12-15 ENCOUNTER — NURSE TRIAGE (OUTPATIENT)
Dept: OTHER | Facility: CLINIC | Age: 76
End: 2021-12-15

## 2021-12-15 NOTE — TELEPHONE ENCOUNTER
Received call from 23 Johana Garcia Said at [facility] with [employer] [facility] [employer]. Brief description of triage: BS is running high and my ankles are swelling up for the last couple of weeks. Patient takes his BS every other day. His fasting BS has been 234 with his BS going up over 400 after breakfast and is over 350 after lunch. Patient also has mild bilateral swelling of both ankles for the past few weeks. Patient denies any SOB, chest pian or any other symptoms at this time. Triage indicates for patient to See today in office. If you are unable to be seen today please go to [facility] to be evaluated. Care advice provided, patient verbalizes understanding; denies any other questions or concerns; instructed to call back for any new or worsening symptoms. Writer provided warm transfer to [facility] at [facility] for appointment scheduling. Attention Provider: Thank you for allowing me to participate in the care of your patient. The patient was connected to triage in response to information provided to the [facility]. Please do not respond through this encounter as the response is not directed to a shared pool. Reason for Disposition   Swelling of both ankles (i.e., pedal edema)   [1] MILD swelling of both ankles (i.e., pedal edema) AND [2] new onset or worsening   Patient wants to be seen    Answer Assessment - Initial Assessment Questions  1. BLOOD GLUCOSE: \"What is your blood glucose level? \"       Yesterday it was 234 in the am. After breakfast it goes to 443, after lunch it is 350    2. ONSET: \"When did you check the blood glucose? \"      Yesterday    3. USUAL RANGE: \"What is your glucose level usually? \" (e.g., usual fasting morning value, usual evening value)      Average is 230    4. KETONES: \"Do you check for ketones (urine or blood test strips)? \" If yes, ask: \"What does the test show now? \"       No    5. TYPE 1 or 2:  \"Do you know what type of diabetes you have? \"  (e.g., Type 1, Type 2, Gestational; doesn't know)       Type 2    6. INSULIN: \"Do you take insulin? \" \"What type of insulin(s) do you use? What is the mode of delivery? (syringe, pen; injection or pump)? \"       No    7. DIABETES PILLS: \"Do you take any pills for your diabetes? \" If yes, ask: \"Have you missed taking any pills recently? \"      Glimepiride 1 tab x 2 daily    8. OTHER SYMPTOMS: \"Do you have any symptoms? \" (e.g., fever, frequent urination, difficulty breathing, dizziness, weakness, vomiting)      None    9. PREGNANCY: \"Is there any chance you are pregnant? \" \"When was your last menstrual period? \"      n/a    Answer Assessment - Initial Assessment Questions  1. LOCATION: \"Which joint is swollen? \"      Bilateral ankle swelling    2. ONSET: \"When did the swelling start? \"      A few weeks ago    3. SIZE: \"How large is the swelling? \"      Mild    4. PAIN: \"Is there any pain? \" If so, ask: \"How bad is it? \" (Scale 1-10; or mild, moderate, severe)      No    5. CAUSE: \"What do you think caused the swollen joint? \"      Fluid retention    6. OTHER SYMPTOMS: \"Do you have any other symptoms? \" (e.g., fever, chest pain, difficulty breathing, calf pain)      No    7. PREGNANCY: \"Is there any chance you are pregnant? \" \"When was your last menstrual period? \"      n/a    Answer Assessment - Initial Assessment Questions  1. ONSET: \"When did the swelling start? \" (e.g., minutes, hours, days)      A few weeks ago    2. LOCATION: \"What part of the leg is swollen? \"  \"Are both legs swollen or just one leg? \"      Both    3. SEVERITY: \"How bad is the swelling? \" (e.g., localized; mild, moderate, severe)   - Localized - small area of swelling localized to one leg   - MILD pedal edema - swelling limited to foot and ankle, pitting edema < 1/4 inch (6 mm) deep, rest and elevation eliminate most or all swelling   - MODERATE edema - swelling of lower leg to knee, pitting edema > 1/4 inch (6 mm) deep, rest and elevation only partially reduce swelling   - SEVERE edema

## 2021-12-16 ENCOUNTER — HOSPITAL ENCOUNTER (OUTPATIENT)
Age: 76
Setting detail: SPECIMEN
Discharge: HOME | End: 2021-12-16
Payer: MEDICARE

## 2021-12-16 ENCOUNTER — TELEPHONE (OUTPATIENT)
Dept: FAMILY MEDICINE CLINIC | Age: 76
End: 2021-12-16

## 2021-12-16 ENCOUNTER — HOSPITAL ENCOUNTER (OUTPATIENT)
Dept: LAB | Age: 76
Discharge: HOME | End: 2021-12-16
Payer: MEDICARE

## 2021-12-16 ENCOUNTER — OFFICE VISIT (OUTPATIENT)
Dept: PRIMARY CARE CLINIC | Age: 76
End: 2021-12-16
Payer: MEDICARE

## 2021-12-16 VITALS
WEIGHT: 188.8 LBS | TEMPERATURE: 97.2 F | OXYGEN SATURATION: 99 % | HEART RATE: 52 BPM | SYSTOLIC BLOOD PRESSURE: 132 MMHG | HEIGHT: 68 IN | BODY MASS INDEX: 28.61 KG/M2 | DIASTOLIC BLOOD PRESSURE: 82 MMHG

## 2021-12-16 DIAGNOSIS — E11.65 TYPE 2 DIABETES MELLITUS WITH HYPERGLYCEMIA, WITHOUT LONG-TERM CURRENT USE OF INSULIN (HCC): Primary | ICD-10-CM

## 2021-12-16 DIAGNOSIS — R79.89 ELEVATED SERUM CREATININE: ICD-10-CM

## 2021-12-16 DIAGNOSIS — E87.5 HYPERKALEMIA: ICD-10-CM

## 2021-12-16 LAB
ANION GAP SERPL CALCULATED.3IONS-SCNC: 10 MMOL/L (ref 9–17)
BUN BLDV-MCNC: 36 MG/DL (ref 8–23)
BUN/CREAT BLD: 17 (ref 9–20)
CALCIUM SERPL-MCNC: 9.8 MG/DL (ref 8.6–10.4)
CHLORIDE BLD-SCNC: 104 MMOL/L (ref 98–107)
CO2: 24 MMOL/L (ref 20–31)
CREAT SERPL-MCNC: 2.09 MG/DL (ref 0.7–1.2)
GFR AFRICAN AMERICAN: 38 ML/MIN
GFR NON-AFRICAN AMERICAN: 31 ML/MIN
GFR SERPL CREATININE-BSD FRML MDRD: ABNORMAL ML/MIN/{1.73_M2}
GFR SERPL CREATININE-BSD FRML MDRD: ABNORMAL ML/MIN/{1.73_M2}
GLUCOSE BLD-MCNC: 429 MG/DL (ref 70–99)
POTASSIUM SERPL-SCNC: 5.8 MMOL/L (ref 3.7–5.3)
POTASSIUM SERPL-SCNC: 6.6 MMOL/L (ref 3.7–5.3)
SODIUM BLD-SCNC: 138 MMOL/L (ref 135–144)

## 2021-12-16 PROCEDURE — 80048 BASIC METABOLIC PNL TOTAL CA: CPT

## 2021-12-16 PROCEDURE — 93010 ELECTROCARDIOGRAM REPORT: CPT | Performed by: FAMILY MEDICINE

## 2021-12-16 PROCEDURE — 1036F TOBACCO NON-USER: CPT | Performed by: FAMILY MEDICINE

## 2021-12-16 PROCEDURE — 3051F HG A1C>EQUAL 7.0%<8.0%: CPT | Performed by: FAMILY MEDICINE

## 2021-12-16 PROCEDURE — G8427 DOCREV CUR MEDS BY ELIG CLIN: HCPCS | Performed by: FAMILY MEDICINE

## 2021-12-16 PROCEDURE — 4040F PNEUMOC VAC/ADMIN/RCVD: CPT | Performed by: FAMILY MEDICINE

## 2021-12-16 PROCEDURE — 99204 OFFICE O/P NEW MOD 45 MIN: CPT | Performed by: FAMILY MEDICINE

## 2021-12-16 PROCEDURE — G8417 CALC BMI ABV UP PARAM F/U: HCPCS | Performed by: FAMILY MEDICINE

## 2021-12-16 PROCEDURE — 93005 ELECTROCARDIOGRAM TRACING: CPT | Performed by: FAMILY MEDICINE

## 2021-12-16 PROCEDURE — 1123F ACP DISCUSS/DSCN MKR DOCD: CPT | Performed by: FAMILY MEDICINE

## 2021-12-16 PROCEDURE — G8484 FLU IMMUNIZE NO ADMIN: HCPCS | Performed by: FAMILY MEDICINE

## 2021-12-16 PROCEDURE — 36415 COLL VENOUS BLD VENIPUNCTURE: CPT

## 2021-12-16 PROCEDURE — 84132 ASSAY OF SERUM POTASSIUM: CPT

## 2021-12-16 RX ORDER — ORAL SEMAGLUTIDE 3 MG/1
3 TABLET ORAL DAILY
Qty: 30 TABLET | Refills: 0 | Status: SHIPPED | OUTPATIENT
Start: 2021-12-16 | End: 2022-01-11 | Stop reason: ALTCHOICE

## 2021-12-16 NOTE — TELEPHONE ENCOUNTER
Lab called with a critical potassium of 6.6. Dr. Mallory Heart notified. Per Dr. Mallory Heart- call patient and have him come back in for an EKG.  Patient notified by Reid Spicer LPN

## 2021-12-16 NOTE — PATIENT INSTRUCTIONS
Patient Education        Hyperkalemia: Care Instructions  Your Care Instructions     Hyperkalemia is too much potassium in the blood. Potassium helps keep the right mix of fluids in your body. It also helps your nerves and muscles work as they should. And it keeps your heartbeat in a normal rhythm. Some things can raise potassium levels. These include some health problems, medicines, and kidney problems. (Normally, your kidneys remove extra potassium.)  Too much potassium can cause nausea. It also can cause a heartbeat that isn't normal. But you may not have any symptoms. Too much potassium can be dangerous. That's why it's important to treat it. If you are taking any of the medicines that can raise your levels, your doctor will ask you to stop. You may get medicines to lower your levels. And you may have to limit or not eat foods that have a lot of potassium. Follow-up care is a key part of your treatment and safety. Be sure to make and go to all appointments, and call your doctor if you are having problems. It's also a good idea to know your test results and keep a list of the medicines you take. How can you care for yourself at home? · Take your medicines exactly as prescribed. Call your doctor if you think you are having a problem with your medicine. · Stop taking certain medicines if your doctor asks you to. They may be causing your high potassium levels. If you have concerns about stopping medicine, talk with your doctor. · If you have kidney, heart, or liver disease and have to limit fluids, talk with your doctor before you increase the amount of fluids you drink. If the doctor says it's okay, drink plenty of fluids. · Avoid strenuous exercise until your doctor tells you it is okay. · Potassium is in many foods, including vegetables, fruits, and milk products. Foods high in potassium include bananas, cantaloupe, broccoli, milk, potatoes, and tomatoes.   · Low potassium foods include blueberries, raspberries, cucumber, white or brown rice, pasta, and noodles. · Do not use a salt substitute without talking to your doctor first. Most of these are very high in potassium. · Be sure to tell your doctor about any prescription, over-the-counter, or herbal medicines you take. Some of these can raise potassium. When should you call for help? Call 911 anytime you think you may need emergency care. For example, call if:    · You passed out (lost consciousness).     · You have an unusual heartbeat. Your heart may beat fast or skip beats. Call your doctor now or seek immediate medical care if:    · You have muscle aches.     · You feel very weak. Watch closely for changes in your health, and be sure to contact your doctor if:    · You do not get better as expected. Where can you learn more? Go to https://[employer]eb[employer]. org and sign in to your [employer] account. Enter Q555 in the AMKAI box to learn more about \"Hyperkalemia: Care Instructions. \"     If you do not have an account, please click on the \"Sign Up Now\" link. Current as of: December 17, 2020               Content Version: 13.0  © 2006-2021 Healthwise, Incorporated. Care instructions adapted under license by [facility] ([facility]). If you have questions about a medical condition or this instruction, always ask your healthcare professional. [place] [address] any warranty or liability for your use of this information.

## 2021-12-16 NOTE — PROGRESS NOTES
Spoke to patient and asked if pt could return to urgent care for further testing due to his pot. Blood level being elevated. Pt stated he will return today.

## 2021-12-16 NOTE — PROGRESS NOTES
Allergies. He  reports that he quit smoking about 29 years ago. His smoking use included cigarettes. He started smoking about 53 years ago. He has a 13.50 pack-year smoking history. He has never used smokeless tobacco.      Objective:    Vitals:    12/16/21 1139   BP: 132/82   Site: Right Upper Arm   Position: Sitting   Cuff Size: Large Adult   Pulse: 52   Temp: 97.2 °F (36.2 °C)   TempSrc: Temporal   SpO2: 99%   Weight: 188 lb 12.8 oz (85.6 kg)   Height: 5' 8\" (1.727 m)     Body mass index is 28.71 kg/m². Overweight elderly male healthy-appearing, alert, cooperative and in no acute distress. Neck supple. No adenopathy. Thyroid symmetric, normal size. Chest is clear to auscultation, no wheezes, rales, or rhonchi. Heart sounds are regular rate and rhythm, no murmurs. Lower extremities have trace to 1+ edema bilaterally.     Results of labs and ECG done today were reviewed with the patient:  Hospital Outpatient Visit on 12/16/2021   Component Date Value Ref Range Status    Potassium 12/16/2021 5.8* 3.7 - 5.3 mmol/L Final   Hospital Outpatient Visit on 12/16/2021   Component Date Value Ref Range Status    Glucose 12/16/2021 429* 70 - 99 mg/dL Final    BUN 12/16/2021 36* 8 - 23 mg/dL Final    CREATININE 12/16/2021 2.09* 0.70 - 1.20 mg/dL Final    Bun/Cre Ratio 12/16/2021 17  9 - 20 Final    Calcium 12/16/2021 9.8  8.6 - 10.4 mg/dL Final    Sodium 12/16/2021 138  135 - 144 mmol/L Final    Potassium 12/16/2021 6.6* 3.7 - 5.3 mmol/L Final    Chloride 12/16/2021 104  98 - 107 mmol/L Final    CO2 12/16/2021 24  20 - 31 mmol/L Final    Anion Gap 12/16/2021 10  9 - 17 mmol/L Final    GFR Non- 12/16/2021 31* >60 mL/min Final    GFR  12/16/2021 38* >60 mL/min Final    GFR Comment 12/16/2021        Final    Comment: Average GFR for 79or more years old:   76 mL/min/1.73sq m  Chronic Kidney Disease:   <60 mL/min/1.73sq m  Kidney failure:   <15 mL/min/1.73sq m              eGFR calculated using average adult body mass. Additional eGFR calculator available at:        [email or web address]            GFR Staging 12/16/2021 NOT REPORTED   Final       ECG done today showed sinus bradycardia with a rate of 54, similar to the ECG done 11/18/2021.         (Please note that portions of this note were completed with a voice-recognition program. Efforts were made to edit the dictation but occasionally words are mis-transcribed.)

## 2021-12-17 NOTE — TELEPHONE ENCOUNTER
Spoke with the patient, Dr Marion Grover sent in a new script for him on 12/16/21 for [place] for his blood sugars, I advised for him to cut the amlodipine in half then check Bp daily, then to let us know if his bs still run high after taking the new medication started on 12/16/21.  Patient voiced understanding

## 2021-12-17 NOTE — TELEPHONE ENCOUNTER
Pend order to add Actos 15 mg daily to help with the sugar levels. Also tell patient try to follow more strict diabetic diet. The mild foot and ankle swelling is most likely secondary to the Norvasc/amlodipine. Tell him to cut the 10 mg pills in half and take only a half or 5 mg daily ,  and follow blood pressure numbers and report how the blood pressure is doing.

## 2021-12-20 ENCOUNTER — HOSPITAL ENCOUNTER (OUTPATIENT)
Dept: LAB | Age: 76
Discharge: HOME | End: 2021-12-20
Payer: MEDICARE

## 2021-12-20 DIAGNOSIS — R97.20 ELEVATED PSA: ICD-10-CM

## 2021-12-20 DIAGNOSIS — R79.89 ELEVATED SERUM CREATININE: ICD-10-CM

## 2021-12-20 DIAGNOSIS — E87.5 HYPERKALEMIA: ICD-10-CM

## 2021-12-20 LAB
ANION GAP SERPL CALCULATED.3IONS-SCNC: 11 MMOL/L (ref 9–17)
BUN BLDV-MCNC: 37 MG/DL (ref 8–23)
BUN/CREAT BLD: 18 (ref 9–20)
CALCIUM SERPL-MCNC: 9.1 MG/DL (ref 8.6–10.4)
CHLORIDE BLD-SCNC: 97 MMOL/L (ref 98–107)
CO2: 24 MMOL/L (ref 20–31)
CREAT SERPL-MCNC: 2.09 MG/DL (ref 0.7–1.2)
GFR AFRICAN AMERICAN: 38 ML/MIN
GFR NON-AFRICAN AMERICAN: 31 ML/MIN
GFR SERPL CREATININE-BSD FRML MDRD: ABNORMAL ML/MIN/{1.73_M2}
GFR SERPL CREATININE-BSD FRML MDRD: ABNORMAL ML/MIN/{1.73_M2}
GLUCOSE BLD-MCNC: 478 MG/DL (ref 70–99)
POTASSIUM SERPL-SCNC: 5.1 MMOL/L (ref 3.7–5.3)
PROSTATE SPECIFIC ANTIGEN: 13.28 UG/L
SODIUM BLD-SCNC: 132 MMOL/L (ref 135–144)

## 2021-12-20 PROCEDURE — 36415 COLL VENOUS BLD VENIPUNCTURE: CPT

## 2021-12-20 PROCEDURE — 80048 BASIC METABOLIC PNL TOTAL CA: CPT

## 2021-12-20 PROCEDURE — 84153 ASSAY OF PSA TOTAL: CPT

## 2021-12-21 ENCOUNTER — TELEPHONE (OUTPATIENT)
Dept: INTERNAL MEDICINE | Age: 76
End: 2021-12-21

## 2021-12-21 NOTE — TELEPHONE ENCOUNTER
Patient needs a f/u from  for high blood sugars (400's)  Booked him first available 1-20-22   If you get a cancel, ssoner, or can work him in sooner, please call patient

## 2022-01-03 ENCOUNTER — HOSPITAL ENCOUNTER (OUTPATIENT)
Dept: LAB | Age: 77
Discharge: HOME | End: 2022-01-03
Payer: MEDICARE

## 2022-01-03 ENCOUNTER — HOSPITAL ENCOUNTER (OUTPATIENT)
Age: 77
Setting detail: SPECIMEN
Discharge: HOME | End: 2022-01-03
Payer: MEDICARE

## 2022-01-03 ENCOUNTER — OFFICE VISIT (OUTPATIENT)
Dept: INTERNAL MEDICINE | Age: 77
End: 2022-01-03
Payer: MEDICARE

## 2022-01-03 ENCOUNTER — OFFICE VISIT (OUTPATIENT)
Dept: UROLOGY | Age: 77
End: 2022-01-03
Payer: MEDICARE

## 2022-01-03 VITALS
DIASTOLIC BLOOD PRESSURE: 78 MMHG | HEART RATE: 86 BPM | RESPIRATION RATE: 16 BRPM | WEIGHT: 188 LBS | BODY MASS INDEX: 28.49 KG/M2 | HEIGHT: 68 IN | SYSTOLIC BLOOD PRESSURE: 136 MMHG

## 2022-01-03 VITALS
WEIGHT: 188.6 LBS | BODY MASS INDEX: 28.58 KG/M2 | DIASTOLIC BLOOD PRESSURE: 74 MMHG | SYSTOLIC BLOOD PRESSURE: 134 MMHG | HEIGHT: 68 IN | HEART RATE: 86 BPM | OXYGEN SATURATION: 98 %

## 2022-01-03 DIAGNOSIS — E78.49 OTHER HYPERLIPIDEMIA: ICD-10-CM

## 2022-01-03 DIAGNOSIS — I25.119 CORONARY ARTERY DISEASE INVOLVING NATIVE HEART WITH ANGINA PECTORIS, UNSPECIFIED VESSEL OR LESION TYPE (HCC): ICD-10-CM

## 2022-01-03 DIAGNOSIS — R97.20 ELEVATED PSA: ICD-10-CM

## 2022-01-03 DIAGNOSIS — D50.9 IRON DEFICIENCY ANEMIA, UNSPECIFIED IRON DEFICIENCY ANEMIA TYPE: ICD-10-CM

## 2022-01-03 DIAGNOSIS — N40.0 BENIGN PROSTATIC HYPERPLASIA, UNSPECIFIED WHETHER LOWER URINARY TRACT SYMPTOMS PRESENT: Primary | ICD-10-CM

## 2022-01-03 DIAGNOSIS — D64.9 ANEMIA, UNSPECIFIED TYPE: ICD-10-CM

## 2022-01-03 DIAGNOSIS — E11.9 TYPE 2 DIABETES MELLITUS WITHOUT COMPLICATION, WITHOUT LONG-TERM CURRENT USE OF INSULIN (HCC): Primary | ICD-10-CM

## 2022-01-03 DIAGNOSIS — I10 PRIMARY HYPERTENSION: ICD-10-CM

## 2022-01-03 DIAGNOSIS — N52.9 ERECTILE DYSFUNCTION, UNSPECIFIED ERECTILE DYSFUNCTION TYPE: ICD-10-CM

## 2022-01-03 DIAGNOSIS — E11.9 TYPE 2 DIABETES MELLITUS WITHOUT COMPLICATION, WITHOUT LONG-TERM CURRENT USE OF INSULIN (HCC): ICD-10-CM

## 2022-01-03 LAB
ANION GAP SERPL CALCULATED.3IONS-SCNC: 9 MMOL/L (ref 9–17)
BUN BLDV-MCNC: 30 MG/DL (ref 8–23)
BUN/CREAT BLD: 17 (ref 9–20)
CALCIUM SERPL-MCNC: 9.6 MG/DL (ref 8.6–10.4)
CHLORIDE BLD-SCNC: 101 MMOL/L (ref 98–107)
CO2: 26 MMOL/L (ref 20–31)
CREAT SERPL-MCNC: 1.8 MG/DL (ref 0.7–1.2)
CREATININE URINE: 73.9 MG/DL (ref 39–259)
ESTIMATED AVERAGE GLUCOSE: 240 MG/DL
GFR AFRICAN AMERICAN: 45 ML/MIN
GFR NON-AFRICAN AMERICAN: 37 ML/MIN
GFR SERPL CREATININE-BSD FRML MDRD: ABNORMAL ML/MIN/{1.73_M2}
GFR SERPL CREATININE-BSD FRML MDRD: ABNORMAL ML/MIN/{1.73_M2}
GLUCOSE BLD-MCNC: 405 MG/DL (ref 70–99)
HBA1C MFR BLD: 10 % (ref 4–6)
HEMOGLOBIN: 11.3 G/DL (ref 13–17)
IRON SATURATION: 34 % (ref 20–55)
IRON: 79 UG/DL (ref 59–158)
MICROALBUMIN/CREAT 24H UR: 78 MG/L
MICROALBUMIN/CREAT UR-RTO: 106 MCG/MG CREAT
POTASSIUM SERPL-SCNC: 4.9 MMOL/L (ref 3.7–5.3)
SODIUM BLD-SCNC: 136 MMOL/L (ref 135–144)
TOTAL IRON BINDING CAPACITY: 235 UG/DL (ref 250–450)
UNSATURATED IRON BINDING CAPACITY: 156 UG/DL (ref 112–347)

## 2022-01-03 PROCEDURE — 83550 IRON BINDING TEST: CPT

## 2022-01-03 PROCEDURE — 83036 HEMOGLOBIN GLYCOSYLATED A1C: CPT

## 2022-01-03 PROCEDURE — 83540 ASSAY OF IRON: CPT

## 2022-01-03 PROCEDURE — G8427 DOCREV CUR MEDS BY ELIG CLIN: HCPCS | Performed by: UROLOGY

## 2022-01-03 PROCEDURE — 82570 ASSAY OF URINE CREATININE: CPT

## 2022-01-03 PROCEDURE — G8417 CALC BMI ABV UP PARAM F/U: HCPCS | Performed by: UROLOGY

## 2022-01-03 PROCEDURE — G8417 CALC BMI ABV UP PARAM F/U: HCPCS | Performed by: INTERNAL MEDICINE

## 2022-01-03 PROCEDURE — 4040F PNEUMOC VAC/ADMIN/RCVD: CPT | Performed by: UROLOGY

## 2022-01-03 PROCEDURE — 99214 OFFICE O/P EST MOD 30 MIN: CPT | Performed by: INTERNAL MEDICINE

## 2022-01-03 PROCEDURE — 80048 BASIC METABOLIC PNL TOTAL CA: CPT

## 2022-01-03 PROCEDURE — 82043 UR ALBUMIN QUANTITATIVE: CPT

## 2022-01-03 PROCEDURE — 99213 OFFICE O/P EST LOW 20 MIN: CPT

## 2022-01-03 PROCEDURE — G8484 FLU IMMUNIZE NO ADMIN: HCPCS | Performed by: INTERNAL MEDICINE

## 2022-01-03 PROCEDURE — 1123F ACP DISCUSS/DSCN MKR DOCD: CPT | Performed by: UROLOGY

## 2022-01-03 PROCEDURE — 99213 OFFICE O/P EST LOW 20 MIN: CPT | Performed by: UROLOGY

## 2022-01-03 PROCEDURE — G8484 FLU IMMUNIZE NO ADMIN: HCPCS | Performed by: UROLOGY

## 2022-01-03 PROCEDURE — 99214 OFFICE O/P EST MOD 30 MIN: CPT | Performed by: UROLOGY

## 2022-01-03 PROCEDURE — 36415 COLL VENOUS BLD VENIPUNCTURE: CPT

## 2022-01-03 PROCEDURE — 85018 HEMOGLOBIN: CPT

## 2022-01-03 PROCEDURE — 99213 OFFICE O/P EST LOW 20 MIN: CPT | Performed by: INTERNAL MEDICINE

## 2022-01-03 PROCEDURE — 1036F TOBACCO NON-USER: CPT | Performed by: UROLOGY

## 2022-01-03 PROCEDURE — 4040F PNEUMOC VAC/ADMIN/RCVD: CPT | Performed by: INTERNAL MEDICINE

## 2022-01-03 PROCEDURE — 1123F ACP DISCUSS/DSCN MKR DOCD: CPT | Performed by: INTERNAL MEDICINE

## 2022-01-03 PROCEDURE — G8427 DOCREV CUR MEDS BY ELIG CLIN: HCPCS | Performed by: INTERNAL MEDICINE

## 2022-01-03 PROCEDURE — 1036F TOBACCO NON-USER: CPT | Performed by: INTERNAL MEDICINE

## 2022-01-03 RX ORDER — LANCETS
1 EACH MISCELLANEOUS DAILY
Qty: 100 EACH | Refills: 3 | Status: SHIPPED | OUTPATIENT
Start: 2022-01-03

## 2022-01-03 RX ORDER — BLOOD SUGAR DIAGNOSTIC
STRIP MISCELLANEOUS
Qty: 90 EACH | Refills: 3 | Status: SHIPPED | OUTPATIENT
Start: 2022-01-03

## 2022-01-03 RX ORDER — INSULIN GLARGINE 100 [IU]/ML
10 INJECTION, SOLUTION SUBCUTANEOUS NIGHTLY
Qty: 5 PEN | Refills: 3 | Status: SHIPPED | OUTPATIENT
Start: 2022-01-03 | End: 2022-02-17 | Stop reason: SDUPTHER

## 2022-01-03 RX ORDER — BLOOD PRESSURE TEST KIT
KIT MISCELLANEOUS
Qty: 100 EACH | Refills: 3 | Status: SHIPPED | OUTPATIENT
Start: 2022-01-03

## 2022-01-03 RX ORDER — GLIMEPIRIDE 4 MG/1
4 TABLET ORAL 2 TIMES DAILY
Qty: 180 TABLET | Refills: 3 | Status: SHIPPED | OUTPATIENT
Start: 2022-01-03 | End: 2022-02-14 | Stop reason: SDUPTHER

## 2022-01-03 RX ORDER — NITROGLYCERIN 0.4 MG/1
0.4 TABLET SUBLINGUAL EVERY 5 MIN PRN
Qty: 25 TABLET | Refills: 3 | Status: SHIPPED | OUTPATIENT
Start: 2022-01-03

## 2022-01-03 ASSESSMENT — ENCOUNTER SYMPTOMS
COUGH: 0
DIARRHEA: 0
BLOOD IN STOOL: 0
CONSTIPATION: 0
ABDOMINAL PAIN: 0
VOMITING: 0
BACK PAIN: 0
NAUSEA: 0
EYE PAIN: 0
SHORTNESS OF BREATH: 0

## 2022-01-03 NOTE — PROGRESS NOTES
Rojelio Ramirez MD        [place] [id number]  [address] [facility] [address] [id number]  Dept: [phone]  Dept Fax: [phone]  Loc: [phone]      [facility] Urology Office Note - Follow up Visit    Patient:  Fadia Jones  YOB: 1945  Date: 1/3/2022    The patient is a 68 y.o. male who presents today for evaluation of the following problems: BPH  Chief Complaint   Patient presents with    Benign Prostatic Hypertrophy     1 year with PSA        HISTORY OF PRESENT ILLNESS:     BPH  Not on meds  Very satisfied. No new infections. No incontinence. Nocturia x 1. Stream is strong. greenlight in past    Elevated PSA- stable. Oscillating    ED-  Not bothered. Tried medications in the past with no improvement. Previous records:  Overall the PSA level is: oscillating. Range 11-12  Recent history of urinary tract infection/prostatitis? no  Previous prostate biopsy? Yes x 6. NEG  Lower urinary tract symptoms: no  Previous patient of Dr. Cheryle Chalk --[facility]  Has had episodes of retention after alcohol use in past    Was in [place] vacation and went into retention. Failed voiding trial.    Requested/reviewed records from Yaw Vuong MD office and/or outside physician/EMR    (Patient's old records have been requested, reviewed and pertinent findings summarized in today's note.)    Procedures Today: N/A    Last several PSA's:  Lab Results   Component Value Date    PSA 13.28 (H) 12/20/2021    PSA 12.99 (H) 12/09/2020    PSA 11.03 (H) 12/09/2019       Last total testosterone:  No results found for: TESTOSTERONE    Urinalysis today:  No results found for this visit on 01/03/22.     Last BUN and creatinine:  Lab Results   Component Value Date    BUN 37 (H) 12/20/2021     Lab Results   Component Value Date    CREATININE 2.09 (H) 12/20/2021       Additional Lab/Culture results: none    Imaging Reviewed during this Office Visit:   Rojelio Ramirez MD independently reviewed the images and verified the radiology reports from:    No results found. PAST MEDICAL, FAMILY AND SOCIAL HISTORY:  Past Medical History:   Diagnosis Date    Benign prostatic hypertrophy     with elevated PSA.  BPPV (benign paroxysmal positional vertigo) 2/2008    Chest pain 05/2021    Coronary heart disease     Status post CABG.  Diverticulosis     Dupuytren's contracture     Mid finger, right hand.  Erectile dysfunction     Bowens catheter in place 11/02/2018    has had one in place for 6weeks, this one in place x 2 weeks    Gout     Quiescent.  History of blood transfusion     possible with open heart surgery 26years ago    Hyperlipidemia     Hypertension     Positive cardiac stress test 05/06/2021    Snores     Type II or unspecified type diabetes mellitus without mention of complication, not stated as uncontrolled      Past Surgical History:   Procedure Laterality Date    CARDIAC CATHETERIZATION  6/2005    Triple vessel CAD with patent LIMA to LAD, patent saphenous vein graft to first obtuse marginal, patent saphenous vein graft to posterior descending artery, occluded jump graft from posterior descending artery to posterior left ventricular branch.  CARDIAC CATHETERIZATION  3/16/16    CARDIAC CATHETERIZATION  05/07/2021    CATARACT REMOVAL Bilateral Nov. and Dec 2014    COLONOSCOPY  5/27/2008    Distal ileoscopy. Scattered diverticulosis and extensive diverticular disease of sigmoid colon and internal hemorrhoids.  COLONOSCOPY N/A 10/28/2019    COLONOSCOPY [facility]on [facility] at [facility] OR  severe diverticulosis    CORONARY ARTERY BYPASS GRAFT      ELBOW SURGERY Right     Surgical repair.  EYE SURGERY      KNEE ARTHROSCOPY Right 3/16/2007    Partial medial meniscectomy, partial medial and lateral synovectomy, light chondroplasty.     NV LASER VAPORIZATION SURGERY PROSTATE, COMPLETE N/A 11/2/2018    CYSTOSCOPY, TRANSURETHRAL RESECTION PROSTATE - GREENLIGHT XPS LASER [facility] # [id number] [facility]) performed by Damian Ross MD at [facility] Bilateral 9/15/2010, 2007    [facility] Dr. Lazarus Adam BIOPSY Bilateral 05/10/2001    Benign-Dr. Lola Belle    PROSTATE BIOPSY Bilateral 04/08/1999    Benign-Dr. Paulo White    PROSTATE BIOPSY Bilateral 06/18/1998    Benign-Dr. Curtis Martins    PROSTATE BIOPSY Bilateral 01/07/1998    Benign-Dr. Curtis Martins    PROSTATE BIOPSY Bilateral 10/04/1996    Benign-Dr. Ken Moser    PROSTATE SURGERY  11/02/2018    CYSTOSCOPY, TRANSURETHRAL RESECTION PROSTATE - GREENLIGHT     VASECTOMY       Family History   Problem Relation Age of Onset    Diabetes Mother     Kidney Disease Mother         Renal failure    Coronary Art Dis Mother     Hypertension Mother     Lung Cancer Father     Coronary Art Dis Father     Hypertension Brother     Other Brother         Hypernephroma     Outpatient Medications Marked as Taking for the 1/3/22 encounter (Office Visit) with Damian Ross MD   Medication Sig Dispense Refill    Semaglutide (RYBELSUS) 3 MG TABS Take 3 mg by mouth daily 30 tablet 0    ranolazine (RANEXA) 500 MG extended release tablet Take 500 mg by mouth 2 times daily      hydroCHLOROthiazide (HYDRODIURIL) 25 MG tablet Take 1 tablet by mouth every morning 90 tablet 3    Cyanocobalamin (VITAMIN B12 PO) Take 1 tablet by mouth daily       glimepiride (AMARYL) 4 MG tablet Take 1 tablet by mouth 2 times daily 180 tablet 3    atorvastatin (LIPITOR) 80 MG tablet Take 1 tablet by mouth daily 90 tablet 3    metoprolol tartrate (LOPRESSOR) 50 MG tablet Take 1 tablet by mouth 2 times daily 180 tablet 3    lisinopril (PRINIVIL;ZESTRIL) 40 MG tablet Take 1 tablet by mouth daily 90 tablet 3    blood glucose test strips (DURGA CONTOUR TEST) strip Tests blood sugar daily.  300 each 3    nitroGLYCERIN (NITROSTAT) 0.4 MG SL tablet Place 1 tablet under the tongue every 5 minutes as needed for Chest pain 25 tablet 3    ONE TOUCH ULTRASOFT LANCETS MISC 1 each by Does not apply route daily 100 each 3    aspirin 81 MG EC tablet Take 81 mg by mouth daily         Patient has no known allergies. Social History     Tobacco Use   Smoking Status Former Smoker    Packs/day: 0.50    Years: 27.00    Pack years: 13.50    Types: Cigarettes    Start date: 1968   Beto Porch Quit date: 10/11/1992    Years since quittin.2   Smokeless Tobacco Never Used      (If patient a smoker, smoking cessation counseling offered)   Social History     Substance and Sexual Activity   Alcohol Use Yes    Alcohol/week: 1.0 standard drink    Types: 1 Standard drinks or equivalent per week    Comment: 1 time per week       REVIEW OF SYSTEMS:  Constitutional: negative  Eyes: negative  Respiratory: negative  Cardiovascular: negative  Gastrointestinal: negative  Genitourinary: see HPI  Musculoskeletal: negative  Skin: negative   Neurological: negative  Hematological/Lymphatic: negative  Psychological: negative        Physical Exam:    This a 68 y.o. male  Vitals:    22 0826   BP: 134/74   Pulse: 86   SpO2: 98%     Body mass index is 28.68 kg/m². Constitutional: Patient in no acute distress;         Assessment and Plan        1. Benign prostatic hyperplasia, unspecified whether lower urinary tract symptoms present    2. Erectile dysfunction, unspecified erectile dysfunction type    3. Elevated PSA               Plan:      BPH- improvement in irritative symptoms. Otherwise doing well  ED- No treatment for ED. Elevated PSA- Follow up with PSA in one year. PSA oscillating. Did discuss this with patient. If still elevated in 15-20 range or above will get prostate MRI in six months      Prescriptions Ordered:  No orders of the defined types were placed in this encounter. Orders Placed:  No orders of the defined types were placed in this encounter.            John Peacock MD

## 2022-01-03 NOTE — PROGRESS NOTES
[place] [address]  Dept: [phone]  Dept Fax: [phone]  Loc: [phone]     Av Jones is a 68 y.o. male who presents today for his medical conditions/complaintsas noted below. Av Jones is c/o of   Chief Complaint   Patient presents with    Diabetes     3 month    Hypertension    Hyperlipidemia         HPI:     Diabetes  He presents for his follow-up diabetic visit. He has type 2 diabetes mellitus. His disease course has been fluctuating. Pertinent negatives for hypoglycemia include no confusion, dizziness, headaches, nervousness/anxiousness or pallor. Pertinent negatives for diabetes include no chest pain, no polydipsia, no polyuria and no weakness. Hypertension  This is a chronic problem. The current episode started more than 1 year ago. The problem has been waxing and waning since onset. The problem is controlled. Pertinent negatives include no chest pain, headaches, neck pain, palpitations or shortness of breath. Hyperlipidemia  This is a chronic problem. The current episode started more than 1 year ago. The problem is controlled. Recent lipid tests were reviewed and are variable. Pertinent negatives include no chest pain or shortness of breath. Hemoglobin A1C (%)   Date Value   09/28/2021 7.9 (H)   06/17/2021 7.3 (H)   03/16/2021 7.2 (H)            Microalb/Crt. Ratio (mcg/mg creat)   Date Value   12/09/2020 91 (H)     LDL Cholesterol (mg/dL)   Date Value   09/28/2021 40   09/11/2020 51   09/12/2019 43         AST (U/L)   Date Value   09/28/2021 33     ALT (U/L)   Date Value   09/28/2021 40     BUN (mg/dL)   Date Value   12/20/2021 37 (H)     BP Readings from Last 3 Encounters:   01/03/22 136/78   01/03/22 134/74   12/16/21 132/82              Past Medical History:   Diagnosis Date    Benign prostatic hypertrophy     with elevated PSA.     BPPV (benign paroxysmal positional vertigo) 2/2008    Chest pain 05/2021    Coronary heart disease     Status post CABG.  Diverticulosis     Dupuytren's contracture     Mid finger, right hand.  Erectile dysfunction     Bowens catheter in place 11/02/2018    has had one in place for 6weeks, this one in place x 2 weeks    Gout     Quiescent.  History of blood transfusion     possible with open heart surgery 26years ago    Hyperlipidemia     Hypertension     Positive cardiac stress test 05/06/2021    Snores     Type II or unspecified type diabetes mellitus without mention of complication, not stated as uncontrolled       Past Surgical History:   Procedure Laterality Date    CARDIAC CATHETERIZATION  6/2005    Triple vessel CAD with patent LIMA to LAD, patent saphenous vein graft to first obtuse marginal, patent saphenous vein graft to posterior descending artery, occluded jump graft from posterior descending artery to posterior left ventricular branch.  CARDIAC CATHETERIZATION  3/16/16    CARDIAC CATHETERIZATION  05/07/2021    CATARACT REMOVAL Bilateral Nov. and Dec 2014    COLONOSCOPY  5/27/2008    Distal ileoscopy. Scattered diverticulosis and extensive diverticular disease of sigmoid colon and internal hemorrhoids.  COLONOSCOPY N/A 10/28/2019    COLONOSCOPY performed by Sh[facility]ir Palma [facility] OR  severe diverticulosis    CORONARY ARTERY BYPASS GRAFT      ELBOW SURGERY Right     Surgical repair.  EYE SURGERY      KNEE ARTHROSCOPY Right 3/16/2007    Partial medial meniscectomy, partial medial and lateral synovectomy, light chondroplasty.     MI LASER VAPORIZATION SURGERY PROSTATE, COMPLETE N/A 11/2/2018    CYSTOSCOPY, TRANSURETHRAL RESECTION PROSTATE - [facility] # [id number] HAILEE) performed by Alvaro Breaux MD at [facility] Bilateral 9/15/2010, 2007    [facility] Dr. Shruti Araiza PROSTATE BIOPSY Bilateral 05/10/2001    Benign-Dr. Juju Layton    PROSTATE BIOPSY Bilateral 04/08/1999 Benign-Dr. Paulo White    PROSTATE BIOPSY Bilateral 1998    Benign-Dr. Curtis Martins    PROSTATE BIOPSY Bilateral 1998    Benign-Dr. Curtis Martins    PROSTATE BIOPSY Bilateral 10/04/1996    Benign-Dr. Ken Moser    PROSTATE SURGERY  2018    CYSTOSCOPY, TRANSURETHRAL RESECTION PROSTATE - [facility]     VASECTOMY         Family History   Problem Relation Age of Onset    Diabetes Mother     Kidney Disease Mother         Renal failure    Coronary Art Dis Mother     Hypertension Mother     Lung Cancer Father     Coronary Art Dis Father     Hypertension Brother     Other Brother         Hypernephroma          Social History     Tobacco Use    Smoking status: Former Smoker     Packs/day: 0.50     Years: 27.00     Pack years: 13.50     Types: Cigarettes     Start date: 1968     Quit date: 10/11/1992     Years since quittin.2    Smokeless tobacco: Never Used   Substance Use Topics    Alcohol use:  Yes     Alcohol/week: 1.0 standard drink     Types: 1 Standard drinks or equivalent per week     Comment: 1 time per week         Current Outpatient Medications   Medication Sig Dispense Refill    insulin glargine (LANTUS SOLOSTAR) 100 UNIT/ML injection pen Inject 10 Units into the skin nightly 5 pen 3    Insulin Pen Needle (PEN NEEDLES) 32G X 5 MM MISC Use 1 pen needle nightly 90 each 3    glimepiride (AMARYL) 4 MG tablet Take 1 tablet by mouth 2 times daily 180 tablet 3    nitroGLYCERIN (NITROSTAT) 0.4 MG SL tablet Place 1 tablet under the tongue every 5 minutes as needed for Chest pain 25 tablet 3    ONE TOUCH ULTRASOFT LANCETS MISC 1 each by Does not apply route daily 100 each 3    blood glucose test strips (ASCENSIA AUTODISC VI;ONE TOUCH ULTRA TEST VI) strip Test 1 x daily 100 each 3    Alcohol Swabs PADS Use 1 nightly with lantus 100 each 3    Semaglutide (RYBELSUS) 3 MG TABS Take 3 mg by mouth daily 30 tablet 0    amLODIPine (NORVASC) 10 MG tablet Take 1 tablet by mouth daily (Patient taking differently: Take 5 mg by mouth daily ) 30 tablet 3    ranolazine (RANEXA) 500 MG extended release tablet Take 500 mg by mouth 2 times daily      hydroCHLOROthiazide (HYDRODIURIL) 25 MG tablet Take 1 tablet by mouth every morning 90 tablet 3    Cyanocobalamin (VITAMIN B12 PO) Take 1 tablet by mouth daily       atorvastatin (LIPITOR) 80 MG tablet Take 1 tablet by mouth daily 90 tablet 3    metoprolol tartrate (LOPRESSOR) 50 MG tablet Take 1 tablet by mouth 2 times daily 180 tablet 3    lisinopril (PRINIVIL;ZESTRIL) 40 MG tablet Take 1 tablet by mouth daily 90 tablet 3    blood glucose test strips (DURGA CONTOUR TEST) strip Tests blood sugar daily. 300 each 3    aspirin 81 MG EC tablet Take 81 mg by mouth daily       No current facility-administered medications for this visit. No Known Allergies    Health Maintenance   Topic Date Due    Hepatitis C screen  Never done    COVID-19 Vaccine (1) Never done    DTaP/Tdap/Td vaccine (1 - Tdap) Never done    Shingles Vaccine (2 of 3) 06/23/2008    Depression Screen  06/28/2022    Annual Wellness Visit (AWV)  06/29/2022    Lipid screen  09/28/2022    Potassium monitoring  12/20/2022    Creatinine monitoring  12/20/2022    PSA counseling  12/20/2022    Flu vaccine  Completed    Pneumococcal 65+ years Vaccine  Completed    Hepatitis A vaccine  Aged Out    Hib vaccine  Aged Out    Meningococcal (ACWY) vaccine  Aged Out       Subjective:      Review of Systems   Constitutional: Negative for chills and fever. HENT: Negative for hearing loss. Eyes: Negative for pain and visual disturbance. Respiratory: Negative for cough and shortness of breath. Cardiovascular: Negative for chest pain, palpitations and leg swelling. Gastrointestinal: Negative for abdominal pain, blood in stool, constipation, diarrhea, nausea and vomiting. Endocrine: Negative for cold intolerance, polydipsia and polyuria.    Genitourinary: Negative for difficulty urinating, dysuria and hematuria. Musculoskeletal: Negative for arthralgias, back pain, gait problem and neck pain. Skin: Negative for pallor and rash. Neurological: Negative for dizziness, weakness, numbness and headaches. Hematological: Negative for adenopathy. Does not bruise/bleed easily. Psychiatric/Behavioral: Negative for confusion. The patient is not nervous/anxious. Objective:     Physical Exam  Vitals reviewed. Constitutional:       Appearance: He is well-developed. HENT:      Head: Normocephalic and atraumatic. Eyes:      Pupils: Pupils are equal, round, and reactive to light. Cardiovascular:      Rate and Rhythm: Normal rate and regular rhythm. Heart sounds: No murmur heard. No friction rub. No gallop. Pulmonary:      Effort: Pulmonary effort is normal.      Breath sounds: Normal breath sounds. No wheezing or rales. Abdominal:      General: There is no distension. Palpations: Abdomen is soft. There is no mass. Tenderness: There is no abdominal tenderness. There is no rebound. Musculoskeletal:         General: Normal range of motion. Cervical back: Neck supple. Lymphadenopathy:      Cervical: No cervical adenopathy. Skin:     General: Skin is warm and dry. Findings: No rash. Neurological:      Mental Status: He is alert and oriented to person, place, and time. Cranial Nerves: No cranial nerve deficit (grossly). Psychiatric:         Thought Content: Thought content normal.        /78 (Site: Left Upper Arm, Position: Sitting, Cuff Size: Large Adult)   Pulse 86   Resp 16   Ht 5' 8\" (1.727 m)   Wt 188 lb (85.3 kg)   BMI 28.59 kg/m²     Assessment:       Diagnosis Orders   1.  Type 2 diabetes mellitus without complication, without long-term current use of insulin ([facility])  Hemoglobin A1C    insulin glargine (LANTUS SOLOSTAR) 100 UNIT/ML injection pen    Insulin Pen Needle (PEN NEEDLES) 32G X 5 MM MISC    glimepiride (AMARYL) 4 MG tablet    ONE TOUCH ULTRASOFT LANCETS MISC    blood glucose test strips (ASCENSIA AUTODISC VI;ONE TOUCH ULTRA TEST VI) strip    Alcohol Swabs PADS   2. Coronary artery disease involving native heart with angina pectoris, unspecified vessel or lesion type (HCC)  nitroGLYCERIN (NITROSTAT) 0.4 MG SL tablet   3. Other hyperlipidemia     4. Primary hypertension  Basic Metabolic Panel   5. Iron deficiency anemia, unspecified iron deficiency anemia type  Hemoglobin    Iron And TIBC      I reviewed multiple test results for this appointment. 12/20/2021 high glucose at 478. His home readings again has been over 300 and close to 400.    9/28/2021 normal total cholesterol 92.    12/20/2021 very slightly high diagnostic PSA at 13.28 (was 12.99 and this is followed by the urologist). We are adding Lantus 10 units nightly to help better control his diabetes. He will continue to monitor glucose numbers at home and tell us how he is doing. Plan:       Return in about 13 weeks (around 4/4/2022) for Hypertension, Hyperlipidemia, Diabetes. Orders Placed This Encounter   Procedures    Basic Metabolic Panel     Standing Status:   Future     Standing Expiration Date:   1/3/2023    Hemoglobin     Standing Status:   Future     Standing Expiration Date:   1/3/2023    Hemoglobin A1C     Standing Status:   Future     Standing Expiration Date:   1/3/2023    Iron And TIBC     Standing Status:   Future     Standing Expiration Date:   1/3/2023     Order Specific Question:   Is Patient Fasting? Answer:   no     Order Specific Question:   No of Hours?      Answer:   0     Orders Placed This Encounter   Medications    insulin glargine (LANTUS SOLOSTAR) 100 UNIT/ML injection pen     Sig: Inject 10 Units into the skin nightly     Dispense:  5 pen     Refill:  3    Insulin Pen Needle (PEN NEEDLES) 32G X 5 MM MISC     Sig: Use 1 pen needle nightly     Dispense:  90 each     Refill:  3    glimepiride (AMARYL) 4 MG tablet     Sig: Take 1

## 2022-01-04 DIAGNOSIS — E53.8 VITAMIN B12 DEFICIENCY: ICD-10-CM

## 2022-01-04 DIAGNOSIS — E03.9 HYPOTHYROIDISM, UNSPECIFIED TYPE: ICD-10-CM

## 2022-01-04 DIAGNOSIS — D64.9 ANEMIA, UNSPECIFIED TYPE: Primary | ICD-10-CM

## 2022-01-11 ENCOUNTER — OFFICE VISIT (OUTPATIENT)
Dept: DIABETES SERVICES | Age: 77
End: 2022-01-11
Payer: MEDICARE

## 2022-01-11 VITALS
SYSTOLIC BLOOD PRESSURE: 92 MMHG | BODY MASS INDEX: 28.49 KG/M2 | DIASTOLIC BLOOD PRESSURE: 60 MMHG | RESPIRATION RATE: 14 BRPM | HEART RATE: 64 BPM | HEIGHT: 68 IN | WEIGHT: 188 LBS

## 2022-01-11 DIAGNOSIS — I10 PRIMARY HYPERTENSION: ICD-10-CM

## 2022-01-11 DIAGNOSIS — E11.9 TYPE 2 DIABETES MELLITUS WITHOUT COMPLICATION, WITHOUT LONG-TERM CURRENT USE OF INSULIN (HCC): Primary | ICD-10-CM

## 2022-01-11 DIAGNOSIS — E78.49 OTHER HYPERLIPIDEMIA: ICD-10-CM

## 2022-01-11 PROCEDURE — G8427 DOCREV CUR MEDS BY ELIG CLIN: HCPCS | Performed by: NURSE PRACTITIONER

## 2022-01-11 PROCEDURE — G8484 FLU IMMUNIZE NO ADMIN: HCPCS | Performed by: NURSE PRACTITIONER

## 2022-01-11 PROCEDURE — 4040F PNEUMOC VAC/ADMIN/RCVD: CPT | Performed by: NURSE PRACTITIONER

## 2022-01-11 PROCEDURE — 1123F ACP DISCUSS/DSCN MKR DOCD: CPT | Performed by: NURSE PRACTITIONER

## 2022-01-11 PROCEDURE — 99214 OFFICE O/P EST MOD 30 MIN: CPT | Performed by: NURSE PRACTITIONER

## 2022-01-11 PROCEDURE — G8417 CALC BMI ABV UP PARAM F/U: HCPCS | Performed by: NURSE PRACTITIONER

## 2022-01-11 PROCEDURE — 1036F TOBACCO NON-USER: CPT | Performed by: NURSE PRACTITIONER

## 2022-01-11 PROCEDURE — 99205 OFFICE O/P NEW HI 60 MIN: CPT | Performed by: NURSE PRACTITIONER

## 2022-01-11 NOTE — PROGRESS NOTES
[facility] [address] INTERNAL  [facility]  [address], [address]  [place] [address] [facility]  [phone]        HISTORY:    Tiffani Macdonald presents today for evaluation and management of:  Chief Complaint   Patient presents with    New Patient     DX 6-7 years ago. Recent UC visit and BS were high. HPI    Interval History:    Past DM Medications   Metformin- ckd  Rybelsus- ineffective    Current Diabetic Medications  Lantus 10 units daily, glimepiride 4 mg bid     DKA episodes: 0      01/12/22   Tiffani Macdonald is a 68 y.o. male patient who presents to clinic today for follow up on his diabetes. he has a history of CAD, hypertension, hyperlipidemia and obesity which contributes to his diabetes. He is here for initial diabetes management he was recently started on insulin he denies any signs or symptoms of hyper/hypoglycemia. he states they are taking their medications as prescribed without any adverse effects. Diet: regular  Exercise: none  BS testing: once daily 150  Issues: denies   Diabetic foot exam up-to-date: Yes  Diabetic retinal exam up-to-date: Yes  Hypoglycemia as needed treatment: snack    High cholesterol-  Takes lipitor and denies any adverse effects with its use. Watches diet and exercise. Hypertension-  Takes lopressor, lisniopril and denies any adverse effects with their use. Watches diet and exercise. Denies any chest pain, dizziness or edema. Obesity- Working on weight loss. Past Medical History:   Diagnosis Date    Benign prostatic hypertrophy     with elevated PSA.  BPPV (benign paroxysmal positional vertigo) 2/2008    Chest pain 05/2021    Coronary heart disease     Status post CABG.  Diverticulosis     Dupuytren's contracture     Mid finger, right hand.  Erectile dysfunction     Bowens catheter in place 11/02/2018    has had one in place for 6weeks, this one in place x 2 weeks    Gout     Quiescent.  History of blood transfusion     possible with open heart surgery 26years ago    Hyperlipidemia     Hypertension     Positive cardiac stress test 2021    Snores     Type II or unspecified type diabetes mellitus without mention of complication, not stated as uncontrolled      Family History   Problem Relation Age of Onset    Diabetes Mother     Kidney Disease Mother         Renal failure    Coronary Art Dis Mother     Hypertension Mother     Lung Cancer Father     Coronary Art Dis Father     Hypertension Brother     Other Brother         Hypernephroma     Social History     Tobacco Use    Smoking status: Former Smoker     Packs/day: 0.50     Years: 27.00     Pack years: 13.50     Types: Cigarettes     Start date: 1968     Quit date: 10/11/1992     Years since quittin.2    Smokeless tobacco: Never Used   Vaping Use    Vaping Use: Never used   Substance Use Topics    Alcohol use:  Yes     Alcohol/week: 1.0 standard drink     Types: 1 Standard drinks or equivalent per week     Comment: 1 time per week    Drug use: No     No Known Allergies    MEDICATIONS:  Current Outpatient Medications   Medication Sig Dispense Refill    insulin glargine (LANTUS SOLOSTAR) 100 UNIT/ML injection pen Inject 10 Units into the skin nightly 5 pen 3    glimepiride (AMARYL) 4 MG tablet Take 1 tablet by mouth 2 times daily 180 tablet 3    amLODIPine (NORVASC) 10 MG tablet Take 1 tablet by mouth daily (Patient taking differently: Take 5 mg by mouth daily ) 30 tablet 3    ranolazine (RANEXA) 500 MG extended release tablet Take 500 mg by mouth 2 times daily      hydroCHLOROthiazide (HYDRODIURIL) 25 MG tablet Take 1 tablet by mouth every morning 90 tablet 3    Cyanocobalamin (VITAMIN B12 PO) Take 1 tablet by mouth daily       atorvastatin (LIPITOR) 80 MG tablet Take 1 tablet by mouth daily 90 tablet 3    metoprolol tartrate (LOPRESSOR) 50 MG tablet Take 1 tablet by mouth 2 times daily 180 tablet 3    lisinopril (PRINIVIL;ZESTRIL) 40 MG tablet Take 1 tablet by mouth daily 90 tablet 3    aspirin 81 MG EC tablet Take 81 mg by mouth daily      Insulin Pen Needle (PEN NEEDLES) 32G X 5 MM MISC Use 1 pen needle nightly 90 each 3    nitroGLYCERIN (NITROSTAT) 0.4 MG SL tablet Place 1 tablet under the tongue every 5 minutes as needed for Chest pain 25 tablet 3    ONE TOUCH ULTRASOFT LANCETS MISC 1 each by Does not apply route daily 100 each 3    blood glucose test strips (ASCENSIA AUTODISC VI;ONE TOUCH ULTRA TEST VI) strip Test 1 x daily 100 each 3    Alcohol Swabs PADS Use 1 nightly with lantus 100 each 3    blood glucose test strips (DURGA CONTOUR TEST) strip Tests blood sugar daily. 300 each 3     No current facility-administered medications for this visit. Review ofSymptoms:  Review of Systems   Constitutional: Positive for fatigue. Negative for unexpected weight change. Eyes: Negative for visual disturbance. Respiratory: Negative. Negative for shortness of breath. Cardiovascular: Negative for chest pain and leg swelling. Gastrointestinal: Negative for abdominal pain and diarrhea. Endocrine: Negative for polydipsia, polyphagia and polyuria. Genitourinary: Negative. Musculoskeletal: Negative. Skin: Negative for rash and wound. Neurological: Negative for dizziness, tremors, seizures and headaches. Psychiatric/Behavioral: Negative. Negative for confusion and decreased concentration. Theremainder of a complete 14-point review of systems is negative. Vital Signs: BP 92/60 (Site: Right Upper Arm, Position: Sitting, Cuff Size: Large Adult)   Pulse 64   Resp 14   Ht 5' 8\" (1.727 m)   Wt 188 lb (85.3 kg)   BMI 28.59 kg/m²      Wt Readings from Last 3 Encounters:   01/11/22 188 lb (85.3 kg)   01/03/22 188 lb (85.3 kg)   01/03/22 188 lb 9.6 oz (85.5 kg)     Body mass index is 28.59 kg/m².   LABS:  Hemoglobin A1C   Date Value Ref Range Status   01/03/2022 10.0 (H) 4.0 - 6.0 % Final   09/28/2021 7.9 (H) 4.0 - 6.0 % Final     Lab Results   Component Value Date    LABMICR 106 (H) 01/03/2022     Lab Results   Component Value Date     01/03/2022    K 4.9 01/03/2022     01/03/2022    CO2 26 01/03/2022    BUN 30 (H) 01/03/2022    CREATININE 1.80 (H) 01/03/2022    GLUCOSE 405 (H) 01/03/2022    CALCIUM 9.6 01/03/2022    PROT 6.8 09/28/2021    LABALBU 4.2 09/28/2021    BILITOT 0.91 09/28/2021    ALKPHOS 59 09/28/2021    AST 33 09/28/2021    ALT 40 09/28/2021    LABGLOM 37 (L) 01/03/2022    GFRAA 45 (L) 01/03/2022     Lab Results   Component Value Date    CHOL 92 09/28/2021    CHOL 100 09/11/2020    CHOL 96 09/12/2019     Lab Results   Component Value Date    TRIG 106 09/28/2021    TRIG 85 09/11/2020    TRIG 110 09/12/2019     Lab Results   Component Value Date    HDL 31 (L) 09/28/2021    HDL 32 (L) 09/11/2020    HDL 31 (L) 09/12/2019     Lab Results   Component Value Date    LDLCHOLESTEROL 40 09/28/2021    LDLCHOLESTEROL 51 09/11/2020    LDLCHOLESTEROL 43 09/12/2019     Lab Results   Component Value Date    VLDL NOT REPORTED 09/28/2021    VLDL NOT REPORTED 09/11/2020    VLDL NOT REPORTED 09/12/2019     Lab Results   Component Value Date    CHOLHDLRATIO 3.0 09/28/2021    CHOLHDLRATIO 3.1 09/11/2020    CHOLHDLRATIO 3.1 09/12/2019           Physical Exam  Constitutional:       Appearance: He is well-developed. Eyes:      Pupils: Pupils are equal, round, and reactive to light. Cardiovascular:      Rate and Rhythm: Normal rate and regular rhythm. Pulmonary:      Effort: Pulmonary effort is normal.      Breath sounds: Normal breath sounds. Skin:     General: Skin is warm and dry. Findings: No lesion (no lipohypertrophy) or rash. Neurological:      Mental Status: He is alert and oriented to person, place, and time. Sensory: No sensory deficit. Psychiatric:         Speech: Speech normal.         Behavior: Behavior normal.         Thought Content:  Thought content normal. Judgment: Judgment normal.           ASSESSMENT/PLAN:     Diagnosis Orders   1. Type 2 diabetes mellitus without complication, without long-term current use of insulin ([facility] [address]     2. Other hyperlipidemia     3. Primary hypertension       No orders of the defined types were placed in this encounter. No orders of the defined types were placed in this encounter. Requested Prescriptions      No prescriptions requested or ordered in this encounter       1. Type 2 diabetes mellitus without complication, without long-term current use of insulin (HCC)  - Unstable  HbA1C goal is less than 7%. - Fasting blood glucose goal is 70-120mg/dl and postprandial blood sugar goal is less than 180 mg/dl. - Labs reviewed including most recent A1c, microalbumin and kidney function. Repeat labs due in 2 months.    -We discussed in great detail dietary modifications they can make to better improve their blood sugars. --Initial diabetic education completed. Discussed diabetes as a disease and how we can manage it to prevent complications associated with it.   -continue current therapy monitor for hypoglycemia we will continue to work on lifestyle changes. Discussed signs and symptoms of hyper/hypoglycemia and how to treat. Encouraged 150 minutes of physical activity per week. Follow a low carbohydrate diet. Encouraged at least 7 hours of sleep. The patient was informed of the goals of diabetes management. This can only be accomplished by watching their diet and exercise levels. We certainly use medicines to help attain these goals. The consequences of not controlling blood sugars were discussed. These include blindness, heart disease, stroke, kidney disease, and possibly need for dialysis. They were told to be careful with their foot care as diabetics often have nerve damage, infections and risk for limb amutations . They also need a dilated eye exam yearly.  We discussed the issues of diet, exercise, medication, complication avoidance, reviewed the signs and symptoms of diabetes, hypoglycemic episodes, significance of HbA1C.         2. Other hyperlipidemia  stable, lipid panel reviewed, continue current medications. Diet and exercise      3. Primary hypertension   stable, continue current medications. Diet and exercise Seek emergent care if chest pain develops. Answered all patient questions. Agrees to follow plan of care and to follow up in 1 months, sooner if needed. Call office if unexplained blood sugars less than 70 occur or above 400. Call office or access MyChart with any further questions or concerns. Be sure to bring glucometer/food log at next appointment. Total time spent reviewing chart, labs, counseling patient and documenting on the date of the encounter: 60 min.       Electronically signed by AMANDA Monsivais CNP on 1/12/2022 at 5:42 PM      (Please note that portions of this note were completed with a voice-recognition program. Efforts were made to edit the dictation but occasionally words are mis-transcribed.)

## 2022-01-12 ASSESSMENT — ENCOUNTER SYMPTOMS
SHORTNESS OF BREATH: 0
RESPIRATORY NEGATIVE: 1
DIARRHEA: 0
ABDOMINAL PAIN: 0

## 2022-02-01 ENCOUNTER — HOSPITAL ENCOUNTER (OUTPATIENT)
Dept: LAB | Age: 77
Discharge: HOME | End: 2022-02-01
Payer: MEDICARE

## 2022-02-01 DIAGNOSIS — E03.9 HYPOTHYROIDISM, UNSPECIFIED TYPE: ICD-10-CM

## 2022-02-01 DIAGNOSIS — D64.9 ANEMIA, UNSPECIFIED TYPE: ICD-10-CM

## 2022-02-01 DIAGNOSIS — E53.8 VITAMIN B12 DEFICIENCY: ICD-10-CM

## 2022-02-01 LAB
HEMOGLOBIN: 10.7 G/DL (ref 13–17)
TSH SERPL DL<=0.05 MIU/L-ACNC: 2.03 MIU/L (ref 0.3–5)

## 2022-02-01 PROCEDURE — 83550 IRON BINDING TEST: CPT

## 2022-02-01 PROCEDURE — 82746 ASSAY OF FOLIC ACID SERUM: CPT

## 2022-02-01 PROCEDURE — 82607 VITAMIN B-12: CPT

## 2022-02-01 PROCEDURE — 36415 COLL VENOUS BLD VENIPUNCTURE: CPT

## 2022-02-01 PROCEDURE — 85018 HEMOGLOBIN: CPT

## 2022-02-01 PROCEDURE — 83540 ASSAY OF IRON: CPT

## 2022-02-01 PROCEDURE — 84443 ASSAY THYROID STIM HORMONE: CPT

## 2022-02-02 LAB
FOLATE: 11.4 NG/ML
IRON SATURATION: 32 % (ref 20–55)
IRON: 73 UG/DL (ref 59–158)
TOTAL IRON BINDING CAPACITY: 230 UG/DL (ref 250–450)
UNSATURATED IRON BINDING CAPACITY: 157 UG/DL (ref 112–347)
VITAMIN B-12: 1599 PG/ML (ref 232–1245)

## 2022-02-04 ENCOUNTER — TELEPHONE (OUTPATIENT)
Dept: CARDIOLOGY | Age: 77
End: 2022-02-04

## 2022-02-04 ENCOUNTER — TELEPHONE (OUTPATIENT)
Dept: INTERNAL MEDICINE | Age: 77
End: 2022-02-04

## 2022-02-04 DIAGNOSIS — D64.9 ANEMIA, UNSPECIFIED TYPE: Primary | ICD-10-CM

## 2022-02-04 NOTE — TELEPHONE ENCOUNTER
Pt calling. He cannot afford Ranexa. It is $500. His question is does he need both amlodipine and ranexa? He reports that he was using his  for about 30 mins today and did ok. He says he had angina before but not recently. He has a bad hip and has been walking less. He will wait for Dr. Randolph Mattson to review next week.

## 2022-02-14 ENCOUNTER — HOSPITAL ENCOUNTER (OUTPATIENT)
Dept: LAB | Age: 77
Discharge: HOME | End: 2022-02-14
Payer: MEDICARE

## 2022-02-14 ENCOUNTER — OFFICE VISIT (OUTPATIENT)
Dept: ONCOLOGY | Age: 77
End: 2022-02-14
Payer: MEDICARE

## 2022-02-14 VITALS
HEIGHT: 68 IN | TEMPERATURE: 97.2 F | HEART RATE: 60 BPM | BODY MASS INDEX: 28.98 KG/M2 | DIASTOLIC BLOOD PRESSURE: 70 MMHG | OXYGEN SATURATION: 98 % | WEIGHT: 191.2 LBS | RESPIRATION RATE: 16 BRPM | SYSTOLIC BLOOD PRESSURE: 128 MMHG

## 2022-02-14 DIAGNOSIS — D64.9 ANEMIA, UNSPECIFIED TYPE: ICD-10-CM

## 2022-02-14 DIAGNOSIS — E11.9 TYPE 2 DIABETES MELLITUS WITHOUT COMPLICATION, WITHOUT LONG-TERM CURRENT USE OF INSULIN (HCC): ICD-10-CM

## 2022-02-14 DIAGNOSIS — D64.9 ANEMIA, UNSPECIFIED TYPE: Primary | ICD-10-CM

## 2022-02-14 LAB
ABSOLUTE EOS #: 0.29 K/UL (ref 0–0.44)
ABSOLUTE IMMATURE GRANULOCYTE: 0.03 K/UL (ref 0–0.3)
ABSOLUTE LYMPH #: 1.31 K/UL (ref 1.1–3.7)
ABSOLUTE MONO #: 0.57 K/UL (ref 0.1–1.2)
ABSOLUTE RETIC #: 0.08 M/UL (ref 0.03–0.08)
ALBUMIN SERPL-MCNC: 4.4 G/DL (ref 3.5–5.2)
ALBUMIN/GLOBULIN RATIO: 1.6 (ref 1–2.5)
ALP BLD-CCNC: 64 U/L (ref 40–129)
ALT SERPL-CCNC: 20 U/L (ref 5–41)
ANION GAP SERPL CALCULATED.3IONS-SCNC: 10 MMOL/L (ref 9–17)
AST SERPL-CCNC: 17 U/L
BASOPHILS # BLD: 1 % (ref 0–2)
BASOPHILS ABSOLUTE: 0.04 K/UL (ref 0–0.2)
BILIRUB SERPL-MCNC: 0.86 MG/DL (ref 0.3–1.2)
BUN BLDV-MCNC: 35 MG/DL (ref 8–23)
BUN/CREAT BLD: 15 (ref 9–20)
CALCIUM SERPL-MCNC: 9.4 MG/DL (ref 8.6–10.4)
CHLORIDE BLD-SCNC: 107 MMOL/L (ref 98–107)
CO2: 26 MMOL/L (ref 20–31)
CREAT SERPL-MCNC: 2.28 MG/DL (ref 0.7–1.2)
DIFFERENTIAL TYPE: ABNORMAL
EOSINOPHILS RELATIVE PERCENT: 5 % (ref 1–4)
FREE KAPPA/LAMBDA RATIO: 1.97 (ref 0.26–1.65)
GFR AFRICAN AMERICAN: 34 ML/MIN
GFR NON-AFRICAN AMERICAN: 28 ML/MIN
GFR SERPL CREATININE-BSD FRML MDRD: ABNORMAL ML/MIN/{1.73_M2}
GFR SERPL CREATININE-BSD FRML MDRD: ABNORMAL ML/MIN/{1.73_M2}
GLUCOSE BLD-MCNC: 148 MG/DL (ref 70–99)
HAPTOGLOBIN: 228 MG/DL (ref 30–200)
HCT VFR BLD CALC: 34.1 % (ref 40.7–50.3)
HEMOGLOBIN: 11.1 G/DL (ref 13–17)
IGA: 290 MG/DL (ref 70–400)
IGG: 780 MG/DL (ref 700–1600)
IGM: 35 MG/DL (ref 40–230)
IMMATURE GRANULOCYTES: 1 %
IMMATURE RETIC FRACT: 17 % (ref 2.7–18.3)
KAPPA FREE LIGHT CHAINS QNT: 4.88 MG/DL (ref 0.37–1.94)
LACTATE DEHYDROGENASE: 166 U/L (ref 135–225)
LAMBDA FREE LIGHT CHAINS QNT: 2.48 MG/DL (ref 0.57–2.63)
LYMPHOCYTES # BLD: 22 % (ref 24–43)
MCH RBC QN AUTO: 31.5 PG (ref 25.2–33.5)
MCHC RBC AUTO-ENTMCNC: 32.6 G/DL (ref 25.2–33.5)
MCV RBC AUTO: 96.9 FL (ref 82.6–102.9)
MONOCYTES # BLD: 10 % (ref 3–12)
NRBC AUTOMATED: 0 PER 100 WBC
PDW BLD-RTO: 13.3 % (ref 11.8–14.4)
PLATELET # BLD: 184 K/UL (ref 138–453)
PLATELET ESTIMATE: ABNORMAL
PMV BLD AUTO: 10 FL (ref 8.1–13.5)
POTASSIUM SERPL-SCNC: 4.8 MMOL/L (ref 3.7–5.3)
RBC # BLD: 3.52 M/UL (ref 4.21–5.77)
RBC # BLD: ABNORMAL 10*6/UL
RETIC %: 2.2 % (ref 0.5–1.9)
RETIC HEMOGLOBIN: 35.2 PG (ref 28.2–35.7)
SEG NEUTROPHILS: 61 % (ref 36–65)
SEGMENTED NEUTROPHILS ABSOLUTE COUNT: 3.73 K/UL (ref 1.5–8.1)
SODIUM BLD-SCNC: 143 MMOL/L (ref 135–144)
TOTAL PROTEIN: 7.1 G/DL (ref 6.4–8.3)
WBC # BLD: 6 K/UL (ref 3.5–11.3)
WBC # BLD: ABNORMAL 10*3/UL

## 2022-02-14 PROCEDURE — 80053 COMPREHEN METABOLIC PANEL: CPT

## 2022-02-14 PROCEDURE — G8417 CALC BMI ABV UP PARAM F/U: HCPCS | Performed by: INTERNAL MEDICINE

## 2022-02-14 PROCEDURE — 83615 LACTATE (LD) (LDH) ENZYME: CPT

## 2022-02-14 PROCEDURE — 1036F TOBACCO NON-USER: CPT | Performed by: INTERNAL MEDICINE

## 2022-02-14 PROCEDURE — G8427 DOCREV CUR MEDS BY ELIG CLIN: HCPCS | Performed by: INTERNAL MEDICINE

## 2022-02-14 PROCEDURE — 83883 ASSAY NEPHELOMETRY NOT SPEC: CPT

## 2022-02-14 PROCEDURE — 83010 ASSAY OF HAPTOGLOBIN QUANT: CPT

## 2022-02-14 PROCEDURE — 82784 ASSAY IGA/IGD/IGG/IGM EACH: CPT

## 2022-02-14 PROCEDURE — 85045 AUTOMATED RETICULOCYTE COUNT: CPT

## 2022-02-14 PROCEDURE — 36415 COLL VENOUS BLD VENIPUNCTURE: CPT

## 2022-02-14 PROCEDURE — 1123F ACP DISCUSS/DSCN MKR DOCD: CPT | Performed by: INTERNAL MEDICINE

## 2022-02-14 PROCEDURE — 4040F PNEUMOC VAC/ADMIN/RCVD: CPT | Performed by: INTERNAL MEDICINE

## 2022-02-14 PROCEDURE — 82668 ASSAY OF ERYTHROPOIETIN: CPT

## 2022-02-14 PROCEDURE — G8484 FLU IMMUNIZE NO ADMIN: HCPCS | Performed by: INTERNAL MEDICINE

## 2022-02-14 PROCEDURE — 84155 ASSAY OF PROTEIN SERUM: CPT

## 2022-02-14 PROCEDURE — 99204 OFFICE O/P NEW MOD 45 MIN: CPT | Performed by: INTERNAL MEDICINE

## 2022-02-14 PROCEDURE — 86334 IMMUNOFIX E-PHORESIS SERUM: CPT

## 2022-02-14 PROCEDURE — 99203 OFFICE O/P NEW LOW 30 MIN: CPT | Performed by: INTERNAL MEDICINE

## 2022-02-14 PROCEDURE — 84165 PROTEIN E-PHORESIS SERUM: CPT

## 2022-02-14 PROCEDURE — 85025 COMPLETE CBC W/AUTO DIFF WBC: CPT

## 2022-02-14 RX ORDER — GLIMEPIRIDE 4 MG/1
4 TABLET ORAL 2 TIMES DAILY
Qty: 180 TABLET | Refills: 3 | Status: SHIPPED | OUTPATIENT
Start: 2022-02-14

## 2022-02-14 NOTE — PROGRESS NOTES
Patient ID: Sushil Wells, 1945, [id number], 68 y.o. Referred by : Luana Latham MD  Reason for consultation:   Anemia  HISTORY OF PRESENT ILLNESS:    Oncologic History:  Sushil Wells is a 68 y.o. male with past medical history of prostatic hypertrophy, CAD status post CABG was interventional consultation visit for anemia. He denies any unintentional weight loss, drenching night sweats fever chills. H denied any blood in stool. He is on b12 pills. His recent lab work showed hb 10.7. Pt has mild CKD. Denied any SOB chest pain. Past Medical History:   Diagnosis Date    Benign prostatic hypertrophy     with elevated PSA.  BPPV (benign paroxysmal positional vertigo) 2/2008    Chest pain 05/2021    Coronary heart disease     Status post CABG.  Diverticulosis     Dupuytren's contracture     Mid finger, right hand.  Erectile dysfunction     Bowens catheter in place 11/02/2018    has had one in place for 6weeks, this one in place x 2 weeks    Gout     Quiescent.  History of blood transfusion     possible with open heart surgery 26years ago    Hyperlipidemia     Hypertension     Positive cardiac stress test 05/06/2021    Snores     Type II or unspecified type diabetes mellitus without mention of complication, not stated as uncontrolled        Past Surgical History:   Procedure Laterality Date    CARDIAC CATHETERIZATION  6/2005    Triple vessel CAD with patent LIMA to LAD, patent saphenous vein graft to first obtuse marginal, patent saphenous vein graft to posterior descending artery, occluded jump graft from posterior descending artery to posterior left ventricular branch.  CARDIAC CATHETERIZATION  3/16/16    CARDIAC CATHETERIZATION  05/07/2021    CATARACT REMOVAL Bilateral Nov. and Dec 2014    COLONOSCOPY  5/27/2008    Distal ileoscopy. Scattered diverticulosis and extensive diverticular disease of sigmoid colon and internal hemorrhoids.     COLONOSCOPY N/A 10/28/2019 COLONOS[facility] performed [facility]se[facility] OR  severe diverticulosis    CORONARY ARTERY BYPASS GRAFT      ELBOW SURGERY Right     Surgical repair.  EYE SURGERY      KNEE ARTHROSCOPY Right 3/16/2007    Partial medial meniscectomy, partial medial and lateral synovectomy, light chondroplasty.     NE LASER VAPORIZATION SURGERY PROSTATE, COMPLETE N/A 11/2/2018    CYSTOSCOPY, TRANSURETHRAL RESECTION PROSTATE - [facility] # [id number] HAILEE) performed by Edil Kang MD at [facility] Bilateral 9/15/2010, 2007    [facility] Dr. Abhinav Carranza PROSTATE BIOPSY Bilateral 05/10/2001    Benign-Dr. Joleen Libman    PROSTATE BIOPSY Bilateral 04/08/1999    Benign-Dr. Donna Solano    PROSTATE BIOPSY Bilateral 06/18/1998    Benign-Dr. Crystal Coa    PROSTATE BIOPSY Bilateral 01/07/1998    Benign-Dr. Crystal Cao    PROSTATE BIOPSY Bilateral 10/04/1996    Benign-Dr. Morenita Sanabria    PROSTATE SURGERY  11/02/2018    CYSTOSCOPY, TRANSURETHRAL RESECTION PROSTATE - [facility]     VASECTOMY         No Known Allergies    Current Outpatient Medications   Medication Sig Dispense Refill    insulin glargine (LANTUS SOLOSTAR) 100 UNIT/ML injection pen Inject 10 Units into the skin nightly 5 pen 3    Insulin Pen Needle (PEN NEEDLES) 32G X 5 MM MISC Use 1 pen needle nightly 90 each 3    glimepiride (AMARYL) 4 MG tablet Take 1 tablet by mouth 2 times daily 180 tablet 3    nitroGLYCERIN (NITROSTAT) 0.4 MG SL tablet Place 1 tablet under the tongue every 5 minutes as needed for Chest pain 25 tablet 3    ONE TOUCH ULTRASOFT LANCETS MISC 1 each by Does not apply route daily 100 each 3    blood glucose test strips (ASCENSIA AUTODISC VI;ONE TOUCH ULTRA TEST VI) strip Test 1 x daily 100 each 3    Alcohol Swabs PADS Use 1 nightly with lantus 100 each 3    amLODIPine (NORVASC) 10 MG tablet Take 1 tablet by mouth daily (Patient taking differently: Take 5 mg by mouth daily ) 30 tablet 3    ranolazine (RANEXA) 500 MG extended release tablet Take 500 mg by mouth 2 times daily      hydroCHLOROthiazide (HYDRODIURIL) 25 MG tablet Take 1 tablet by mouth every morning 90 tablet 3    Cyanocobalamin (VITAMIN B12 PO) Take 1 tablet by mouth daily       atorvastatin (LIPITOR) 80 MG tablet Take 1 tablet by mouth daily 90 tablet 3    metoprolol tartrate (LOPRESSOR) 50 MG tablet Take 1 tablet by mouth 2 times daily 180 tablet 3    lisinopril (PRINIVIL;ZESTRIL) 40 MG tablet Take 1 tablet by mouth daily 90 tablet 3    blood glucose test strips (DURGA CONTOUR TEST) strip Tests blood sugar daily. 300 each 3    aspirin 81 MG EC tablet Take 81 mg by mouth daily       No current facility-administered medications for this visit. Social History     Socioeconomic History    Marital status:      Spouse name: Not on file    Number of children: Not on file    Years of education: Not on file    Highest education level: Not on file   Occupational History    Not on file   Tobacco Use    Smoking status: Former Smoker     Packs/day: 0.50     Years: 27.00     Pack years: 13.50     Types: Cigarettes     Start date: 1968     Quit date: 10/11/1992     Years since quittin.3    Smokeless tobacco: Never Used   Vaping Use    Vaping Use: Never used   Substance and Sexual Activity    Alcohol use: Yes     Alcohol/week: 1.0 standard drink     Types: 1 Standard drinks or equivalent per week     Comment: 1 time per week    Drug use: No    Sexual activity: Yes     Partners: Female   Other Topics Concern    Not on file   Social History Narrative    Not on file     Social Determinants of Health     Financial Resource Strain: Low Risk     Difficulty of Paying Living Expenses: Not hard at all   Food Insecurity: No Food Insecurity    Worried About Running Out of Food in the Last Year: Never true    0 [facility] in the Last Year: Never true   Transportation Needs:     Lack of Transportation (Medical):  Not on file    Lack of Transportation (Non-Medical): Not on file   Physical Activity:     Days of Exercise per Week: Not on file    Minutes of Exercise per Session: Not on file   Stress:     Feeling of Stress : Not on file   Social Connections:     Frequency of Communication with Friends and Family: Not on file    Frequency of Social Gatherings with Friends and Family: Not on file    Attends Christian Services: Not on file    Active Member of Clubs or Organizations: Not on file    Attends Club or Organization Meetings: Not on file    Marital Status: Not on file   Intimate Partner Violence:     Fear of Current or Ex-Partner: Not on file    Emotionally Abused: Not on file    Physically Abused: Not on file    Sexually Abused: Not on file   Housing Stability:     Unable to Pay for Housing in the Last Year: Not on file    Number of [place] in the Last Year: Not on file    Unstable Housing in the Last Year: Not on file       Family History   Problem Relation Age of Onset    Diabetes Mother     Kidney Disease Mother         Renal failure    Coronary Art Dis Mother     Hypertension Mother     Lung Cancer Father     Coronary Art Dis Father     Hypertension Brother     Other Brother         Hypernephroma        REVIEW OF SYSTEM:     Constitutional: No fever or chills. No night sweats, no weight loss   Eyes: No eye discharge, double vision, or eye pain   HEENT: negative for sore mouth, sore throat, hoarseness and voice change   Respiratory: negative for cough , sputum, dyspnea, wheezing, hemoptysis, chest pain   Cardiovascular: negative for chest pain, dyspnea, palpitations, orthopnea, PND   Gastrointestinal: negative for nausea, vomiting, diarrhea, constipation, abdominal pain, Dysphagia, hematemesis and hematochezia   Genitourinary: negative for frequency, dysuria, nocturia, urinary incontinence, and hematuria   Integument: negative for rash, skin lesions, bruises.    Hematologic/Lymphatic: negative for easy bruising, bleeding, lymphadenopathy, petechiae and swelling/edema   Endocrine: negative for heat or cold intolerance, tremor, weight changes, change in bowel habits and hair loss   Musculoskeletal: negative for myalgias, arthralgias, pain, joint swelling,and bone pain   Neurological: negative for headaches, dizziness, seizures, weakness, numbness       OBJECTIVE:         Vitals:    02/14/22 1400   BP: 128/70   Pulse: 60   Resp: 16   Temp: 97.2 °F (36.2 °C)   SpO2: 98%       PHYSICAL EXAM:   General appearance - well appearing, no in pain or distress   Mental status - alert and cooperative   Eyes - pupils equal and reactive, extraocular eye movements intact   Ears - bilateral TM's and external ear canals normal   Mouth - mucous membranes moist, pharynx normal without lesions   Neck - supple, no significant adenopathy   Lymphatics - no palpable lymphadenopathy, no hepatosplenomegaly   Chest - clear to auscultation, no wheezes, rales or rhonchi, symmetric air entry   Heart - normal rate, regular rhythm, normal S1, S2, no murmurs, rubs, clicks or gallops   Abdomen - soft, nontender, nondistended, no masses or organomegaly   Neurological - alert, oriented, normal speech, no focal findings or movement disorder noted   Musculoskeletal - no joint tenderness, deformity or swelling   Extremities - peripheral pulses normal, no pedal edema, no clubbing or cyanosis   Skin - normal coloration and turgor, no rashes, no suspicious skin lesions noted ,      LABORATORY DATA:     Lab Results   Component Value Date    WBC 7.7 10/24/2018    HGB 10.7 (L) 02/01/2022    HCT 38.8 (L) 10/24/2018    MCV 93.3 10/24/2018     05/07/2021    LYMPHOPCT 27 10/11/2016    RBC 4.16 (L) 10/24/2018    MCH 30.8 10/24/2018    MCHC 33.0 10/24/2018    RDW 13.2 10/24/2018    MONOPCT 6 10/11/2016    BASOPCT 1 10/11/2016    NEUTROABS 3.30 10/11/2016    LYMPHSABS 1.40 10/11/2016    MONOSABS 0.30 10/11/2016    EOSABS 0.30 10/11/2016    BASOSABS 0.10 10/11/2016 Chemistry        Component Value Date/Time     01/03/2022 1014    K 4.9 01/03/2022 1014     01/03/2022 1014    CO2 26 01/03/2022 1014    BUN 30 (H) 01/03/2022 1014    CREATININE 1.80 (H) 01/03/2022 1014        Component Value Date/Time    CALCIUM 9.6 01/03/2022 1014    ALKPHOS 59 09/28/2021 0929    AST 33 09/28/2021 0929    ALT 40 09/28/2021 0929    BILITOT 0.91 09/28/2021 0929         Ref. Range 2/1/2022 10:35   Iron Latest Ref Range: 59 - 158 ug/dL 73   Iron Saturation Latest Ref Range: 20 - 55 % 32   UIBC Latest Ref Range: 112 - 347 ug/dL 157   TIBC Latest Ref Range: 250 - 450 ug/dL 230 (L)   Folate Latest Ref Range: >4.8 ng/mL 11.4   Vitamin B-12 Latest Ref Range: 232 - 1245 pg/mL 1599 (H)       PATHOLOGY DATA:   Reviewed  IMAGING DATA:      NM MYOCARDIAL SPECT REST EXERCISE OR RX  Radiology exam is complete. No Radiologist dictation. Please follow up   with ordering provider. REviewed   ASSESSMENT:      Christine Law is a 68 y.o. male wih CKD, CAD s/p CABG with mild anemia. I reviewed the lab work, prior records, imaging studies, discussed the diagnosis and treatment recommendations. He has very mild anemia. As compared to chronic disease renal disease however underlying bone marrow pathology given his age cannot be ruled out. I will order lab work today. I discussed the possibility of bone marrow biopsy if the work-up studies otherwise are is completely negative. Patient's questions were sought and answered to satisfaction and he agreed to proceed with the plan. PLAN:   Lab work today  RTC in one week to discuss results and further recommendations      Hayden Archuleta MD  Hematologist/Medical Oncologist    On this date 2/20/22  I have spent 60 minutes reviewing previous notes, test results and face to face with the patient discussing the diagnosis and importance of compliance with the treatment plan.  Greater than 50% of that time was spent face-to-face with the patient in counseling and coordinating her care. This note is created with the assistance of a speech recognition program.  While intending to generate a document that actually reflects the content of the visit, the document can still have some errors including those of syntax and sound a like substitutions which may escape proof reading. It such instances, actual meaning can be extrapolated by contextual diversion.

## 2022-02-15 LAB
ALBUMIN (CALCULATED): 4.6 G/DL (ref 3.2–5.2)
ALBUMIN PERCENT: 70 % (ref 45–65)
ALPHA 1 PERCENT: 2 % (ref 3–6)
ALPHA 2 PERCENT: 10 % (ref 6–13)
ALPHA-1-GLOBULIN: 0.1 G/DL (ref 0.1–0.4)
ALPHA-2-GLOBULIN: 0.7 G/DL (ref 0.5–0.9)
BETA GLOBULIN: 0.6 G/DL (ref 0.5–1.1)
BETA PERCENT: 9 % (ref 11–19)
GAMMA GLOBULIN %: 9 % (ref 9–20)
GAMMA GLOBULIN: 0.6 G/DL (ref 0.5–1.5)
PATHOLOGIST: ABNORMAL
PATHOLOGIST: NORMAL
PROTEIN ELECTROPHORESIS, SERUM: ABNORMAL
SERUM IFX INTERP: NORMAL
SURGICAL PATHOLOGY REPORT: NORMAL
TOTAL PROT. SUM,%: 100 % (ref 98–102)
TOTAL PROT. SUM: 6.6 G/DL (ref 6.3–8.2)
TOTAL PROTEIN: 6.6 G/DL (ref 6.4–8.3)

## 2022-02-16 LAB
ERYTHROPOIETIN: 19 MU/ML (ref 4–27)
PATHOLOGIST REVIEW: NORMAL

## 2022-02-17 ENCOUNTER — OFFICE VISIT (OUTPATIENT)
Dept: DIABETES SERVICES | Age: 77
End: 2022-02-17
Payer: MEDICARE

## 2022-02-17 VITALS
HEART RATE: 56 BPM | BODY MASS INDEX: 28.64 KG/M2 | HEIGHT: 68 IN | WEIGHT: 189 LBS | SYSTOLIC BLOOD PRESSURE: 104 MMHG | DIASTOLIC BLOOD PRESSURE: 60 MMHG | RESPIRATION RATE: 16 BRPM

## 2022-02-17 DIAGNOSIS — E11.9 TYPE 2 DIABETES MELLITUS WITHOUT COMPLICATION, WITHOUT LONG-TERM CURRENT USE OF INSULIN (HCC): Primary | ICD-10-CM

## 2022-02-17 DIAGNOSIS — I10 PRIMARY HYPERTENSION: ICD-10-CM

## 2022-02-17 DIAGNOSIS — E78.49 OTHER HYPERLIPIDEMIA: ICD-10-CM

## 2022-02-17 PROCEDURE — 4040F PNEUMOC VAC/ADMIN/RCVD: CPT | Performed by: NURSE PRACTITIONER

## 2022-02-17 PROCEDURE — 1036F TOBACCO NON-USER: CPT | Performed by: NURSE PRACTITIONER

## 2022-02-17 PROCEDURE — 1123F ACP DISCUSS/DSCN MKR DOCD: CPT | Performed by: NURSE PRACTITIONER

## 2022-02-17 PROCEDURE — G8484 FLU IMMUNIZE NO ADMIN: HCPCS | Performed by: NURSE PRACTITIONER

## 2022-02-17 PROCEDURE — 99214 OFFICE O/P EST MOD 30 MIN: CPT | Performed by: NURSE PRACTITIONER

## 2022-02-17 PROCEDURE — G8427 DOCREV CUR MEDS BY ELIG CLIN: HCPCS | Performed by: NURSE PRACTITIONER

## 2022-02-17 PROCEDURE — G8417 CALC BMI ABV UP PARAM F/U: HCPCS | Performed by: NURSE PRACTITIONER

## 2022-02-17 RX ORDER — INSULIN GLARGINE 100 [IU]/ML
8 INJECTION, SOLUTION SUBCUTANEOUS NIGHTLY
Qty: 5 PEN | Refills: 3
Start: 2022-02-17 | End: 2022-10-13 | Stop reason: SDUPTHER

## 2022-02-17 ASSESSMENT — ENCOUNTER SYMPTOMS
ABDOMINAL PAIN: 0
DIARRHEA: 0
SHORTNESS OF BREATH: 0
RESPIRATORY NEGATIVE: 1

## 2022-02-17 NOTE — PROGRESS NOTES
[address] [facility] INTERNAL MED A DEPARTMENT OF [place] [address]  [address] [facility], [address]  [facility] [address] [phone]        HISTORY:    Kacey Connor presents today for evaluation and management of:  Chief Complaint   Patient presents with    Diabetes     1m       HPI    Interval History:    Past DM Medications   Metformin- ckd  Rybelsus- ineffective     Current Diabetic Medications  Lantus 10 units daily, glimepiride 4 mg bid      DKA episodes: 0    01/12/22   Kacey Connor is a 68 y.o. male patient who presents to clinic today for follow up on his diabetes. he has a history of CAD, hypertension, hyperlipidemia and obesity which contributes to his diabetes. He is here for initial diabetes management he was recently started on insulin he denies any signs or symptoms of hyper/hypoglycemia. he states they are taking their medications as prescribed without any adverse effects. Diet: regular  Exercise: none  BS testing: once daily 150  Issues: denies   Diabetic foot exam up-to-date: Yes  Diabetic retinal exam up-to-date: Yes  Hypoglycemia as needed treatment: snack    02/17/22   Kacey Connor is a 68 y.o. male patient who presents to clinic today for follow up on his diabetes. he has a history of  CAD, hypertension, hyperlipidemia and obesity which contributes to his diabetes. At previous visit diabetes counseling was provided  . he does have occasional symptoms of hypoglycemia mid day and overnight.  he states they are taking their medications as prescribed without any adverse effects. He has made significant dietary changes and has seen an improvement in his glucose numbers.    Diet: counting carbs  Exercise: none  BS testing: once daily fasting average 70-80 post prandial average 100-200  Issues: denies  Diabetic foot exam up-to-date: Yes  Diabetic retinal exam up-to-date: Yes  Hypoglycemia as needed treatment: oj    High cholesterol-  Takes lipitor and denies any adverse effects with its use. Watches diet and exercise.      Hypertension-  Takes lopressor, lisniopril and denies any adverse effects with their use. Watches diet and exercise. Denies any chest pain, dizziness or edema.       Obesity- Working on weight loss.          Past Medical History:   Diagnosis Date    Benign prostatic hypertrophy     with elevated PSA.  BPPV (benign paroxysmal positional vertigo) 2008    Chest pain 2021    Coronary heart disease     Status post CABG.  Diverticulosis     Dupuytren's contracture     Mid finger, right hand.  Erectile dysfunction     Bowens catheter in place 2018    has had one in place for 6weeks, this one in place x 2 weeks    Gout     Quiescent.  History of blood transfusion     possible with open heart surgery 26years ago    Hyperlipidemia     Hypertension     Positive cardiac stress test 2021    Snores     Type II or unspecified type diabetes mellitus without mention of complication, not stated as uncontrolled      Family History   Problem Relation Age of Onset    Diabetes Mother     Kidney Disease Mother         Renal failure    Coronary Art Dis Mother     Hypertension Mother     Lung Cancer Father     Coronary Art Dis Father     Hypertension Brother     Other Brother         Hypernephroma     Social History     Tobacco Use    Smoking status: Former Smoker     Packs/day: 0.50     Years: 27.00     Pack years: 13.50     Types: Cigarettes     Start date: 1968     Quit date: 10/11/1992     Years since quittin.3    Smokeless tobacco: Never Used   Vaping Use    Vaping Use: Never used   Substance Use Topics    Alcohol use:  Yes     Alcohol/week: 1.0 standard drink     Types: 1 Standard drinks or equivalent per week     Comment: 1 time per week    Drug use: No     No Known Allergies    MEDICATIONS:  Current Outpatient Medications   Medication Sig Dispense Refill    insulin glargine (LANTUS SOLOSTAR) 100 UNIT/ML injection pen Inject 8 Units into the skin nightly 5 pen 3    glimepiride (AMARYL) 4 MG tablet Take 1 tablet by mouth 2 times daily 180 tablet 3    amLODIPine (NORVASC) 10 MG tablet Take 1 tablet by mouth daily (Patient taking differently: Take 5 mg by mouth daily ) 30 tablet 3    ranolazine (RANEXA) 500 MG extended release tablet Take 500 mg by mouth 2 times daily      hydroCHLOROthiazide (HYDRODIURIL) 25 MG tablet Take 1 tablet by mouth every morning 90 tablet 3    Cyanocobalamin (VITAMIN B12 PO) Take 1 tablet by mouth daily       atorvastatin (LIPITOR) 80 MG tablet Take 1 tablet by mouth daily 90 tablet 3    metoprolol tartrate (LOPRESSOR) 50 MG tablet Take 1 tablet by mouth 2 times daily 180 tablet 3    lisinopril (PRINIVIL;ZESTRIL) 40 MG tablet Take 1 tablet by mouth daily 90 tablet 3    aspirin 81 MG EC tablet Take 81 mg by mouth daily      Insulin Pen Needle (PEN NEEDLES) 32G X 5 MM MISC Use 1 pen needle nightly 90 each 3    nitroGLYCERIN (NITROSTAT) 0.4 MG SL tablet Place 1 tablet under the tongue every 5 minutes as needed for Chest pain 25 tablet 3    ONE TOUCH ULTRASOFT LANCETS MISC 1 each by Does not apply route daily 100 each 3    blood glucose test strips (ASCENSIA AUTODISC VI;ONE TOUCH ULTRA TEST VI) strip Test 1 x daily 100 each 3    Alcohol Swabs PADS Use 1 nightly with lantus 100 each 3    blood glucose test strips (DURGA CONTOUR TEST) strip Tests blood sugar daily. 300 each 3     No current facility-administered medications for this visit. Review ofSymptoms:  Review of Systems   Constitutional: Positive for fatigue. Negative for unexpected weight change. Eyes: Negative for visual disturbance. Respiratory: Negative. Negative for shortness of breath. Cardiovascular: Negative for chest pain and leg swelling. Gastrointestinal: Negative for abdominal pain and diarrhea. Endocrine: Negative for polydipsia, polyphagia and polyuria. Genitourinary: Negative. Musculoskeletal: Negative. Skin: Negative for rash and wound. Neurological: Negative for dizziness, tremors, seizures and headaches. Psychiatric/Behavioral: Negative. Negative for confusion and decreased concentration. Theremainder of a complete 14-point review of systems is negative. Vital Signs: /60 (Site: Right Upper Arm, Position: Sitting, Cuff Size: Large Adult)   Pulse 56   Resp 16   Ht 5' 8\" (1.727 m)   Wt 189 lb (85.7 kg)   BMI 28.74 kg/m²      Wt Readings from Last 3 Encounters:   02/17/22 189 lb (85.7 kg)   02/14/22 191 lb 3.2 oz (86.7 kg)   01/11/22 188 lb (85.3 kg)     Body mass index is 28.74 kg/m².   LABS:  Hemoglobin A1C   Date Value Ref Range Status   01/03/2022 10.0 (H) 4.0 - 6.0 % Final   09/28/2021 7.9 (H) 4.0 - 6.0 % Final     Lab Results   Component Value Date    LABMICR 106 (H) 01/03/2022     Lab Results   Component Value Date     02/14/2022    K 4.8 02/14/2022     02/14/2022    CO2 26 02/14/2022    BUN 35 (H) 02/14/2022    CREATININE 2.28 (H) 02/14/2022    GLUCOSE 148 (H) 02/14/2022    CALCIUM 9.4 02/14/2022    PROT 7.1 02/14/2022    PROT 6.6 02/14/2022    LABALBU 4.4 02/14/2022    BILITOT 0.86 02/14/2022    ALKPHOS 64 02/14/2022    AST 17 02/14/2022    ALT 20 02/14/2022    LABGLOM 28 (L) 02/14/2022    GFRAA 34 (L) 02/14/2022     Lab Results   Component Value Date    CHOL 92 09/28/2021    CHOL 100 09/11/2020    CHOL 96 09/12/2019     Lab Results   Component Value Date    TRIG 106 09/28/2021    TRIG 85 09/11/2020    TRIG 110 09/12/2019     Lab Results   Component Value Date    HDL 31 (L) 09/28/2021    HDL 32 (L) 09/11/2020    HDL 31 (L) 09/12/2019     Lab Results   Component Value Date    LDLCHOLESTEROL 40 09/28/2021    LDLCHOLESTEROL 51 09/11/2020    LDLCHOLESTEROL 43 09/12/2019     Lab Results   Component Value Date    VLDL NOT REPORTED 09/28/2021    VLDL NOT REPORTED 09/11/2020    VLDL NOT REPORTED 09/12/2019     Lab Results   Component Value Date CHOLHDLRATIO 3.0 09/28/2021    CHOLHDLRATIO 3.1 09/11/2020    CHOLHDLRATIO 3.1 09/12/2019           Physical Exam  Constitutional:       Appearance: He is well-developed. Eyes:      Pupils: Pupils are equal, round, and reactive to light. Cardiovascular:      Rate and Rhythm: Normal rate and regular rhythm. Pulmonary:      Effort: Pulmonary effort is normal.      Breath sounds: Normal breath sounds. Skin:     General: Skin is warm and dry. Findings: No lesion (no lipohypertrophy) or rash. Neurological:      Mental Status: He is alert and oriented to person, place, and time. Sensory: No sensory deficit. Psychiatric:         Speech: Speech normal.         Behavior: Behavior normal.         Thought Content: Thought content normal.         Judgment: Judgment normal.           ASSESSMENT/PLAN:     Diagnosis Orders   1. Type 2 diabetes mellitus without complication, without long-term current use of insulin ([facility])  insulin glargine (LANTUS SOLOSTAR) 100 UNIT/ML injection pen    Hemoglobin A1C   2. Other hyperlipidemia     3. Primary hypertension       Orders Placed This Encounter   Procedures    Hemoglobin A1C     Orders Placed This Encounter   Medications    insulin glargine (LANTUS SOLOSTAR) 100 UNIT/ML injection pen     Sig: Inject 8 Units into the skin nightly     Dispense:  5 pen     Refill:  3     Requested Prescriptions     Signed Prescriptions Disp Refills    insulin glargine (LANTUS SOLOSTAR) 100 UNIT/ML injection pen 5 pen 3     Sig: Inject 8 Units into the skin nightly       1. Type 2 diabetes mellitus without complication, without long-term current use of insulin ([facility])  - Unstable  HbA1C goal is less than 7%. - Fasting blood glucose goal is 70-120mg/dl and postprandial blood sugar goal is less than 180 mg/dl. - Labs reviewed including most recent A1c, microalbumin and kidney function.  Repeat labs due in 1 month.    -We discussed in great detail dietary modifications they can make to better improve their blood sugars.  -blood sugar log improved, will decrease insulin due to risk of hypoglcyemia  Patient Instructions   Decrease Lantus to 8 units once daily   You can decrease by 2 units every week if fasting blood sugars are trending less than 100.     -Carry rapidly absorbed carbohydrate source at all times and tach friends and family about how to treat low glucose. Treat low glucose (<70) per rule of 15's: Give 15 grams of rapildy absorbed carbohydrate (Ex: 1/2 cup juice or 4 glucose tabs). Recheck glucose in 15 minutes. Give another 15 grams of carbohydrates if glucose still <70. Repeat until glucose is higher than 70. Once glucose level returns to normal, follow with a protein. If severe hypoglycemia symptoms call 911. Discussed signs and symptoms of hyper/hypoglycemia and how to treat. Encouraged 150 minutes of physical activity per week. Follow a low carbohydrate diet. Encouraged at least 7 hours of sleep. The patient was informed of the goals of diabetes management. This can only be accomplished by watching their diet and exercise levels. We certainly use medicines to help attain these goals. The consequences of not controlling blood sugars were discussed. These include blindness, heart disease, stroke, kidney disease, and possibly need for dialysis. They were told to be careful with their foot care as diabetics often have nerve damage, infections and risk for limb amutations . They also need a dilated eye exam yearly. We discussed the issues of diet, exercise, medication, complication avoidance, reviewed the signs and symptoms of diabetes, hypoglycemic episodes, significance of HbA1C.       - insulin glargine (LANTUS SOLOSTAR) 100 UNIT/ML injection pen; Inject 8 Units into the skin nightly  Dispense: 5 pen; Refill: 3  - Hemoglobin A1C; Future    2. Other hyperlipidemia  stable, lipid panel reviewed, continue current medications. Diet and exercise      3.  Primary hypertension stable, continue current medications. Diet and exercise Seek emergent care if chest pain develops. Answered all patient questions. Agrees to follow plan of care and to follow up in 4 months, sooner if needed. Call office if unexplained blood sugars less than 70 occur or above 400. Call office or access MyChart with any further questions or concerns. Be sure to bring glucometer/food log at next appointment.        Total time spent reviewing chart, labs, counseling patient and documenting on the date of the encounter: 30 min     Electronically signed by AMANDA Sanchez CNP on 2/17/2022 at 12:09 PM      (Please note that portions of this note were completed with a voice-recognition program. Efforts were made to edit the dictation but occasionally words are mis-transcribed.)

## 2022-02-17 NOTE — PATIENT INSTRUCTIONS
Decrease Lantus to 8 units once daily   You can decrease by 2 units every week if fasting blood sugars are trending less than 100.     -Carry rapidly absorbed carbohydrate source at all times and tach friends and family about how to treat low glucose. Treat low glucose (<70) per rule of 15's: Give 15 grams of rapildy absorbed carbohydrate (Ex: 1/2 cup juice or 4 glucose tabs). Recheck glucose in 15 minutes. Give another 15 grams of carbohydrates if glucose still <70. Repeat until glucose is higher than 70. Once glucose level returns to normal, follow with a protein. If severe hypoglycemia symptoms call 911.

## 2022-02-28 ENCOUNTER — OFFICE VISIT (OUTPATIENT)
Dept: ONCOLOGY | Age: 77
End: 2022-02-28
Payer: MEDICARE

## 2022-02-28 VITALS
TEMPERATURE: 97.7 F | OXYGEN SATURATION: 98 % | HEART RATE: 58 BPM | DIASTOLIC BLOOD PRESSURE: 70 MMHG | WEIGHT: 190.2 LBS | HEIGHT: 68 IN | BODY MASS INDEX: 28.82 KG/M2 | SYSTOLIC BLOOD PRESSURE: 124 MMHG

## 2022-02-28 DIAGNOSIS — D64.9 ANEMIA, UNSPECIFIED TYPE: Primary | ICD-10-CM

## 2022-02-28 PROCEDURE — 4040F PNEUMOC VAC/ADMIN/RCVD: CPT | Performed by: INTERNAL MEDICINE

## 2022-02-28 PROCEDURE — G8484 FLU IMMUNIZE NO ADMIN: HCPCS | Performed by: INTERNAL MEDICINE

## 2022-02-28 PROCEDURE — 1036F TOBACCO NON-USER: CPT | Performed by: INTERNAL MEDICINE

## 2022-02-28 PROCEDURE — G8417 CALC BMI ABV UP PARAM F/U: HCPCS | Performed by: INTERNAL MEDICINE

## 2022-02-28 PROCEDURE — 99214 OFFICE O/P EST MOD 30 MIN: CPT | Performed by: INTERNAL MEDICINE

## 2022-02-28 PROCEDURE — G8427 DOCREV CUR MEDS BY ELIG CLIN: HCPCS | Performed by: INTERNAL MEDICINE

## 2022-02-28 PROCEDURE — 1123F ACP DISCUSS/DSCN MKR DOCD: CPT | Performed by: INTERNAL MEDICINE

## 2022-02-28 NOTE — PROGRESS NOTES
Patient ID: Dewayne Tse, 1945, [id number], 68 y.o. Referred by : Kaykay Webb MD  Reason for consultation:   Anemia  CKD  HISTORY OF PRESENT ILLNESS:    Oncologic History:  Dewayne Tse is a 68 y.o. male with past medical history of prostatic hypertrophy, CAD status post CABG was interventional consultation visit for anemia. He denies any unintentional weight loss, drenching night sweats fever chills. H denied any blood in stool. He is on b12 pills. His recent lab work showed hb 10.7. Pt has mild CKD. Denied any SOB chest pain. Interval history:  Patient is return for follow Sadan to discuss lab results and further recommendations. Patient denies any chest pain shortness of breath. During this visit patient's allergy, social, medical, surgical history and medications were reviewed and updated. Past Medical History:   Diagnosis Date    Benign prostatic hypertrophy     with elevated PSA.  BPPV (benign paroxysmal positional vertigo) 2/2008    Chest pain 05/2021    Coronary heart disease     Status post CABG.  Diverticulosis     Dupuytren's contracture     Mid finger, right hand.  Erectile dysfunction     Bowens catheter in place 11/02/2018    has had one in place for 6weeks, this one in place x 2 weeks    Gout     Quiescent.  History of blood transfusion     possible with open heart surgery 26years ago    Hyperlipidemia     Hypertension     Positive cardiac stress test 05/06/2021    Snores     Type II or unspecified type diabetes mellitus without mention of complication, not stated as uncontrolled        Past Surgical History:   Procedure Laterality Date    CARDIAC CATHETERIZATION  6/2005    Triple vessel CAD with patent LIMA to LAD, patent saphenous vein graft to first obtuse marginal, patent saphenous vein graft to posterior descending artery, occluded jump graft from posterior descending artery to posterior left ventricular branch.     CARDIAC CATHETERIZATION 3/16/16    CARDIAC CATHETERIZATION  05/07/2021    CATARACT REMOVAL Bilateral Nov. and Dec 2014    COLONOSCOPY  5/27/2008    Distal ileoscopy. Scattered diverticulosis and extensive diverticular disease of sigmoid colon and internal hemorrhoids.  COLONOSCOPY N/A 10/28/2019    COLONOSCOPY performed by Se[facility]erm[facility]o [facility] at [facility] OR  severe diverticulosis    CORONARY ARTERY BYPASS GRAFT      ELBOW SURGERY Right     Surgical repair.  EYE SURGERY      KNEE ARTHROSCOPY Right 3/16/2007    Partial medial meniscectomy, partial medial and lateral synovectomy, light chondroplasty.     GA LASER VAPORIZATION SURGERY PROSTATE, COMPLETE N/A 11/2/2018    CYSTOSCOPY, TRANSURETHRAL RESECTION PROSTATE - [facility] # [id number] HAILEE) performed by Helena Turner MD at [facility] Bilateral 9/15/2010, 2007    [facility] Dr. Eula Queen PROSTATE BIOPSY Bilateral 05/10/2001    Benign-Dr. Conrad Goldsmith    PROSTATE BIOPSY Bilateral 04/08/1999    Benign-Dr. Aislinn Katz    PROSTATE BIOPSY Bilateral 06/18/1998    Benign-Dr. Jodi Post    PROSTATE BIOPSY Bilateral 01/07/1998    Benign-Dr. Jodi Post    PROSTATE BIOPSY Bilateral 10/04/1996    Benign-Dr. Ramu Wheat    PROSTATE SURGERY  11/02/2018    CYSTOSCOPY, TRANSURETHRAL RESECTION PROSTATE - [facility]     VASECTOMY         No Known Allergies    Current Outpatient Medications   Medication Sig Dispense Refill    insulin glargine (LANTUS SOLOSTAR) 100 UNIT/ML injection pen Inject 8 Units into the skin nightly 5 pen 3    glimepiride (AMARYL) 4 MG tablet Take 1 tablet by mouth 2 times daily 180 tablet 3    Insulin Pen Needle (PEN NEEDLES) 32G X 5 MM MISC Use 1 pen needle nightly 90 each 3    nitroGLYCERIN (NITROSTAT) 0.4 MG SL tablet Place 1 tablet under the tongue every 5 minutes as needed for Chest pain 25 tablet 3    ONE TOUCH ULTRASOFT LANCETS MISC 1 each by Does not apply route daily 100 each 3    blood glucose test strips (ASCENSIA AUTODISC VI;ONE TOUCH ULTRA TEST VI) strip Test 1 x daily 100 each 3    Alcohol Swabs PADS Use 1 nightly with lantus 100 each 3    amLODIPine (NORVASC) 10 MG tablet Take 1 tablet by mouth daily (Patient taking differently: Take 5 mg by mouth daily ) 30 tablet 3    ranolazine (RANEXA) 500 MG extended release tablet Take 500 mg by mouth 2 times daily      hydroCHLOROthiazide (HYDRODIURIL) 25 MG tablet Take 1 tablet by mouth every morning 90 tablet 3    Cyanocobalamin (VITAMIN B12 PO) Take 1 tablet by mouth daily       atorvastatin (LIPITOR) 80 MG tablet Take 1 tablet by mouth daily 90 tablet 3    metoprolol tartrate (LOPRESSOR) 50 MG tablet Take 1 tablet by mouth 2 times daily 180 tablet 3    lisinopril (PRINIVIL;ZESTRIL) 40 MG tablet Take 1 tablet by mouth daily 90 tablet 3    blood glucose test strips (DURGA CONTOUR TEST) strip Tests blood sugar daily. 300 each 3    aspirin 81 MG EC tablet Take 81 mg by mouth daily       No current facility-administered medications for this visit. Social History     Socioeconomic History    Marital status:      Spouse name: Not on file    Number of children: Not on file    Years of education: Not on file    Highest education level: Not on file   Occupational History    Not on file   Tobacco Use    Smoking status: Former Smoker     Packs/day: 0.50     Years: 27.00     Pack years: 13.50     Types: Cigarettes     Start date: 1968     Quit date: 10/11/1992     Years since quittin.4    Smokeless tobacco: Never Used   Vaping Use    Vaping Use: Never used   Substance and Sexual Activity    Alcohol use:  Yes     Alcohol/week: 1.0 standard drink     Types: 1 Standard drinks or equivalent per week     Comment: 1 time per week    Drug use: No    Sexual activity: Yes     Partners: Female   Other Topics Concern    Not on file   Social History Narrative    Not on file     Social Determinants of Health     Financial Resource Strain: Low Risk     Difficulty of Paying Living Expenses: Not hard at all   Food Insecurity: No Food Insecurity    Worried About [address] in the Last Year: Never true    Christi of Food in the Last Year: Never true   Transportation Needs:     Lack of Transportation (Medical): Not on file    Lack of Transportation (Non-Medical): Not on file   Physical Activity:     Days of Exercise per Week: Not on file    Minutes of Exercise per Session: Not on file   Stress:     Feeling of Stress : Not on file   Social Connections:     Frequency of Communication with Friends and Family: Not on file    Frequency of Social Gatherings with Friends and Family: Not on file    Attends Hoahaoism Services: Not on file    Active Member of Clubs or Organizations: Not on file    Attends Club or Organization Meetings: Not on file    Marital Status: Not on file   Intimate Partner Violence:     Fear of Current or Ex-Partner: Not on file    Emotionally Abused: Not on file    Physically Abused: Not on file    Sexually Abused: Not on file   Housing Stability:     Unable to Pay for Housing in the Last Year: Not on file    Number of [place] in the Last Year: Not on file    Unstable Housing in the Last Year: Not on file       Family History   Problem Relation Age of Onset    Diabetes Mother     Kidney Disease Mother         Renal failure    Coronary Art Dis Mother     Hypertension Mother     Lung Cancer Father     Coronary Art Dis Father     Hypertension Brother     Other Brother         Hypernephroma        REVIEW OF SYSTEM:     Constitutional: No fever or chills.  No night sweats, no weight loss   Eyes: No eye discharge, double vision, or eye pain   HEENT: negative for sore mouth, sore throat, hoarseness and voice change   Respiratory: negative for cough , sputum, dyspnea, wheezing, hemoptysis, chest pain   Cardiovascular: negative for chest pain, dyspnea, palpitations, orthopnea, PND   Gastrointestinal: negative for nausea, vomiting, diarrhea, constipation, abdominal pain, Dysphagia, hematemesis and hematochezia   Genitourinary: negative for frequency, dysuria, nocturia, urinary incontinence, and hematuria   Integument: negative for rash, skin lesions, bruises.    Hematologic/Lymphatic: negative for easy bruising, bleeding, lymphadenopathy, petechiae and swelling/edema   Endocrine: negative for heat or cold intolerance, tremor, weight changes, change in bowel habits and hair loss   Musculoskeletal: negative for myalgias, arthralgias, pain, joint swelling,and bone pain   Neurological: negative for headaches, dizziness, seizures, weakness, numbness       OBJECTIVE:         Vitals:    02/28/22 1510   BP: 124/70   Pulse: 58   Temp: 97.7 °F (36.5 °C)   SpO2: 98%       PHYSICAL EXAM:   General appearance - well appearing, no in pain or distress   Mental status - alert and cooperative   Eyes - pupils equal and reactive, extraocular eye movements intact   Ears - bilateral TM's and external ear canals normal   Mouth - mucous membranes moist, pharynx normal without lesions   Neck - supple, no significant adenopathy   Lymphatics - no palpable lymphadenopathy, no hepatosplenomegaly   Chest - clear to auscultation, no wheezes, rales or rhonchi, symmetric air entry   Heart - normal rate, regular rhythm, normal S1, S2, no murmurs, rubs, clicks or gallops   Abdomen - soft, nontender, nondistended, no masses or organomegaly   Neurological - alert, oriented, normal speech, no focal findings or movement disorder noted   Musculoskeletal - no joint tenderness, deformity or swelling   Extremities - peripheral pulses normal, no pedal edema, no clubbing or cyanosis   Skin - normal coloration and turgor, no rashes, no suspicious skin lesions noted ,      LABORATORY DATA:     Lab Results   Component Value Date    WBC 6.0 02/14/2022    HGB 11.1 (L) 02/14/2022    HCT 34.1 (L) 02/14/2022    MCV 96.9 02/14/2022     02/14/2022    LYMPHOPCT 22 (L) 02/14/2022    RBC 3.52 (L) 02/14/2022    MCH 31.5 02/14/2022    MCHC 32.6 02/14/2022    RDW 13.3 02/14/2022    MONOPCT 10 02/14/2022    BASOPCT 1 02/14/2022    NEUTROABS 3.73 02/14/2022    LYMPHSABS 1.31 02/14/2022    MONOSABS 0.57 02/14/2022    EOSABS 0.29 02/14/2022    BASOSABS 0.04 02/14/2022         Chemistry        Component Value Date/Time     02/14/2022 1459    K 4.8 02/14/2022 1459     02/14/2022 1459    CO2 26 02/14/2022 1459    BUN 35 (H) 02/14/2022 1459    CREATININE 2.28 (H) 02/14/2022 1459        Component Value Date/Time    CALCIUM 9.4 02/14/2022 1459    ALKPHOS 64 02/14/2022 1459    AST 17 02/14/2022 1459    ALT 20 02/14/2022 1459    BILITOT 0.86 02/14/2022 1459         Ref. Range 2/1/2022 10:35   Iron Latest Ref Range: 59 - 158 ug/dL 73   Iron Saturation Latest Ref Range: 20 - 55 % 32   UIBC Latest Ref Range: 112 - 347 ug/dL 157   TIBC Latest Ref Range: 250 - 450 ug/dL 230 (L)   Folate Latest Ref Range: >4.8 ng/mL 11.4   Vitamin B-12 Latest Ref Range: 232 - 1245 pg/mL 1599 (H)       PATHOLOGY DATA:   Reviewed  Procedures/Addenda   PERIPHERAL BLOOD REPORT     Date Ordered:     2/15/2022     Status:   Signed Out        Date Complete:     2/15/2022     By: Brenna Briceno. Chong Berkowitz M.D.        Date Reported:     2/15/2022       INTERPRETATION   PERIPHERAL BLOOD:   -  NORMOCYTIC NORMOCHROMIC ANEMIA (HEMOGLOBIN 11.1 g/dL),   HYPOPROLIFERATIVE.  DIFFERENTIAL DIAGNOSES INCLUDE: ANEMIA OF CHRONIC   DISEASE OR HYPOPRODUCTION.     IMAGING DATA:      NM MYOCARDIAL SPECT REST EXERCISE OR RX  Radiology exam is complete. No Radiologist dictation. Please follow up   with ordering provider. REviewed   ASSESSMENT:      Ade Sinha is a 68 y.o. male wih CKD, CAD s/p CABG with mild anemia. I reviewed the lab work, prior records, imaging studies, discussed the diagnosis and treatment recommendations. He has very mild anemia.   His anemia appears most likely secondary to to chronic disease renal disease however underlying bone marrow pathology given his age cannot be ruled out. His WBC and platelets are within normal limits and hemoglobin slightly improved therefore plan to hold off bone marrow biopsy at this point and consider in future if his counts drops further  . Patient's questions were sought and answered to satisfaction and he agreed to proceed with the plan. PLAN:   I reviewed lab work, discussed the diagnosis and treatment recommendations   His lab work showed normal iron levels and B12 levels and no evidence of hemolysis noted  Peripheral blood smear did not show any morphologic abnormalities  His WBC, and platelets are within normal limits and hemoglobin is slightly better  I discussed that if his counts drops further he may need bone marrow evaluation  Return to clinic in 4 months with labs prior        Hayden Chavarria MD  Hematologist/Medical Oncologist    On this date 2/28/22  I have spent 60 minutes reviewing previous notes, test results and face to face with the patient discussing the diagnosis and importance of compliance with the treatment plan. Greater than 50% of that time was spent face-to-face with the patient in counseling and coordinating her care. This note is created with the assistance of a speech recognition program.  While intending to generate a document that actually reflects the content of the visit, the document can still have some errors including those of syntax and sound a like substitutions which may escape proof reading. It such instances, actual meaning can be extrapolated by contextual diversion.

## 2022-03-29 ENCOUNTER — HOSPITAL ENCOUNTER (OUTPATIENT)
Dept: LAB | Age: 77
Discharge: HOME | End: 2022-03-29
Payer: MEDICARE

## 2022-03-29 DIAGNOSIS — E11.9 TYPE 2 DIABETES MELLITUS WITHOUT COMPLICATION, WITHOUT LONG-TERM CURRENT USE OF INSULIN (HCC): ICD-10-CM

## 2022-03-29 DIAGNOSIS — D64.9 ANEMIA, UNSPECIFIED TYPE: ICD-10-CM

## 2022-03-29 DIAGNOSIS — D50.9 IRON DEFICIENCY ANEMIA, UNSPECIFIED IRON DEFICIENCY ANEMIA TYPE: ICD-10-CM

## 2022-03-29 DIAGNOSIS — I10 PRIMARY HYPERTENSION: ICD-10-CM

## 2022-03-29 LAB
ABSOLUTE EOS #: 0.5 K/UL (ref 0–0.44)
ABSOLUTE IMMATURE GRANULOCYTE: 0.03 K/UL (ref 0–0.3)
ABSOLUTE LYMPH #: 1.71 K/UL (ref 1.1–3.7)
ABSOLUTE MONO #: 0.54 K/UL (ref 0.1–1.2)
ANION GAP SERPL CALCULATED.3IONS-SCNC: 9 MMOL/L (ref 9–17)
BASOPHILS # BLD: 1 % (ref 0–2)
BASOPHILS ABSOLUTE: 0.06 K/UL (ref 0–0.2)
BUN BLDV-MCNC: 46 MG/DL (ref 8–23)
BUN/CREAT BLD: 21 (ref 9–20)
CALCIUM SERPL-MCNC: 9.3 MG/DL (ref 8.6–10.4)
CHLORIDE BLD-SCNC: 112 MMOL/L (ref 98–107)
CO2: 24 MMOL/L (ref 20–31)
CREAT SERPL-MCNC: 2.24 MG/DL (ref 0.7–1.2)
EOSINOPHILS RELATIVE PERCENT: 7 % (ref 1–4)
GFR AFRICAN AMERICAN: 35 ML/MIN
GFR NON-AFRICAN AMERICAN: 29 ML/MIN
GFR SERPL CREATININE-BSD FRML MDRD: ABNORMAL ML/MIN/{1.73_M2}
GLUCOSE BLD-MCNC: 203 MG/DL (ref 70–99)
HCT VFR BLD CALC: 34.2 % (ref 40.7–50.3)
HEMOGLOBIN: 11.1 G/DL (ref 13–17)
HEMOGLOBIN: 11.1 G/DL (ref 13–17)
IMMATURE GRANULOCYTES: 0 %
LYMPHOCYTES # BLD: 24 % (ref 24–43)
MCH RBC QN AUTO: 31.5 PG (ref 25.2–33.5)
MCHC RBC AUTO-ENTMCNC: 32.5 G/DL (ref 25.2–33.5)
MCV RBC AUTO: 97.2 FL (ref 82.6–102.9)
MONOCYTES # BLD: 8 % (ref 3–12)
NRBC AUTOMATED: 0 PER 100 WBC
PDW BLD-RTO: 13.6 % (ref 11.8–14.4)
PLATELET # BLD: 182 K/UL (ref 138–453)
PMV BLD AUTO: 9.9 FL (ref 8.1–13.5)
POTASSIUM SERPL-SCNC: 4.9 MMOL/L (ref 3.7–5.3)
RBC # BLD: 3.52 M/UL (ref 4.21–5.77)
SEG NEUTROPHILS: 60 % (ref 36–65)
SEGMENTED NEUTROPHILS ABSOLUTE COUNT: 4.24 K/UL (ref 1.5–8.1)
SODIUM BLD-SCNC: 145 MMOL/L (ref 135–144)
WBC # BLD: 7.1 K/UL (ref 3.5–11.3)

## 2022-03-29 PROCEDURE — 85025 COMPLETE CBC W/AUTO DIFF WBC: CPT

## 2022-03-29 PROCEDURE — 85018 HEMOGLOBIN: CPT

## 2022-03-29 PROCEDURE — 83036 HEMOGLOBIN GLYCOSYLATED A1C: CPT

## 2022-03-29 PROCEDURE — 80048 BASIC METABOLIC PNL TOTAL CA: CPT

## 2022-03-29 PROCEDURE — 36415 COLL VENOUS BLD VENIPUNCTURE: CPT

## 2022-03-30 LAB
ESTIMATED AVERAGE GLUCOSE: 151 MG/DL
HBA1C MFR BLD: 6.9 % (ref 4–6)

## 2022-04-05 ENCOUNTER — OFFICE VISIT (OUTPATIENT)
Dept: INTERNAL MEDICINE | Age: 77
End: 2022-04-05
Payer: MEDICARE

## 2022-04-05 VITALS
SYSTOLIC BLOOD PRESSURE: 130 MMHG | HEIGHT: 68 IN | WEIGHT: 190 LBS | DIASTOLIC BLOOD PRESSURE: 78 MMHG | HEART RATE: 56 BPM | RESPIRATION RATE: 16 BRPM | BODY MASS INDEX: 28.79 KG/M2

## 2022-04-05 DIAGNOSIS — B36.9 FUNGAL SKIN INFECTION: ICD-10-CM

## 2022-04-05 DIAGNOSIS — I10 ESSENTIAL HYPERTENSION: Primary | ICD-10-CM

## 2022-04-05 DIAGNOSIS — E78.5 HYPERLIPIDEMIA, UNSPECIFIED HYPERLIPIDEMIA TYPE: ICD-10-CM

## 2022-04-05 DIAGNOSIS — E11.9 TYPE 2 DIABETES MELLITUS WITHOUT COMPLICATION, WITHOUT LONG-TERM CURRENT USE OF INSULIN (HCC): ICD-10-CM

## 2022-04-05 DIAGNOSIS — D64.9 ANEMIA, UNSPECIFIED TYPE: ICD-10-CM

## 2022-04-05 PROCEDURE — 1036F TOBACCO NON-USER: CPT | Performed by: INTERNAL MEDICINE

## 2022-04-05 PROCEDURE — 99213 OFFICE O/P EST LOW 20 MIN: CPT

## 2022-04-05 PROCEDURE — 4040F PNEUMOC VAC/ADMIN/RCVD: CPT | Performed by: INTERNAL MEDICINE

## 2022-04-05 PROCEDURE — 3044F HG A1C LEVEL LT 7.0%: CPT | Performed by: INTERNAL MEDICINE

## 2022-04-05 PROCEDURE — 99214 OFFICE O/P EST MOD 30 MIN: CPT | Performed by: INTERNAL MEDICINE

## 2022-04-05 PROCEDURE — G8417 CALC BMI ABV UP PARAM F/U: HCPCS | Performed by: INTERNAL MEDICINE

## 2022-04-05 PROCEDURE — G8427 DOCREV CUR MEDS BY ELIG CLIN: HCPCS | Performed by: INTERNAL MEDICINE

## 2022-04-05 PROCEDURE — 1123F ACP DISCUSS/DSCN MKR DOCD: CPT | Performed by: INTERNAL MEDICINE

## 2022-04-05 RX ORDER — ATORVASTATIN CALCIUM 80 MG/1
80 TABLET, FILM COATED ORAL DAILY
Qty: 90 TABLET | Refills: 3 | Status: SHIPPED | OUTPATIENT
Start: 2022-04-05

## 2022-04-05 RX ORDER — METOPROLOL TARTRATE 50 MG/1
50 TABLET, FILM COATED ORAL 2 TIMES DAILY
Qty: 180 TABLET | Refills: 3 | Status: SHIPPED | OUTPATIENT
Start: 2022-04-05 | End: 2022-05-19

## 2022-04-05 RX ORDER — CLOTRIMAZOLE AND BETAMETHASONE DIPROPIONATE 10; .64 MG/G; MG/G
CREAM TOPICAL
Qty: 2 G | Refills: 5 | Status: SHIPPED | OUTPATIENT
Start: 2022-04-05

## 2022-04-05 RX ORDER — AMLODIPINE BESYLATE 5 MG/1
5 TABLET ORAL DAILY
Qty: 90 TABLET | Refills: 3 | Status: SHIPPED | OUTPATIENT
Start: 2022-04-05

## 2022-04-05 RX ORDER — LISINOPRIL 40 MG/1
40 TABLET ORAL DAILY
Qty: 90 TABLET | Refills: 3 | Status: SHIPPED | OUTPATIENT
Start: 2022-04-05

## 2022-04-05 RX ORDER — CLOTRIMAZOLE AND BETAMETHASONE DIPROPIONATE 10; .64 MG/G; MG/G
CREAM TOPICAL
Qty: 45 G | Refills: 3 | Status: SHIPPED | OUTPATIENT
Start: 2022-04-05 | End: 2022-04-05 | Stop reason: ALTCHOICE

## 2022-04-05 ASSESSMENT — ENCOUNTER SYMPTOMS
SHORTNESS OF BREATH: 0
BLOOD IN STOOL: 0
NAUSEA: 0
EYE PAIN: 0
CONSTIPATION: 0
COUGH: 0
ABDOMINAL PAIN: 0
DIARRHEA: 0
VOMITING: 0
BACK PAIN: 0

## 2022-04-05 NOTE — PROGRESS NOTES
[place] [address]  Dept: [phone]  Dept Fax: [phone]  Loc: [phone]     Anisha Hill is a 68 y.o. male who presents today for his medical conditions/complaintsas noted below. Anisha Hill is c/o of   Chief Complaint   Patient presents with    Hypertension     3 month, has some spots/sores on his forhead that he would like to have a cream for. they do itch at times    Hyperlipidemia    Diabetes         HPI:     Hypertension  This is a chronic problem. The current episode started more than 1 year ago. The problem has been waxing and waning since onset. The problem is controlled. Pertinent negatives include no chest pain, headaches, neck pain, palpitations or shortness of breath. Hyperlipidemia  This is a chronic problem. The current episode started more than 1 year ago. The problem is controlled. Recent lipid tests were reviewed and are variable. Pertinent negatives include no chest pain or shortness of breath. Diabetes  He presents for his follow-up diabetic visit. He has type 2 diabetes mellitus. His disease course has been fluctuating. Pertinent negatives for hypoglycemia include no confusion, dizziness, headaches, nervousness/anxiousness or pallor. Pertinent negatives for diabetes include no chest pain, no polydipsia, no polyuria and no weakness. Hemoglobin A1C (%)   Date Value   03/29/2022 6.9 (H)   01/03/2022 10.0 (H)   09/28/2021 7.9 (H)            Microalb/Crt.  Ratio (mcg/mg creat)   Date Value   01/03/2022 106 (H)     LDL Cholesterol (mg/dL)   Date Value   09/28/2021 40   09/11/2020 51   09/12/2019 43         AST (U/L)   Date Value   02/14/2022 17     ALT (U/L)   Date Value   02/14/2022 20     BUN (mg/dL)   Date Value   03/29/2022 46 (H)     BP Readings from Last 3 Encounters:   04/05/22 130/78   02/28/22 124/70   02/17/22 104/60              Past Medical History: Diagnosis Date    Benign prostatic hypertrophy     with elevated PSA.  BPPV (benign paroxysmal positional vertigo) 2/2008    Chest pain 05/2021    Coronary heart disease     Status post CABG.  Diverticulosis     Dupuytren's contracture     Mid finger, right hand.  Erectile dysfunction     Bowens catheter in place 11/02/2018    has had one in place for 6weeks, this one in place x 2 weeks    Gout     Quiescent.  History of blood transfusion     possible with open heart surgery 26years ago    Hyperlipidemia     Hypertension     Positive cardiac stress test 05/06/2021    Snores     Type II or unspecified type diabetes mellitus without mention of complication, not stated as uncontrolled       Past Surgical History:   Procedure Laterality Date    CARDIAC CATHETERIZATION  6/2005    Triple vessel CAD with patent LIMA to LAD, patent saphenous vein graft to first obtuse marginal, patent saphenous vein graft to posterior descending artery, occluded jump graft from posterior descending artery to posterior left ventricular branch.  CARDIAC CATHETERIZATION  3/16/16    CARDIAC CATHETERIZATION  05/07/2021    CATARACT REMOVAL Bilateral Nov. and Dec 2014    COLONOSCOPY  5/27/2008    Distal ileoscopy. Scattered diverticulosis and extensive diverticular disease of sigmoid colon and internal hemorrhoids.  COLONOSCOPY N/A 10/28/2019    COLONOSCOPY [facility] Marline Burns [facility] at [facility] OR  severe diverticulosis    CORONARY ARTERY BYPASS GRAFT      ELBOW SURGERY Right     Surgical repair.  EYE SURGERY      KNEE ARTHROSCOPY Right 3/16/2007    Partial medial meniscectomy, partial medial and lateral synovectomy, light chondroplasty.     AL LASER VAPORIZATION SURGERY PROSTATE, COMPLETE N/A 11/2/2018    CYSTOSCOPY, TRANSURETHRAL RESECTION PROSTATE - [facility] # [id number] HAILEE) performed by Silas Nelson MD at [facility] Bilateral 9/15/2010, 2007    [facility] Dr. Zelda Crandall PROSTATE BIOPSY Bilateral 05/10/2001    Benign-Dr. Aure Snell    PROSTATE BIOPSY Bilateral 1999    Benign-Dr. Anselmo Pruett    PROSTATE BIOPSY Bilateral 1998    Benign-Dr. Alice Black    PROSTATE BIOPSY Bilateral 1998    Benign-Dr. Alice Black    PROSTATE BIOPSY Bilateral 10/04/1996    Benign-Dr. Angel Dennis    PROSTATE SURGERY  2018    CYSTOSCOPY, TRANSURETHRAL RESECTION PROSTATE - [facility]     VASECTOMY         Family History   Problem Relation Age of Onset    Diabetes Mother     Kidney Disease Mother         Renal failure    Coronary Art Dis Mother     Hypertension Mother     Lung Cancer Father     Coronary Art Dis Father     Hypertension Brother     Other Brother         Hypernephroma          Social History     Tobacco Use    Smoking status: Former Smoker     Packs/day: 0.50     Years: 27.00     Pack years: 13.50     Types: Cigarettes     Start date: 1968     Quit date: 10/11/1992     Years since quittin.5    Smokeless tobacco: Never Used   Substance Use Topics    Alcohol use: Yes     Alcohol/week: 1.0 standard drink     Types: 1 Standard drinks or equivalent per week     Comment: 1 time per week         Current Outpatient Medications   Medication Sig Dispense Refill    atorvastatin (LIPITOR) 80 MG tablet Take 1 tablet by mouth daily 90 tablet 3    lisinopril (PRINIVIL;ZESTRIL) 40 MG tablet Take 1 tablet by mouth daily 90 tablet 3    metoprolol tartrate (LOPRESSOR) 50 MG tablet Take 1 tablet by mouth 2 times daily 180 tablet 3    amLODIPine (NORVASC) 5 MG tablet Take 1 tablet by mouth daily 90 tablet 3    clotrimazole-betamethasone (LOTRISONE) 1-0.05 % cream Apply topically 2 times daily.  2 g 5    insulin glargine (LANTUS SOLOSTAR) 100 UNIT/ML injection pen Inject 8 Units into the skin nightly 5 pen 3    glimepiride (AMARYL) 4 MG tablet Take 1 tablet by mouth 2 times daily 180 tablet 3    Insulin Pen Needle (PEN NEEDLES) 32G X 5 MM MISC Use 1 pen needle nightly 90 each 3  nitroGLYCERIN (NITROSTAT) 0.4 MG SL tablet Place 1 tablet under the tongue every 5 minutes as needed for Chest pain 25 tablet 3    ONE TOUCH ULTRASOFT LANCETS MISC 1 each by Does not apply route daily 100 each 3    blood glucose test strips (ASCENSIA AUTODISC VI;ONE TOUCH ULTRA TEST VI) strip Test 1 x daily 100 each 3    Alcohol Swabs PADS Use 1 nightly with lantus 100 each 3    hydroCHLOROthiazide (HYDRODIURIL) 25 MG tablet Take 1 tablet by mouth every morning 90 tablet 3    blood glucose test strips (DURGA CONTOUR TEST) strip Tests blood sugar daily. 300 each 3    aspirin 81 MG EC tablet Take 81 mg by mouth daily       No current facility-administered medications for this visit. No Known Allergies    Health Maintenance   Topic Date Due    Hepatitis C screen  Never done    COVID-19 Vaccine (1) Never done    DTaP/Tdap/Td vaccine (1 - Tdap) Never done    Shingles Vaccine (3 of 3) 03/14/2022    Depression Screen  06/28/2022    Annual Wellness Visit (AWV)  06/29/2022    Lipid screen  09/28/2022    PSA counseling  12/20/2022    Potassium monitoring  03/29/2023    Creatinine monitoring  03/29/2023    Flu vaccine  Completed    Pneumococcal 65+ years Vaccine  Completed    Hepatitis A vaccine  Aged Out    Hib vaccine  Aged Out    Meningococcal (ACWY) vaccine  Aged Out       Subjective:      Review of Systems   Constitutional: Negative for chills and fever. HENT: Negative for hearing loss. Eyes: Negative for pain and visual disturbance. Respiratory: Negative for cough and shortness of breath. Cardiovascular: Negative for chest pain, palpitations and leg swelling. Gastrointestinal: Negative for abdominal pain, blood in stool, constipation, diarrhea, nausea and vomiting. Endocrine: Negative for cold intolerance, polydipsia and polyuria. Genitourinary: Negative for difficulty urinating, dysuria and hematuria.    Musculoskeletal: Negative for arthralgias, back pain, gait problem and neck pain. Skin: Negative for pallor and rash. Neurological: Negative for dizziness, weakness, numbness and headaches. Hematological: Negative for adenopathy. Does not bruise/bleed easily. Psychiatric/Behavioral: Negative for confusion. The patient is not nervous/anxious. Objective:     Physical Exam  Vitals reviewed. Constitutional:       Appearance: He is well-developed. HENT:      Head: Normocephalic and atraumatic. Eyes:      Pupils: Pupils are equal, round, and reactive to light. Cardiovascular:      Rate and Rhythm: Normal rate and regular rhythm. Heart sounds: No murmur heard. No friction rub. No gallop. Pulmonary:      Effort: Pulmonary effort is normal.      Breath sounds: Normal breath sounds. No wheezing or rales. Abdominal:      General: There is no distension. Palpations: Abdomen is soft. There is no mass. Tenderness: There is no abdominal tenderness. There is no rebound. Musculoskeletal:         General: Normal range of motion. Cervical back: Neck supple. Lymphadenopathy:      Cervical: No cervical adenopathy. Skin:     General: Skin is warm and dry. Findings: No rash. Neurological:      Mental Status: He is alert and oriented to person, place, and time. Cranial Nerves: No cranial nerve deficit (grossly). Psychiatric:         Thought Content: Thought content normal.        /78 (Site: Left Upper Arm, Position: Sitting, Cuff Size: Large Adult)   Pulse 56   Resp 16   Ht 5' 8\" (1.727 m)   Wt 190 lb (86.2 kg)   BMI 28.89 kg/m²     Assessment:       Diagnosis Orders   1. Essential hypertension  Basic Metabolic Panel    lisinopril (PRINIVIL;ZESTRIL) 40 MG tablet    metoprolol tartrate (LOPRESSOR) 50 MG tablet    amLODIPine (NORVASC) 5 MG tablet   2. Hyperlipidemia, unspecified hyperlipidemia type  atorvastatin (LIPITOR) 80 MG tablet   3.  Type 2 diabetes mellitus without complication, without long-term current use of insulin ([address] 4. Anemia, unspecified type  Hemoglobin   5. Fungal skin infection  clotrimazole-betamethasone (LOTRISONE) 1-0.05 % cream    DISCONTINUED: clotrimazole-betamethasone (LOTRISONE) 1-0.05 % cream   Reviewed multiple test results for this appointment. 3/29/2022 stable hemoglobin 11.1.    3/29/2022 stable creatinine 2.24.    3/29/2022 much better hemoglobin A1c at 6.9. Patient told to continue Lantus insulin. Plan:       Return in about 3 months (around 7/7/2022) for medicare AWV, Hypertension, Hyperlipidemia, Diabetes. Orders Placed This Encounter   Procedures    Basic Metabolic Panel     Standing Status:   Future     Standing Expiration Date:   4/5/2023    Hemoglobin     Standing Status:   Future     Standing Expiration Date:   4/5/2023     Orders Placed This Encounter   Medications    atorvastatin (LIPITOR) 80 MG tablet     Sig: Take 1 tablet by mouth daily     Dispense:  90 tablet     Refill:  3    lisinopril (PRINIVIL;ZESTRIL) 40 MG tablet     Sig: Take 1 tablet by mouth daily     Dispense:  90 tablet     Refill:  3    metoprolol tartrate (LOPRESSOR) 50 MG tablet     Sig: Take 1 tablet by mouth 2 times daily     Dispense:  180 tablet     Refill:  3    amLODIPine (NORVASC) 5 MG tablet     Sig: Take 1 tablet by mouth daily     Dispense:  90 tablet     Refill:  3    DISCONTD: clotrimazole-betamethasone (LOTRISONE) 1-0.05 % cream     Sig: Apply topically 2 times daily. Dispense:  45 g     Refill:  3    clotrimazole-betamethasone (LOTRISONE) 1-0.05 % cream     Sig: Apply topically 2 times daily. Dispense:  2 g     Refill:  5       Patientgiven educational materials - see patient instructions. Discussed use, benefit,and side effects of prescribed medications. All patient questions answered. Ptvoiced understanding. Reviewed health maintenance. Instructed to continue currentmedications, diet and exercise. Patient agreed with treatment plan. Follow up asdirected.      Electronically signed by Michael Owens MD on 4/5/2022 at 1:36 PM

## 2022-04-07 LAB — DIABETIC RETINOPATHY: NEGATIVE

## 2022-05-19 ENCOUNTER — OFFICE VISIT (OUTPATIENT)
Dept: CARDIOLOGY | Age: 77
End: 2022-05-19
Payer: MEDICARE

## 2022-05-19 VITALS
HEIGHT: 68 IN | BODY MASS INDEX: 28.52 KG/M2 | WEIGHT: 188.2 LBS | SYSTOLIC BLOOD PRESSURE: 160 MMHG | HEART RATE: 55 BPM | DIASTOLIC BLOOD PRESSURE: 74 MMHG

## 2022-05-19 DIAGNOSIS — Z95.1 S/P CABG X 3: ICD-10-CM

## 2022-05-19 DIAGNOSIS — E78.2 MIXED HYPERLIPIDEMIA: ICD-10-CM

## 2022-05-19 DIAGNOSIS — I10 ESSENTIAL HYPERTENSION: ICD-10-CM

## 2022-05-19 DIAGNOSIS — N18.4 CKD (CHRONIC KIDNEY DISEASE) STAGE 4, GFR 15-29 ML/MIN (HCC): ICD-10-CM

## 2022-05-19 DIAGNOSIS — R94.39 ABNORMAL CARDIOVASCULAR STRESS TEST: ICD-10-CM

## 2022-05-19 DIAGNOSIS — I25.10 CORONARY ARTERY DISEASE INVOLVING NATIVE CORONARY ARTERY OF NATIVE HEART WITHOUT ANGINA PECTORIS: Primary | ICD-10-CM

## 2022-05-19 DIAGNOSIS — N18.30 STAGE 3 CHRONIC KIDNEY DISEASE, UNSPECIFIED WHETHER STAGE 3A OR 3B CKD (HCC): ICD-10-CM

## 2022-05-19 DIAGNOSIS — I25.119 CHEST PAIN DUE TO CAD (HCC): ICD-10-CM

## 2022-05-19 PROCEDURE — G8427 DOCREV CUR MEDS BY ELIG CLIN: HCPCS | Performed by: INTERNAL MEDICINE

## 2022-05-19 PROCEDURE — 99214 OFFICE O/P EST MOD 30 MIN: CPT | Performed by: INTERNAL MEDICINE

## 2022-05-19 PROCEDURE — 1036F TOBACCO NON-USER: CPT | Performed by: INTERNAL MEDICINE

## 2022-05-19 PROCEDURE — 1123F ACP DISCUSS/DSCN MKR DOCD: CPT | Performed by: INTERNAL MEDICINE

## 2022-05-19 PROCEDURE — 93005 ELECTROCARDIOGRAM TRACING: CPT | Performed by: INTERNAL MEDICINE

## 2022-05-19 PROCEDURE — 93010 ELECTROCARDIOGRAM REPORT: CPT | Performed by: INTERNAL MEDICINE

## 2022-05-19 PROCEDURE — 4040F PNEUMOC VAC/ADMIN/RCVD: CPT | Performed by: INTERNAL MEDICINE

## 2022-05-19 PROCEDURE — G8417 CALC BMI ABV UP PARAM F/U: HCPCS | Performed by: INTERNAL MEDICINE

## 2022-05-19 RX ORDER — CARVEDILOL 12.5 MG/1
12.5 TABLET ORAL 2 TIMES DAILY
Qty: 180 TABLET | Refills: 1 | Status: SHIPPED | OUTPATIENT
Start: 2022-05-19 | End: 2022-10-10 | Stop reason: SDUPTHER

## 2022-05-19 NOTE — PROGRESS NOTES
[place] [address]  [address] [facility] [address]  Dept: [phone]    CC: follow up for CAD, CABG    HPI:  The patient is a 68y.o. year old, , male is in the office for follow up   Stable from cardiac standpoint  Denies any cp, sob, orthopnea, pnd, le edema, palpitations, dizziness. States he exercising 3x/week- usually 2 hours each time. Denies any CP with exercise. Can get some discomfort when walking 1 mile- for the past 15 years unchanged, but no CP with his exercise. Mows his yard without issue. Past Medical History:   has a past medical history of Benign prostatic hypertrophy, BPPV (benign paroxysmal positional vertigo), Chest pain, Coronary heart disease, Diverticulosis, Dupuytren's contracture, Erectile dysfunction, Bowens catheter in place, Gout, History of blood transfusion, Hyperlipidemia, Hypertension, Positive cardiac stress test, Snores, and Type II or unspecified type diabetes mellitus without mention of complication, not stated as uncontrolled. Past Surgical History:   has a past surgical history that includes Prostate biopsy (Bilateral, 9/15/2010, 2007); Colonoscopy (5/27/2008); Prostate biopsy (Bilateral, 05/10/2001); Knee arthroscopy (Right, 3/16/2007); Elbow surgery (Right); Vasectomy; Coronary artery bypass graft; Cataract removal (Bilateral, Nov. and Dec 2014); Prostate Biopsy (Bilateral, 04/08/1999); Prostate Biopsy (Bilateral, 06/18/1998); Prostate Biopsy (Bilateral, 01/07/1998); Prostate Biopsy (Bilateral, 10/04/1996); Prostate surgery (11/02/2018); pr laser vaporization surgery prostate, complete (N/A, 11/2/2018); Colonoscopy (N/A, 10/28/2019); eye surgery; Cardiac catheterization (6/2005); Cardiac catheterization (3/16/16); and Cardiac catheterization (05/07/2021).     Home Medications:  Prior to Admission medications    Medication Sig Start Date End Date Taking? Authorizing Provider   atorvastatin (LIPITOR) 80 MG tablet Take 1 tablet by mouth daily 4/5/22  Yes Mau Bonner MD   lisinopril (PRINIVIL;ZESTRIL) 40 MG tablet Take 1 tablet by mouth daily 4/5/22  Yes Mau Bonner MD   metoprolol tartrate (LOPRESSOR) 50 MG tablet Take 1 tablet by mouth 2 times daily 4/5/22  Yes Mau Bonner MD   amLODIPine (NORVASC) 5 MG tablet Take 1 tablet by mouth daily 4/5/22  Yes Mau Bonner MD   clotrimazole-betamethasone (LOTRISONE) 1-0.05 % cream Apply topically 2 times daily. 4/5/22  Yes Mau Bonner MD   insulin glargine (LANTUS SOLOSTAR) 100 UNIT/ML injection pen Inject 8 Units into the skin nightly 2/17/22  Yes AMANDA Hollingsworth - CNP   glimepiride (AMARYL) 4 MG tablet Take 1 tablet by mouth 2 times daily 2/14/22  Yes Mau Bonner MD   Insulin Pen Needle (PEN NEEDLES) 32G X 5 MM MISC Use 1 pen needle nightly 1/3/22  Yes Mau Bonner MD   nitroGLYCERIN (NITROSTAT) 0.4 MG SL tablet Place 1 tablet under the tongue every 5 minutes as needed for Chest pain 1/3/22  Yes Mau Bonner MD   ONE TOUCH ULTRASOFT LANCETS MISC 1 each by Does not apply route daily 1/3/22  Yes Mau Bonner MD   blood glucose test strips (ASCENSIA AUTODISC VI;ONE TOUCH ULTRA TEST VI) strip Test 1 x daily 1/3/22  Yes Mau Bonner MD   Alcohol Swabs PADS Use 1 nightly with lantus 1/3/22  Yes Mau Bonner MD   hydroCHLOROthiazide (HYDRODIURIL) 25 MG tablet Take 1 tablet by mouth every morning 6/28/21  Yes Mau Bonner MD   blood glucose test strips (DURGA CONTOUR TEST) strip Tests blood sugar daily. 5/11/20  Yes Mau Bonner MD   aspirin 81 MG EC tablet Take 81 mg by mouth daily   Yes Historical Provider, MD       Allergies:  Patient has no known allergies. Social History:   reports that he quit smoking about 29 years ago. His smoking use included cigarettes. He started smoking about 54 years ago.  He has a 13.50 pack-year smoking history. He has never used smokeless tobacco. He reports current alcohol use of about 1.0 standard drink of alcohol per week. He reports that he does not use drugs. Review of Systems:  · Constitutional: there has been no unanticipated weight loss. There's been No change in energy level, No change in activity level. · Eyes: No visual changes or diplopia. No scleral icterus. · ENT: No Headaches, hearing loss or vertigo. No mouth sores or sore throat. · Cardiovascular: As above. · Respiratory: as hpi  · Gastrointestinal: No abdominal pain, appetite loss, blood in stools. No change in bowel or bladder habits. · Genitourinary: No dysuria, trouble voiding, or hematuria. · Musculoskeletal:  No gait disturbance, No weakness or joint complaints. · Integumentary: No rash or pruritis. · Psychiatric: No anxiety, or depression. · Hematologic/Lymphatic: No abnormal bruising or bleeding, blood clots or swollen lymph nodes. · Allergic/Immunologic: No nasal congestion or hives. Physical Exam:  BP (!) 160/74   Pulse 55   Ht 5' 8\" (1.727 m)   Wt 188 lb 3.2 oz (85.4 kg)   BMI 28.62 kg/m²     Constitutional and General Appearance: alert, cooperative, no distress and appears stated age  HEENT: PERRL, no cervical lymphadenopathy. No masses palpable. Normal oral mucosa  Respiratory:  · Normal excursion and expansion without use of accessory muscles  · Resp Auscultation: Good respiratory effort. No for increased work of breathing.  On auscultation: clear to auscultation bilaterally  Cardiovascular:  · The apical impulse is not displaced  · Heart tones are crisp and normal. regular S1 and S2.  · Jugular venous pulsation Normal  · The carotid upstroke is normal in amplitude and contour without delay or bruit  · Peripheral pulses are symmetrical and full   Abdomen:   · No masses or tenderness  · Bowel sounds present  Extremities:  ·  No Cyanosis or Clubbing  ·  Lower extremity edema: No  ·  Skin: Warm and dry    Cardiac Data:    EKG: Sinus  Bradycardia   WITHIN NORMAL LIMITS-HR 53    Cath 5/7/2021   1. Severe Native Multivessel CAD   2. Patent LIMA-LAD, SVG-OM and SVG-RPDA grafts with mild disease   3. No significant Change from last angiogram in 2016     Labs:   Lab Results   Component Value Date    CHOL 92 09/28/2021    TRIG 106 09/28/2021    HDL 31 (L) 09/28/2021    LDLCHOLESTEROL 40 09/28/2021    VLDL NOT REPORTED 09/28/2021    CHOLHDLRATIO 3.0 09/28/2021       Lab Results   Component Value Date     (H) 03/29/2022    K 4.9 03/29/2022     (H) 03/29/2022    CO2 24 03/29/2022    BUN 46 (H) 03/29/2022    CREATININE 2.24 (H) 03/29/2022    GLUCOSE 203 (H) 03/29/2022    CALCIUM 9.3 03/29/2022    PROT 7.1 02/14/2022    PROT 6.6 02/14/2022    LABALBU 4.4 02/14/2022    BILITOT 0.86 02/14/2022    ALKPHOS 64 02/14/2022    AST 17 02/14/2022    ALT 20 02/14/2022    LABGLOM 29 (L) 03/29/2022    GFRAA 35 (L) 03/29/2022     IMPRESSION / RECOMMENDATIONS:  1. Multivessel CAD s/p CABG, recent cardiac cath for abnormal stress test in 05/2021 showed Severe native vessel disease but Patent LIMA-LAD, SVG-OM and SVG-RCA: with stable nonlimiting angina  -works out 3 x / week (2 hours each time)  -We will continue current medical therapy with aspirin, BB, high intensity Lipitor,   -unable to tolerate imdur or ranexa    2. HTN- higher today, but was well controlled. 4/2022  - change lopressor to coreg 12.5 bid  - continue norvasc (unable to do 10 due LE edema), lisinopril, HCTZ    3. DL, controlled, LDL 40, chol 92 in 09/2021, continue statin therapy     4. DM2- per pcp    5. Chronic kidney disease, Last cr 2.04 at baseline. Following with Nephrology. The patient is to continue heart healthy diet, weight loss and exercise as tolerated. Patient's medications and side effects were discussed. Medication refills were provided if needed. Follow up appointment timing was discussed.  All questions and concerns were addressed to patient's satisfaction. The patient is to follow up in 6 months or sooner if necessary. Thank you for allowing me to participate in the care of this patient, please do not hesitate to call if you have any questions. Francisco Javier Huang DO, [facility] [place], [address], [address], [place] [address] [facility]  [facility]  [phone]

## 2022-07-05 ENCOUNTER — HOSPITAL ENCOUNTER (OUTPATIENT)
Dept: LAB | Age: 77
Discharge: HOME | End: 2022-07-05
Payer: MEDICARE

## 2022-07-05 DIAGNOSIS — D64.9 ANEMIA, UNSPECIFIED TYPE: Primary | ICD-10-CM

## 2022-07-05 DIAGNOSIS — E11.9 TYPE 2 DIABETES MELLITUS WITHOUT COMPLICATION, WITHOUT LONG-TERM CURRENT USE OF INSULIN (HCC): ICD-10-CM

## 2022-07-05 DIAGNOSIS — D64.9 ANEMIA, UNSPECIFIED TYPE: ICD-10-CM

## 2022-07-05 DIAGNOSIS — I10 ESSENTIAL HYPERTENSION: ICD-10-CM

## 2022-07-05 LAB
ABSOLUTE EOS #: 0.6 K/UL (ref 0–0.44)
ABSOLUTE IMMATURE GRANULOCYTE: 0.03 K/UL (ref 0–0.3)
ABSOLUTE LYMPH #: 1.53 K/UL (ref 1.1–3.7)
ABSOLUTE MONO #: 0.41 K/UL (ref 0.1–1.2)
ANION GAP SERPL CALCULATED.3IONS-SCNC: 11 MMOL/L (ref 9–17)
BASOPHILS # BLD: 1 % (ref 0–2)
BASOPHILS ABSOLUTE: 0.07 K/UL (ref 0–0.2)
BUN BLDV-MCNC: 49 MG/DL (ref 8–23)
BUN/CREAT BLD: 22 (ref 9–20)
CALCIUM SERPL-MCNC: 9.5 MG/DL (ref 8.6–10.4)
CHLORIDE BLD-SCNC: 108 MMOL/L (ref 98–107)
CO2: 22 MMOL/L (ref 20–31)
CREAT SERPL-MCNC: 2.23 MG/DL (ref 0.7–1.2)
EOSINOPHILS RELATIVE PERCENT: 9 % (ref 1–4)
ESTIMATED AVERAGE GLUCOSE: 174 MG/DL
GFR AFRICAN AMERICAN: 35 ML/MIN
GFR NON-AFRICAN AMERICAN: 29 ML/MIN
GFR SERPL CREATININE-BSD FRML MDRD: ABNORMAL ML/MIN/{1.73_M2}
GLUCOSE BLD-MCNC: 186 MG/DL (ref 70–99)
HBA1C MFR BLD: 7.7 % (ref 4–6)
HCT VFR BLD CALC: 34.1 % (ref 40.7–50.3)
HEMOGLOBIN: 11.3 G/DL (ref 13–17)
HEMOGLOBIN: 11.3 G/DL (ref 13–17)
IMMATURE GRANULOCYTES: 1 %
LYMPHOCYTES # BLD: 23 % (ref 24–43)
MCH RBC QN AUTO: 31.6 PG (ref 25.2–33.5)
MCHC RBC AUTO-ENTMCNC: 33.1 G/DL (ref 25.2–33.5)
MCV RBC AUTO: 95.3 FL (ref 82.6–102.9)
MONOCYTES # BLD: 6 % (ref 3–12)
NRBC AUTOMATED: 0 PER 100 WBC
PDW BLD-RTO: 14.7 % (ref 11.8–14.4)
PLATELET # BLD: 161 K/UL (ref 138–453)
PMV BLD AUTO: 10.1 FL (ref 8.1–13.5)
POTASSIUM SERPL-SCNC: 5 MMOL/L (ref 3.7–5.3)
RBC # BLD: 3.58 M/UL (ref 4.21–5.77)
RBC # BLD: ABNORMAL 10*6/UL
SEG NEUTROPHILS: 60 % (ref 36–65)
SEGMENTED NEUTROPHILS ABSOLUTE COUNT: 4.01 K/UL (ref 1.5–8.1)
SODIUM BLD-SCNC: 141 MMOL/L (ref 135–144)
WBC # BLD: 6.7 K/UL (ref 3.5–11.3)

## 2022-07-05 PROCEDURE — 85018 HEMOGLOBIN: CPT

## 2022-07-05 PROCEDURE — 36415 COLL VENOUS BLD VENIPUNCTURE: CPT

## 2022-07-05 PROCEDURE — 85025 COMPLETE CBC W/AUTO DIFF WBC: CPT

## 2022-07-05 PROCEDURE — 83036 HEMOGLOBIN GLYCOSYLATED A1C: CPT

## 2022-07-05 PROCEDURE — 80048 BASIC METABOLIC PNL TOTAL CA: CPT

## 2022-07-07 ENCOUNTER — OFFICE VISIT (OUTPATIENT)
Dept: INTERNAL MEDICINE | Age: 77
End: 2022-07-07
Payer: MEDICARE

## 2022-07-07 VITALS
BODY MASS INDEX: 28.49 KG/M2 | SYSTOLIC BLOOD PRESSURE: 122 MMHG | HEIGHT: 68 IN | DIASTOLIC BLOOD PRESSURE: 72 MMHG | HEART RATE: 58 BPM | RESPIRATION RATE: 16 BRPM | WEIGHT: 188 LBS

## 2022-07-07 DIAGNOSIS — E78.49 OTHER HYPERLIPIDEMIA: ICD-10-CM

## 2022-07-07 DIAGNOSIS — Z00.00 MEDICARE ANNUAL WELLNESS VISIT, SUBSEQUENT: ICD-10-CM

## 2022-07-07 DIAGNOSIS — E11.65 TYPE 2 DIABETES MELLITUS WITH HYPERGLYCEMIA, WITHOUT LONG-TERM CURRENT USE OF INSULIN (HCC): ICD-10-CM

## 2022-07-07 DIAGNOSIS — M62.838 MUSCLE SPASM: ICD-10-CM

## 2022-07-07 DIAGNOSIS — Z00.00 GENERAL MEDICAL EXAM: Primary | ICD-10-CM

## 2022-07-07 DIAGNOSIS — D64.9 ANEMIA, UNSPECIFIED TYPE: ICD-10-CM

## 2022-07-07 DIAGNOSIS — I10 ESSENTIAL HYPERTENSION: ICD-10-CM

## 2022-07-07 PROCEDURE — 1123F ACP DISCUSS/DSCN MKR DOCD: CPT | Performed by: INTERNAL MEDICINE

## 2022-07-07 PROCEDURE — 3051F HG A1C>EQUAL 7.0%<8.0%: CPT | Performed by: INTERNAL MEDICINE

## 2022-07-07 PROCEDURE — 1036F TOBACCO NON-USER: CPT | Performed by: INTERNAL MEDICINE

## 2022-07-07 PROCEDURE — G8417 CALC BMI ABV UP PARAM F/U: HCPCS | Performed by: INTERNAL MEDICINE

## 2022-07-07 PROCEDURE — 99397 PER PM REEVAL EST PAT 65+ YR: CPT | Performed by: INTERNAL MEDICINE

## 2022-07-07 PROCEDURE — 99214 OFFICE O/P EST MOD 30 MIN: CPT | Performed by: INTERNAL MEDICINE

## 2022-07-07 PROCEDURE — G0439 PPPS, SUBSEQ VISIT: HCPCS | Performed by: INTERNAL MEDICINE

## 2022-07-07 PROCEDURE — G8427 DOCREV CUR MEDS BY ELIG CLIN: HCPCS | Performed by: INTERNAL MEDICINE

## 2022-07-07 RX ORDER — HYDROCHLOROTHIAZIDE 25 MG/1
25 TABLET ORAL EVERY MORNING
Qty: 90 TABLET | Refills: 3 | Status: SHIPPED | OUTPATIENT
Start: 2022-07-07

## 2022-07-07 RX ORDER — CYCLOBENZAPRINE HCL 10 MG
10 TABLET ORAL 3 TIMES DAILY PRN
Qty: 60 TABLET | Refills: 2 | Status: SHIPPED | OUTPATIENT
Start: 2022-07-07 | End: 2022-07-07 | Stop reason: ALTCHOICE

## 2022-07-07 SDOH — ECONOMIC STABILITY: FOOD INSECURITY: WITHIN THE PAST 12 MONTHS, THE FOOD YOU BOUGHT JUST DIDN'T LAST AND YOU DIDN'T HAVE MONEY TO GET MORE.: NEVER TRUE

## 2022-07-07 SDOH — ECONOMIC STABILITY: FOOD INSECURITY: WITHIN THE PAST 12 MONTHS, YOU WORRIED THAT YOUR FOOD WOULD RUN OUT BEFORE YOU GOT MONEY TO BUY MORE.: NEVER TRUE

## 2022-07-07 ASSESSMENT — ENCOUNTER SYMPTOMS
ABDOMINAL PAIN: 0
BACK PAIN: 0
COUGH: 0
VOMITING: 0
BLOOD IN STOOL: 0
SHORTNESS OF BREATH: 0
CONSTIPATION: 0
NAUSEA: 0
DIARRHEA: 0
EYE PAIN: 0

## 2022-07-07 ASSESSMENT — LIFESTYLE VARIABLES
HOW MANY STANDARD DRINKS CONTAINING ALCOHOL DO YOU HAVE ON A TYPICAL DAY: 1 OR 2
HOW OFTEN DO YOU HAVE A DRINK CONTAINING ALCOHOL: 2-3 TIMES A WEEK

## 2022-07-07 ASSESSMENT — PATIENT HEALTH QUESTIONNAIRE - PHQ9
SUM OF ALL RESPONSES TO PHQ QUESTIONS 1-9: 0
SUM OF ALL RESPONSES TO PHQ9 QUESTIONS 1 & 2: 0
SUM OF ALL RESPONSES TO PHQ QUESTIONS 1-9: 0
SUM OF ALL RESPONSES TO PHQ QUESTIONS 1-9: 0
1. LITTLE INTEREST OR PLEASURE IN DOING THINGS: 0
SUM OF ALL RESPONSES TO PHQ QUESTIONS 1-9: 0
2. FEELING DOWN, DEPRESSED OR HOPELESS: 0

## 2022-07-07 ASSESSMENT — SOCIAL DETERMINANTS OF HEALTH (SDOH): HOW HARD IS IT FOR YOU TO PAY FOR THE VERY BASICS LIKE FOOD, HOUSING, MEDICAL CARE, AND HEATING?: NOT HARD AT ALL

## 2022-07-07 NOTE — PROGRESS NOTES
Medicare Annual Wellness Visit    Lakeisha Johnson is here for Medicare AWV (3 month, has been having some lower back pain x 2 months), Hypertension, Hyperlipidemia, and Diabetes    Assessment & Plan   General medical exam  -     Comprehensive Metabolic Panel; Future  Essential hypertension  -     Comprehensive Metabolic Panel; Future  -     Basic Metabolic Panel; Future  -     hydroCHLOROthiazide (HYDRODIURIL) 25 MG tablet; Take 1 tablet by mouth every morning, Disp-90 tablet, R-3Normal  Medicare annual wellness visit, subsequent  Other hyperlipidemia  -     Lipid Panel; Future  Type 2 diabetes mellitus with hyperglycemia, without long-term current use of insulin (HCC)  -     Hemoglobin A1C; Future  Muscle spasm  -     cyclobenzaprine (FLEXERIL) 10 MG tablet; Take 1 tablet by mouth 3 times daily as needed for Muscle spasms, Disp-60 tablet, R-2Normal  Anemia, unspecified type  -     Hemoglobin; Future      Recommendations for Preventive Services Due: see orders and patient instructions/AVS.  Recommended screening schedule for the next 5-10 years is provided to the patient in written form: see Patient Instructions/AVS.     Return in about 3 months (around 10/10/2022) for Hypertension, Hyperlipidemia, Diabetes. Subjective       Patient's complete Health Risk Assessment and screening values have been reviewed and are found in Flowsheets. The following problems were reviewed today and where indicated follow up appointments were made and/or referrals ordered.     Positive Risk Factor Screenings with Interventions:               General Health and ACP:  General  In general, how would you say your health is?: Fair  In the past 7 days, have you experienced any of the following: New or Increased Pain, New or Increased Fatigue, Loneliness, Social Isolation, Stress or Anger?: No  Do you get the social and emotional support that you need?: Yes  Do you have a Living Will?: (!) No    Advance Directives     Power of [facility] Living Will ACP-Advance Directive ACP-Power of     Not on File Not on File Not on File Not on File      General Health Risk Interventions:  · No Living Will: declines living will at this time  · . Objective   Vitals:    07/07/22 1136   BP: 122/72   Site: Left Upper Arm   Position: Sitting   Cuff Size: Large Adult   Pulse: 58   Resp: 16   Weight: 188 lb (85.3 kg)   Height: 5' 8\" (1.727 m)      Body mass index is 28.59 kg/m². No Known Allergies  Prior to Visit Medications    Medication Sig Taking? Authorizing Provider   hydroCHLOROthiazide (HYDRODIURIL) 25 MG tablet Take 1 tablet by mouth every morning Yes Jami Day MD   cyclobenzaprine (FLEXERIL) 10 MG tablet Take 1 tablet by mouth 3 times daily as needed for Muscle spasms Yes Jami Day MD   carvedilol (COREG) 12.5 MG tablet Take 1 tablet by mouth 2 times daily Yes Mj Lepe DO   atorvastatin (LIPITOR) 80 MG tablet Take 1 tablet by mouth daily Yes Jami Day MD   lisinopril (PRINIVIL;ZESTRIL) 40 MG tablet Take 1 tablet by mouth daily Yes Jami Day MD   amLODIPine (NORVASC) 5 MG tablet Take 1 tablet by mouth daily Yes Jami Day MD   clotrimazole-betamethasone (LOTRISONE) 1-0.05 % cream Apply topically 2 times daily.  Yes Jami Day MD   insulin glargine (LANTUS SOLOSTAR) 100 UNIT/ML injection pen Inject 8 Units into the skin nightly  Patient taking differently: Inject 10 Units into the skin nightly  Yes Katey Cagle, APRN - CNP   glimepiride (AMARYL) 4 MG tablet Take 1 tablet by mouth 2 times daily Yes Jami Day MD   aspirin 81 MG EC tablet Take 81 mg by mouth daily Yes Historical Provider, MD   Insulin Pen Needle (PEN NEEDLES) 32G X 5 MM MISC Use 1 pen needle nightly  Jami Day MD   nitroGLYCERIN (NITROSTAT) 0.4 MG SL tablet Place 1 tablet under the tongue every 5 minutes as needed for Chest pain  Jami Day MD   ONE TOUCH ULTRASOFT LANCETS MISC 1 each by Does not apply route daily  Mark Amaro MD   blood glucose test strips (ASCENSIA AUTODISC VI;ONE TOUCH ULTRA TEST VI) strip Test 1 x daily  Mark Amaro MD   Alcohol Swabs PADS Use 1 nightly with lantus  Mark Amaro MD   blood glucose test strips (DURGA CONTOUR TEST) strip Tests blood sugar daily.   Mark Amaro MD       CareTeam (Including outside providers/suppliers regularly involved in providing care):   Patient Care Team:  Mark Amaro MD as PCP - General (Internal Medicine)  Mark Amaro MD as PCP - [facility]  Hipolito Brooks MD as Consulting Physician (Urology)  Marium Chavez MD as Consulting Physician (Cardiology)     Reviewed and updated this visit:  Tobacco  Allergies  Meds  Problems  Med Hx  Surg Hx  Soc Hx  Fam Hx                   I, Dr. Shelton Robbins, directly supervised the performance of this Medicare annual wellness visit

## 2022-07-07 NOTE — PROGRESS NOTES
09/28/2021 40   09/11/2020 51   09/12/2019 43         AST (U/L)   Date Value   02/14/2022 17     ALT (U/L)   Date Value   02/14/2022 20     BUN (mg/dL)   Date Value   07/05/2022 49 (H)     BP Readings from Last 3 Encounters:   07/07/22 122/72   05/19/22 (!) 160/74   04/05/22 130/78              Past Medical History:   Diagnosis Date    Benign prostatic hypertrophy     with elevated PSA.  BPPV (benign paroxysmal positional vertigo) 2/2008    Chest pain 05/2021    Coronary heart disease     Status post CABG.  Diverticulosis     Dupuytren's contracture     Mid finger, right hand.  Erectile dysfunction     Bowens catheter in place 11/02/2018    has had one in place for 6weeks, this one in place x 2 weeks    Gout     Quiescent.  History of blood transfusion     possible with open heart surgery 26years ago    Hyperlipidemia     Hypertension     Positive cardiac stress test 05/06/2021    Snores     Type II or unspecified type diabetes mellitus without mention of complication, not stated as uncontrolled       Past Surgical History:   Procedure Laterality Date    CARDIAC CATHETERIZATION  6/2005    Triple vessel CAD with patent LIMA to LAD, patent saphenous vein graft to first obtuse marginal, patent saphenous vein graft to posterior descending artery, occluded jump graft from posterior descending artery to posterior left ventricular branch.  CARDIAC CATHETERIZATION  3/16/16    CARDIAC CATHETERIZATION  05/07/2021    CATARACT REMOVAL Bilateral Nov. and Dec 2014    COLONOSCOPY  5/27/2008    Distal ileoscopy. Scattered diverticulosis and extensive diverticular disease of sigmoid colon and internal hemorrhoids.  COLONOSCOPY N/A 10/28/2019    COLONOSCOPY performed by Luís Collins [facility] at [facility] OR  severe diverticulosis    CORONARY ARTERY BYPASS GRAFT      ELBOW SURGERY Right     Surgical repair.     EYE SURGERY      KNEE ARTHROSCOPY Right 3/16/2007    Partial medial meniscectomy, partial medial and lateral synovectomy, light chondroplasty.  NC LASER VAPORIZATION SURGERY PROSTATE, COMPLETE N/A 2018    CYSTOSCOPY, TRANSURETHRAL RESECTION PROSTATE - [facility] # [id number] HAILEE) performed by Timmy Buckner MD at [facility] Bilateral 9/15/2010,     [facility] Dr. Lory Santamaria Bilateral 05/10/2001    Benign-Dr. Roney Chavez    PROSTATE BIOPSY Bilateral 1999    Benign-Dr. Chel Reyes    PROSTATE BIOPSY Bilateral 1998    Benign-Dr. Allegra Marcial    PROSTATE BIOPSY Bilateral 1998    Benign-Dr. Allegra Marcial    PROSTATE BIOPSY Bilateral 10/04/1996    Benign-Dr. Niharika Navarro    PROSTATE SURGERY  2018    CYSTOSCOPY, TRANSURETHRAL RESECTION PROSTATE - [facility]     VASECTOMY         Family History   Problem Relation Age of Onset    Diabetes Mother     Kidney Disease Mother         Renal failure    Coronary Art Dis Mother     Hypertension Mother     Lung Cancer Father     Coronary Art Dis Father     Hypertension Brother     Other Brother         Hypernephroma          Social History     Tobacco Use    Smoking status: Former Smoker     Packs/day: 0.50     Years: 27.00     Pack years: 13.50     Types: Cigarettes     Start date: 1968     Quit date: 10/11/1992     Years since quittin.7    Smokeless tobacco: Never Used   Substance Use Topics    Alcohol use:  Yes     Alcohol/week: 1.0 standard drink     Types: 1 Standard drinks or equivalent per week     Comment: 1 time per week         Current Outpatient Medications   Medication Sig Dispense Refill    hydroCHLOROthiazide (HYDRODIURIL) 25 MG tablet Take 1 tablet by mouth every morning 90 tablet 3    carvedilol (COREG) 12.5 MG tablet Take 1 tablet by mouth 2 times daily 180 tablet 1    atorvastatin (LIPITOR) 80 MG tablet Take 1 tablet by mouth daily 90 tablet 3    lisinopril (PRINIVIL;ZESTRIL) 40 MG tablet Take 1 tablet by mouth daily 90 tablet 3    amLODIPine (NORVASC) 5 MG tablet Take 1 tablet by mouth daily 90 tablet 3    clotrimazole-betamethasone (LOTRISONE) 1-0.05 % cream Apply topically 2 times daily. 2 g 5    insulin glargine (LANTUS SOLOSTAR) 100 UNIT/ML injection pen Inject 8 Units into the skin nightly (Patient taking differently: Inject 10 Units into the skin nightly ) 5 pen 3    glimepiride (AMARYL) 4 MG tablet Take 1 tablet by mouth 2 times daily 180 tablet 3    aspirin 81 MG EC tablet Take 81 mg by mouth daily      Insulin Pen Needle (PEN NEEDLES) 32G X 5 MM MISC Use 1 pen needle nightly 90 each 3    nitroGLYCERIN (NITROSTAT) 0.4 MG SL tablet Place 1 tablet under the tongue every 5 minutes as needed for Chest pain 25 tablet 3    ONE TOUCH ULTRASOFT LANCETS MISC 1 each by Does not apply route daily 100 each 3    blood glucose test strips (ASCENSIA AUTODISC VI;ONE TOUCH ULTRA TEST VI) strip Test 1 x daily 100 each 3    Alcohol Swabs PADS Use 1 nightly with lantus 100 each 3    blood glucose test strips (DURGA CONTOUR TEST) strip Tests blood sugar daily. 300 each 3     No current facility-administered medications for this visit. No Known Allergies    Health Maintenance   Topic Date Due    Hepatitis C screen  Never done    DTaP/Tdap/Td vaccine (1 - Tdap) Never done    COVID-19 Vaccine (3 - Booster for Moderna series) 07/25/2021    Flu vaccine (1) 09/01/2022    Lipids  09/28/2022    Prostate Specific Antigen (PSA) Screening or Monitoring  12/20/2022    Depression Screen  07/07/2023    Annual Wellness Visit (AWV)  07/08/2023    Shingles vaccine  Completed    Pneumococcal 65+ years Vaccine  Completed    Hepatitis A vaccine  Aged Out    Hib vaccine  Aged Out    Meningococcal (ACWY) vaccine  Aged Out       Subjective:      Review of Systems   Constitutional: Negative for chills and fever. HENT: Negative for hearing loss. Eyes: Negative for pain and visual disturbance. Respiratory: Negative for cough and shortness of breath.     Cardiovascular: Negative for chest pain, palpitations and leg swelling. Gastrointestinal: Negative for abdominal pain, blood in stool, constipation, diarrhea, nausea and vomiting. Endocrine: Negative for cold intolerance, polydipsia and polyuria. Genitourinary: Negative for difficulty urinating, dysuria and hematuria. Musculoskeletal: Negative for arthralgias, back pain, gait problem and neck pain. Skin: Negative for pallor and rash. Neurological: Negative for dizziness, weakness, numbness and headaches. Hematological: Negative for adenopathy. Does not bruise/bleed easily. Psychiatric/Behavioral: Negative for confusion. The patient is not nervous/anxious. Objective:     Physical Exam  Vitals reviewed. Constitutional:       Appearance: He is well-developed. HENT:      Head: Normocephalic and atraumatic. Eyes:      Pupils: Pupils are equal, round, and reactive to light. Cardiovascular:      Rate and Rhythm: Normal rate and regular rhythm. Heart sounds: No murmur heard. No friction rub. No gallop. Pulmonary:      Effort: Pulmonary effort is normal.      Breath sounds: Normal breath sounds. No wheezing or rales. Abdominal:      General: There is no distension. Palpations: Abdomen is soft. There is no mass. Tenderness: There is no abdominal tenderness. There is no rebound. Musculoskeletal:         General: Normal range of motion. Cervical back: Neck supple. Lymphadenopathy:      Cervical: No cervical adenopathy. Skin:     General: Skin is warm and dry. Findings: No rash. Neurological:      Mental Status: He is alert and oriented to person, place, and time. Cranial Nerves: No cranial nerve deficit (grossly). Psychiatric:         Thought Content: Thought content normal.        /72 (Site: Left Upper Arm, Position: Sitting, Cuff Size: Large Adult)   Pulse 58   Resp 16   Ht 5' 8\" (1.727 m)   Wt 188 lb (85.3 kg)   BMI 28.59 kg/m²     Assessment:       Diagnosis Orders   1.  General medical exam  Comprehensive Metabolic Panel   2. Essential hypertension  Comprehensive Metabolic Panel    Basic Metabolic Panel    hydroCHLOROthiazide (HYDRODIURIL) 25 MG tablet   3. Medicare annual wellness visit, subsequent     4. Other hyperlipidemia  Lipid Panel   5. Type 2 diabetes mellitus with hyperglycemia, without long-term current use of insulin ([facility])  Hemoglobin A1C   6. Muscle spasm  DISCONTINUED: cyclobenzaprine (FLEXERIL) 10 MG tablet   7. Anemia, unspecified type  Hemoglobin      Multiple test results for this appointment. 7/5/2022 stable and okay creatinine at 2.23 (was 2.24 and 2.28 before that). 7/5/2022 slightly better hemoglobin at 11.3.    7/5/2022 A1c somewhat higher at 7.7 (on 3/29/2022 A1c was 6.9.)    Patient told to continue Lantus insulin and go back to more strict diabetic diet. Patient was told not to drive for least 8 hours after each Flexeril dose. Plan:       Return in about 3 months (around 10/10/2022) for Hypertension, Hyperlipidemia, Diabetes.     Orders Placed This Encounter   Procedures    Comprehensive Metabolic Panel     Standing Status:   Future     Standing Expiration Date:   7/7/2023    Lipid Panel     Standing Status:   Future     Standing Expiration Date:   7/7/2023     Order Specific Question:   Is Patient Fasting?/# of Hours     Answer:   yes    Hemoglobin A1C     Standing Status:   Future     Standing Expiration Date:   7/7/2023    Basic Metabolic Panel     Standing Status:   Future     Standing Expiration Date:   7/7/2023    Hemoglobin     Standing Status:   Future     Standing Expiration Date:   7/7/2023     Orders Placed This Encounter   Medications    hydroCHLOROthiazide (HYDRODIURIL) 25 MG tablet     Sig: Take 1 tablet by mouth every morning     Dispense:  90 tablet     Refill:  3    DISCONTD: cyclobenzaprine (FLEXERIL) 10 MG tablet     Sig: Take 1 tablet by mouth 3 times daily as needed for Muscle spasms     Dispense:  60 tablet     Refill:  2

## 2022-07-07 NOTE — PATIENT INSTRUCTIONS
Personalized Preventive Plan for Surya Mccabe - 7/7/2022  Medicare offers a range of preventive health benefits. Some of the tests and screenings are paid in full while other may be subject to a deductible, co-insurance, and/or copay. Some of these benefits include a comprehensive review of your medical history including lifestyle, illnesses that may run in your family, and various assessments and screenings as appropriate. After reviewing your medical record and screening and assessments performed today your provider may have ordered immunizations, labs, imaging, and/or referrals for you. A list of these orders (if applicable) as well as your Preventive Care list are included within your After Visit Summary for your review. Other Preventive Recommendations:    · A preventive eye exam performed by an eye specialist is recommended every 1-2 years to screen for glaucoma; cataracts, macular degeneration, and other eye disorders. · A preventive dental visit is recommended every 6 months. · Try to get at least 150 minutes of exercise per week or 10,000 steps per day on a pedometer . · Order or download the FREE \"Exercise & Physical Activity: Your Everyday Guide\" from [employer]. Call 2-473.162.5751 or search [employer] online. · You need 8972-1384 mg of calcium and 4862-7589 IU of vitamin D per day. It is possible to meet your calcium requirement with diet alone, but a vitamin D supplement is usually necessary to meet this goal.  · When exposed to the sun, use a sunscreen that protects against both UVA and UVB radiation with an SPF of 30 or greater. Reapply every 2 to 3 hours or after sweating, drying off with a towel, or swimming. · Always wear a seat belt when traveling in a car. Always wear a helmet when riding a bicycle or motorcycle.

## 2022-07-11 ENCOUNTER — OFFICE VISIT (OUTPATIENT)
Dept: ONCOLOGY | Age: 77
End: 2022-07-11
Payer: MEDICARE

## 2022-07-11 VITALS
BODY MASS INDEX: 29.1 KG/M2 | OXYGEN SATURATION: 98 % | HEIGHT: 68 IN | TEMPERATURE: 97.3 F | HEART RATE: 64 BPM | DIASTOLIC BLOOD PRESSURE: 74 MMHG | WEIGHT: 192 LBS | SYSTOLIC BLOOD PRESSURE: 138 MMHG

## 2022-07-11 DIAGNOSIS — D64.9 ANEMIA, UNSPECIFIED TYPE: Primary | ICD-10-CM

## 2022-07-11 PROCEDURE — 1123F ACP DISCUSS/DSCN MKR DOCD: CPT | Performed by: INTERNAL MEDICINE

## 2022-07-11 PROCEDURE — G8427 DOCREV CUR MEDS BY ELIG CLIN: HCPCS | Performed by: INTERNAL MEDICINE

## 2022-07-11 PROCEDURE — G8417 CALC BMI ABV UP PARAM F/U: HCPCS | Performed by: INTERNAL MEDICINE

## 2022-07-11 PROCEDURE — 1036F TOBACCO NON-USER: CPT | Performed by: INTERNAL MEDICINE

## 2022-07-11 PROCEDURE — 99214 OFFICE O/P EST MOD 30 MIN: CPT | Performed by: INTERNAL MEDICINE

## 2022-07-11 RX ORDER — CYANOCOBALAMIN (VITAMIN B-12) 1000 MCG
1000 TABLET, EXTENDED RELEASE ORAL DAILY
COMMUNITY
End: 2022-10-10

## 2022-07-11 NOTE — PROGRESS NOTES
Patient ID: Johnson Bradshaw, 1945, [id number], 68 y.o. Referred by : Karla Vann MD  Reason for consultation:   Anemia  CKD  HISTORY OF PRESENT ILLNESS:    Oncologic History:  Johnson Bradshaw is a 68 y.o. male with past medical history of prostatic hypertrophy, CAD status post CABG was interventional consultation visit for anemia. He denies any unintentional weight loss, drenching night sweats fever chills. H denied any blood in stool. He is on b12 pills. His recent lab work showed hb 10.7. Pt has mild CKD. Denied any SOB chest pain. Interval history:  Patient is returning for follow-up visit and to discuss lab results and further recommendations. He denied any chest pain, shortness of breath. His recent lab work showed stable hemoglobin at 11.3. He denies any blood in stool, constipation diarrhea. He denies any unintentional weight loss, drenching night sweats fever chills. During this visit patient's allergy, social, medical, surgical history and medications were reviewed and updated. Past Medical History:   Diagnosis Date    Benign prostatic hypertrophy     with elevated PSA.  BPPV (benign paroxysmal positional vertigo) 2/2008    Chest pain 05/2021    Coronary heart disease     Status post CABG.  Diverticulosis     Dupuytren's contracture     Mid finger, right hand.  Erectile dysfunction     Bowens catheter in place 11/02/2018    has had one in place for 6weeks, this one in place x 2 weeks    Gout     Quiescent.     History of blood transfusion     possible with open heart surgery 26years ago    Hyperlipidemia     Hypertension     Positive cardiac stress test 05/06/2021    Snores     Type II or unspecified type diabetes mellitus without mention of complication, not stated as uncontrolled        Past Surgical History:   Procedure Laterality Date    CARDIAC CATHETERIZATION  6/2005    Triple vessel CAD with patent LIMA to LAD, patent saphenous vein graft to first obtuse marginal, patent saphenous vein graft to posterior descending artery, occluded jump graft from posterior descending artery to posterior left ventricular branch.  CARDIAC CATHETERIZATION  3/16/16    CARDIAC CATHETERIZATION  05/07/2021    CATARACT REMOVAL Bilateral Nov. and Dec 2014    COLONOSCOPY  5/27/2008    Distal ileoscopy. Scattered diverticulosis and extensive diverticular disease of sigmoid colon and internal hemorrhoids.  COLONOSCOPY N/A 10/28/2019    COLONOSCOPY performed by Luís Collins MD at [facility] OR  severe diverticulosis    CORONARY ARTERY BYPASS GRAFT      ELBOW SURGERY Right     Surgical repair.  EYE SURGERY      KNEE ARTHROSCOPY Right 3/16/2007    Partial medial meniscectomy, partial medial and lateral synovectomy, light chondroplasty.     VA LASER VAPORIZATION SURGERY PROSTATE, COMPLETE N/A 11/2/2018    CYSTOSCOPY, TRANSURETHRAL RESECTION PROSTATE - [facility] # [id number] [facility]) performed by Kiley Gómez MD at [facility] Bilateral 9/15/2010, 2007    [facility] Dr. Lalo Aguilar PROSTATE BIOPSY Bilateral 05/10/2001    Benign-Dr. Maria E Moreno    PROSTATE BIOPSY Bilateral 04/08/1999    Benign-Dr. Maeve Griffiths    PROSTATE BIOPSY Bilateral 06/18/1998    Benign-Dr. Sue Correa    PROSTATE BIOPSY Bilateral 01/07/1998    Benign-Dr. Sue Correa    PROSTATE BIOPSY Bilateral 10/04/1996    Benign-Dr. Noemi Alfaro    PROSTATE SURGERY  11/02/2018    CYSTOSCOPY, TRANSURETHRAL RESECTION PROSTATE - [facility]     VASECTOMY         No Known Allergies    Current Outpatient Medications   Medication Sig Dispense Refill    hydroCHLOROthiazide (HYDRODIURIL) 25 MG tablet Take 1 tablet by mouth every morning 90 tablet 3    carvedilol (COREG) 12.5 MG tablet Take 1 tablet by mouth 2 times daily 180 tablet 1    atorvastatin (LIPITOR) 80 MG tablet Take 1 tablet by mouth daily 90 tablet 3    lisinopril (PRINIVIL;ZESTRIL) 40 MG tablet Take 1 tablet by mouth daily 90 tablet 3    amLODIPine (NORVASC) 5 MG tablet Take 1 tablet by mouth daily 90 tablet 3    Alcohol Swabs PADS Use 1 nightly with lantus 100 each 3    aspirin 81 MG EC tablet Take 81 mg by mouth daily      clotrimazole-betamethasone (LOTRISONE) 1-0.05 % cream Apply topically 2 times daily. 2 g 5    insulin glargine (LANTUS SOLOSTAR) 100 UNIT/ML injection pen Inject 8 Units into the skin nightly (Patient taking differently: Inject 10 Units into the skin nightly ) 5 pen 3    glimepiride (AMARYL) 4 MG tablet Take 1 tablet by mouth 2 times daily 180 tablet 3    Insulin Pen Needle (PEN NEEDLES) 32G X 5 MM MISC Use 1 pen needle nightly 90 each 3    nitroGLYCERIN (NITROSTAT) 0.4 MG SL tablet Place 1 tablet under the tongue every 5 minutes as needed for Chest pain 25 tablet 3    ONE TOUCH ULTRASOFT LANCETS MISC 1 each by Does not apply route daily 100 each 3    blood glucose test strips (ASCENSIA AUTODISC VI;ONE TOUCH ULTRA TEST VI) strip Test 1 x daily 100 each 3    blood glucose test strips (DURGA CONTOUR TEST) strip Tests blood sugar daily. 300 each 3     No current facility-administered medications for this visit. Social History     Socioeconomic History    Marital status:      Spouse name: Not on file    Number of children: Not on file    Years of education: Not on file    Highest education level: Not on file   Occupational History    Not on file   Tobacco Use    Smoking status: Former Smoker     Packs/day: 0.50     Years: 27.00     Pack years: 13.50     Types: Cigarettes     Start date: 1968     Quit date: 10/11/1992     Years since quittin.7    Smokeless tobacco: Never Used   Vaping Use    Vaping Use: Never used   Substance and Sexual Activity    Alcohol use:  Yes     Alcohol/week: 1.0 standard drink     Types: 1 Standard drinks or equivalent per week     Comment: 1 time per week    Drug use: No    Sexual activity: Yes     Partners: Female   Other Topics Concern    Not on file   Social History Narrative    Not on file     Social Determinants of Health     Financial Resource Strain: Low Risk     Difficulty of Paying Living Expenses: Not hard at all   Food Insecurity: No Food Insecurity    Worried About Running Out of Food in the Last Year: Never true    [address] in the Last Year: Never true   Transportation Needs:     Lack of Transportation (Medical): Not on file    Lack of Transportation (Non-Medical): Not on file   Physical Activity: Sufficiently Active    Days of Exercise per Week: 3 days    Minutes of Exercise per Session: 90 min   Stress:     Feeling of Stress : Not on file   Social Connections:     Frequency of Communication with Friends and Family: Not on file    Frequency of Social Gatherings with Friends and Family: Not on file    Attends Anabaptism Services: Not on file    Active Member of Clubs or Organizations: Not on file    Attends Club or Organization Meetings: Not on file    Marital Status: Not on file   Intimate Partner Violence:     Fear of Current or Ex-Partner: Not on file    Emotionally Abused: Not on file    Physically Abused: Not on file    Sexually Abused: Not on file   Housing Stability:     Unable to Pay for Housing in the Last Year: Not on file    Number of [place] in the Last Year: Not on file    Unstable Housing in the Last Year: Not on file       Family History   Problem Relation Age of Onset    Diabetes Mother     Kidney Disease Mother         Renal failure    Coronary Art Dis Mother     Hypertension Mother     Lung Cancer Father     Coronary Art Dis Father     Hypertension Brother     Other Brother         Hypernephroma        REVIEW OF SYSTEM:     Constitutional: No fever or chills.  No night sweats, no weight loss   Eyes: No eye discharge, double vision, or eye pain   HEENT: negative for sore mouth, sore throat, hoarseness and voice change   Respiratory: negative for cough , sputum, dyspnea, wheezing, hemoptysis, chest pain Cardiovascular: negative for chest pain, dyspnea, palpitations, orthopnea, PND   Gastrointestinal: negative for nausea, vomiting, diarrhea, constipation, abdominal pain, Dysphagia, hematemesis and hematochezia   Genitourinary: negative for frequency, dysuria, nocturia, urinary incontinence, and hematuria   Integument: negative for rash, skin lesions, bruises. Hematologic/Lymphatic: negative for easy bruising, bleeding, lymphadenopathy, petechiae and swelling/edema   Endocrine: negative for heat or cold intolerance, tremor, weight changes, change in bowel habits and hair loss   Musculoskeletal: negative for myalgias, arthralgias, pain, joint swelling,and bone pain   Neurological: negative for headaches, dizziness, seizures, weakness, numbness       OBJECTIVE:         There were no vitals filed for this visit.     PHYSICAL EXAM:   General appearance - well appearing, no in pain or distress   Mental status - alert and cooperative   Eyes - pupils equal and reactive, extraocular eye movements intact   Ears - bilateral TM's and external ear canals normal   Mouth - mucous membranes moist, pharynx normal without lesions   Neck - supple, no significant adenopathy   Lymphatics - no palpable lymphadenopathy, no hepatosplenomegaly   Chest - clear to auscultation, no wheezes, rales or rhonchi, symmetric air entry   Heart - normal rate, regular rhythm, normal S1, S2, no murmurs, rubs, clicks or gallops   Abdomen - soft, nontender, nondistended, no masses or organomegaly   Neurological - alert, oriented, normal speech, no focal findings or movement disorder noted   Musculoskeletal - no joint tenderness, deformity or swelling   Extremities - peripheral pulses normal, no pedal edema, no clubbing or cyanosis   Skin - normal coloration and turgor, no rashes, no suspicious skin lesions noted ,      LABORATORY DATA:     Lab Results   Component Value Date    WBC 6.7 07/05/2022    HGB 11.3 (L) 07/05/2022    HGB 11.3 (L) 07/05/2022 HCT 34.1 (L) 07/05/2022    MCV 95.3 07/05/2022     07/05/2022    LYMPHOPCT 23 (L) 07/05/2022    RBC 3.58 (L) 07/05/2022    MCH 31.6 07/05/2022    MCHC 33.1 07/05/2022    RDW 14.7 (H) 07/05/2022    MONOPCT 6 07/05/2022    BASOPCT 1 07/05/2022    NEUTROABS 4.01 07/05/2022    LYMPHSABS 1.53 07/05/2022    MONOSABS 0.41 07/05/2022    EOSABS 0.60 (H) 07/05/2022    BASOSABS 0.07 07/05/2022         Chemistry        Component Value Date/Time     07/05/2022 0923    K 5.0 07/05/2022 0923     (H) 07/05/2022 0923    CO2 22 07/05/2022 0923    BUN 49 (H) 07/05/2022 0923    CREATININE 2.23 (H) 07/05/2022 0923        Component Value Date/Time    CALCIUM 9.5 07/05/2022 0923    ALKPHOS 64 02/14/2022 1459    AST 17 02/14/2022 1459    ALT 20 02/14/2022 1459    BILITOT 0.86 02/14/2022 1459         Ref. Range 2/1/2022 10:35   Iron Latest Ref Range: 59 - 158 ug/dL 73   Iron Saturation Latest Ref Range: 20 - 55 % 32   UIBC Latest Ref Range: 112 - 347 ug/dL 157   TIBC Latest Ref Range: 250 - 450 ug/dL 230 (L)   Folate Latest Ref Range: >4.8 ng/mL 11.4   Vitamin B-12 Latest Ref Range: 232 - 1245 pg/mL 1599 (H)       PATHOLOGY DATA:   Reviewed  Procedures/Addenda   PERIPHERAL BLOOD REPORT     Date Ordered:     2/15/2022     Status:   Signed Out        Date Complete:     2/15/2022     By: Enrique Yates. Yolanda Ayala M.D.        Date Reported:     2/15/2022       INTERPRETATION   PERIPHERAL BLOOD:   -  NORMOCYTIC NORMOCHROMIC ANEMIA (HEMOGLOBIN 11.1 g/dL),   HYPOPROLIFERATIVE.  DIFFERENTIAL DIAGNOSES INCLUDE: ANEMIA OF CHRONIC   DISEASE OR HYPOPRODUCTION.     IMAGING DATA:      NM MYOCARDIAL SPECT REST EXERCISE OR RX  Radiology exam is complete. No Radiologist dictation. Please follow up   with ordering provider. REviewed   ASSESSMENT:      Abdoulaye Oh is a 68 y.o. male wih CKD, CAD s/p CABG with mild anemia. I reviewed the lab work, prior records, imaging studies, discussed the diagnosis and treatment recommendations.   He has very mild anemia. His anemia appears most likely secondary to to chronic disease renal disease however underlying bone marrow pathology given his age cannot be ruled out. His WBC and platelets are within normal limits and hemoglobin slightly improved therefore plan to hold off bone marrow biopsy at this point and consider in future if his counts drops further  . Patient's questions were sought and answered to satisfaction and he agreed to proceed with the plan. PLAN:   I reviewed his lab work, discussed diagnosis and treatment recommendations   Clinically he is doing well and denies any worsening of fatigue   His recent hemoglobin is stable   I discussed option of erythropoietin stimulating agent if his hemoglobin drops below 10 and also possibly a bone marrow biopsy   We will plan to repeat CBC in 6 months   Return to clinic in 6 months or earlier if needed         Hayden Dominguez MD  Hematologist/Medical Oncologist    On this date 7/11/22  I have spent 30 minutes reviewing previous notes, test results and face to face with the patient discussing the diagnosis and importance of compliance with the treatment plan. Greater than 50% of that time was spent face-to-face with the patient in counseling and coordinating her care. This note is created with the assistance of a speech recognition program.  While intending to generate a document that actually reflects the content of the visit, the document can still have some errors including those of syntax and sound a like substitutions which may escape proof reading. It such instances, actual meaning can be extrapolated by contextual diversion.

## 2022-07-14 ENCOUNTER — OFFICE VISIT (OUTPATIENT)
Dept: DIABETES SERVICES | Age: 77
End: 2022-07-14
Payer: MEDICARE

## 2022-07-14 VITALS
HEIGHT: 68 IN | SYSTOLIC BLOOD PRESSURE: 122 MMHG | BODY MASS INDEX: 28.79 KG/M2 | RESPIRATION RATE: 14 BRPM | WEIGHT: 190 LBS | DIASTOLIC BLOOD PRESSURE: 78 MMHG | HEART RATE: 56 BPM

## 2022-07-14 DIAGNOSIS — N18.4 TYPE 2 DIABETES MELLITUS WITH STAGE 4 CHRONIC KIDNEY DISEASE, WITHOUT LONG-TERM CURRENT USE OF INSULIN (HCC): Primary | ICD-10-CM

## 2022-07-14 DIAGNOSIS — E11.22 TYPE 2 DIABETES MELLITUS WITH STAGE 4 CHRONIC KIDNEY DISEASE, WITHOUT LONG-TERM CURRENT USE OF INSULIN (HCC): Primary | ICD-10-CM

## 2022-07-14 DIAGNOSIS — I10 PRIMARY HYPERTENSION: ICD-10-CM

## 2022-07-14 DIAGNOSIS — E78.49 OTHER HYPERLIPIDEMIA: ICD-10-CM

## 2022-07-14 PROCEDURE — 1123F ACP DISCUSS/DSCN MKR DOCD: CPT | Performed by: NURSE PRACTITIONER

## 2022-07-14 PROCEDURE — G8427 DOCREV CUR MEDS BY ELIG CLIN: HCPCS | Performed by: NURSE PRACTITIONER

## 2022-07-14 PROCEDURE — 99214 OFFICE O/P EST MOD 30 MIN: CPT | Performed by: NURSE PRACTITIONER

## 2022-07-14 PROCEDURE — G8417 CALC BMI ABV UP PARAM F/U: HCPCS | Performed by: NURSE PRACTITIONER

## 2022-07-14 PROCEDURE — 3051F HG A1C>EQUAL 7.0%<8.0%: CPT | Performed by: NURSE PRACTITIONER

## 2022-07-14 PROCEDURE — 1036F TOBACCO NON-USER: CPT | Performed by: NURSE PRACTITIONER

## 2022-07-14 PROCEDURE — 99213 OFFICE O/P EST LOW 20 MIN: CPT

## 2022-07-14 ASSESSMENT — ENCOUNTER SYMPTOMS
ABDOMINAL PAIN: 0
SHORTNESS OF BREATH: 0
DIARRHEA: 0
RESPIRATORY NEGATIVE: 1

## 2022-07-14 NOTE — PROGRESS NOTES
retinal exam up-to-date: Yes  Hypoglycemia as needed treatment: oj    High cholesterol-  Takes lipitor and denies any adverse effects with its use.  Watches diet and exercise.      Hypertension-  Takes coreg, lisniopril HCTZ, norvasc and denies any adverse effects with their use. Watches diet and exercise. Denies any chest pain, dizziness or edema.       Obesity- Working on weight loss.       Past Medical History:   Diagnosis Date    Benign prostatic hypertrophy     with elevated PSA.  BPPV (benign paroxysmal positional vertigo) 2008    Chest pain 2021    Coronary heart disease     Status post CABG.  Diverticulosis     Dupuytren's contracture     Mid finger, right hand.  Erectile dysfunction     Bowens catheter in place 2018    has had one in place for 6weeks, this one in place x 2 weeks    Gout     Quiescent.  History of blood transfusion     possible with open heart surgery 26years ago    Hyperlipidemia     Hypertension     Positive cardiac stress test 2021    Snores     Type II or unspecified type diabetes mellitus without mention of complication, not stated as uncontrolled      Family History   Problem Relation Age of Onset    Diabetes Mother     Kidney Disease Mother         Renal failure    Coronary Art Dis Mother     Hypertension Mother     Lung Cancer Father     Coronary Art Dis Father     Hypertension Brother     Other Brother         Hypernephroma     Social History     Tobacco Use    Smoking status: Former Smoker     Packs/day: 0.50     Years: 27.00     Pack years: 13.50     Types: Cigarettes     Start date: 1968     Quit date: 10/11/1992     Years since quittin.7    Smokeless tobacco: Never Used   Vaping Use    Vaping Use: Never used   Substance Use Topics    Alcohol use:  Yes     Alcohol/week: 1.0 standard drink     Types: 1 Standard drinks or equivalent per week     Comment: 1 time per week    Drug use: No     No Known Allergies    MEDICATIONS:  Current Outpatient Medications   Medication Sig Dispense Refill    Cyanocobalamin (VITAMIN B-12) 1000 MCG extended release tablet Take 1,000 mcg by mouth daily      hydroCHLOROthiazide (HYDRODIURIL) 25 MG tablet Take 1 tablet by mouth every morning 90 tablet 3    carvedilol (COREG) 12.5 MG tablet Take 1 tablet by mouth 2 times daily 180 tablet 1    atorvastatin (LIPITOR) 80 MG tablet Take 1 tablet by mouth daily 90 tablet 3    lisinopril (PRINIVIL;ZESTRIL) 40 MG tablet Take 1 tablet by mouth daily 90 tablet 3    amLODIPine (NORVASC) 5 MG tablet Take 1 tablet by mouth daily 90 tablet 3    insulin glargine (LANTUS SOLOSTAR) 100 UNIT/ML injection pen Inject 8 Units into the skin nightly (Patient taking differently: Inject 10 Units into the skin nightly ) 5 pen 3    glimepiride (AMARYL) 4 MG tablet Take 1 tablet by mouth 2 times daily 180 tablet 3    Insulin Pen Needle (PEN NEEDLES) 32G X 5 MM MISC Use 1 pen needle nightly 90 each 3    nitroGLYCERIN (NITROSTAT) 0.4 MG SL tablet Place 1 tablet under the tongue every 5 minutes as needed for Chest pain 25 tablet 3    ONE TOUCH ULTRASOFT LANCETS MISC 1 each by Does not apply route daily 100 each 3    blood glucose test strips (ASCENSIA AUTODISC VI;ONE TOUCH ULTRA TEST VI) strip Test 1 x daily 100 each 3    blood glucose test strips (DURGA CONTOUR TEST) strip Tests blood sugar daily. 300 each 3    aspirin 81 MG EC tablet Take 81 mg by mouth daily      clotrimazole-betamethasone (LOTRISONE) 1-0.05 % cream Apply topically 2 times daily. (Patient not taking: Reported on 7/11/2022) 2 g 5    Alcohol Swabs PADS Use 1 nightly with lantus 100 each 3     No current facility-administered medications for this visit. Review ofSymptoms:  Review of Systems   Constitutional: Positive for fatigue. Negative for unexpected weight change. Eyes: Negative for visual disturbance. Respiratory: Negative. Negative for shortness of breath. Cardiovascular: Negative for chest pain and leg swelling. Gastrointestinal: Negative for abdominal pain and diarrhea. Endocrine: Negative for polydipsia, polyphagia and polyuria. Genitourinary: Negative. Musculoskeletal: Negative. Skin: Negative for rash and wound. Neurological: Negative for dizziness, tremors, seizures and headaches. Psychiatric/Behavioral: Negative. Negative for confusion and decreased concentration. Theremainder of a complete 14-point review of systems is negative. Vital Signs: /78 (Site: Right Upper Arm, Position: Sitting, Cuff Size: Medium Adult)   Pulse 56   Resp 14   Ht 5' 8\" (1.727 m)   Wt 190 lb (86.2 kg)   BMI 28.89 kg/m²      Wt Readings from Last 3 Encounters:   07/14/22 190 lb (86.2 kg)   07/11/22 192 lb (87.1 kg)   07/07/22 188 lb (85.3 kg)     Body mass index is 28.89 kg/m².   LABS:  Hemoglobin A1C   Date Value Ref Range Status   07/05/2022 7.7 (H) 4.0 - 6.0 % Final   03/29/2022 6.9 (H) 4.0 - 6.0 % Final     Lab Results   Component Value Date    LABMICR 106 (H) 01/03/2022     Lab Results   Component Value Date     07/05/2022    K 5.0 07/05/2022     (H) 07/05/2022    CO2 22 07/05/2022    BUN 49 (H) 07/05/2022    CREATININE 2.23 (H) 07/05/2022    GLUCOSE 186 (H) 07/05/2022    CALCIUM 9.5 07/05/2022    PROT 7.1 02/14/2022    PROT 6.6 02/14/2022    LABALBU 4.4 02/14/2022    BILITOT 0.86 02/14/2022    ALKPHOS 64 02/14/2022    AST 17 02/14/2022    ALT 20 02/14/2022    LABGLOM 29 (L) 07/05/2022    GFRAA 35 (L) 07/05/2022     Lab Results   Component Value Date    CHOL 92 09/28/2021    CHOL 100 09/11/2020    CHOL 96 09/12/2019     Lab Results   Component Value Date    TRIG 106 09/28/2021    TRIG 85 09/11/2020    TRIG 110 09/12/2019     Lab Results   Component Value Date    HDL 31 (L) 09/28/2021    HDL 32 (L) 09/11/2020    HDL 31 (L) 09/12/2019     Lab Results   Component Value Date    LDLCHOLESTEROL 40 09/28/2021    LDLCHOLESTEROL 51 09/11/2020    LDLCHOLESTEROL 43 09/12/2019     Lab Results   Component Value Date    VLDL NOT REPORTED 09/28/2021    VLDL NOT REPORTED 09/11/2020    VLDL NOT REPORTED 09/12/2019     Lab Results   Component Value Date    CHOLHDLRATIO 3.0 09/28/2021    CHOLHDLRATIO 3.1 09/11/2020    CHOLHDLRATIO 3.1 09/12/2019           Physical Exam  Constitutional:       Appearance: He is well-developed. Eyes:      Pupils: Pupils are equal, round, and reactive to light. Cardiovascular:      Rate and Rhythm: Normal rate and regular rhythm. Pulmonary:      Effort: Pulmonary effort is normal.      Breath sounds: Normal breath sounds. Skin:     General: Skin is warm and dry. Findings: No lesion (no lipohypertrophy) or rash. Neurological:      Mental Status: He is alert and oriented to person, place, and time. Sensory: No sensory deficit. Psychiatric:         Speech: Speech normal.         Behavior: Behavior normal.         Thought Content: Thought content normal.         Judgment: Judgment normal.           ASSESSMENT/PLAN:     Diagnosis Orders   1. Type 2 diabetes mellitus with stage 4 chronic kidney disease, without long-term current use of insulin ([facility]     2. Other hyperlipidemia     3. Primary hypertension       No orders of the defined types were placed in this encounter. No orders of the defined types were placed in this encounter. Requested Prescriptions      No prescriptions requested or ordered in this encounter       1. Type 2 diabetes mellitus with stage 4 chronic kidney disease, without long-term current use of insulin ([facility])   - Unstable  HbA1C goal is less than 7%. - Fasting blood glucose goal is 70-120mg/dl and postprandial blood sugar goal is less than 180 mg/dl. - Labs reviewed including most recent A1c, microalbumin and kidney function. Repeat labs due in 3 months.    -We discussed in great detail dietary modifications they can make to better improve their blood sugars.   -follow up diabetes education completed, all questions answered.  -Blood sugar report reviewed and scanned into media tab.    -recommended Farxiga due to CKD he would like me to discus with PCP prior to ordering. If agreeable will also work on weaning down insulin. Discussed signs and symptoms of hyper/hypoglycemia and how to treat. Encouraged 150 minutes of physical activity per week. Follow a low carbohydrate diet. Encouraged at least 7 hours of sleep. The patient was informed of the goals of diabetes management. This can only be accomplished by watching their diet and exercise levels. We certainly use medicines to help attain these goals. The consequences of not controlling blood sugars were discussed. These include blindness, heart disease, stroke, kidney disease, and possibly need for dialysis. They were told to be careful with their foot care as diabetics often have nerve damage, infections and risk for limb amutations . They also need a dilated eye exam yearly. We discussed the issues of diet, exercise, medication, complication avoidance, reviewed the signs and symptoms of diabetes, hypoglycemic episodes, significance of HbA1C.         2. Other hyperlipidemia  stable, lipid panel reviewed, continue current medications. Diet and exercise      3. Primary hypertension   stable, continue current medications. Diet and exercise Seek emergent care if chest pain develops. Answered all patient questions. Agrees to follow plan of care and to follow up in 6 months, sooner if needed. Call office if unexplained blood sugars less than 70 occur or above 400. Call office or access [facility] with any further questions or concerns. Be sure to bring glucometer/food log at next appointment. Total time spent reviewing chart, labs, counseling patient and documenting on the date of the encounter: 30 min.       Electronically signed by AMANDA Montaño CNP on 7/14/2022 at 10:49 AM      (Please note that portions of this note were completed with a voice-recognition program. Efforts were made to edit the dictation but occasionally words are mis-transcribed.)

## 2022-07-14 NOTE — Clinical Note
Dr Werner Chan,  This patient is having lower fasting readings () but having postprandial reading 160-200 and recent a1c did increase. I did not want to increase insulin do to concerns of hypoglycemia. I discussed adding Bryantevelio Mccartney given his CKD with a GFR of 29. He was agreeable to start Bryant Sherrill as long as I discussed with you and received your approval. What are you thoughts? Thank you.

## 2022-08-29 NOTE — TELEPHONE ENCOUNTER
[facility]  coding opportunities       Chart reviewed, no opportunity found: CHART REVIEWED, NO OPPORTUNITY FOUND        Patients Insurance        Commercial Insurance: [address] [place] [address] s

## 2022-10-03 DIAGNOSIS — E78.49 OTHER HYPERLIPIDEMIA: Primary | ICD-10-CM

## 2022-10-04 ENCOUNTER — HOSPITAL ENCOUNTER (OUTPATIENT)
Dept: LAB | Age: 77
Discharge: HOME | End: 2022-10-04
Payer: MEDICARE

## 2022-10-04 DIAGNOSIS — I10 ESSENTIAL HYPERTENSION: ICD-10-CM

## 2022-10-04 DIAGNOSIS — D64.9 ANEMIA, UNSPECIFIED TYPE: ICD-10-CM

## 2022-10-04 DIAGNOSIS — E11.65 TYPE 2 DIABETES MELLITUS WITH HYPERGLYCEMIA, WITHOUT LONG-TERM CURRENT USE OF INSULIN (HCC): ICD-10-CM

## 2022-10-04 DIAGNOSIS — E78.49 OTHER HYPERLIPIDEMIA: ICD-10-CM

## 2022-10-04 LAB
ALBUMIN SERPL-MCNC: 4.2 G/DL (ref 3.5–5.2)
ALBUMIN/GLOBULIN RATIO: 1.6 (ref 1–2.5)
ALP BLD-CCNC: 71 U/L (ref 40–129)
ALT SERPL-CCNC: 25 U/L (ref 5–41)
ANION GAP SERPL CALCULATED.3IONS-SCNC: 10 MMOL/L (ref 9–17)
AST SERPL-CCNC: 18 U/L
BILIRUB SERPL-MCNC: 1.2 MG/DL (ref 0.3–1.2)
BUN BLDV-MCNC: 40 MG/DL (ref 8–23)
BUN/CREAT BLD: 19 (ref 9–20)
CALCIUM SERPL-MCNC: 9.5 MG/DL (ref 8.6–10.4)
CHLORIDE BLD-SCNC: 109 MMOL/L (ref 98–107)
CHOLESTEROL/HDL RATIO: 2.9
CHOLESTEROL: 90 MG/DL
CO2: 24 MMOL/L (ref 20–31)
CREAT SERPL-MCNC: 2.08 MG/DL (ref 0.7–1.2)
GFR SERPL CREATININE-BSD FRML MDRD: 32 ML/MIN/1.73M2
GLUCOSE BLD-MCNC: 166 MG/DL (ref 70–99)
HDLC SERPL-MCNC: 31 MG/DL
HEMOGLOBIN: 11.8 G/DL (ref 13–17)
LDL CHOLESTEROL: 39 MG/DL (ref 0–130)
POTASSIUM SERPL-SCNC: 4.6 MMOL/L (ref 3.7–5.3)
SODIUM BLD-SCNC: 143 MMOL/L (ref 135–144)
TOTAL PROTEIN: 6.8 G/DL (ref 6.4–8.3)
TRIGL SERPL-MCNC: 101 MG/DL

## 2022-10-04 PROCEDURE — 80061 LIPID PANEL: CPT

## 2022-10-04 PROCEDURE — 36415 COLL VENOUS BLD VENIPUNCTURE: CPT

## 2022-10-04 PROCEDURE — 83036 HEMOGLOBIN GLYCOSYLATED A1C: CPT

## 2022-10-04 PROCEDURE — 85018 HEMOGLOBIN: CPT

## 2022-10-04 PROCEDURE — 80053 COMPREHEN METABOLIC PANEL: CPT

## 2022-10-06 LAB
ESTIMATED AVERAGE GLUCOSE: 177 MG/DL
HBA1C MFR BLD: 7.8 % (ref 4–6)

## 2022-10-10 ENCOUNTER — OFFICE VISIT (OUTPATIENT)
Dept: INTERNAL MEDICINE | Age: 77
End: 2022-10-10
Payer: MEDICARE

## 2022-10-10 VITALS
HEART RATE: 60 BPM | RESPIRATION RATE: 16 BRPM | OXYGEN SATURATION: 99 % | DIASTOLIC BLOOD PRESSURE: 82 MMHG | HEIGHT: 68 IN | BODY MASS INDEX: 29.1 KG/M2 | SYSTOLIC BLOOD PRESSURE: 128 MMHG | WEIGHT: 192 LBS

## 2022-10-10 DIAGNOSIS — Z79.4 TYPE 2 DIABETES MELLITUS WITHOUT COMPLICATION, WITH LONG-TERM CURRENT USE OF INSULIN (HCC): ICD-10-CM

## 2022-10-10 DIAGNOSIS — E78.49 OTHER HYPERLIPIDEMIA: ICD-10-CM

## 2022-10-10 DIAGNOSIS — E11.9 TYPE 2 DIABETES MELLITUS WITHOUT COMPLICATION, WITHOUT LONG-TERM CURRENT USE OF INSULIN (HCC): ICD-10-CM

## 2022-10-10 DIAGNOSIS — E11.9 TYPE 2 DIABETES MELLITUS WITHOUT COMPLICATION, WITH LONG-TERM CURRENT USE OF INSULIN (HCC): ICD-10-CM

## 2022-10-10 DIAGNOSIS — I10 PRIMARY HYPERTENSION: Primary | ICD-10-CM

## 2022-10-10 PROCEDURE — G8427 DOCREV CUR MEDS BY ELIG CLIN: HCPCS | Performed by: INTERNAL MEDICINE

## 2022-10-10 PROCEDURE — G8484 FLU IMMUNIZE NO ADMIN: HCPCS | Performed by: INTERNAL MEDICINE

## 2022-10-10 PROCEDURE — G0008 ADMIN INFLUENZA VIRUS VAC: HCPCS | Performed by: INTERNAL MEDICINE

## 2022-10-10 PROCEDURE — 1036F TOBACCO NON-USER: CPT | Performed by: INTERNAL MEDICINE

## 2022-10-10 PROCEDURE — 99214 OFFICE O/P EST MOD 30 MIN: CPT | Performed by: INTERNAL MEDICINE

## 2022-10-10 PROCEDURE — 1123F ACP DISCUSS/DSCN MKR DOCD: CPT | Performed by: INTERNAL MEDICINE

## 2022-10-10 PROCEDURE — 3051F HG A1C>EQUAL 7.0%<8.0%: CPT | Performed by: INTERNAL MEDICINE

## 2022-10-10 PROCEDURE — PBSHW INFLUENZA, FLUAD, (AGE 65 Y+), IM, PF, 0.5 ML: Performed by: INTERNAL MEDICINE

## 2022-10-10 PROCEDURE — G8417 CALC BMI ABV UP PARAM F/U: HCPCS | Performed by: INTERNAL MEDICINE

## 2022-10-10 RX ORDER — CARVEDILOL 12.5 MG/1
12.5 TABLET ORAL 2 TIMES DAILY
Qty: 180 TABLET | Refills: 1 | Status: SHIPPED | OUTPATIENT
Start: 2022-10-10

## 2022-10-10 RX ORDER — INSULIN GLARGINE 100 [IU]/ML
15 INJECTION, SOLUTION SUBCUTANEOUS NIGHTLY
Qty: 13.5 ML | Refills: 3 | Status: CANCELLED | OUTPATIENT
Start: 2022-10-10 | End: 2023-10-05

## 2022-10-10 ASSESSMENT — ENCOUNTER SYMPTOMS
CONSTIPATION: 0
BLOOD IN STOOL: 0
DIARRHEA: 0
EYE PAIN: 0
VOMITING: 0
SHORTNESS OF BREATH: 0
COUGH: 0
NAUSEA: 0
BACK PAIN: 0
ABDOMINAL PAIN: 0

## 2022-10-10 NOTE — PROGRESS NOTES
[place] [address]  Dept: [phone]  Dept Fax: [phone]  Loc: [phone]     Amy Lara is a 68 y.o. male who presents today for his medical conditions/complaintsas noted below. Amy Conception is c/o of   Chief Complaint   Patient presents with    Hypertension    Hyperlipidemia    Diabetes     Type 2         HPI:     Hypertension  This is a chronic problem. The current episode started more than 1 year ago. The problem has been waxing and waning since onset. The problem is controlled. Pertinent negatives include no chest pain, headaches, neck pain, palpitations or shortness of breath. Hyperlipidemia  This is a chronic problem. The current episode started more than 1 year ago. The problem is controlled. Recent lipid tests were reviewed and are variable. Pertinent negatives include no chest pain or shortness of breath. Diabetes  He presents for his follow-up diabetic visit. He has type 2 diabetes mellitus. The initial diagnosis of diabetes was made 11 years ago. His disease course has been fluctuating. Pertinent negatives for hypoglycemia include no confusion, dizziness, headaches, nervousness/anxiousness or pallor. Pertinent negatives for diabetes include no chest pain, no polydipsia, no polyuria and no weakness. Hemoglobin A1C (%)   Date Value   10/04/2022 7.8 (H)   07/05/2022 7.7 (H)   03/29/2022 6.9 (H)            Microalb/Crt.  Ratio (mcg/mg creat)   Date Value   01/03/2022 106 (H)     LDL Cholesterol (mg/dL)   Date Value   10/04/2022 39   09/28/2021 40   09/11/2020 51         AST (U/L)   Date Value   10/04/2022 18     ALT (U/L)   Date Value   10/04/2022 25     BUN (mg/dL)   Date Value   10/04/2022 40 (H)     BP Readings from Last 3 Encounters:   10/10/22 128/82   07/14/22 122/78   07/11/22 138/74              Past Medical History:   Diagnosis Date    Benign prostatic hypertrophy with elevated PSA. BPPV (benign paroxysmal positional vertigo) 2/2008    Chest pain 05/2021    Coronary heart disease     Status post CABG. Diverticulosis     Dupuytren's contracture     Mid finger, right hand. Erectile dysfunction     Bowens catheter in place 11/02/2018    has had one in place for 6weeks, this one in place x 2 weeks    Gout     Quiescent. History of blood transfusion     possible with open heart surgery 26years ago    Hyperlipidemia     Hypertension     Positive cardiac stress test 05/06/2021    Snores     Type II or unspecified type diabetes mellitus without mention of complication, not stated as uncontrolled       Past Surgical History:   Procedure Laterality Date    CARDIAC CATHETERIZATION  6/2005    Triple vessel CAD with patent LIMA to LAD, patent saphenous vein graft to first obtuse marginal, patent saphenous vein graft to posterior descending artery, occluded jump graft from posterior descending artery to posterior left ventricular branch. CARDIAC CATHETERIZATION  3/16/16    CARDIAC CATHETERIZATION  05/07/2021    CATARACT REMOVAL Bilateral Nov. and Dec 2014    COLONOSCOPY  5/27/2008    Distal ileoscopy. Scattered diverticulosis and extensive diverticular disease of sigmoid colon and internal hemorrhoids. COLONOSCOPY N/A 10/28/2019    COLONOSCOPY performed by Zoltan Mcgee MD at [facility] OR  severe diverticulosis    CORONARY ARTERY BYPASS GRAFT      ELBOW SURGERY Right     Surgical repair. EYE SURGERY      KNEE ARTHROSCOPY Right 3/16/2007    Partial medial meniscectomy, partial medial and lateral synovectomy, light chondroplasty.     TN LASER VAPORIZATION SURGERY PROSTATE, COMPLETE N/A 11/2/2018    CYSTOSCOPY, TRANSURETHRAL RESECTION PROSTATE - [facility] # [id number] HAILEE) performed by Cyndi Sims MD at [facility] Bilateral 9/15/2010, 2007    [email or web address] Dr. Gabriella Montero Bilateral 05/10/2001    Benign-Dr. Reyna Butts    PROSTATE BIOPSY Bilateral 1999    Benign-Dr. Denis Tran    PROSTATE BIOPSY Bilateral 1998    Benign-Dr. Indu Womack    PROSTATE BIOPSY Bilateral 1998    Benign-Dr. Indu Womack    PROSTATE BIOPSY Bilateral 10/04/1996    Benign-Dr. Gonzalez    PROSTATE SURGERY  2018    CYSTOSCOPY, TRANSURETHRAL RESECTION PROSTATE - [facility]     VASECTOMY         Family History   Problem Relation Age of Onset    Diabetes Mother     Kidney Disease Mother         Renal failure    Coronary Art Dis Mother     Hypertension Mother     Lung Cancer Father     Coronary Art Dis Father     Hypertension Brother     Other Brother         Hypernephroma          Social History     Tobacco Use    Smoking status: Former     Packs/day: 0.50     Years: 27.00     Pack years: 13.50     Types: Cigarettes     Start date: 1968     Quit date: 10/11/1992     Years since quittin.0    Smokeless tobacco: Never   Substance Use Topics    Alcohol use: Yes     Alcohol/week: 1.0 standard drink     Types: 1 Standard drinks or equivalent per week     Comment: 1 time per week         Current Outpatient Medications   Medication Sig Dispense Refill    carvedilol (COREG) 12.5 MG tablet Take 1 tablet by mouth 2 times daily 180 tablet 1    hydroCHLOROthiazide (HYDRODIURIL) 25 MG tablet Take 1 tablet by mouth every morning 90 tablet 3    atorvastatin (LIPITOR) 80 MG tablet Take 1 tablet by mouth daily 90 tablet 3    lisinopril (PRINIVIL;ZESTRIL) 40 MG tablet Take 1 tablet by mouth daily 90 tablet 3    amLODIPine (NORVASC) 5 MG tablet Take 1 tablet by mouth daily 90 tablet 3    clotrimazole-betamethasone (LOTRISONE) 1-0.05 % cream Apply topically 2 times daily.  2 g 5    insulin glargine (LANTUS SOLOSTAR) 100 UNIT/ML injection pen Inject 8 Units into the skin nightly (Patient taking differently: Inject 10 Units into the skin nightly) 5 pen 3    glimepiride (AMARYL) 4 MG tablet Take 1 tablet by mouth 2 times daily 180 tablet 3    Insulin Pen Needle (PEN NEEDLES) 32G X 5 MM MISC Use 1 pen needle nightly 90 each 3    ONE TOUCH ULTRASOFT LANCETS MISC 1 each by Does not apply route daily 100 each 3    Alcohol Swabs PADS Use 1 nightly with lantus 100 each 3    blood glucose test strips (DURGA CONTOUR TEST) strip Tests blood sugar daily. 300 each 3    aspirin 81 MG EC tablet Take 81 mg by mouth daily      nitroGLYCERIN (NITROSTAT) 0.4 MG SL tablet Place 1 tablet under the tongue every 5 minutes as needed for Chest pain 25 tablet 3     No current facility-administered medications for this visit. No Known Allergies    Health Maintenance   Topic Date Due    Hepatitis C screen  Never done    DTaP/Tdap/Td vaccine (1 - Tdap) Never done    COVID-19 Vaccine (3 - Booster for Moderna series) 07/25/2021    Prostate Specific Antigen (PSA) Screening or Monitoring  12/20/2022    Depression Screen  07/07/2023    Annual Wellness Visit (AWV)  07/08/2023    Lipids  10/04/2023    Flu vaccine  Completed    Shingles vaccine  Completed    Pneumococcal 65+ years Vaccine  Completed    Hepatitis A vaccine  Aged Out    Hib vaccine  Aged Out    Meningococcal (ACWY) vaccine  Aged Out       Subjective:      Review of Systems   Constitutional:  Negative for chills and fever. HENT:  Negative for hearing loss. Eyes:  Negative for pain and visual disturbance. Respiratory:  Negative for cough and shortness of breath. Cardiovascular:  Negative for chest pain, palpitations and leg swelling. Gastrointestinal:  Negative for abdominal pain, blood in stool, constipation, diarrhea, nausea and vomiting. Endocrine: Negative for cold intolerance, polydipsia and polyuria. Genitourinary:  Negative for difficulty urinating, dysuria and hematuria. Musculoskeletal:  Negative for arthralgias, back pain, gait problem and neck pain. Skin:  Negative for pallor and rash. Neurological:  Negative for dizziness, weakness, numbness and headaches. Hematological:  Negative for adenopathy. Does not bruise/bleed easily. Psychiatric/Behavioral:  Negative for confusion. The patient is not nervous/anxious. Objective:     Physical Exam  Vitals reviewed. Constitutional:       Appearance: He is well-developed. HENT:      Head: Normocephalic and atraumatic. Eyes:      Pupils: Pupils are equal, round, and reactive to light. Cardiovascular:      Rate and Rhythm: Normal rate and regular rhythm. Heart sounds: No murmur heard. No friction rub. No gallop. Pulmonary:      Effort: Pulmonary effort is normal.      Breath sounds: Normal breath sounds. No wheezing or rales. Abdominal:      General: There is no distension. Palpations: Abdomen is soft. There is no mass. Tenderness: There is no abdominal tenderness. There is no rebound. Musculoskeletal:         General: Normal range of motion. Cervical back: Neck supple. Lymphadenopathy:      Cervical: No cervical adenopathy. Skin:     General: Skin is warm and dry. Findings: No rash. Neurological:      Mental Status: He is alert and oriented to person, place, and time. Cranial Nerves: No cranial nerve deficit (grossly). Psychiatric:         Thought Content: Thought content normal.      /82 (Site: Right Upper Arm, Position: Sitting, Cuff Size: Large Adult)   Pulse 60   Resp 16   Ht 5' 8\" (1.727 m)   Wt 192 lb (87.1 kg)   SpO2 99%   BMI 29.19 kg/m²     Assessment:       Diagnosis Orders   1. Primary hypertension  Basic Metabolic Panel    carvedilol (COREG) 12.5 MG tablet      2. Other hyperlipidemia        3. Type 2 diabetes mellitus without complication, with long-term current use of insulin (HCC)  Basic Metabolic Panel    Hemoglobin A1C    Microalbumin, Ur      Multiple test results this appointment. 10/4/2022 slightly better creatinine at 2.08    10/4/2022 normal total cholesterol 90    10/4/2022 stable A1c 7.8.     Patient told to continue Lantus insulin we did offer to increase the dose from 10 units nightly to 15 units nightly he is also working with the diabetic Ed NP for his diabetes control. Plan:       Return in about 3 months (around 1/13/2023) for Hypertension, Hyperlipidemia, Diabetes. Orders Placed This Encounter   Procedures    Influenza, FLUAD, (age 72 y+), IM, Preservative Free, 0.5 mL    Basic Metabolic Panel     Standing Status:   Future     Standing Expiration Date:   1/10/2024    Hemoglobin A1C     Standing Status:   Future     Standing Expiration Date:   1/10/2024    Microalbumin, Ur     Standing Status:   Future     Standing Expiration Date:   1/10/2024     Orders Placed This Encounter   Medications    carvedilol (COREG) 12.5 MG tablet     Sig: Take 1 tablet by mouth 2 times daily     Dispense:  180 tablet     Refill:  1         Patientgiven educational materials - see patient instructions. Discussed use, benefit,and side effects of prescribed medications. All patient questions answered. Ptvoiced understanding. Reviewed health maintenance. Instructed to continue currentmedications, diet and exercise. Patient agreed with treatment plan. Follow up asdirected.      Electronically signed by Layne Sal MD on 10/10/2022 at 11:43 AM

## 2022-10-13 DIAGNOSIS — E11.9 TYPE 2 DIABETES MELLITUS WITHOUT COMPLICATION, WITHOUT LONG-TERM CURRENT USE OF INSULIN (HCC): ICD-10-CM

## 2022-10-13 RX ORDER — INSULIN GLARGINE 100 [IU]/ML
15 INJECTION, SOLUTION SUBCUTANEOUS NIGHTLY
Qty: 5 ADJUSTABLE DOSE PRE-FILLED PEN SYRINGE | Refills: 2 | Status: SHIPPED | OUTPATIENT
Start: 2022-10-13

## 2022-10-13 NOTE — TELEPHONE ENCOUNTER
Patient called in stating his insulin was increased for 10 units to 15 units and he needs a new script sent to [facility] [phone]. Please advise.      Last Appt:  10/10/2022  Next Appt:   1/17/2023  Med verified in Epic

## 2022-12-01 ENCOUNTER — OFFICE VISIT (OUTPATIENT)
Dept: CARDIOLOGY | Age: 77
End: 2022-12-01
Payer: MEDICARE

## 2022-12-01 VITALS
BODY MASS INDEX: 29.37 KG/M2 | HEIGHT: 68 IN | HEART RATE: 57 BPM | WEIGHT: 193.8 LBS | SYSTOLIC BLOOD PRESSURE: 130 MMHG | DIASTOLIC BLOOD PRESSURE: 64 MMHG

## 2022-12-01 DIAGNOSIS — E78.2 MIXED HYPERLIPIDEMIA: ICD-10-CM

## 2022-12-01 DIAGNOSIS — I10 ESSENTIAL HYPERTENSION: ICD-10-CM

## 2022-12-01 DIAGNOSIS — I25.10 CORONARY ARTERY DISEASE INVOLVING NATIVE CORONARY ARTERY OF NATIVE HEART WITHOUT ANGINA PECTORIS: Primary | ICD-10-CM

## 2022-12-01 PROCEDURE — 93010 ELECTROCARDIOGRAM REPORT: CPT | Performed by: INTERNAL MEDICINE

## 2022-12-01 PROCEDURE — 1036F TOBACCO NON-USER: CPT | Performed by: INTERNAL MEDICINE

## 2022-12-01 PROCEDURE — 93005 ELECTROCARDIOGRAM TRACING: CPT | Performed by: INTERNAL MEDICINE

## 2022-12-01 PROCEDURE — 1123F ACP DISCUSS/DSCN MKR DOCD: CPT | Performed by: INTERNAL MEDICINE

## 2022-12-01 PROCEDURE — 99214 OFFICE O/P EST MOD 30 MIN: CPT | Performed by: INTERNAL MEDICINE

## 2022-12-01 PROCEDURE — 99212 OFFICE O/P EST SF 10 MIN: CPT | Performed by: INTERNAL MEDICINE

## 2022-12-01 PROCEDURE — 3078F DIAST BP <80 MM HG: CPT | Performed by: INTERNAL MEDICINE

## 2022-12-01 PROCEDURE — G8417 CALC BMI ABV UP PARAM F/U: HCPCS | Performed by: INTERNAL MEDICINE

## 2022-12-01 PROCEDURE — 3074F SYST BP LT 130 MM HG: CPT | Performed by: INTERNAL MEDICINE

## 2022-12-01 PROCEDURE — G8484 FLU IMMUNIZE NO ADMIN: HCPCS | Performed by: INTERNAL MEDICINE

## 2022-12-01 PROCEDURE — G8427 DOCREV CUR MEDS BY ELIG CLIN: HCPCS | Performed by: INTERNAL MEDICINE

## 2022-12-01 RX ORDER — ISOSORBIDE MONONITRATE 30 MG/1
30 TABLET, EXTENDED RELEASE ORAL DAILY
Qty: 30 TABLET | Refills: 3 | Status: SHIPPED | OUTPATIENT
Start: 2022-12-01

## 2022-12-01 NOTE — PROGRESS NOTES
[place] [address]  [address] [address]  [place] [address] [address]  Dept: [phone]    Subjective: The patient is a 68y.o. year old, , male is in the office for a follow up visit. Extensive history of CAD history of bypass surgery 30 years ago  History of heart cath last year in May 2021 details as below. According patient he is getting easily his typical angina patient workout 3 days a week in the gym while lifting weight does not bother but on a walking patient get chest pain patient has to stop but never took sublingual nitro after some minute it goes away  At this time denies any pain pressure ankle swelling short of breath. Past Medical History:   has a past medical history of Benign prostatic hypertrophy, BPPV (benign paroxysmal positional vertigo), Chest pain, Coronary heart disease, Diverticulosis, Dupuytren's contracture, Erectile dysfunction, Bowens catheter in place, Gout, History of blood transfusion, Hyperlipidemia, Hypertension, Positive cardiac stress test, Snores, and Type II or unspecified type diabetes mellitus without mention of complication, not stated as uncontrolled. Past Surgical History:   has a past surgical history that includes Prostate biopsy (Bilateral, 9/15/2010, 2007); Colonoscopy (5/27/2008); Prostate biopsy (Bilateral, 05/10/2001); Knee arthroscopy (Right, 3/16/2007); Elbow surgery (Right); Vasectomy; Coronary artery bypass graft; Cataract removal (Bilateral, Nov. and Dec 2014); Prostate Biopsy (Bilateral, 04/08/1999); Prostate Biopsy (Bilateral, 06/18/1998); Prostate Biopsy (Bilateral, 01/07/1998); Prostate Biopsy (Bilateral, 10/04/1996); Prostate surgery (11/02/2018); pr laser vaporization surgery prostate, complete (N/A, 11/2/2018); Colonoscopy (N/A, 10/28/2019); eye surgery; Cardiac catheterization (6/2005);  Cardiac catheterization (3/16/16); and Cardiac catheterization (05/07/2021). Home Medications:  Prior to Admission medications    Medication Sig Start Date End Date Taking? Authorizing Provider   insulin glargine (LANTUS SOLOSTAR) 100 UNIT/ML injection pen Inject 15 Units into the skin nightly 10/13/22  Yes Ramsey Hawthorne MD   carvedilol (COREG) 12.5 MG tablet Take 1 tablet by mouth 2 times daily 10/10/22  Yes Noemy Rojas MD   hydroCHLOROthiazide (HYDRODIURIL) 25 MG tablet Take 1 tablet by mouth every morning 7/7/22  Yes Noemy Rojas MD   atorvastatin (LIPITOR) 80 MG tablet Take 1 tablet by mouth daily 4/5/22  Yes Noemy Rojas MD   amLODIPine (NORVASC) 5 MG tablet Take 1 tablet by mouth daily 4/5/22  Yes Noemy Rojas MD   clotrimazole-betamethasone (LOTRISONE) 1-0.05 % cream Apply topically 2 times daily. 4/5/22  Yes Noemy Rojas MD   glimepiride (AMARYL) 4 MG tablet Take 1 tablet by mouth 2 times daily 2/14/22  Yes Noemy Rojas MD   Insulin Pen Needle (PEN NEEDLES) 32G X 5 MM MISC Use 1 pen needle nightly 1/3/22  Yes Noemy Rojas MD   nitroGLYCERIN (NITROSTAT) 0.4 MG SL tablet Place 1 tablet under the tongue every 5 minutes as needed for Chest pain 1/3/22  Yes Noemy Rojas MD   ONE TOUCH ULTRASOFT LANCETS [address] [facility] 1 each by Does not apply route daily 1/3/22  Yes Noemy Rojas MD   Alcohol Swabs PADS Use 1 nightly with lantus 1/3/22  Yes Noemy Rojas MD   blood glucose test strips (DURGA CONTOUR TEST) strip Tests blood sugar daily. 5/11/20  Yes Noemy Rojas MD   aspirin 81 MG EC tablet Take 81 mg by mouth daily   Yes Historical Provider, MD   lisinopril (PRINIVIL;ZESTRIL) 40 MG tablet Take 1 tablet by mouth daily 4/5/22   Noemy Rojas MD       Allergies:  Patient has no known allergies. Social History:   reports that he quit smoking about 30 years ago. His smoking use included cigarettes. He started smoking about 54 years ago. He has a 13.50 pack-year smoking history.  He has never used smokeless tobacco. He reports current alcohol use of about 1.0 standard drink per week. He reports that he does not use drugs. Review of Systems:  Constitutional: there has been no unanticipated weight loss. There's been No change in energy level, No change in activity level. Eyes: No visual changes or diplopia. No scleral icterus. ENT: No Headaches, hearing loss or vertigo. No mouth sores or sore throat. Cardiovascular: As above. Respiratory: No SOB, cough or hemoptysis. Gastrointestinal: No abdominal pain, appetite loss, blood in stools. No change in bowel or bladder habits. Genitourinary: No dysuria, trouble voiding, or hematuria. Musculoskeletal:  No gait disturbance, No weakness or joint complaints. Integumentary: No rash or pruritis. Psychiatric: No anxiety, or depression. Hematologic/Lymphatic: No abnormal bruising or bleeding, blood clots or swollen lymph nodes. Allergic/Immunologic: No nasal congestion or hives. Physical Exam:  /64   Pulse 57   Ht 5' 8\" (1.727 m)   Wt 193 lb 12.8 oz (87.9 kg)   BMI 29.47 kg/m²     Constitutional and General Appearance: alert, cooperative, no distress and appears stated age  [de-identified]: PERRL, no cervical lymphadenopathy. No masses palpable. Normal oral mucosa  Respiratory:  Normal excursion and expansion without use of accessory muscles  Resp Auscultation: Good respiratory effort. No for increased work of breathing. On auscultation: clear to auscultation bilaterally  Cardiovascular: The apical impulse is not displaced  Heart tones are crisp and normal. regular S1 and S2.  Jugular venous pulsation Normal  The carotid upstroke is normal in amplitude and contour without delay or bruit  Peripheral pulses are symmetrical and full   Abdomen:   No masses or tenderness  Bowel sounds present  Extremities:   No Cyanosis or Clubbing   Lower extremity edema: No   Skin: Warm and dry    Cardiac Data:  EKG: SB   Cath 5/7/2021   1. Severe Native Multivessel CAD   2.  Patent LIMA-LAD, SVG-OM and SVG-RPDA grafts with mild disease   3. No significant Change from last angiogram in 2016       Labs:     CBC: No results for input(s): WBC, HGB, HCT, PLT in the last 72 hours. BMP: No results for input(s): NA, K, CO2, BUN, CREATININE, LABGLOM, GLUCOSE in the last 72 hours. PT/INR: No results for input(s): PROTIME, INR in the last 72 hours. FASTING LIPID PANEL:  Lab Results   Component Value Date/Time    CHOL 90 10/04/2022 08:26 AM    HDL 31 10/04/2022 08:26 AM    LDLCHOLESTEROL 39 10/04/2022 08:26 AM    TRIG 101 10/04/2022 08:26 AM    CHOLHDLRATIO 2.9 10/04/2022 08:26 AM     LIVER PROFILE:No results for input(s): AST, ALT, LABALBU in the last 72 hours. IMPRESSION:    Patient Active Problem List   Diagnosis    Hyperlipidemia    Erectile dysfunction    Coronary heart disease    Benign prostatic hyperplasia    Gout    History of elevated PSA    Hypertension    Benign prostatic hyperplasia    Elevated PSA    Type 2 diabetes mellitus without complication (HCC)    S/P CABG x 3    Coronary artery disease involving native coronary artery of native heart without angina pectoris    Coronary artery disease involving native heart with angina pectoris ([facility])    CKD (chronic kidney disease) stage 4, GFR 15-29 ml/min ([facility])    Chronic renal disease, stage III ([facility] [address] [[id number]]       RECOMMENDATIONS:   CAD- History of stent (CABG-last cath as above details)  As patient is getting easily angina  I talk in detail that we can start on Imdur patient need to watch blood pressure patient might get headache  If it does not work then we can try Ranexa        HYPERTENSION-well controlled we will continue current medications     HYPERLIPIDEMIA- ON STATIN, NEEDS TO WATCH LIPID PROFILE AND LFT'S        DISCUSSED IN [address] [facility] VISIT IN 3 MONTHS, IF ANY SYMPTOM CHANGE PATIENT ADVISED TO GO TO [place] [address].           Albert Sutton MD, MD  [facility] Cardiology Consult [phone]

## 2022-12-12 ENCOUNTER — HOSPITAL ENCOUNTER (OUTPATIENT)
Age: 77
Discharge: HOME | End: 2022-12-12
Payer: MEDICARE

## 2022-12-12 DIAGNOSIS — N40.0 BENIGN PROSTATIC HYPERPLASIA, UNSPECIFIED WHETHER LOWER URINARY TRACT SYMPTOMS PRESENT: ICD-10-CM

## 2022-12-12 LAB — PROSTATE SPECIFIC ANTIGEN: 11.88 NG/ML

## 2022-12-12 PROCEDURE — 84153 ASSAY OF PSA TOTAL: CPT

## 2022-12-12 PROCEDURE — 36415 COLL VENOUS BLD VENIPUNCTURE: CPT

## 2022-12-19 ENCOUNTER — OFFICE VISIT (OUTPATIENT)
Dept: UROLOGY | Age: 77
End: 2022-12-19
Payer: MEDICARE

## 2022-12-19 VITALS
DIASTOLIC BLOOD PRESSURE: 70 MMHG | SYSTOLIC BLOOD PRESSURE: 110 MMHG | WEIGHT: 196 LBS | OXYGEN SATURATION: 98 % | BODY MASS INDEX: 29.7 KG/M2 | HEIGHT: 68 IN | RESPIRATION RATE: 16 BRPM | HEART RATE: 57 BPM

## 2022-12-19 DIAGNOSIS — N52.9 ERECTILE DYSFUNCTION, UNSPECIFIED ERECTILE DYSFUNCTION TYPE: ICD-10-CM

## 2022-12-19 DIAGNOSIS — R97.20 ELEVATED PSA: ICD-10-CM

## 2022-12-19 DIAGNOSIS — N40.0 BENIGN PROSTATIC HYPERPLASIA, UNSPECIFIED WHETHER LOWER URINARY TRACT SYMPTOMS PRESENT: Primary | ICD-10-CM

## 2022-12-19 PROCEDURE — 99213 OFFICE O/P EST LOW 20 MIN: CPT | Performed by: UROLOGY

## 2022-12-19 PROCEDURE — 3078F DIAST BP <80 MM HG: CPT | Performed by: UROLOGY

## 2022-12-19 PROCEDURE — 99214 OFFICE O/P EST MOD 30 MIN: CPT | Performed by: UROLOGY

## 2022-12-19 PROCEDURE — 1036F TOBACCO NON-USER: CPT | Performed by: UROLOGY

## 2022-12-19 PROCEDURE — 1123F ACP DISCUSS/DSCN MKR DOCD: CPT | Performed by: UROLOGY

## 2022-12-19 PROCEDURE — G8484 FLU IMMUNIZE NO ADMIN: HCPCS | Performed by: UROLOGY

## 2022-12-19 PROCEDURE — G8427 DOCREV CUR MEDS BY ELIG CLIN: HCPCS | Performed by: UROLOGY

## 2022-12-19 PROCEDURE — 3074F SYST BP LT 130 MM HG: CPT | Performed by: UROLOGY

## 2022-12-19 PROCEDURE — G8417 CALC BMI ABV UP PARAM F/U: HCPCS | Performed by: UROLOGY

## 2022-12-19 NOTE — PROGRESS NOTES
Odalys Mejia MD        [address] [place] [address]  [address] [id number]  Dept: [phone]  Dept Fax: [phone]  Loc: [phone]      [facility] Office Note - Follow up Visit    Patient:  Gareth Harrison  YOB: 1945  Date: 12/19/2022    The patient is a 68 y.o. male who presents today for evaluation of the following problems: BPH  Chief Complaint   Patient presents with    Benign Prostatic Hypertrophy     1 year f/u        HISTORY OF PRESENT ILLNESS:     BPH  Not on meds  Very satisfied. No new infections. No incontinence. Nocturia x 2. Stream is strong. greenlight in past    Elevated PSA- stable. Oscillating    ED-  Not bothered. Tried medications in the past with no improvement. Previous records:  Overall the PSA level is: oscillating. Range 11-12  Recent history of urinary tract infection/prostatitis? no  Previous prostate biopsy? Yes x 6. NEG  Lower urinary tract symptoms: no  Previous patient of Dr. Laurie Hartmann --[facility]  Has had episodes of retention after alcohol use in past    Was in [place] vacation and went into retention. Failed voiding trial.    Requested/reviewed records from Che Rogel MD office and/or outside physician/EMR    (Patient's old records have been requested, reviewed and pertinent findings summarized in today's note.)    Procedures Today: N/A    Last several PSA's:  Lab Results   Component Value Date    PSA 11.88 (H) 12/12/2022    PSA 13.28 (H) 12/20/2021    PSA 12.99 (H) 12/09/2020       Last total testosterone:  No results found for: TESTOSTERONE    Urinalysis today:  No results found for this visit on 12/19/22.     Last BUN and creatinine:  Lab Results   Component Value Date    BUN 40 (H) 10/04/2022     Lab Results   Component Value Date    CREATININE 2.08 (H) 10/04/2022       Additional Lab/Culture results: none    Imaging Reviewed during this Office Visit:   Odalys Mejia MD independently reviewed the images and verified the radiology reports from:    No results found. PAST MEDICAL, FAMILY AND SOCIAL HISTORY:  Past Medical History:   Diagnosis Date    Benign prostatic hypertrophy     with elevated PSA. BPPV (benign paroxysmal positional vertigo) 2/2008    Chest pain 05/2021    Coronary heart disease     Status post CABG. Diverticulosis     Dupuytren's contracture     Mid finger, right hand. Erectile dysfunction     Bowens catheter in place 11/02/2018    has had one in place for 6weeks, this one in place x 2 weeks    Gout     Quiescent. History of blood transfusion     possible with open heart surgery 26years ago    Hyperlipidemia     Hypertension     Positive cardiac stress test 05/06/2021    Snores     Type II or unspecified type diabetes mellitus without mention of complication, not stated as uncontrolled      Past Surgical History:   Procedure Laterality Date    CARDIAC CATHETERIZATION  6/2005    Triple vessel CAD with patent LIMA to LAD, patent saphenous vein graft to first obtuse marginal, patent saphenous vein graft to posterior descending artery, occluded jump graft from posterior descending artery to posterior left ventricular branch. CARDIAC CATHETERIZATION  3/16/16    CARDIAC CATHETERIZATION  05/07/2021    CATARACT REMOVAL Bilateral Nov. and Dec 2014    COLONOSCOPY  5/27/2008    Distal ileoscopy. Scattered diverticulosis and extensive diverticular disease of sigmoid colon and internal hemorrhoids. COLONOSCOPY N/A 10/28/2019    COLONOSCOPY performed by Nader Patel MD at [facility] OR  severe diverticulosis    CORONARY ARTERY BYPASS GRAFT      ELBOW SURGERY Right     Surgical repair. EYE SURGERY      KNEE ARTHROSCOPY Right 3/16/2007    Partial medial meniscectomy, partial medial and lateral synovectomy, light chondroplasty.     VT LASER VAPORIZATION SURGERY PROSTATE, COMPLETE N/A 11/2/2018    CYSTOSCOPY, TRANSURETHRAL RESECTION PROSTATE - GREENLIGHT XPS LASER [facility] # [id number] HAILEE) performed by Les Amin MD at [facility] 82 Bilateral 9/15/2010, 2007    [facility] Dr. Vanessa Wood BIOPSY Bilateral 05/10/2001    Benign-Dr. Bethanie Woody    PROSTATE BIOPSY Bilateral 04/08/1999    Benign-Dr. Aj Rosen    PROSTATE BIOPSY Bilateral 06/18/1998    Benign-Dr. Omero Stevens    PROSTATE BIOPSY Bilateral 01/07/1998    Benign-Dr. Omero Stevens    PROSTATE BIOPSY Bilateral 10/04/1996    Benign-Dr. Jaclyn Plasencia    PROSTATE SURGERY  11/02/2018    CYSTOSCOPY, TRANSURETHRAL RESECTION PROSTATE - GREENLIGHT     VASECTOMY       Family History   Problem Relation Age of Onset    Diabetes Mother     Kidney Disease Mother         Renal failure    Coronary Art Dis Mother     Hypertension Mother     Lung Cancer Father     Coronary Art Dis Father     Hypertension Brother     Other Brother         Hypernephroma     Outpatient Medications Marked as Taking for the 12/19/22 encounter (Office Visit) with Les Amin MD   Medication Sig Dispense Refill    isosorbide mononitrate (IMDUR) 30 MG extended release tablet Take 1 tablet by mouth daily 30 tablet 3    insulin glargine (LANTUS SOLOSTAR) 100 UNIT/ML injection pen Inject 15 Units into the skin nightly 5 Adjustable Dose Pre-filled Pen Syringe 2    carvedilol (COREG) 12.5 MG tablet Take 1 tablet by mouth 2 times daily 180 tablet 1    hydroCHLOROthiazide (HYDRODIURIL) 25 MG tablet Take 1 tablet by mouth every morning 90 tablet 3    atorvastatin (LIPITOR) 80 MG tablet Take 1 tablet by mouth daily 90 tablet 3    lisinopril (PRINIVIL;ZESTRIL) 40 MG tablet Take 1 tablet by mouth daily 90 tablet 3    amLODIPine (NORVASC) 5 MG tablet Take 1 tablet by mouth daily 90 tablet 3    clotrimazole-betamethasone (LOTRISONE) 1-0.05 % cream Apply topically 2 times daily.  2 g 5    glimepiride (AMARYL) 4 MG tablet Take 1 tablet by mouth 2 times daily 180 tablet 3    Insulin Pen Needle (PEN NEEDLES) 32G X 5 MM MISC Use 1 pen needle nightly 90 each 3    nitroGLYCERIN (NITROSTAT) 0.4 MG SL tablet Place 1 tablet under the tongue every 5 minutes as needed for Chest pain 25 tablet 3    ONE TOUCH ULTRASOFT LANCETS MISC 1 each by Does not apply route daily 100 each 3    Alcohol Swabs PADS Use 1 nightly with lantus 100 each 3    blood glucose test strips (DURGA CONTOUR TEST) strip Tests blood sugar daily. 300 each 3    aspirin 81 MG EC tablet Take 81 mg by mouth daily         Patient has no known allergies. Social History     Tobacco Use   Smoking Status Former    Packs/day: 0.50    Years: 27.00    Pack years: 13.50    Types: Cigarettes    Start date: 1968    Quit date: 10/11/1992    Years since quittin.2   Smokeless Tobacco Never      (If patient a smoker, smoking cessation counseling offered)   Social History     Substance and Sexual Activity   Alcohol Use Yes    Alcohol/week: 1.0 standard drink    Types: 1 Standard drinks or equivalent per week    Comment: 1 time per week       REVIEW OF SYSTEMS:  Constitutional: negative  Eyes: negative  Respiratory: negative  Cardiovascular: negative  Gastrointestinal: negative  Genitourinary: see HPI  Musculoskeletal: negative  Skin: negative   Neurological: negative  Hematological/Lymphatic: negative  Psychological: negative        Physical Exam:    This a 68 y.o. male  Vitals:    22 0901   BP: 110/70   Pulse: 57   Resp: 16   SpO2: 98%     Body mass index is 29.8 kg/m². Constitutional: Patient in no acute distress;         Assessment and Plan        1. Benign prostatic hyperplasia, unspecified whether lower urinary tract symptoms present    2. Erectile dysfunction, unspecified erectile dysfunction type    3. Elevated PSA                 Plan:      BPH- stable. hx of PVP in 2018. Stable irritative symptoms, some nocturia. No meds  ED- No treatment for ED. Discussed treatemtn, but pt is not interested (failed med therapy in past)  Elevated PSA- Follow up with PSA in one year. PSA oscillating. Did discuss this with patient.  If still elevated in 15-20 range or above will get prostate MRI       PSA in one year      Prescriptions Ordered:  No orders of the defined types were placed in this encounter. Orders Placed:  No orders of the defined types were placed in this encounter.            Avis Castano MD

## 2022-12-27 DIAGNOSIS — E11.9 TYPE 2 DIABETES MELLITUS WITHOUT COMPLICATION, WITHOUT LONG-TERM CURRENT USE OF INSULIN (HCC): ICD-10-CM

## 2022-12-27 RX ORDER — GLIMEPIRIDE 4 MG/1
4 TABLET ORAL 2 TIMES DAILY
Qty: 180 TABLET | Refills: 3 | Status: SHIPPED | OUTPATIENT
Start: 2022-12-27

## 2022-12-27 NOTE — TELEPHONE ENCOUNTER
Leilani Willis called requesting a refill of the below medication which has been pended for you:     Requested Prescriptions     Pending Prescriptions Disp Refills    glimepiride (AMARYL) 4 MG tablet 180 tablet 3     Sig: Take 1 tablet by mouth 2 times daily       Last Appointment Date: 10/10/2022  Next Appointment Date: 2/20/2023    No Known Allergies

## 2023-01-03 DIAGNOSIS — E11.9 TYPE 2 DIABETES MELLITUS WITHOUT COMPLICATION, WITHOUT LONG-TERM CURRENT USE OF INSULIN (HCC): ICD-10-CM

## 2023-01-03 RX ORDER — BLOOD SUGAR DIAGNOSTIC
STRIP MISCELLANEOUS
Qty: 90 EACH | Refills: 3 | Status: SHIPPED | OUTPATIENT
Start: 2023-01-03

## 2023-01-03 NOTE — TELEPHONE ENCOUNTER
Alivia Salmeron called requesting a refill of the below medication which has been pended for you:     Requested Prescriptions     Pending Prescriptions Disp Refills    Insulin Pen Needle (PEN NEEDLES) 32G X 5 MM MISC 90 each 3     Sig: Use 1 pen needle nightly       Last Appointment Date: 10/10/2022  Next Appointment Date: 2/20/2023    No Known Allergies

## 2023-02-15 ENCOUNTER — HOSPITAL ENCOUNTER (OUTPATIENT)
Age: 78
Discharge: HOME | End: 2023-02-15
Payer: MEDICARE

## 2023-02-15 DIAGNOSIS — Z79.4 TYPE 2 DIABETES MELLITUS WITHOUT COMPLICATION, WITH LONG-TERM CURRENT USE OF INSULIN (HCC): ICD-10-CM

## 2023-02-15 DIAGNOSIS — I10 PRIMARY HYPERTENSION: ICD-10-CM

## 2023-02-15 DIAGNOSIS — E11.9 TYPE 2 DIABETES MELLITUS WITHOUT COMPLICATION, WITH LONG-TERM CURRENT USE OF INSULIN (HCC): ICD-10-CM

## 2023-02-15 DIAGNOSIS — D64.9 ANEMIA, UNSPECIFIED TYPE: ICD-10-CM

## 2023-02-15 LAB
ABSOLUTE EOS #: 0.44 K/UL (ref 0–0.44)
ABSOLUTE IMMATURE GRANULOCYTE: <0.03 K/UL (ref 0–0.3)
ABSOLUTE LYMPH #: 1.5 K/UL (ref 1.1–3.7)
ABSOLUTE MONO #: 0.39 K/UL (ref 0.1–1.2)
ANION GAP SERPL CALCULATED.3IONS-SCNC: 9 MMOL/L (ref 9–17)
BASOPHILS # BLD: 1 % (ref 0–2)
BASOPHILS ABSOLUTE: 0.05 K/UL (ref 0–0.2)
BUN SERPL-MCNC: 41 MG/DL (ref 8–23)
BUN/CREAT BLD: 21 (ref 9–20)
CALCIUM SERPL-MCNC: 9.3 MG/DL (ref 8.6–10.4)
CHLORIDE SERPL-SCNC: 106 MMOL/L (ref 98–107)
CO2 SERPL-SCNC: 26 MMOL/L (ref 20–31)
CREAT SERPL-MCNC: 1.99 MG/DL (ref 0.7–1.2)
EOSINOPHILS RELATIVE PERCENT: 9 % (ref 1–4)
EST. AVERAGE GLUCOSE BLD GHB EST-MCNC: 192 MG/DL
GFR SERPL CREATININE-BSD FRML MDRD: 34 ML/MIN/1.73M2
GLUCOSE SERPL-MCNC: 216 MG/DL (ref 70–99)
HBA1C MFR BLD: 8.3 % (ref 4–6)
HCT VFR BLD AUTO: 35 % (ref 40.7–50.3)
HGB BLD-MCNC: 11.9 G/DL (ref 13–17)
IMMATURE GRANULOCYTES: 0 %
LYMPHOCYTES # BLD: 29 % (ref 24–43)
MCH RBC QN AUTO: 30.6 PG (ref 25.2–33.5)
MCHC RBC AUTO-ENTMCNC: 34 G/DL (ref 25.2–33.5)
MCV RBC AUTO: 90 FL (ref 82.6–102.9)
MONOCYTES # BLD: 8 % (ref 3–12)
NRBC AUTOMATED: 0 PER 100 WBC
PDW BLD-RTO: 13.2 % (ref 11.8–14.4)
PLATELET # BLD AUTO: 159 K/UL (ref 138–453)
PMV BLD AUTO: 9.7 FL (ref 8.1–13.5)
POTASSIUM SERPL-SCNC: 4.7 MMOL/L (ref 3.7–5.3)
RBC # BLD: 3.89 M/UL (ref 4.21–5.77)
SEG NEUTROPHILS: 53 % (ref 36–65)
SEGMENTED NEUTROPHILS ABSOLUTE COUNT: 2.78 K/UL (ref 1.5–8.1)
SODIUM SERPL-SCNC: 141 MMOL/L (ref 135–144)
WBC # BLD AUTO: 5.2 K/UL (ref 3.5–11.3)

## 2023-02-15 PROCEDURE — 80048 BASIC METABOLIC PNL TOTAL CA: CPT

## 2023-02-15 PROCEDURE — 85025 COMPLETE CBC W/AUTO DIFF WBC: CPT

## 2023-02-15 PROCEDURE — 36415 COLL VENOUS BLD VENIPUNCTURE: CPT

## 2023-02-15 PROCEDURE — 82043 UR ALBUMIN QUANTITATIVE: CPT

## 2023-02-15 PROCEDURE — 83036 HEMOGLOBIN GLYCOSYLATED A1C: CPT

## 2023-02-15 PROCEDURE — 82570 ASSAY OF URINE CREATININE: CPT

## 2023-02-16 LAB
CREATININE URINE: 100.7 MG/DL (ref 39–259)
MICROALBUMIN/CREAT 24H UR: 134 MG/L
MICROALBUMIN/CREAT UR-RTO: 133 MCG/MG CREAT

## 2023-02-20 ENCOUNTER — OFFICE VISIT (OUTPATIENT)
Dept: INTERNAL MEDICINE | Age: 78
End: 2023-02-20
Payer: MEDICARE

## 2023-02-20 ENCOUNTER — OFFICE VISIT (OUTPATIENT)
Dept: DIABETES SERVICES | Age: 78
End: 2023-02-20
Payer: MEDICARE

## 2023-02-20 ENCOUNTER — TELEPHONE (OUTPATIENT)
Dept: INTERNAL MEDICINE | Age: 78
End: 2023-02-20

## 2023-02-20 VITALS
RESPIRATION RATE: 16 BRPM | HEART RATE: 64 BPM | OXYGEN SATURATION: 98 % | SYSTOLIC BLOOD PRESSURE: 112 MMHG | BODY MASS INDEX: 29.7 KG/M2 | WEIGHT: 196 LBS | DIASTOLIC BLOOD PRESSURE: 68 MMHG | HEIGHT: 68 IN

## 2023-02-20 VITALS
HEART RATE: 64 BPM | HEIGHT: 68 IN | DIASTOLIC BLOOD PRESSURE: 68 MMHG | RESPIRATION RATE: 16 BRPM | BODY MASS INDEX: 29.7 KG/M2 | SYSTOLIC BLOOD PRESSURE: 112 MMHG | WEIGHT: 196 LBS

## 2023-02-20 DIAGNOSIS — E78.49 OTHER HYPERLIPIDEMIA: ICD-10-CM

## 2023-02-20 DIAGNOSIS — E53.8 VITAMIN B 12 DEFICIENCY: ICD-10-CM

## 2023-02-20 DIAGNOSIS — I10 PRIMARY HYPERTENSION: Primary | ICD-10-CM

## 2023-02-20 DIAGNOSIS — E11.22 TYPE 2 DIABETES MELLITUS WITH STAGE 3B CHRONIC KIDNEY DISEASE, WITH LONG-TERM CURRENT USE OF INSULIN (HCC): Primary | ICD-10-CM

## 2023-02-20 DIAGNOSIS — I10 PRIMARY HYPERTENSION: ICD-10-CM

## 2023-02-20 DIAGNOSIS — Z79.4 TYPE 2 DIABETES MELLITUS WITH STAGE 3B CHRONIC KIDNEY DISEASE, WITH LONG-TERM CURRENT USE OF INSULIN (HCC): ICD-10-CM

## 2023-02-20 DIAGNOSIS — Z79.4 TYPE 2 DIABETES MELLITUS WITH STAGE 3B CHRONIC KIDNEY DISEASE, WITH LONG-TERM CURRENT USE OF INSULIN (HCC): Primary | ICD-10-CM

## 2023-02-20 DIAGNOSIS — N18.32 TYPE 2 DIABETES MELLITUS WITH STAGE 3B CHRONIC KIDNEY DISEASE, WITH LONG-TERM CURRENT USE OF INSULIN (HCC): ICD-10-CM

## 2023-02-20 DIAGNOSIS — E11.22 TYPE 2 DIABETES MELLITUS WITH STAGE 3B CHRONIC KIDNEY DISEASE, WITH LONG-TERM CURRENT USE OF INSULIN (HCC): ICD-10-CM

## 2023-02-20 DIAGNOSIS — G89.29 CHRONIC LEFT SHOULDER PAIN: ICD-10-CM

## 2023-02-20 DIAGNOSIS — N18.32 TYPE 2 DIABETES MELLITUS WITH STAGE 3B CHRONIC KIDNEY DISEASE, WITH LONG-TERM CURRENT USE OF INSULIN (HCC): Primary | ICD-10-CM

## 2023-02-20 DIAGNOSIS — M10.00 IDIOPATHIC GOUT, UNSPECIFIED CHRONICITY, UNSPECIFIED SITE: ICD-10-CM

## 2023-02-20 DIAGNOSIS — M25.512 CHRONIC LEFT SHOULDER PAIN: ICD-10-CM

## 2023-02-20 PROBLEM — N18.30 CHRONIC RENAL DISEASE, STAGE III (HCC): Status: RESOLVED | Noted: 2022-05-19 | Resolved: 2023-02-20

## 2023-02-20 PROCEDURE — 3052F HG A1C>EQUAL 8.0%<EQUAL 9.0%: CPT | Performed by: NURSE PRACTITIONER

## 2023-02-20 PROCEDURE — G8417 CALC BMI ABV UP PARAM F/U: HCPCS | Performed by: NURSE PRACTITIONER

## 2023-02-20 PROCEDURE — 99214 OFFICE O/P EST MOD 30 MIN: CPT | Performed by: NURSE PRACTITIONER

## 2023-02-20 PROCEDURE — G8484 FLU IMMUNIZE NO ADMIN: HCPCS | Performed by: INTERNAL MEDICINE

## 2023-02-20 PROCEDURE — 1123F ACP DISCUSS/DSCN MKR DOCD: CPT | Performed by: NURSE PRACTITIONER

## 2023-02-20 PROCEDURE — 1036F TOBACCO NON-USER: CPT | Performed by: INTERNAL MEDICINE

## 2023-02-20 PROCEDURE — 3078F DIAST BP <80 MM HG: CPT | Performed by: NURSE PRACTITIONER

## 2023-02-20 PROCEDURE — 3052F HG A1C>EQUAL 8.0%<EQUAL 9.0%: CPT | Performed by: INTERNAL MEDICINE

## 2023-02-20 PROCEDURE — G8484 FLU IMMUNIZE NO ADMIN: HCPCS | Performed by: NURSE PRACTITIONER

## 2023-02-20 PROCEDURE — 99212 OFFICE O/P EST SF 10 MIN: CPT | Performed by: INTERNAL MEDICINE

## 2023-02-20 PROCEDURE — 1123F ACP DISCUSS/DSCN MKR DOCD: CPT | Performed by: INTERNAL MEDICINE

## 2023-02-20 PROCEDURE — 3074F SYST BP LT 130 MM HG: CPT | Performed by: NURSE PRACTITIONER

## 2023-02-20 PROCEDURE — 1036F TOBACCO NON-USER: CPT | Performed by: NURSE PRACTITIONER

## 2023-02-20 PROCEDURE — G8427 DOCREV CUR MEDS BY ELIG CLIN: HCPCS | Performed by: INTERNAL MEDICINE

## 2023-02-20 PROCEDURE — 3078F DIAST BP <80 MM HG: CPT | Performed by: INTERNAL MEDICINE

## 2023-02-20 PROCEDURE — 99214 OFFICE O/P EST MOD 30 MIN: CPT | Performed by: INTERNAL MEDICINE

## 2023-02-20 PROCEDURE — G8427 DOCREV CUR MEDS BY ELIG CLIN: HCPCS | Performed by: NURSE PRACTITIONER

## 2023-02-20 PROCEDURE — 3074F SYST BP LT 130 MM HG: CPT | Performed by: INTERNAL MEDICINE

## 2023-02-20 PROCEDURE — G8417 CALC BMI ABV UP PARAM F/U: HCPCS | Performed by: INTERNAL MEDICINE

## 2023-02-20 RX ORDER — ALLOPURINOL 300 MG/1
300 TABLET ORAL DAILY
Qty: 90 TABLET | Refills: 3 | Status: SHIPPED | OUTPATIENT
Start: 2023-02-20

## 2023-02-20 RX ORDER — INSULIN GLARGINE 100 [IU]/ML
15 INJECTION, SOLUTION SUBCUTANEOUS NIGHTLY
Qty: 5 ADJUSTABLE DOSE PRE-FILLED PEN SYRINGE | Refills: 2 | Status: SHIPPED | OUTPATIENT
Start: 2023-02-20

## 2023-02-20 RX ORDER — CARVEDILOL 25 MG/1
TABLET, FILM COATED ORAL
Qty: 100 EACH | Refills: 3 | Status: SHIPPED | OUTPATIENT
Start: 2023-02-20

## 2023-02-20 RX ORDER — BLOOD PRESSURE TEST KIT
KIT MISCELLANEOUS
Qty: 100 EACH | Refills: 3 | Status: SHIPPED | OUTPATIENT
Start: 2023-02-20

## 2023-02-20 SDOH — ECONOMIC STABILITY: FOOD INSECURITY: WITHIN THE PAST 12 MONTHS, YOU WORRIED THAT YOUR FOOD WOULD RUN OUT BEFORE YOU GOT MONEY TO BUY MORE.: NEVER TRUE

## 2023-02-20 SDOH — ECONOMIC STABILITY: INCOME INSECURITY: HOW HARD IS IT FOR YOU TO PAY FOR THE VERY BASICS LIKE FOOD, HOUSING, MEDICAL CARE, AND HEATING?: NOT HARD AT ALL

## 2023-02-20 SDOH — ECONOMIC STABILITY: FOOD INSECURITY: WITHIN THE PAST 12 MONTHS, THE FOOD YOU BOUGHT JUST DIDN'T LAST AND YOU DIDN'T HAVE MONEY TO GET MORE.: NEVER TRUE

## 2023-02-20 SDOH — ECONOMIC STABILITY: HOUSING INSECURITY
IN THE LAST 12 MONTHS, WAS THERE A TIME WHEN YOU DID NOT HAVE A STEADY PLACE TO SLEEP OR SLEPT IN A SHELTER (INCLUDING NOW)?: NO

## 2023-02-20 ASSESSMENT — ENCOUNTER SYMPTOMS
ABDOMINAL PAIN: 0
BACK PAIN: 0
ABDOMINAL PAIN: 0
DIARRHEA: 0
NAUSEA: 0
EYE PAIN: 0
CONSTIPATION: 0
SHORTNESS OF BREATH: 0
RESPIRATORY NEGATIVE: 1
BLOOD IN STOOL: 0
COUGH: 0
VOMITING: 0
SHORTNESS OF BREATH: 0
DIARRHEA: 0

## 2023-02-20 ASSESSMENT — PATIENT HEALTH QUESTIONNAIRE - PHQ9
SUM OF ALL RESPONSES TO PHQ QUESTIONS 1-9: 0
2. FEELING DOWN, DEPRESSED OR HOPELESS: 0
SUM OF ALL RESPONSES TO PHQ9 QUESTIONS 1 & 2: 0
SUM OF ALL RESPONSES TO PHQ QUESTIONS 1-9: 0
1. LITTLE INTEREST OR PLEASURE IN DOING THINGS: 0

## 2023-02-20 NOTE — PROGRESS NOTES
[address], [address]  [place] [address]  [phone]        HISTORY:    Madeline Mtz presents today for evaluation and management of:  Chief Complaint   Patient presents with    Diabetes    6 Month Follow-Up       Patient Care Team:  Yane Solares MD as PCP - General (Internal Medicine)  [facility]ares [facility]  Alivia Romero MD as Consulting Physician (Urology)  Coy Palumbo MD as Consulting Physician (Cardiology)      Interval History:    Past DM Medications   Metformin- ckd  Rybelsus- ineffective     Current Diabetic Medications  Lantus 15 units daily, glimepiride 4 mg bid      DKA episodes: 0    07/14/22   Madeline Mtz is a 68 y.o. male patient who presents to clinic today for his diabetes. he has a history of CAD, hypertension, hyperlipidemia, CKD and obesity which contributes to his diabetes. At previous visit insulin was decreased but he increased it back up do to postprandial highs. he denies any current signs or symptoms of hyper/hypoglycemia. he states they are taking their medications as prescribed without any adverse effects. He has a fasting hypoglycemic event maybe once a month. Diet: counting carbs  Exercise: none  BS testing: once daily fasting average  post prandial average 100-200  Issues: denies  Diabetic foot exam up-to-date: Yes  Diabetic retinal exam up-to-date: Yes  Hypoglycemia as needed treatment: oj    02/20/23   Madeline Mtz is a 68 y.o. male patient who presents to clinic today for his diabetes. he has a history of CAD, hypertension, hyperlipidemia, CKD and obesity which contributes to his diabetes. At previous visit diabetes counseling was provided. he denies any current signs or symptoms of hyper/hypoglycemia. he states they are taking their medications as prescribed without any adverse effects.    Diet: regular  Exercise: walking and golf  BS testing: fasting range: 130-150, postprandial range: >200, patient tests 1 time(s) per day  Hypoglycemia: No  Hypoglycemia as needed treatment: snack  Issues:   Diabetic foot exam up-to-date: Yes  Diabetic retinal exam up-to-date: Yes    Diabetes complications:nephropathy, impotence, and cardiovascular disease      High cholesterol-  Takes lipitor and denies any adverse effects with its use.  Watches diet and exercise.      Hypertension-  Takes coreg, lisniopril HCTZ, norvasc and denies any adverse effects with their use. Watches diet and exercise. Denies any chest pain, dizziness or edema.       Obesity- Working on weight loss.         Past Medical History:   Diagnosis Date    Benign prostatic hypertrophy     with elevated PSA.    BPPV (benign paroxysmal positional vertigo) 2008    Chest pain 2021    Coronary heart disease     Status post CABG.    Diverticulosis     Dupuytren's contracture     Mid finger, right hand.    Erectile dysfunction     Bowens catheter in place 2018    has had one in place for 6weeks, this one in place x 2 weeks    Gout     Quiescent.    History of blood transfusion     possible with open heart surgery 26years ago    Hyperlipidemia     Hypertension     Positive cardiac stress test 2021    Snores     Type II or unspecified type diabetes mellitus without mention of complication, not stated as uncontrolled      Family History   Problem Relation Age of Onset    Diabetes Mother     Kidney Disease Mother         Renal failure    Coronary Art Dis Mother     Hypertension Mother     Lung Cancer Father     Coronary Art Dis Father     Hypertension Brother     Other Brother         Hypernephroma     Social History     Tobacco Use    Smoking status: Former     Packs/day: 0.50     Years: 27.00     Pack years: 13.50     Types: Cigarettes     Start date: 1968     Quit date: 10/11/1992     Years since quittin.3    Smokeless tobacco: Never   Vaping Use    Vaping Use: Never  used   Substance Use Topics    Alcohol use: Yes     Alcohol/week: 1.0 standard drink     Types: 1 Standard drinks or equivalent per week     Comment: 1 time per week    Drug use: No     No Known Allergies    MEDICATIONS:  Current Outpatient Medications   Medication Sig Dispense Refill    insulin glargine (LANTUS SOLOSTAR) 100 UNIT/ML injection pen Inject 15 Units into the skin nightly 5 Adjustable Dose Pre-filled Pen Syringe 2    Alcohol Swabs PADS Use 1 nightly with lantus DX: E11.9 100 each 3    blood glucose test strips (CONTOUR TEST) strip Use to test blood sugars once daily. DX: E11.9 100 each 3    Insulin Pen Needle (PEN NEEDLES) 32G X 5 MM MISC Use 1 pen needle nightly 90 each 3    glimepiride (AMARYL) 4 MG tablet Take 1 tablet by mouth 2 times daily 180 tablet 3    isosorbide mononitrate (IMDUR) 30 MG extended release tablet Take 1 tablet by mouth daily 30 tablet 3    carvedilol (COREG) 12.5 MG tablet Take 1 tablet by mouth 2 times daily 180 tablet 1    hydroCHLOROthiazide (HYDRODIURIL) 25 MG tablet Take 1 tablet by mouth every morning 90 tablet 3    atorvastatin (LIPITOR) 80 MG tablet Take 1 tablet by mouth daily 90 tablet 3    lisinopril (PRINIVIL;ZESTRIL) 40 MG tablet Take 1 tablet by mouth daily 90 tablet 3    amLODIPine (NORVASC) 5 MG tablet Take 1 tablet by mouth daily 90 tablet 3    clotrimazole-betamethasone (LOTRISONE) 1-0.05 % cream Apply topically 2 times daily. 2 g 5    nitroGLYCERIN (NITROSTAT) 0.4 MG SL tablet Place 1 tablet under the tongue every 5 minutes as needed for Chest pain 25 tablet 3    ONE TOUCH ULTRASOFT LANCETS MISC 1 each by Does not apply route daily 100 each 3    aspirin 81 MG EC tablet Take 81 mg by mouth daily       No current facility-administered medications for this visit. Review ofSymptoms:  Review of Systems   Constitutional:  Positive for fatigue. Negative for unexpected weight change. Eyes:  Negative for visual disturbance. Respiratory: Negative.   Negative for shortness of breath. Cardiovascular:  Negative for chest pain and leg swelling. Gastrointestinal:  Negative for abdominal pain and diarrhea. Endocrine: Negative for polydipsia, polyphagia and polyuria. Genitourinary: Negative. Musculoskeletal: Negative. Skin:  Negative for rash and wound. Neurological:  Negative for dizziness, tremors, seizures and headaches. Psychiatric/Behavioral: Negative. Negative for confusion and decreased concentration. Theremainder of a complete 14-point review of systems is negative. Vital Signs: /68 (Site: Right Upper Arm, Position: Sitting, Cuff Size: Medium Adult)   Pulse 64   Resp 16   Ht 5' 8\" (1.727 m)   Wt 196 lb (88.9 kg)   BMI 29.80 kg/m²      Wt Readings from Last 3 Encounters:   02/20/23 196 lb (88.9 kg)   12/19/22 196 lb (88.9 kg)   12/01/22 193 lb 12.8 oz (87.9 kg)     Body mass index is 29.8 kg/m².   LABS:  Hemoglobin A1C   Date Value Ref Range Status   02/15/2023 8.3 (H) 4.0 - 6.0 % Final   10/04/2022 7.8 (H) 4.0 - 6.0 % Final     Lab Results   Component Value Date    LABMICR 133 (H) 02/15/2023     Lab Results   Component Value Date     02/15/2023    K 4.7 02/15/2023     02/15/2023    CO2 26 02/15/2023    BUN 41 (H) 02/15/2023    CREATININE 1.99 (H) 02/15/2023    GLUCOSE 216 (H) 02/15/2023    CALCIUM 9.3 02/15/2023    PROT 6.8 10/04/2022    LABALBU 4.2 10/04/2022    BILITOT 1.2 10/04/2022    ALKPHOS 71 10/04/2022    AST 18 10/04/2022    ALT 25 10/04/2022    LABGLOM 34 (L) 02/15/2023    GFRAA 35 (L) 07/05/2022     Lab Results   Component Value Date    CHOL 90 10/04/2022    CHOL 92 09/28/2021    CHOL 100 09/11/2020     Lab Results   Component Value Date    TRIG 101 10/04/2022    TRIG 106 09/28/2021    TRIG 85 09/11/2020     Lab Results   Component Value Date    HDL 31 (L) 10/04/2022    HDL 31 (L) 09/28/2021    HDL 32 (L) 09/11/2020     Lab Results   Component Value Date    LDLCHOLESTEROL 39 10/04/2022    LDLCHOLESTEROL 40 09/28/2021    LDLCHOLESTEROL 51 09/11/2020     Lab Results   Component Value Date    VLDL NOT REPORTED 09/28/2021    VLDL NOT REPORTED 09/11/2020    VLDL NOT REPORTED 09/12/2019     Lab Results   Component Value Date    CHOLHDLRATIO 2.9 10/04/2022    CHOLHDLRATIO 3.0 09/28/2021    CHOLHDLRATIO 3.1 09/11/2020           Physical Exam  Constitutional:       Appearance: Normal appearance. He is well-developed.   HENT:      Head: Normocephalic.   Eyes:      Pupils: Pupils are equal, round, and reactive to light.   Cardiovascular:      Rate and Rhythm: Normal rate and regular rhythm.   Pulmonary:      Effort: Pulmonary effort is normal.      Breath sounds: Normal breath sounds.   Musculoskeletal:         General: Normal range of motion.      Cervical back: Normal range of motion.   Skin:     General: Skin is warm and dry.      Findings: No lesion (no lipohypertrophy) or rash.   Neurological:      Mental Status: He is alert and oriented to person, place, and time. Mental status is at baseline.      Sensory: No sensory deficit.   Psychiatric:         Mood and Affect: Mood normal.         Speech: Speech normal.         Behavior: Behavior normal.         Thought Content: Thought content normal.         Judgment: Judgment normal.           ASSESSMENT/PLAN:     Diagnosis Orders   1. Type 2 diabetes mellitus with stage 3b chronic kidney disease, with long-term current use of insulin (HCC)  insulin glargine (LANTUS SOLOSTAR) 100 UNIT/ML injection pen    Alcohol Swabs PADS    blood glucose test strips (CONTOUR TEST) strip    Hemoglobin A1C    Basic Metabolic Panel      2. Other hyperlipidemia        3. Primary hypertension          Orders Placed This Encounter   Procedures    Hemoglobin A1C    Basic Metabolic Panel     Orders Placed This Encounter   Medications    insulin glargine (LANTUS SOLOSTAR) 100 UNIT/ML injection pen     Sig: Inject 15 Units into the skin nightly     Dispense:  5 Adjustable Dose Pre-filled Pen Syringe      Refill:  2    Alcohol Swabs PADS     Sig: Use 1 nightly with lantus DX: E11.9     Dispense:  100 each     Refill:  3    blood glucose test strips (CONTOUR TEST) strip     Sig: Use to test blood sugars once daily. DX: E11.9     Dispense:  100 each     Refill:  3     Requested Prescriptions     Signed Prescriptions Disp Refills    insulin glargine (LANTUS SOLOSTAR) 100 UNIT/ML injection pen 5 Adjustable Dose Pre-filled Pen Syringe 2     Sig: Inject 15 Units into the skin nightly    Alcohol Swabs PADS 100 each 3     Sig: Use 1 nightly with lantus DX: E11.9    blood glucose test strips (CONTOUR TEST) strip 100 each 3     Sig: Use to test blood sugars once daily. DX: E11.9       1. Type 2 diabetes mellitus with stage 3b chronic kidney disease, with long-term current use of insulin (HCC)  - Unstable  HbA1C goal is less than 7%. - Fasting blood glucose goal is 70-120mg/dl and postprandial blood sugar goal is less than 180 mg/dl. - Labs reviewed including most recent A1c, UACR and kidney function. Repeat labs due in 3 months.    -We discussed in great detail dietary modifications they can make to better improve their blood sugars. -follow up diabetes education completed, all questions answered.  -discussed farxiga for dm and ckd, declines at this time.   -he is agreeable to cgm therapy   -ckd is stable       Discussed signs and symptoms of hyper/hypoglycemia and how to treat. Encouraged 150 minutes of physical activity per week. Follow a low carbohydrate diet. Encouraged at least 7 hours of sleep. The patient was informed of the goals of diabetes management. This can only be accomplished by watching their diet and exercise levels. We certainly use medicines to help attain these goals. The consequences of not controlling blood sugars were discussed. These include blindness, heart disease, stroke, kidney disease, and possibly need for dialysis.  They were told to be careful with their foot care as diabetics often have nerve damage, infections and risk for limb amutations . They also need a dilated eye exam yearly. We discussed the issues of diet, exercise, medication, complication avoidance, reviewed the signs and symptoms of diabetes, hypoglycemic episodes, significance of HbA1C.     - insulin glargine (LANTUS SOLOSTAR) 100 UNIT/ML injection pen; Inject 15 Units into the skin nightly  Dispense: 5 Adjustable Dose Pre-filled Pen Syringe; Refill: 2  - Alcohol Swabs PADS; Use 1 nightly with lantus DX: E11.9  Dispense: 100 each; Refill: 3  - blood glucose test strips (CONTOUR TEST) strip; Use to test blood sugars once daily. DX: E11.9  Dispense: 100 each; Refill: 3  - Hemoglobin A1C; Future  - Basic Metabolic Panel; Future    2. Other hyperlipidemia  stable, lipid panel reviewed, continue current medications. Diet and exercise. 3. Primary hypertension   stable, continue current medications. Diet and exercise Seek emergent care if chest pain develops. Answered all patient questions. Agrees to follow plan of care and to follow up in 3 months, sooner if needed. Call office if unexplained blood sugars less than 70 occur or above 400. Call office [facility] questions or concerns. Be sure to bring glucometer/food log at next appointment. Total time spent reviewing chart, labs, counseling patient and documenting on the date of the encounter: 30 min.       Electronically signed by AMANDA Jimenez CNP on 2/20/2023 at 2:36 PM      (Please note that portions of this note were completed with a voice-recognition program. Efforts were made to edit the dictation but occasionally words are mis-transcribed.)

## 2023-02-20 NOTE — PROGRESS NOTES
Tom  [address] [place] [id number]  Dept: [phone]  Dept Fax: [phone]  Loc: [phone]     Jose Cisneros is a 68 y.o. male who presents today for his medical conditions/complaintsas noted below. Jose Cisneros is c/o of   Chief Complaint   Patient presents with    Hypertension     3 month  Left shoulder pain. Denies any injury to it. Hyperlipidemia    Diabetes         HPI:     Hypertension  This is a chronic problem. The current episode started more than 1 year ago. The problem has been waxing and waning since onset. The problem is controlled. Pertinent negatives include no chest pain, headaches, neck pain, palpitations or shortness of breath. Hyperlipidemia  This is a chronic problem. The current episode started more than 1 year ago. The problem is controlled. Recent lipid tests were reviewed and are variable. Pertinent negatives include no chest pain or shortness of breath. Diabetes  He presents for his follow-up diabetic visit. He has type 2 diabetes mellitus. The initial diagnosis of diabetes was made 11 years ago. His disease course has been fluctuating. Pertinent negatives for hypoglycemia include no confusion, dizziness, headaches, nervousness/anxiousness or pallor. Pertinent negatives for diabetes include no chest pain, no polydipsia, no polyuria and no weakness. Hemoglobin A1C (%)   Date Value   02/15/2023 8.3 (H)   10/04/2022 7.8 (H)   07/05/2022 7.7 (H)            Microalb/Crt.  Ratio (mcg/mg creat)   Date Value   02/15/2023 133 (H)     LDL Cholesterol (mg/dL)   Date Value   10/04/2022 39   09/28/2021 40   09/11/2020 51         AST (U/L)   Date Value   10/04/2022 18     ALT (U/L)   Date Value   10/04/2022 25     BUN (mg/dL)   Date Value   02/15/2023 41 (H)     BP Readings from Last 3 Encounters:   02/20/23 112/68   02/20/23 112/68   12/19/22 110/70              Past Medical History: Diagnosis Date    Benign prostatic hypertrophy     with elevated PSA. BPPV (benign paroxysmal positional vertigo) 2/2008    Chest pain 05/2021    Coronary heart disease     Status post CABG. Diverticulosis     Dupuytren's contracture     Mid finger, right hand. Erectile dysfunction     Bowens catheter in place 11/02/2018    has had one in place for 6weeks, this one in place x 2 weeks    Gout     Quiescent. History of blood transfusion     possible with open heart surgery 26years ago    Hyperlipidemia     Hypertension     Positive cardiac stress test 05/06/2021    Snores     Type II or unspecified type diabetes mellitus without mention of complication, not stated as uncontrolled       Past Surgical History:   Procedure Laterality Date    CARDIAC CATHETERIZATION  6/2005    Triple vessel CAD with patent LIMA to LAD, patent saphenous vein graft to first obtuse marginal, patent saphenous vein graft to posterior descending artery, occluded jump graft from posterior descending artery to posterior left ventricular branch. CARDIAC CATHETERIZATION  3/16/16    CARDIAC CATHETERIZATION  05/07/2021    CATARACT REMOVAL Bilateral Nov. and Dec 2014    COLONOSCOPY  5/27/2008    Distal ileoscopy. Scattered diverticulosis and extensive diverticular disease of sigmoid colon and internal hemorrhoids. COLONOSCOPY N/A 10/28/2019    COLONOSCOPY performed by China Motta MD at [facility] OR  severe diverticulosis    CORONARY ARTERY BYPASS GRAFT      ELBOW SURGERY Right     Surgical repair. EYE SURGERY      KNEE ARTHROSCOPY Right 3/16/2007    Partial medial meniscectomy, partial medial and lateral synovectomy, light chondroplasty.     OR LASER VAPORIZATION OF PROSTATE FOR URINE FLOW N/A 11/2/2018    CYSTOSCOPY, TRANSURETHRAL RESECTION PROSTATE - [facility] # [id number] HAILEE) performed by Yanci Hannon MD at [place] [address] Bilateral 9/15/2010, 2007    [facility] Dr. Tara Oliva Bilateral 05/10/2001    Benign-Dr. Linda Zuñiga    PROSTATE BIOPSY Bilateral 1999    Benign-Dr. Mart Stern    PROSTATE BIOPSY Bilateral 1998    Benign-Dr. Corky Morley    PROSTATE BIOPSY Bilateral 1998    Benign-Dr. Corky Morley    PROSTATE BIOPSY Bilateral 10/04/1996    Benign-Dr. Gonzalez    PROSTATE SURGERY  2018    CYSTOSCOPY, TRANSURETHRAL RESECTION PROSTATE - [facility]     VASECTOMY         Family History   Problem Relation Age of Onset    Diabetes Mother     Kidney Disease Mother         Renal failure    Coronary Art Dis Mother     Hypertension Mother     Lung Cancer Father     Coronary Art Dis Father     Hypertension Brother     Other Brother         Hypernephroma          Social History     Tobacco Use    Smoking status: Former     Packs/day: 0.50     Years: 27.00     Pack years: 13.50     Types: Cigarettes     Start date: 1968     Quit date: 10/11/1992     Years since quittin.3    Smokeless tobacco: Never   Substance Use Topics    Alcohol use: Yes     Alcohol/week: 1.0 standard drink     Types: 1 Standard drinks or equivalent per week     Comment: 1 time per week         Current Outpatient Medications   Medication Sig Dispense Refill    insulin glargine (LANTUS SOLOSTAR) 100 UNIT/ML injection pen Inject 15 Units into the skin nightly 5 Adjustable Dose Pre-filled Pen Syringe 2    blood glucose test strips (CONTOUR TEST) strip Use to test blood sugars once daily.  DX: E11.9 100 each 3    allopurinol (ZYLOPRIM) 300 MG tablet Take 1 tablet by mouth daily 90 tablet 3    glimepiride (AMARYL) 4 MG tablet Take 1 tablet by mouth 2 times daily 180 tablet 3    isosorbide mononitrate (IMDUR) 30 MG extended release tablet Take 1 tablet by mouth daily 30 tablet 3    carvedilol (COREG) 12.5 MG tablet Take 1 tablet by mouth 2 times daily 180 tablet 1    hydroCHLOROthiazide (HYDRODIURIL) 25 MG tablet Take 1 tablet by mouth every morning 90 tablet 3    atorvastatin (LIPITOR) 80 MG tablet Take 1 tablet by mouth daily 90 tablet 3    lisinopril (PRINIVIL;ZESTRIL) 40 MG tablet Take 1 tablet by mouth daily 90 tablet 3    amLODIPine (NORVASC) 5 MG tablet Take 1 tablet by mouth daily 90 tablet 3    clotrimazole-betamethasone (LOTRISONE) 1-0.05 % cream Apply topically 2 times daily. 2 g 5    aspirin 81 MG EC tablet Take 81 mg by mouth daily      Alcohol Swabs PADS Use 1 nightly with lantus DX: E11.9 100 each 3    Insulin Pen Needle (PEN NEEDLES) 32G X 5 MM MISC Use 1 pen needle nightly 90 each 3    nitroGLYCERIN (NITROSTAT) 0.4 MG SL tablet Place 1 tablet under the tongue every 5 minutes as needed for Chest pain 25 tablet 3    ONE TOUCH ULTRASOFT LANCETS MISC 1 each by Does not apply route daily 100 each 3     No current facility-administered medications for this visit. No Known Allergies    Health Maintenance   Topic Date Due    Hepatitis C screen  Never done    DTaP/Tdap/Td vaccine (1 - Tdap) Never done    Depression Screen  07/07/2023    Annual Wellness Visit (AWV)  07/08/2023    Lipids  10/04/2023    Prostate Specific Antigen (PSA) Screening or Monitoring  12/12/2023    Flu vaccine  Completed    Shingles vaccine  Completed    Pneumococcal 65+ years Vaccine  Completed    COVID-19 Vaccine  Completed    Hepatitis A vaccine  Aged Out    Hib vaccine  Aged Out    Meningococcal (ACWY) vaccine  Aged Out       Subjective:      Review of Systems   Constitutional:  Negative for chills and fever. HENT:  Negative for hearing loss. Eyes:  Negative for pain and visual disturbance. Respiratory:  Negative for cough and shortness of breath. Cardiovascular:  Negative for chest pain, palpitations and leg swelling. Gastrointestinal:  Negative for abdominal pain, blood in stool, constipation, diarrhea, nausea and vomiting. Endocrine: Negative for cold intolerance, polydipsia and polyuria. Genitourinary:  Negative for difficulty urinating, dysuria and hematuria.    Musculoskeletal:  Negative for arthralgias, back pain, gait problem and neck pain.   Skin:  Negative for pallor and rash.   Neurological:  Negative for dizziness, weakness, numbness and headaches.   Hematological:  Negative for adenopathy. Does not bruise/bleed easily.   Psychiatric/Behavioral:  Negative for confusion. The patient is not nervous/anxious.      Objective:     Physical Exam  Vitals reviewed.   Constitutional:       Appearance: He is well-developed.   HENT:      Head: Normocephalic and atraumatic.   Eyes:      Pupils: Pupils are equal, round, and reactive to light.   Cardiovascular:      Rate and Rhythm: Normal rate and regular rhythm.      Heart sounds: No murmur heard.    No friction rub. No gallop.   Pulmonary:      Effort: Pulmonary effort is normal.      Breath sounds: Normal breath sounds. No wheezing or rales.   Abdominal:      General: There is no distension.      Palpations: Abdomen is soft. There is no mass.      Tenderness: There is no abdominal tenderness. There is no rebound.   Musculoskeletal:         General: Normal range of motion.      Cervical back: Neck supple.   Lymphadenopathy:      Cervical: No cervical adenopathy.   Skin:     General: Skin is warm and dry.      Findings: No rash.   Neurological:      Mental Status: He is alert and oriented to person, place, and time.      Cranial Nerves: No cranial nerve deficit (grossly).   Psychiatric:         Thought Content: Thought content normal.      /68 (Site: Right Upper Arm, Position: Sitting, Cuff Size: Large Adult)   Pulse 64   Resp 16   Ht 5' 8\" (1.727 m)   Wt 196 lb (88.9 kg)   SpO2 98%   BMI 29.80 kg/m²     Assessment:       Diagnosis Orders   1. Primary hypertension        2. Idiopathic gout, unspecified chronicity, unspecified site  allopurinol (ZYLOPRIM) 300 MG tablet      3. Other hyperlipidemia        4. Type 2 diabetes mellitus with stage 3b chronic kidney disease, with long-term current use of insulin (HCC)        5. Chronic left shoulder pain  Mike Mcbride MD, Orthopedic  Surgery, [place]      6. Vitamin B 12 deficiency  Vitamin B12 & Folate         Reviewed multiple test results. 2/15/2023 better and okay creatinine 1.99    12/12/2022 slightly better diagnostic PSA 11.88    2/15/2023 A1c higher at 8.3. WILL Miller will be setting patient up for freestyle salome. Hopefully that will help him monitor his glucose better  Eat differently to help lower the A1c. At this point we will have him just continue the current Lantus 15 units daily. Plan:       Return in about 3 months (around 5/26/2023) for Hypertension, Hyperlipidemia, Diabetes. Orders Placed This Encounter   Procedures    Vitamin B12 & Folate     Standing Status:   Future     Standing Expiration Date:   2/20/2024    Roro Espinoza MD, Orthopedic Surgery, [place]     Referral Priority:   Routine     Referral Type:   Eval and Treat     Referral Reason:   Specialty Services Required     Referred to Provider:   Tessa Mayorga MD     Requested Specialty:   Orthopedic Surgery     Number of Visits Requested:   1       Orders Placed This Encounter   Medications    allopurinol (ZYLOPRIM) 300 MG tablet     Sig: Take 1 tablet by mouth daily     Dispense:  90 tablet     Refill:  3         Patientgiven educational materials - see patient instructions. Discussed use, benefit,and side effects of prescribed medications. All patient questions answered. Ptvoiced understanding. Reviewed health maintenance. Instructed to continue currentmedications, diet and exercise. Patient agreed with treatment plan. Follow up asdirected.      Electronically signed by Ashish Kc MD on 2/20/2023 at 3:18 PM

## 2023-03-01 DIAGNOSIS — N18.32 TYPE 2 DIABETES MELLITUS WITH STAGE 3B CHRONIC KIDNEY DISEASE, WITH LONG-TERM CURRENT USE OF INSULIN (HCC): ICD-10-CM

## 2023-03-01 DIAGNOSIS — M25.512 ACUTE PAIN OF LEFT SHOULDER: Primary | ICD-10-CM

## 2023-03-01 DIAGNOSIS — E11.22 TYPE 2 DIABETES MELLITUS WITH STAGE 3B CHRONIC KIDNEY DISEASE, WITH LONG-TERM CURRENT USE OF INSULIN (HCC): ICD-10-CM

## 2023-03-01 DIAGNOSIS — Z79.4 TYPE 2 DIABETES MELLITUS WITH STAGE 3B CHRONIC KIDNEY DISEASE, WITH LONG-TERM CURRENT USE OF INSULIN (HCC): ICD-10-CM

## 2023-03-02 RX ORDER — CARVEDILOL 25 MG/1
TABLET, FILM COATED ORAL
Qty: 100 EACH | Refills: 3 | Status: SHIPPED | OUTPATIENT
Start: 2023-03-02

## 2023-03-06 ENCOUNTER — OFFICE VISIT (OUTPATIENT)
Dept: ONCOLOGY | Age: 78
End: 2023-03-06
Payer: MEDICARE

## 2023-03-06 VITALS
SYSTOLIC BLOOD PRESSURE: 136 MMHG | BODY MASS INDEX: 29.95 KG/M2 | HEIGHT: 68 IN | RESPIRATION RATE: 16 BRPM | WEIGHT: 197.6 LBS | DIASTOLIC BLOOD PRESSURE: 74 MMHG | HEART RATE: 64 BPM | OXYGEN SATURATION: 98 % | TEMPERATURE: 97.9 F

## 2023-03-06 DIAGNOSIS — D64.9 ANEMIA, UNSPECIFIED TYPE: Primary | ICD-10-CM

## 2023-03-06 PROCEDURE — 1123F ACP DISCUSS/DSCN MKR DOCD: CPT | Performed by: INTERNAL MEDICINE

## 2023-03-06 PROCEDURE — G8484 FLU IMMUNIZE NO ADMIN: HCPCS | Performed by: INTERNAL MEDICINE

## 2023-03-06 PROCEDURE — 3075F SYST BP GE 130 - 139MM HG: CPT | Performed by: INTERNAL MEDICINE

## 2023-03-06 PROCEDURE — 99214 OFFICE O/P EST MOD 30 MIN: CPT | Performed by: INTERNAL MEDICINE

## 2023-03-06 PROCEDURE — 1036F TOBACCO NON-USER: CPT | Performed by: INTERNAL MEDICINE

## 2023-03-06 PROCEDURE — G8427 DOCREV CUR MEDS BY ELIG CLIN: HCPCS | Performed by: INTERNAL MEDICINE

## 2023-03-06 PROCEDURE — 99213 OFFICE O/P EST LOW 20 MIN: CPT | Performed by: INTERNAL MEDICINE

## 2023-03-06 PROCEDURE — G8417 CALC BMI ABV UP PARAM F/U: HCPCS | Performed by: INTERNAL MEDICINE

## 2023-03-06 PROCEDURE — 3078F DIAST BP <80 MM HG: CPT | Performed by: INTERNAL MEDICINE

## 2023-03-06 NOTE — PROGRESS NOTES
Patient ID: Peyton Woods, 1945, [id number], 68 y.o. Referred by : Isabella Banks MD  Reason for consultation:   Anemia  CKD  HISTORY OF PRESENT ILLNESS:    Oncologic History:  Peyton Woods is a 68 y.o. male with past medical history of prostatic hypertrophy, CAD status post CABG was interventional consultation visit for anemia. He denies any unintentional weight loss, drenching night sweats fever chills. H denied any blood in stool. He is on b12 pills. His recent lab work showed hb 10.7. Pt has mild CKD. Denied any SOB chest pain. Interval history:  Patient is returning for follow-up visit and to discuss lab results and further recommendations. He denies any significant fatigue or tiredness. He has mild fatigue but has been stable. Denies any bleeding symptoms. His most recent hemoglobin is stable at 11.9. No chest pain shortness of breath. He does have some lower extremity swelling and following with his PCP. During this visit patient's allergy, social, medical, surgical history and medications were reviewed and updated. Past Medical History:   Diagnosis Date    Benign prostatic hypertrophy     with elevated PSA. BPPV (benign paroxysmal positional vertigo) 2/2008    Chest pain 05/2021    Coronary heart disease     Status post CABG. Diverticulosis     Dupuytren's contracture     Mid finger, right hand. Erectile dysfunction     Bowens catheter in place 11/02/2018    has had one in place for 6weeks, this one in place x 2 weeks    Gout     Quiescent.     History of blood transfusion     possible with open heart surgery 26years ago    Hyperlipidemia     Hypertension     Positive cardiac stress test 05/06/2021    Snores     Type II or unspecified type diabetes mellitus without mention of complication, not stated as uncontrolled        Past Surgical History:   Procedure Laterality Date    CARDIAC CATHETERIZATION  6/2005    Triple vessel CAD with patent LIMA to LAD, patent saphenous vein graft to first obtuse marginal, patent saphenous vein graft to posterior descending artery, occluded jump graft from posterior descending artery to posterior left ventricular branch. CARDIAC CATHETERIZATION  3/16/16    CARDIAC CATHETERIZATION  05/07/2021    CATARACT REMOVAL Bilateral Nov. and Dec 2014    COLONOSCOPY  5/27/2008    Distal ileoscopy. Scattered diverticulosis and extensive diverticular disease of sigmoid colon and internal hemorrhoids. COLONOSCOPY N/A 10/28/2019    COLONOSCOPY performed by Dane Meraz MD at [facility] OR  severe diverticulosis    CORONARY ARTERY BYPASS GRAFT      ELBOW SURGERY Right     Surgical repair. EYE SURGERY      KNEE ARTHROSCOPY Right 3/16/2007    Partial medial meniscectomy, partial medial and lateral synovectomy, light chondroplasty. CA LASER VAPORIZATION OF PROSTATE FOR URINE FLOW N/A 11/2/2018    CYSTOSCOPY, TRANSURETHRAL RESECTION PROSTATE - [facility] # [id number] HAILEE) performed by Shashi Erickson MD at [place] [address] Bilateral 9/15/2010, 2007    [facility] Dr. Melissa Ring Bilateral 05/10/2001    Benign-Dr. Jessi Garcia    PROSTATE BIOPSY Bilateral 04/08/1999    Benign-Dr. Aime Alcazar    PROSTATE BIOPSY Bilateral 06/18/1998    Benign-Dr. Tanya Win    PROSTATE BIOPSY Bilateral 01/07/1998    Benign-Dr. Tanya Win    PROSTATE BIOPSY Bilateral 10/04/1996    Benign-Dr. Gonzalez    PROSTATE SURGERY  11/02/2018    CYSTOSCOPY, TRANSURETHRAL RESECTION PROSTATE - [facility]     VASECTOMY         No Known Allergies    Current Outpatient Medications   Medication Sig Dispense Refill    blood glucose test strips (CONTOUR TEST) strip Use to test blood sugars once daily.  DX: E11.9 100 each 3    insulin glargine (LANTUS SOLOSTAR) 100 UNIT/ML injection pen Inject 15 Units into the skin nightly 5 Adjustable Dose Pre-filled Pen Syringe 2    Alcohol Swabs PADS Use 1 nightly with lantus DX: E11.9 100 each 3    allopurinol (ZYLOPRIM) 300 MG tablet Take 1 tablet by mouth daily 90 tablet 3    Insulin Pen Needle (PEN NEEDLES) 32G X 5 MM MISC Use 1 pen needle nightly 90 each 3    glimepiride (AMARYL) 4 MG tablet Take 1 tablet by mouth 2 times daily 180 tablet 3    isosorbide mononitrate (IMDUR) 30 MG extended release tablet Take 1 tablet by mouth daily 30 tablet 3    carvedilol (COREG) 12.5 MG tablet Take 1 tablet by mouth 2 times daily 180 tablet 1    hydroCHLOROthiazide (HYDRODIURIL) 25 MG tablet Take 1 tablet by mouth every morning 90 tablet 3    atorvastatin (LIPITOR) 80 MG tablet Take 1 tablet by mouth daily 90 tablet 3    lisinopril (PRINIVIL;ZESTRIL) 40 MG tablet Take 1 tablet by mouth daily 90 tablet 3    amLODIPine (NORVASC) 5 MG tablet Take 1 tablet by mouth daily 90 tablet 3    clotrimazole-betamethasone (LOTRISONE) 1-0.05 % cream Apply topically 2 times daily. 2 g 5    nitroGLYCERIN (NITROSTAT) 0.4 MG SL tablet Place 1 tablet under the tongue every 5 minutes as needed for Chest pain 25 tablet 3    ONE TOUCH ULTRASOFT LANCETS MISC 1 each by Does not apply route daily 100 each 3    aspirin 81 MG EC tablet Take 81 mg by mouth daily       No current facility-administered medications for this visit. Social History     Socioeconomic History    Marital status:      Spouse name: Not on file    Number of children: Not on file    Years of education: Not on file    Highest education level: Not on file   Occupational History    Not on file   Tobacco Use    Smoking status: Former     Packs/day: 0.50     Years: 27.00     Pack years: 13.50     Types: Cigarettes     Start date: 1968     Quit date: 10/11/1992     Years since quittin.4    Smokeless tobacco: Never   Vaping Use    Vaping Use: Never used   Substance and Sexual Activity    Alcohol use:  Yes     Alcohol/week: 1.0 standard drink     Types: 1 Standard drinks or equivalent per week     Comment: 1 time per week    Drug use: No    Sexual activity: Yes     Partners: Female   Other Topics Concern Not on file   Social History Narrative    Not on file     Social Determinants of Health     Financial Resource Strain: Low Risk     Difficulty of Paying Living Expenses: Not hard at all   Food Insecurity: No Food Insecurity    Worried About [address] in the Last Year: Never true    [facility] in the Last Year: Never true   Transportation Needs: Unknown    Lack of Transportation (Medical): Not on file    Lack of Transportation (Non-Medical): No   Physical Activity: Sufficiently Active    Days of Exercise per Week: 3 days    Minutes of Exercise per Session: 90 min   Stress: Not on file   Social Connections: Not on file   Intimate Partner Violence: Not on file   Housing Stability: Unknown    Unable to Pay for Housing in the Last Year: Not on file    Number of Places Lived in the Last Year: Not on file    Unstable Housing in the Last Year: No       Family History   Problem Relation Age of Onset    Diabetes Mother     Kidney Disease Mother         Renal failure    Coronary Art Dis Mother     Hypertension Mother     Lung Cancer Father     Coronary Art Dis Father     Hypertension Brother     Other Brother         Hypernephroma        REVIEW OF SYSTEM:     Constitutional: No fever or chills. No night sweats, no weight loss   Eyes: No eye discharge, double vision, or eye pain   HEENT: negative for sore mouth, sore throat, hoarseness and voice change   Respiratory: negative for cough , sputum, dyspnea, wheezing, hemoptysis, chest pain   Cardiovascular: negative for chest pain, dyspnea, palpitations, orthopnea, PND   Gastrointestinal: negative for nausea, vomiting, diarrhea, constipation, abdominal pain, Dysphagia, hematemesis and hematochezia   Genitourinary: negative for frequency, dysuria, nocturia, urinary incontinence, and hematuria   Integument: negative for rash, skin lesions, bruises.    Hematologic/Lymphatic: negative for easy bruising, bleeding, lymphadenopathy, petechiae and swelling/edema   Endocrine: negative for heat or cold intolerance, tremor, weight changes, change in bowel habits and hair loss   Musculoskeletal: negative for myalgias, arthralgias, pain, joint swelling,and bone pain   Neurological: negative for headaches, dizziness, seizures, weakness, numbness       OBJECTIVE:         Vitals:    03/06/23 1446   BP: 136/74   Pulse: 64   Resp: 16   Temp: 97.9 °F (36.6 °C)   SpO2: 98%       PHYSICAL EXAM:   General appearance - well appearing, no in pain or distress   Mental status - alert and cooperative   Eyes - pupils equal and reactive, extraocular eye movements intact   Ears - bilateral TM's and external ear canals normal   Mouth - mucous membranes moist, pharynx normal without lesions   Neck - supple, no significant adenopathy   Lymphatics - no palpable lymphadenopathy, no hepatosplenomegaly   Chest - clear to auscultation, no wheezes, rales or rhonchi, symmetric air entry   Heart - normal rate, regular rhythm, normal S1, S2, no murmurs, rubs, clicks or gallops   Abdomen - soft, nontender, nondistended, no masses or organomegaly   Neurological - alert, oriented, normal speech, no focal findings or movement disorder noted   Musculoskeletal - no joint tenderness, deformity or swelling   Extremities - peripheral pulses normal, no pedal edema, no clubbing or cyanosis   Skin - normal coloration and turgor, no rashes, no suspicious skin lesions noted ,      LABORATORY DATA:     Lab Results   Component Value Date    WBC 5.2 02/15/2023    HGB 11.9 (L) 02/15/2023    HCT 35.0 (L) 02/15/2023    MCV 90.0 02/15/2023     02/15/2023    LYMPHOPCT 29 02/15/2023    RBC 3.89 (L) 02/15/2023    MCH 30.6 02/15/2023    MCHC 34.0 (H) 02/15/2023    RDW 13.2 02/15/2023    MONOPCT 8 02/15/2023    BASOPCT 1 02/15/2023    NEUTROABS 2.78 02/15/2023    LYMPHSABS 1.50 02/15/2023    MONOSABS 0.39 02/15/2023    EOSABS 0.44 02/15/2023    BASOSABS 0.05 02/15/2023         Chemistry        Component Value Date/Time     02/15/2023 0927    K 4.7 02/15/2023 0927     02/15/2023 0927    CO2 26 02/15/2023 0927    BUN 41 (H) 02/15/2023 0927    CREATININE 1.99 (H) 02/15/2023 0927        Component Value Date/Time    CALCIUM 9.3 02/15/2023 0927    ALKPHOS 71 10/04/2022 0826    AST 18 10/04/2022 0826    ALT 25 10/04/2022 0826    BILITOT 1.2 10/04/2022 0826         Ref. Range 2/1/2022 10:35   Iron Latest Ref Range: 59 - 158 ug/dL 73   Iron Saturation Latest Ref Range: 20 - 55 % 32   UIBC Latest Ref Range: 112 - 347 ug/dL 157   TIBC Latest Ref Range: 250 - 450 ug/dL 230 (L)   Folate Latest Ref Range: >4.8 ng/mL 11.4   Vitamin B-12 Latest Ref Range: 232 - 1245 pg/mL 1599 (H)       PATHOLOGY DATA:   Reviewed  Procedures/Addenda   PERIPHERAL BLOOD REPORT     Date Ordered:     2/15/2022     Status:   Signed Out        Date Complete:     2/15/2022     By: Jenifer Taylor. Nima Wooten M.D. Date Reported:     2/15/2022       INTERPRETATION   PERIPHERAL BLOOD:   -  NORMOCYTIC NORMOCHROMIC ANEMIA (HEMOGLOBIN 11.1 g/dL),   HYPOPROLIFERATIVE. DIFFERENTIAL DIAGNOSES INCLUDE: ANEMIA OF CHRONIC   DISEASE OR HYPOPRODUCTION. IMAGING DATA:      NM MYOCARDIAL SPECT REST EXERCISE OR RX  Radiology exam is complete. No Radiologist dictation. Please follow up   with ordering provider. REviewed   ASSESSMENT:      Sea Brown is a 68 y.o. male wih CKD, CAD s/p CABG with mild anemia. I reviewed the lab work, prior records, imaging studies, discussed the diagnosis and treatment recommendations. He has very mild anemia. His anemia appears most likely secondary to to chronic disease renal disease however underlying bone marrow pathology given his age cannot be ruled out. His WBC and platelets are within normal limits and hemoglobin slightly improved therefore plan to hold off bone marrow biopsy at this point and consider in future if his counts drops further  .   Patient's questions were sought and answered to satisfaction and he agreed to proceed with the plan. PLAN:   I reviewed his recent lab work, discussed diagnosis and treatment recommendations   His hemoglobin is slightly improved to 11.9   Clinically he is doing well   His anemia is most likely secondary to his renal disease   No indication of erythropoietin stimulating agent   Return to clinic in 6 months with labs prior       Hayden Hubbard MD  Hematologist/Medical Oncologist    On this date 3/6/23  I have spent 30 minutes reviewing previous notes, test results and face to face with the patient discussing the diagnosis and importance of compliance with the treatment plan. Greater than 50% of that time was spent face-to-face with the patient in counseling and coordinating her care. This note is created with the assistance of a speech recognition program.  While intending to generate a document that actually reflects the content of the visit, the document can still have some errors including those of syntax and sound a like substitutions which may escape proof reading. It such instances, actual meaning can be extrapolated by contextual diversion.

## 2023-03-07 ENCOUNTER — OFFICE VISIT (OUTPATIENT)
Dept: ORTHOPEDIC SURGERY | Age: 78
End: 2023-03-07
Payer: MEDICARE

## 2023-03-07 ENCOUNTER — HOSPITAL ENCOUNTER (OUTPATIENT)
Dept: GENERAL RADIOLOGY | Age: 78
Discharge: HOME | End: 2023-03-09
Payer: MEDICARE

## 2023-03-07 VITALS
HEART RATE: 59 BPM | RESPIRATION RATE: 14 BRPM | BODY MASS INDEX: 29.7 KG/M2 | DIASTOLIC BLOOD PRESSURE: 70 MMHG | SYSTOLIC BLOOD PRESSURE: 138 MMHG | OXYGEN SATURATION: 99 % | WEIGHT: 196 LBS | HEIGHT: 68 IN

## 2023-03-07 DIAGNOSIS — M25.512 ACUTE PAIN OF LEFT SHOULDER: ICD-10-CM

## 2023-03-07 DIAGNOSIS — G89.29 CHRONIC LEFT SHOULDER PAIN: Primary | ICD-10-CM

## 2023-03-07 DIAGNOSIS — M25.512 CHRONIC LEFT SHOULDER PAIN: Primary | ICD-10-CM

## 2023-03-07 PROCEDURE — 99203 OFFICE O/P NEW LOW 30 MIN: CPT | Performed by: PHYSICIAN ASSISTANT

## 2023-03-07 PROCEDURE — 1123F ACP DISCUSS/DSCN MKR DOCD: CPT | Performed by: PHYSICIAN ASSISTANT

## 2023-03-07 PROCEDURE — 1036F TOBACCO NON-USER: CPT | Performed by: PHYSICIAN ASSISTANT

## 2023-03-07 PROCEDURE — 99213 OFFICE O/P EST LOW 20 MIN: CPT | Performed by: PHYSICIAN ASSISTANT

## 2023-03-07 PROCEDURE — G8417 CALC BMI ABV UP PARAM F/U: HCPCS | Performed by: PHYSICIAN ASSISTANT

## 2023-03-07 PROCEDURE — 3078F DIAST BP <80 MM HG: CPT | Performed by: PHYSICIAN ASSISTANT

## 2023-03-07 PROCEDURE — 73030 X-RAY EXAM OF SHOULDER: CPT

## 2023-03-07 PROCEDURE — G8427 DOCREV CUR MEDS BY ELIG CLIN: HCPCS | Performed by: PHYSICIAN ASSISTANT

## 2023-03-07 PROCEDURE — G8484 FLU IMMUNIZE NO ADMIN: HCPCS | Performed by: PHYSICIAN ASSISTANT

## 2023-03-07 PROCEDURE — 3075F SYST BP GE 130 - 139MM HG: CPT | Performed by: PHYSICIAN ASSISTANT

## 2023-03-07 NOTE — PROGRESS NOTES
Orthopaedic Progress Note      CHIEF COMPLAINT: Left shoulder pain    HISTORY OF PRESENT ILLNESS:       Mr. Parth Linares  is a 68 y.o. male  who presents with left shoulder pain. Patient has been having increasing pain within his left shoulder. Unable to do lateral raise beyond 45 degrees. Does have difficulty with forward flexion. Difficulty with sleeping at night. Complains of pain within his deltoid insertion. Past Medical History:    Past Medical History:   Diagnosis Date    Benign prostatic hypertrophy     with elevated PSA. BPPV (benign paroxysmal positional vertigo) 2/2008    Chest pain 05/2021    Coronary heart disease     Status post CABG. Diverticulosis     Dupuytren's contracture     Mid finger, right hand. Erectile dysfunction     Bowens catheter in place 11/02/2018    has had one in place for 6weeks, this one in place x 2 weeks    Gout     Quiescent. History of blood transfusion     possible with open heart surgery 26years ago    Hyperlipidemia     Hypertension     Positive cardiac stress test 05/06/2021    Snores     Type II or unspecified type diabetes mellitus without mention of complication, not stated as uncontrolled        Past Surgical History:    Past Surgical History:   Procedure Laterality Date    CARDIAC CATHETERIZATION  6/2005    Triple vessel CAD with patent LIMA to LAD, patent saphenous vein graft to first obtuse marginal, patent saphenous vein graft to posterior descending artery, occluded jump graft from posterior descending artery to posterior left ventricular branch. CARDIAC CATHETERIZATION  3/16/16    CARDIAC CATHETERIZATION  05/07/2021    CATARACT REMOVAL Bilateral Nov. and Dec 2014    COLONOSCOPY  5/27/2008    Distal ileoscopy. Scattered diverticulosis and extensive diverticular disease of sigmoid colon and internal hemorrhoids.     COLONOSCOPY N/A 10/28/2019    COLONOSCOPY performed by Dane Meraz MD at [facility] OR  severe diverticulosis    CORONARY ARTERY BYPASS GRAFT      ELBOW SURGERY Right     Surgical repair.    EYE SURGERY      KNEE ARTHROSCOPY Right 3/16/2007    Partial medial meniscectomy, partial medial and lateral synovectomy, light chondroplasty.    IA LASER VAPORIZATION OF PROSTATE FOR URINE FLOW N/A 11/2/2018    CYSTOSCOPY, TRANSURETHRAL RESECTION PROSTATE - GREENLIGHT XPS LASER ([facility] # [id number] HAILEE) performed by Fabian Ramsey MD at [email or web address] OR    PROSTATE BIOPSY Bilateral 9/15/2010, 2007    [facility] Dr. Orozco    PROSTATE BIOPSY Bilateral 05/10/2001    Benign-Dr. Balderas    PROSTATE BIOPSY Bilateral 04/08/1999    Benign-Dr. Sullivan    PROSTATE BIOPSY Bilateral 06/18/1998    Benign-Dr. Velasquez    PROSTATE BIOPSY Bilateral 01/07/1998    Benign-Dr. Velasquez    PROSTATE BIOPSY Bilateral 10/04/1996    Benign-Dr. Gonzalez    PROSTATE SURGERY  11/02/2018    CYSTOSCOPY, TRANSURETHRAL RESECTION PROSTATE - GREENLIGHT     VASECTOMY           Current  Medications:  Current Outpatient Medications   Medication Sig Dispense Refill    blood glucose test strips (CONTOUR TEST) strip Use to test blood sugars once daily. DX: E11.9 100 each 3    insulin glargine (LANTUS SOLOSTAR) 100 UNIT/ML injection pen Inject 15 Units into the skin nightly 5 Adjustable Dose Pre-filled Pen Syringe 2    Alcohol Swabs PADS Use 1 nightly with lantus DX: E11.9 100 each 3    allopurinol (ZYLOPRIM) 300 MG tablet Take 1 tablet by mouth daily 90 tablet 3    Insulin Pen Needle (PEN NEEDLES) 32G X 5 MM MISC Use 1 pen needle nightly 90 each 3    glimepiride (AMARYL) 4 MG tablet Take 1 tablet by mouth 2 times daily 180 tablet 3    isosorbide mononitrate (IMDUR) 30 MG extended release tablet Take 1 tablet by mouth daily 30 tablet 3    carvedilol (COREG) 12.5 MG tablet Take 1 tablet by mouth 2 times daily 180 tablet 1    hydroCHLOROthiazide (HYDRODIURIL) 25 MG tablet Take 1 tablet by mouth every morning 90 tablet 3    atorvastatin (LIPITOR) 80 MG tablet Take 1 tablet by mouth daily 90 tablet 3    lisinopril  (PRINIVIL;ZESTRIL) 40 MG tablet Take 1 tablet by mouth daily 90 tablet 3    amLODIPine (NORVASC) 5 MG tablet Take 1 tablet by mouth daily 90 tablet 3    clotrimazole-betamethasone (LOTRISONE) 1-0.05 % cream Apply topically 2 times daily. 2 g 5    nitroGLYCERIN (NITROSTAT) 0.4 MG SL tablet Place 1 tablet under the tongue every 5 minutes as needed for Chest pain 25 tablet 3    ONE TOUCH ULTRASOFT LANCETS MISC 1 each by Does not apply route daily 100 each 3    aspirin 81 MG EC tablet Take 81 mg by mouth daily       No current facility-administered medications for this visit. Allergies:  Patient has no known allergies. Social History:   Social History     Tobacco Use   Smoking Status Former    Packs/day: 0.50    Years: 27.00    Pack years: 13.50    Types: Cigarettes    Start date: 1968    Quit date: 10/11/1992    Years since quittin.4   Smokeless Tobacco Never     Social History     Substance and Sexual Activity   Alcohol Use Yes    Alcohol/week: 1.0 standard drink    Types: 1 Standard drinks or equivalent per week    Comment: 1 time per week     Social History     Substance and Sexual Activity   Drug Use No       Family History:  Family History   Problem Relation Age of Onset    Diabetes Mother     Kidney Disease Mother         Renal failure    Coronary Art Dis Mother     Hypertension Mother     Lung Cancer Father     Coronary Art Dis Father     Hypertension Brother     Other Brother         Hypernephroma       REVIEW OF SYSTEMS:  Constitutional: Denies any fever, chills. Musculoskeletal: Positive for pain with active and passive range of motion of this left shoulder. PHYSICAL EXAM:  [unfilled]  General appearance:  Alert and oriented x 3. No apparent distress, appears stated age, calm and cooperative. Musculoskeletal:  Does have weakness noted with empty can full can against resistance 3-5 strength compared to contralateral side. .  Pain with speeds and crossover. Erma Jackson       DATA:  CBC:   Lab Results   Component Value Date/Time    WBC 5.2 02/15/2023 09:27 AM    HGB 11.9 02/15/2023 09:27 AM     02/15/2023 09:27 AM     BMP:    Lab Results   Component Value Date/Time     02/15/2023 09:27 AM    K 4.7 02/15/2023 09:27 AM     02/15/2023 09:27 AM    CO2 26 02/15/2023 09:27 AM    BUN 41 02/15/2023 09:27 AM    CREATININE 1.99 02/15/2023 09:27 AM    CALCIUM 9.3 02/15/2023 09:27 AM    GLUCOSE 216 02/15/2023 09:27 AM     PT/INR:    Lab Results   Component Value Date/Time    PROTIME 9.7 10/24/2018 12:06 PM    INR 0.9 10/24/2018 12:06 PM     Troponin:  No results found for: TROPONINI  No results for input(s): LIPASE, AMYLASE in the last 72 hours. No results for input(s): AST, ALT, BILIDIR, BILITOT, ALKPHOS in the last 72 hours. Uric Acid:  No components found for: URIC  Urine Culture:  No components found for: CURINE    Radiology:   XR SHOULDER LEFT (MIN 2 VIEWS)    Result Date: 3/7/2023  EXAMINATION: 3 XRAY VIEWS OF THE LEFT SHOULDER 3/7/2023 10:26 am COMPARISON: None. HISTORY: ORDERING SYSTEM PROVIDED HISTORY: Acute pain of left shoulder TECHNOLOGIST PROVIDED HISTORY: Reason for Exam: C/o pain at head of humerus Left shoulder pain. FINDINGS: No acute fracture or dislocation. There are mild degenerative changes of the [facility] joint. Glenohumeral joint is maintained. No erosions are present. No evidence of rotator cuff calcification. 1. No acute osseous abnormality. 2. Mild AC joint degenerative changes. X-rays personally reviewed by me of does show concern for mild high riding humeral head. No acute bony abnormalities or fractures noted. Diagnosis Orders   1. Chronic left shoulder pain  MRI SHOULDER LEFT WO CONTRAST            PLAN:  Proceed with MRI left shoulder once completed we will discuss further treatment options with this patient. No orders of the defined types were placed in this encounter.        Procedure:        Electronically signed by Aram Hatchet, PA-C on 3/7/2023 at 11:41 AM

## 2023-03-09 ENCOUNTER — OFFICE VISIT (OUTPATIENT)
Dept: DIABETES SERVICES | Age: 78
End: 2023-03-09

## 2023-03-09 ENCOUNTER — OFFICE VISIT (OUTPATIENT)
Dept: CARDIOLOGY | Age: 78
End: 2023-03-09
Payer: MEDICARE

## 2023-03-09 ENCOUNTER — HOSPITAL ENCOUNTER (OUTPATIENT)
Dept: MRI IMAGING | Age: 78
Discharge: HOME | End: 2023-03-11
Payer: MEDICARE

## 2023-03-09 VITALS
RESPIRATION RATE: 16 BRPM | WEIGHT: 197 LBS | BODY MASS INDEX: 29.86 KG/M2 | SYSTOLIC BLOOD PRESSURE: 138 MMHG | DIASTOLIC BLOOD PRESSURE: 76 MMHG | HEIGHT: 68 IN | HEART RATE: 64 BPM

## 2023-03-09 VITALS
WEIGHT: 198.2 LBS | BODY MASS INDEX: 30.04 KG/M2 | HEART RATE: 64 BPM | SYSTOLIC BLOOD PRESSURE: 138 MMHG | HEIGHT: 68 IN | DIASTOLIC BLOOD PRESSURE: 76 MMHG

## 2023-03-09 DIAGNOSIS — I25.10 CORONARY ARTERY DISEASE INVOLVING NATIVE CORONARY ARTERY OF NATIVE HEART WITHOUT ANGINA PECTORIS: Primary | ICD-10-CM

## 2023-03-09 DIAGNOSIS — E11.22 TYPE 2 DIABETES MELLITUS WITH STAGE 3B CHRONIC KIDNEY DISEASE, WITH LONG-TERM CURRENT USE OF INSULIN (HCC): Primary | ICD-10-CM

## 2023-03-09 DIAGNOSIS — N18.32 TYPE 2 DIABETES MELLITUS WITH STAGE 3B CHRONIC KIDNEY DISEASE, WITH LONG-TERM CURRENT USE OF INSULIN (HCC): Primary | ICD-10-CM

## 2023-03-09 DIAGNOSIS — M25.512 CHRONIC LEFT SHOULDER PAIN: ICD-10-CM

## 2023-03-09 DIAGNOSIS — G89.29 CHRONIC LEFT SHOULDER PAIN: ICD-10-CM

## 2023-03-09 DIAGNOSIS — Z79.4 TYPE 2 DIABETES MELLITUS WITH STAGE 3B CHRONIC KIDNEY DISEASE, WITH LONG-TERM CURRENT USE OF INSULIN (HCC): Primary | ICD-10-CM

## 2023-03-09 PROCEDURE — G1010 CDSM STANSON: HCPCS

## 2023-03-09 PROCEDURE — 99212 OFFICE O/P EST SF 10 MIN: CPT | Performed by: INTERNAL MEDICINE

## 2023-03-09 PROCEDURE — 3078F DIAST BP <80 MM HG: CPT | Performed by: INTERNAL MEDICINE

## 2023-03-09 PROCEDURE — 3075F SYST BP GE 130 - 139MM HG: CPT | Performed by: INTERNAL MEDICINE

## 2023-03-09 PROCEDURE — G8427 DOCREV CUR MEDS BY ELIG CLIN: HCPCS | Performed by: INTERNAL MEDICINE

## 2023-03-09 PROCEDURE — G8417 CALC BMI ABV UP PARAM F/U: HCPCS | Performed by: INTERNAL MEDICINE

## 2023-03-09 PROCEDURE — 93010 ELECTROCARDIOGRAM REPORT: CPT | Performed by: INTERNAL MEDICINE

## 2023-03-09 PROCEDURE — 93005 ELECTROCARDIOGRAM TRACING: CPT | Performed by: INTERNAL MEDICINE

## 2023-03-09 PROCEDURE — G8484 FLU IMMUNIZE NO ADMIN: HCPCS | Performed by: INTERNAL MEDICINE

## 2023-03-09 PROCEDURE — 1123F ACP DISCUSS/DSCN MKR DOCD: CPT | Performed by: INTERNAL MEDICINE

## 2023-03-09 PROCEDURE — 99214 OFFICE O/P EST MOD 30 MIN: CPT | Performed by: INTERNAL MEDICINE

## 2023-03-09 PROCEDURE — 1036F TOBACCO NON-USER: CPT | Performed by: INTERNAL MEDICINE

## 2023-03-09 RX ORDER — FUROSEMIDE 20 MG/1
20 TABLET ORAL DAILY PRN
Qty: 30 TABLET | Refills: 0 | Status: SHIPPED | OUTPATIENT
Start: 2023-03-09

## 2023-03-09 NOTE — PROGRESS NOTES
[facility] [address]  [address]  [facility] [address]  Dept: [phone]    CC: follow up for CAD, CABG    HPI:  The patient is a 68y.o. year old, , male is in the office for follow up   Stable from cardiac standpoint  Denies any recent episodes of angina or cp  No exertional sob, no orthopnea, pnd, le edema, palpitations, dizziness. States he has been exercising 3x/week- usually 2 hours each time. Mows his yard without issue. Does report mild bilateral ankle edema       Past Medical History:   has a past medical history of Benign prostatic hypertrophy, BPPV (benign paroxysmal positional vertigo), Chest pain, Coronary heart disease, Diverticulosis, Dupuytren's contracture, Erectile dysfunction, Bowens catheter in place, Gout, History of blood transfusion, Hyperlipidemia, Hypertension, Positive cardiac stress test, Snores, and Type II or unspecified type diabetes mellitus without mention of complication, not stated as uncontrolled. Past Surgical History:   has a past surgical history that includes Prostate biopsy (Bilateral, 9/15/2010, 2007); Colonoscopy (5/27/2008); Prostate biopsy (Bilateral, 05/10/2001); Knee arthroscopy (Right, 3/16/2007); Elbow surgery (Right); Vasectomy; Coronary artery bypass graft; Cataract removal (Bilateral, Nov. and Dec 2014); Prostate Biopsy (Bilateral, 04/08/1999); Prostate Biopsy (Bilateral, 06/18/1998); Prostate Biopsy (Bilateral, 01/07/1998); Prostate Biopsy (Bilateral, 10/04/1996); Prostate surgery (11/02/2018); pr laser vaporization of prostate for urine flow (N/A, 11/2/2018); Colonoscopy (N/A, 10/28/2019); eye surgery; Cardiac catheterization (6/2005); Cardiac catheterization (3/16/16); and Cardiac catheterization (05/07/2021). Home Medications:  Prior to Admission medications    Medication Sig Start Date End Date Taking?  Authorizing Provider   furosemide (LASIX) 20 MG tablet Take 1 tablet by mouth daily as needed (leg swelling) 3/9/23  Yes Cristofer Ascencio MD   blood glucose test strips (CONTOUR TEST) strip Use to test blood sugars once daily. DX: E11.9 3/2/23  Yes Sterling Reyna MD   insulin glargine (LANTUS SOLOSTAR) 100 UNIT/ML injection pen Inject 15 Units into the skin nightly 2/20/23  Yes AMANDA De La Cruz CNP   Alcohol Swabs PADS Use 1 nightly with lantus DX: E11.9 2/20/23  Yes AMANDA De La Cruz CNP   allopurinol (ZYLOPRIM) 300 MG tablet Take 1 tablet by mouth daily 2/20/23  Yes Sterling Reyna MD   Insulin Pen Needle (PEN NEEDLES) 32G X 5 MM MISC Use 1 pen needle nightly 1/3/23  Yes Sterling Reyna MD   glimepiride (AMARYL) 4 MG tablet Take 1 tablet by mouth 2 times daily 12/27/22  Yes Sterling Reyna MD   isosorbide mononitrate (IMDUR) 30 MG extended release tablet Take 1 tablet by mouth daily 12/1/22  Yes Yevonne Olszewski, MD   carvedilol (COREG) 12.5 MG tablet Take 1 tablet by mouth 2 times daily 10/10/22  Yes Sterling Reyna MD   hydroCHLOROthiazide (HYDRODIURIL) 25 MG tablet Take 1 tablet by mouth every morning 7/7/22  Yes Sterling Reyna MD   atorvastatin (LIPITOR) 80 MG tablet Take 1 tablet by mouth daily 4/5/22  Yes Sterling Reyna MD   lisinopril (PRINIVIL;ZESTRIL) 40 MG tablet Take 1 tablet by mouth daily 4/5/22  Yes Sterling Reyna MD   amLODIPine (NORVASC) 5 MG tablet Take 1 tablet by mouth daily 4/5/22  Yes Sterling Reyna MD   clotrimazole-betamethasone (LOTRISONE) 1-0.05 % cream Apply topically 2 times daily.  4/5/22  Yes Sterling Reyna MD   nitroGLYCERIN (NITROSTAT) 0.4 MG SL tablet Place 1 tablet under the tongue every 5 minutes as needed for Chest pain 1/3/22  Yes Sterling Reyna MD   ONE TOUCH ULTRASOFT LANCETS MISC 1 each by Does not apply route daily 1/3/22  Yes Sterling Reyna MD   aspirin 81 MG EC tablet Take 81 mg by mouth daily   Yes Historical Provider, MD Allergies:  Patient has no known allergies. Social History:   reports that he quit smoking about 30 years ago. His smoking use included cigarettes. He started smoking about 55 years ago. He has a 13.50 pack-year smoking history. He has never used smokeless tobacco. He reports current alcohol use of about 1.0 standard drink per week. He reports that he does not use drugs. Review of Systems:  Constitutional: there has been no unanticipated weight loss. There's been No change in energy level, No change in activity level. Eyes: No visual changes or diplopia. No scleral icterus. ENT: No Headaches, hearing loss or vertigo. No mouth sores or sore throat. Cardiovascular: As above. Respiratory: as hpi  Gastrointestinal: No abdominal pain, appetite loss, blood in stools. No change in bowel or bladder habits. Genitourinary: No dysuria, trouble voiding, or hematuria. Musculoskeletal:  No gait disturbance, No weakness or joint complaints. Integumentary: No rash or pruritis. Psychiatric: No anxiety, or depression. Hematologic/Lymphatic: No abnormal bruising or bleeding, blood clots or swollen lymph nodes. Allergic/Immunologic: No nasal congestion or hives. Physical Exam:  /76   Pulse 64   Ht 5' 8\" (1.727 m)   Wt 198 lb 3.2 oz (89.9 kg)   BMI 30.14 kg/m²     Constitutional and General Appearance: alert, cooperative, no distress and appears stated age  HEENT: PERRL, no cervical lymphadenopathy. No masses palpable. Normal oral mucosa  Respiratory:  Normal excursion and expansion without use of accessory muscles  Resp Auscultation: Good respiratory effort. No for increased work of breathing. On auscultation: clear to auscultation bilaterally  Cardiovascular:   The apical impulse is not displaced  Heart tones are crisp and normal. regular S1 and S2.  Jugular venous pulsation Normal  The carotid upstroke is normal in amplitude and contour without delay or bruit  Peripheral pulses are symmetrical and full   Abdomen:   No masses or tenderness  Bowel sounds present  Extremities:   No Cyanosis or Clubbing   Lower extremity edema: No   Skin: Warm and dry    Cardiac Data:    EKG 3/9/2023: NSR, NL ECG     Cath 5/7/2021   1. Severe Native Multivessel CAD   2. Patent LIMA-LAD, SVG-OM and SVG-RPDA grafts with mild disease   3. No significant Change from last angiogram in 2016     Labs:   Lab Results   Component Value Date    CHOL 90 10/04/2022    TRIG 101 10/04/2022    HDL 31 (L) 10/04/2022    LDLCHOLESTEROL 39 10/04/2022    VLDL NOT REPORTED 09/28/2021    CHOLHDLRATIO 2.9 10/04/2022       Lab Results   Component Value Date     02/15/2023    K 4.7 02/15/2023     02/15/2023    CO2 26 02/15/2023    BUN 41 (H) 02/15/2023    CREATININE 1.99 (H) 02/15/2023    GLUCOSE 216 (H) 02/15/2023    CALCIUM 9.3 02/15/2023    PROT 6.8 10/04/2022    LABALBU 4.2 10/04/2022    BILITOT 1.2 10/04/2022    ALKPHOS 71 10/04/2022    AST 18 10/04/2022    ALT 25 10/04/2022    LABGLOM 34 (L) 02/15/2023    GFRAA 35 (L) 07/05/2022     IMPRESSION / RECOMMENDATIONS:  1. Multivessel CAD s/p CABG, cardiac cath for abnormal stress test in 05/2021 showed Severe native vessel disease but Patent LIMA-LAD, SVG-OM and SVG-RCA  -stable with no recent angina or CHF  -works out 3 x / week (2 hours each time)  -Will continue current medical therapy with aspirin, BB, high intensity Lipitor and Imdur   -lasix PRN for LE edema. 2. HTN, well controlled, continue current meds    3. DL, controlled, LDL 39 in 10/2022, continue current statin therapy     4. DM2- per pcp    5. Chronic kidney disease, Last cr 1.99 and at baseline. Following with Nephrology. The patient is to continue heart healthy diet, weight loss and exercise as tolerated. Patient's medications and side effects were discussed. Medication refills were provided if needed. Follow up appointment timing was discussed. All questions and concerns were addressed to patient's satisfaction. The patient is to follow up in 6 months or sooner if necessary. Thank you for allowing me to participate in the care of this patient, please do not hesitate to call if you have any questions. Eva Parsons MD  [facility]  [facility]. [facility]  [phone]

## 2023-03-09 NOTE — PROGRESS NOTES
Patient had to reschedule for Monday due to having MRI this afternoon. Apologized to patient as he said he called in and asked and was told it would be ok to have done before appt.

## 2023-03-13 ENCOUNTER — OFFICE VISIT (OUTPATIENT)
Dept: DIABETES SERVICES | Age: 78
End: 2023-03-13
Payer: MEDICARE

## 2023-03-13 VITALS
RESPIRATION RATE: 14 BRPM | BODY MASS INDEX: 29.7 KG/M2 | HEART RATE: 60 BPM | WEIGHT: 196 LBS | HEIGHT: 68 IN | DIASTOLIC BLOOD PRESSURE: 78 MMHG | SYSTOLIC BLOOD PRESSURE: 118 MMHG

## 2023-03-13 DIAGNOSIS — I10 PRIMARY HYPERTENSION: ICD-10-CM

## 2023-03-13 DIAGNOSIS — E78.49 OTHER HYPERLIPIDEMIA: ICD-10-CM

## 2023-03-13 DIAGNOSIS — Z79.4 TYPE 2 DIABETES MELLITUS WITH STAGE 3B CHRONIC KIDNEY DISEASE, WITH LONG-TERM CURRENT USE OF INSULIN (HCC): Primary | ICD-10-CM

## 2023-03-13 DIAGNOSIS — E11.22 TYPE 2 DIABETES MELLITUS WITH STAGE 3B CHRONIC KIDNEY DISEASE, WITH LONG-TERM CURRENT USE OF INSULIN (HCC): Primary | ICD-10-CM

## 2023-03-13 DIAGNOSIS — N18.32 TYPE 2 DIABETES MELLITUS WITH STAGE 3B CHRONIC KIDNEY DISEASE, WITH LONG-TERM CURRENT USE OF INSULIN (HCC): Primary | ICD-10-CM

## 2023-03-13 PROCEDURE — G8484 FLU IMMUNIZE NO ADMIN: HCPCS | Performed by: NURSE PRACTITIONER

## 2023-03-13 PROCEDURE — 95249 CONT GLUC MNTR PT PROV EQP: CPT | Performed by: NURSE PRACTITIONER

## 2023-03-13 PROCEDURE — 1036F TOBACCO NON-USER: CPT | Performed by: NURSE PRACTITIONER

## 2023-03-13 PROCEDURE — 3074F SYST BP LT 130 MM HG: CPT | Performed by: NURSE PRACTITIONER

## 2023-03-13 PROCEDURE — 3052F HG A1C>EQUAL 8.0%<EQUAL 9.0%: CPT | Performed by: NURSE PRACTITIONER

## 2023-03-13 PROCEDURE — PBSHW PBB SHADOW CHARGE: Performed by: NURSE PRACTITIONER

## 2023-03-13 PROCEDURE — G8427 DOCREV CUR MEDS BY ELIG CLIN: HCPCS | Performed by: NURSE PRACTITIONER

## 2023-03-13 PROCEDURE — 3078F DIAST BP <80 MM HG: CPT | Performed by: NURSE PRACTITIONER

## 2023-03-13 PROCEDURE — G8417 CALC BMI ABV UP PARAM F/U: HCPCS | Performed by: NURSE PRACTITIONER

## 2023-03-13 PROCEDURE — 1123F ACP DISCUSS/DSCN MKR DOCD: CPT | Performed by: NURSE PRACTITIONER

## 2023-03-13 PROCEDURE — 99214 OFFICE O/P EST MOD 30 MIN: CPT | Performed by: NURSE PRACTITIONER

## 2023-03-13 ASSESSMENT — ENCOUNTER SYMPTOMS
SHORTNESS OF BREATH: 0
DIARRHEA: 0
ABDOMINAL PAIN: 0
RESPIRATORY NEGATIVE: 1

## 2023-03-13 NOTE — PATIENT INSTRUCTIONS
-call 800 number for cgm company if you have any IT issues or senor no working/falls off too soon   -call supply company when you are on your last sensor for refills  -remove senor for any imaging   -always do a fingerstick glucose if you do not have a cgm reading or the reading does not match how you feel.

## 2023-03-13 NOTE — PROGRESS NOTES
[address], [address]  [place] [address]  [phone]        HISTORY:    López Woody presents today for evaluation and management of:  Chief Complaint   Patient presents with    Diabetes     Raina Huff set up       Patient Care Team:  Cristina Conteh MD as PCP - General (Internal Medicine)  Cristina Conteh MD as P[facility]  Dorothy Malhotra MD as Consulting Physician (Urology)  Edison Frankel, MD as Consulting Physician (Cardiology)      Interval History:    Past DM Medications   Metformin- ckd  Rybelsus- ineffective     Current Diabetic Medications  Lantus 15 units daily, glimepiride 4 mg bid      DKA episodes: 0       02/20/23   López Woody is a 68 y.o. male patient who presents to clinic today for his diabetes. he has a history of CAD, hypertension, hyperlipidemia, CKD and obesity which contributes to his diabetes. At previous visit diabetes counseling was provided. he denies any current signs or symptoms of hyper/hypoglycemia. he states they are taking their medications as prescribed without any adverse effects. Diet: regular  Exercise: walking and golf  BS testing: fasting range: 130-150, postprandial range: >200, patient tests 1 time(s) per day  Hypoglycemia: No  Hypoglycemia as needed treatment: snack  Issues:   Diabetic foot exam up-to-date: Yes  Diabetic retinal exam up-to-date: Yes     Diabetes complications:nephropathy, impotence, and cardiovascular disease    03/13/23   López Woody is a 68 y.o. male patient who presents to clinic today for his diabetes. he has a history of CAD, hypertension, hyperlipidemia, CKD and obesity  which contributes to his diabetes. At previous visit cgm therapy ordered. he denies any current signs or symptoms of hyper/hypoglycemia. he states they are taking their medications as prescribed without any adverse effects.    Diet: regular  Exercise: walking and golf  BS testing: fasting range: 130-150, postprandial range: >200, patient tests 1 time(s) per day  Hypoglycemia: No  Hypoglycemia as needed treatment: snack  Issues:   Diabetic foot exam up-to-date: Yes  Diabetic retinal exam up-to-date: Yes     Diabetes complications:nephropathy, impotence, and cardiovascular disease      High cholesterol-  Takes lipitor and denies any adverse effects with its use.  Watches diet and exercise.      Hypertension-  Takes coreg, lisniopril HCTZ, norvasc and denies any adverse effects with their use. Watches diet and exercise. Denies any chest pain, dizziness or edema.       Obesity- Working on weight loss.         Past Medical History:   Diagnosis Date    Benign prostatic hypertrophy     with elevated PSA.    BPPV (benign paroxysmal positional vertigo) 2008    Chest pain 2021    Coronary heart disease     Status post CABG.    Diverticulosis     Dupuytren's contracture     Mid finger, right hand.    Erectile dysfunction     Bowens catheter in place 2018    has had one in place for 6weeks, this one in place x 2 weeks    Gout     Quiescent.    History of blood transfusion     possible with open heart surgery 26years ago    Hyperlipidemia     Hypertension     Positive cardiac stress test 2021    Snores     Type II or unspecified type diabetes mellitus without mention of complication, not stated as uncontrolled      Family History   Problem Relation Age of Onset    Diabetes Mother     Kidney Disease Mother         Renal failure    Coronary Art Dis Mother     Hypertension Mother     Lung Cancer Father     Coronary Art Dis Father     Hypertension Brother     Other Brother         Hypernephroma     Social History     Tobacco Use    Smoking status: Former     Packs/day: 0.50     Years: 27.00     Pack years: 13.50     Types: Cigarettes     Start date: 1968     Quit date: 10/11/1992     Years since quittin.4    Smokeless tobacco: Never   Vaping Use    Vaping Use: Never  used   Substance Use Topics    Alcohol use: Yes     Alcohol/week: 1.0 standard drink     Types: 1 Standard drinks or equivalent per week     Comment: 1 time per week    Drug use: No     No Known Allergies    MEDICATIONS:  Current Outpatient Medications   Medication Sig Dispense Refill    furosemide (LASIX) 20 MG tablet Take 1 tablet by mouth daily as needed (leg swelling) 30 tablet 0    blood glucose test strips (CONTOUR TEST) strip Use to test blood sugars once daily. DX: E11.9 100 each 3    insulin glargine (LANTUS SOLOSTAR) 100 UNIT/ML injection pen Inject 15 Units into the skin nightly 5 Adjustable Dose Pre-filled Pen Syringe 2    Alcohol Swabs PADS Use 1 nightly with lantus DX: E11.9 100 each 3    allopurinol (ZYLOPRIM) 300 MG tablet Take 1 tablet by mouth daily 90 tablet 3    Insulin Pen Needle (PEN NEEDLES) 32G X 5 MM MISC Use 1 pen needle nightly 90 each 3    glimepiride (AMARYL) 4 MG tablet Take 1 tablet by mouth 2 times daily 180 tablet 3    isosorbide mononitrate (IMDUR) 30 MG extended release tablet Take 1 tablet by mouth daily 30 tablet 3    carvedilol (COREG) 12.5 MG tablet Take 1 tablet by mouth 2 times daily 180 tablet 1    hydroCHLOROthiazide (HYDRODIURIL) 25 MG tablet Take 1 tablet by mouth every morning 90 tablet 3    atorvastatin (LIPITOR) 80 MG tablet Take 1 tablet by mouth daily 90 tablet 3    lisinopril (PRINIVIL;ZESTRIL) 40 MG tablet Take 1 tablet by mouth daily 90 tablet 3    amLODIPine (NORVASC) 5 MG tablet Take 1 tablet by mouth daily 90 tablet 3    clotrimazole-betamethasone (LOTRISONE) 1-0.05 % cream Apply topically 2 times daily. 2 g 5    nitroGLYCERIN (NITROSTAT) 0.4 MG SL tablet Place 1 tablet under the tongue every 5 minutes as needed for Chest pain 25 tablet 3    ONE TOUCH ULTRASOFT LANCETS MISC 1 each by Does not apply route daily 100 each 3    aspirin 81 MG EC tablet Take 81 mg by mouth daily       No current facility-administered medications for this visit.        Review ofSymptoms:  Review of Systems   Constitutional:  Positive for fatigue. Negative for unexpected weight change. Eyes:  Negative for visual disturbance. Respiratory: Negative. Negative for shortness of breath. Cardiovascular:  Negative for chest pain and leg swelling. Gastrointestinal:  Negative for abdominal pain and diarrhea. Endocrine: Negative for polydipsia, polyphagia and polyuria. Genitourinary: Negative. Musculoskeletal: Negative. Skin:  Negative for rash and wound. Neurological:  Negative for dizziness, tremors, seizures and headaches. Psychiatric/Behavioral: Negative. Negative for confusion and decreased concentration. Theremainder of a complete 14-point review of systems is negative. Vital Signs: /78 (Site: Right Upper Arm, Position: Sitting, Cuff Size: Medium Adult)   Pulse 60   Resp 14   Ht 5' 8\" (1.727 m)   Wt 196 lb (88.9 kg)   BMI 29.80 kg/m²      Wt Readings from Last 3 Encounters:   03/13/23 196 lb (88.9 kg)   03/09/23 197 lb (89.4 kg)   03/09/23 198 lb 3.2 oz (89.9 kg)     Body mass index is 29.8 kg/m².   LABS:  Hemoglobin A1C   Date Value Ref Range Status   02/15/2023 8.3 (H) 4.0 - 6.0 % Final   10/04/2022 7.8 (H) 4.0 - 6.0 % Final     Lab Results   Component Value Date    LABMICR 133 (H) 02/15/2023     Lab Results   Component Value Date     02/15/2023    K 4.7 02/15/2023     02/15/2023    CO2 26 02/15/2023    BUN 41 (H) 02/15/2023    CREATININE 1.99 (H) 02/15/2023    GLUCOSE 216 (H) 02/15/2023    CALCIUM 9.3 02/15/2023    PROT 6.8 10/04/2022    LABALBU 4.2 10/04/2022    BILITOT 1.2 10/04/2022    ALKPHOS 71 10/04/2022    AST 18 10/04/2022    ALT 25 10/04/2022    LABGLOM 34 (L) 02/15/2023    GFRAA 35 (L) 07/05/2022     Lab Results   Component Value Date    CHOL 90 10/04/2022    CHOL 92 09/28/2021    CHOL 100 09/11/2020     Lab Results   Component Value Date    TRIG 101 10/04/2022    TRIG 106 09/28/2021    TRIG 85 09/11/2020     Lab Results Component Value Date    HDL 31 (L) 10/04/2022    HDL 31 (L) 09/28/2021    HDL 32 (L) 09/11/2020     Lab Results   Component Value Date    LDLCHOLESTEROL 39 10/04/2022    LDLCHOLESTEROL 40 09/28/2021    LDLCHOLESTEROL 51 09/11/2020     Lab Results   Component Value Date    VLDL NOT REPORTED 09/28/2021    VLDL NOT REPORTED 09/11/2020    VLDL NOT REPORTED 09/12/2019     Lab Results   Component Value Date    CHOLHDLRATIO 2.9 10/04/2022    CHOLHDLRATIO 3.0 09/28/2021    CHOLHDLRATIO 3.1 09/11/2020           Physical Exam  Constitutional:       Appearance: Normal appearance. He is well-developed. HENT:      Head: Normocephalic. Eyes:      Pupils: Pupils are equal, round, and reactive to light. Cardiovascular:      Rate and Rhythm: Normal rate and regular rhythm. Pulmonary:      Effort: Pulmonary effort is normal.      Breath sounds: Normal breath sounds. Musculoskeletal:         General: Normal range of motion. Cervical back: Normal range of motion. Skin:     General: Skin is warm and dry. Findings: No lesion (no lipohypertrophy) or rash. Neurological:      Mental Status: He is alert and oriented to person, place, and time. Mental status is at baseline. Sensory: No sensory deficit. Psychiatric:         Mood and Affect: Mood normal.         Speech: Speech normal.         Behavior: Behavior normal.         Thought Content: Thought content normal.         Judgment: Judgment normal.         ASSESSMENT/PLAN:     Diagnosis Orders   1. Type 2 diabetes mellitus with stage 3b chronic kidney disease, with long-term current use of insulin ([facility] [address]        2. Other hyperlipidemia        3. Primary hypertension          No orders of the defined types were placed in this encounter. No orders of the defined types were placed in this encounter. Requested Prescriptions      No prescriptions requested or ordered in this encounter       1.  Type 2 diabetes mellitus with stage 3b chronic kidney disease, with long-term current use of insulin (HCC)  - Unstable  HbA1C goal is less than 7%. - Fasting blood glucose goal is 70-120mg/dl and postprandial blood sugar goal is less than 180 mg/dl. - Labs reviewed including most recent A1c, UACR and kidney function. Repeat labs due in 2 months.    -We discussed in great detail dietary modifications they can make to better improve their blood sugars. -follow up diabetes education completed, all questions answered.  -renal functions stable will monitor     -Education regarding CGM was provided. Quick reference guide reviewed with patient. Patient successfully applied CGM Sensor. CGM reader was set up. CGM reader screen settings reviewed with patient. All questions were answered and patient will follow-up with any further questions. follow up in 4 weeks. Patient will call with any questions and use fingerstick as backup if necessary. Education regarding removal of sensor or any imaging given to patient. Discussed with patient the importance of performing fingerstick glucose if CGM sensor warrants. Instructed patient to perform fingerstick glucose if symptoms do not match greater glucose. Informed patient to call company if sensor falls of prior to 2 weeks or is malfunctioning. Call DME supplier for refills as needed. All  educational material for CGM reviewed and sent with patient      Discussed signs and symptoms of hyper/hypoglycemia and how to treat. Encouraged 150 minutes of physical activity per week. Follow a low carbohydrate diet. Encouraged at least 7 hours of sleep. The patient was informed of the goals of diabetes management. This can only be accomplished by watching their diet and exercise levels. We certainly use medicines to help attain these goals. The consequences of not controlling blood sugars were discussed. These include blindness, heart disease, stroke, kidney disease, and possibly need for dialysis.  They were told to be careful with their foot care as diabetics often have nerve damage, infections and risk for limb amutations . They also need a dilated eye exam yearly. We discussed the issues of diet, exercise, medication, complication avoidance, reviewed the signs and symptoms of diabetes, hypoglycemic episodes, significance of HbA1C.       2. Other hyperlipidemia  stable, lipid panel reviewed, continue current medications. Diet and exercise. 3. Primary hypertension   stable, continue current medications. Diet and exercise Seek emergent care if chest pain develops. Answered all patient questions. Agrees to follow plan of care and to follow up in 3 months, sooner if needed. Call office if unexplained blood sugars less than 70 occur or above 400. Call office or access [employer] with any further questions or concerns. Be sure to bring glucometer/food log at next appointment. Total time spent reviewing chart, labs, counseling patient and documenting on the date of the encounter: 30 min.       Electronically signed by AMANDA Vargas CNP on 3/13/2023 at 1:51 PM      (Please note that portions of this note were completed with a voice-recognition program. Efforts were made to edit the dictation but occasionally words are mis-transcribed.)

## 2023-03-14 ENCOUNTER — TELEPHONE (OUTPATIENT)
Dept: ORTHOPEDIC SURGERY | Age: 78
End: 2023-03-14

## 2023-03-14 NOTE — TELEPHONE ENCOUNTER
Called and left message for patient to schedule appointment with Dr Magali Plasencia based on MRI results that Camilla reviewed.

## 2023-03-23 RX ORDER — ISOSORBIDE MONONITRATE 30 MG/1
30 TABLET, EXTENDED RELEASE ORAL DAILY
Qty: 90 TABLET | Refills: 3 | Status: SHIPPED | OUTPATIENT
Start: 2023-03-23

## 2023-03-27 ENCOUNTER — OFFICE VISIT (OUTPATIENT)
Dept: ORTHOPEDIC SURGERY | Age: 78
End: 2023-03-27
Payer: MEDICARE

## 2023-03-27 VITALS
HEIGHT: 68 IN | HEART RATE: 68 BPM | WEIGHT: 196 LBS | SYSTOLIC BLOOD PRESSURE: 121 MMHG | BODY MASS INDEX: 29.7 KG/M2 | DIASTOLIC BLOOD PRESSURE: 68 MMHG

## 2023-03-27 DIAGNOSIS — M75.02 ADHESIVE CAPSULITIS OF LEFT SHOULDER: Primary | ICD-10-CM

## 2023-03-27 PROCEDURE — 99213 OFFICE O/P EST LOW 20 MIN: CPT | Performed by: ORTHOPAEDIC SURGERY

## 2023-03-27 NOTE — PROGRESS NOTES
today's encounter. I have reviewed it with the patient during the visit. All other systems were reviewed and are negative. PHYSICAL EXAM:  Left shoulder exam today reveals 90 degrees of isolated glenohumeral joint forward flexion, 5 degrees of external rotation and internal rotation to his sacroiliac joint. No weakness with resisted rotator cuff testing. Grossly neurovascular intact. Radiology:  X-rays and an MRI scan of his left shoulder show findings consistent with adhesive capsulitis. ASSESSMENT/PLAN:  1. Adhesive capsulitis of left shoulder        I discussed the natural history of this condition with the patient. I discussed treatment options. He would like to do a home exercise program.  He is not interested in an injection at this point time. I have reviewed his home exercise program with him. He will plan to follow-up in 3 months to reassess his progress. I have also discussed that I would not recommend any type of surgical intervention for this condition. No orders of the defined types were placed in this encounter.        Lelia Simmons MD

## 2023-04-03 RX ORDER — FUROSEMIDE 20 MG/1
20 TABLET ORAL DAILY PRN
Qty: 90 TABLET | Refills: 3 | Status: SHIPPED | OUTPATIENT
Start: 2023-04-03

## 2023-04-03 RX ORDER — FUROSEMIDE 20 MG/1
20 TABLET ORAL DAILY PRN
Qty: 30 TABLET | Refills: 0 | OUTPATIENT
Start: 2023-04-03

## 2023-04-03 NOTE — TELEPHONE ENCOUNTER
Script was sent in on 3/9/23 to [facility] [place].  But needs a refill on the medication and he is requesting a 90 day supply of the medication to his mail order pharmacy

## 2023-04-03 NOTE — TELEPHONE ENCOUNTER
please, Call patient and find out if he needs this prescriptio? if so, repend it. Not  understanding why it was sent to me this way.

## 2023-05-18 ENCOUNTER — HOSPITAL ENCOUNTER (OUTPATIENT)
Age: 78
Discharge: HOME | End: 2023-05-18
Payer: MEDICARE

## 2023-05-18 DIAGNOSIS — E53.8 VITAMIN B 12 DEFICIENCY: ICD-10-CM

## 2023-05-18 DIAGNOSIS — Z79.4 TYPE 2 DIABETES MELLITUS WITH STAGE 3B CHRONIC KIDNEY DISEASE, WITH LONG-TERM CURRENT USE OF INSULIN (HCC): ICD-10-CM

## 2023-05-18 DIAGNOSIS — N18.32 TYPE 2 DIABETES MELLITUS WITH STAGE 3B CHRONIC KIDNEY DISEASE, WITH LONG-TERM CURRENT USE OF INSULIN (HCC): ICD-10-CM

## 2023-05-18 DIAGNOSIS — E11.22 TYPE 2 DIABETES MELLITUS WITH STAGE 3B CHRONIC KIDNEY DISEASE, WITH LONG-TERM CURRENT USE OF INSULIN (HCC): ICD-10-CM

## 2023-05-18 LAB
ANION GAP SERPL CALCULATED.3IONS-SCNC: 11 MMOL/L (ref 9–17)
BUN SERPL-MCNC: 48 MG/DL (ref 8–23)
BUN/CREAT SERPL: 22 (ref 9–20)
CALCIUM SERPL-MCNC: 9.1 MG/DL (ref 8.6–10.4)
CHLORIDE SERPL-SCNC: 104 MMOL/L (ref 98–107)
CO2 SERPL-SCNC: 22 MMOL/L (ref 20–31)
CREAT SERPL-MCNC: 2.2 MG/DL (ref 0.7–1.2)
EST. AVERAGE GLUCOSE BLD GHB EST-MCNC: 203 MG/DL
FOLATE SERPL-MCNC: 8.7 NG/ML
GFR SERPL CREATININE-BSD FRML MDRD: 30 ML/MIN/1.73M2
GLUCOSE SERPL-MCNC: 269 MG/DL (ref 70–99)
HBA1C MFR BLD: 8.7 % (ref 4–6)
POTASSIUM SERPL-SCNC: 4.7 MMOL/L (ref 3.7–5.3)
SODIUM SERPL-SCNC: 137 MMOL/L (ref 135–144)
VIT B12 SERPL-MCNC: 458 PG/ML (ref 232–1245)

## 2023-05-18 PROCEDURE — 82607 VITAMIN B-12: CPT

## 2023-05-18 PROCEDURE — 83036 HEMOGLOBIN GLYCOSYLATED A1C: CPT

## 2023-05-18 PROCEDURE — 80048 BASIC METABOLIC PNL TOTAL CA: CPT

## 2023-05-18 PROCEDURE — 82746 ASSAY OF FOLIC ACID SERUM: CPT

## 2023-05-18 PROCEDURE — 36415 COLL VENOUS BLD VENIPUNCTURE: CPT

## 2023-05-23 ENCOUNTER — OFFICE VISIT (OUTPATIENT)
Dept: DIABETES SERVICES | Age: 78
End: 2023-05-23
Payer: MEDICARE

## 2023-05-23 ENCOUNTER — OFFICE VISIT (OUTPATIENT)
Dept: INTERNAL MEDICINE | Age: 78
End: 2023-05-23
Payer: MEDICARE

## 2023-05-23 VITALS
RESPIRATION RATE: 14 BRPM | HEART RATE: 67 BPM | DIASTOLIC BLOOD PRESSURE: 80 MMHG | WEIGHT: 193.6 LBS | HEIGHT: 68 IN | SYSTOLIC BLOOD PRESSURE: 128 MMHG | OXYGEN SATURATION: 98 % | BODY MASS INDEX: 29.34 KG/M2

## 2023-05-23 VITALS
BODY MASS INDEX: 29.25 KG/M2 | WEIGHT: 193 LBS | HEIGHT: 68 IN | HEART RATE: 67 BPM | DIASTOLIC BLOOD PRESSURE: 80 MMHG | SYSTOLIC BLOOD PRESSURE: 128 MMHG | RESPIRATION RATE: 14 BRPM

## 2023-05-23 DIAGNOSIS — E78.49 OTHER HYPERLIPIDEMIA: ICD-10-CM

## 2023-05-23 DIAGNOSIS — N18.4 TYPE 2 DIABETES MELLITUS WITH STAGE 4 CHRONIC KIDNEY DISEASE, WITHOUT LONG-TERM CURRENT USE OF INSULIN (HCC): ICD-10-CM

## 2023-05-23 DIAGNOSIS — E11.22 TYPE 2 DIABETES MELLITUS WITH STAGE 4 CHRONIC KIDNEY DISEASE, WITHOUT LONG-TERM CURRENT USE OF INSULIN (HCC): ICD-10-CM

## 2023-05-23 DIAGNOSIS — I10 PRIMARY HYPERTENSION: Primary | ICD-10-CM

## 2023-05-23 DIAGNOSIS — I10 PRIMARY HYPERTENSION: ICD-10-CM

## 2023-05-23 DIAGNOSIS — N18.32 TYPE 2 DIABETES MELLITUS WITH STAGE 3B CHRONIC KIDNEY DISEASE, WITH LONG-TERM CURRENT USE OF INSULIN (HCC): Primary | ICD-10-CM

## 2023-05-23 DIAGNOSIS — Z79.4 TYPE 2 DIABETES MELLITUS WITH STAGE 3B CHRONIC KIDNEY DISEASE, WITH LONG-TERM CURRENT USE OF INSULIN (HCC): Primary | ICD-10-CM

## 2023-05-23 DIAGNOSIS — E11.22 TYPE 2 DIABETES MELLITUS WITH STAGE 3B CHRONIC KIDNEY DISEASE, WITH LONG-TERM CURRENT USE OF INSULIN (HCC): Primary | ICD-10-CM

## 2023-05-23 DIAGNOSIS — I25.119 CHEST PAIN DUE TO CAD (HCC): ICD-10-CM

## 2023-05-23 PROCEDURE — 1123F ACP DISCUSS/DSCN MKR DOCD: CPT | Performed by: NURSE PRACTITIONER

## 2023-05-23 PROCEDURE — 99214 OFFICE O/P EST MOD 30 MIN: CPT | Performed by: INTERNAL MEDICINE

## 2023-05-23 PROCEDURE — 3052F HG A1C>EQUAL 8.0%<EQUAL 9.0%: CPT | Performed by: NURSE PRACTITIONER

## 2023-05-23 PROCEDURE — 3079F DIAST BP 80-89 MM HG: CPT | Performed by: NURSE PRACTITIONER

## 2023-05-23 PROCEDURE — 1123F ACP DISCUSS/DSCN MKR DOCD: CPT | Performed by: INTERNAL MEDICINE

## 2023-05-23 PROCEDURE — G8427 DOCREV CUR MEDS BY ELIG CLIN: HCPCS | Performed by: NURSE PRACTITIONER

## 2023-05-23 PROCEDURE — 99214 OFFICE O/P EST MOD 30 MIN: CPT | Performed by: NURSE PRACTITIONER

## 2023-05-23 PROCEDURE — 99213 OFFICE O/P EST LOW 20 MIN: CPT

## 2023-05-23 PROCEDURE — 3079F DIAST BP 80-89 MM HG: CPT | Performed by: INTERNAL MEDICINE

## 2023-05-23 PROCEDURE — 3074F SYST BP LT 130 MM HG: CPT | Performed by: INTERNAL MEDICINE

## 2023-05-23 PROCEDURE — 95251 CONT GLUC MNTR ANALYSIS I&R: CPT | Performed by: NURSE PRACTITIONER

## 2023-05-23 PROCEDURE — G8417 CALC BMI ABV UP PARAM F/U: HCPCS | Performed by: NURSE PRACTITIONER

## 2023-05-23 PROCEDURE — 3052F HG A1C>EQUAL 8.0%<EQUAL 9.0%: CPT | Performed by: INTERNAL MEDICINE

## 2023-05-23 PROCEDURE — 1036F TOBACCO NON-USER: CPT | Performed by: INTERNAL MEDICINE

## 2023-05-23 PROCEDURE — G8417 CALC BMI ABV UP PARAM F/U: HCPCS | Performed by: INTERNAL MEDICINE

## 2023-05-23 PROCEDURE — 3074F SYST BP LT 130 MM HG: CPT | Performed by: NURSE PRACTITIONER

## 2023-05-23 PROCEDURE — 1036F TOBACCO NON-USER: CPT | Performed by: NURSE PRACTITIONER

## 2023-05-23 PROCEDURE — G8427 DOCREV CUR MEDS BY ELIG CLIN: HCPCS | Performed by: INTERNAL MEDICINE

## 2023-05-23 ASSESSMENT — ENCOUNTER SYMPTOMS
RESPIRATORY NEGATIVE: 1
COUGH: 0
DIARRHEA: 0
BLOOD IN STOOL: 0
VOMITING: 0
CONSTIPATION: 0
BACK PAIN: 0
ABDOMINAL PAIN: 0
EYE PAIN: 0
NAUSEA: 0
DIARRHEA: 0
SHORTNESS OF BREATH: 0
SHORTNESS OF BREATH: 0
ABDOMINAL PAIN: 0

## 2023-05-23 NOTE — PROGRESS NOTES
cgm daily with success and is adherent to cgm therapy  Hypoglycemia: No  Hypoglycemia as needed treatment: snack  Issues:   Diabetic foot exam up-to-date: Yes  Diabetic retinal exam up-to-date: Yes    Diabetes complications:nephropathy, impotence, and cardiovascular disease      High cholesterol-  Takes lipitor and denies any adverse effects with its use. Watches diet and exercise. Hypertension-  Takes coreg, lisniopril HCTZ, norvasc and denies any adverse effects with their use. Watches diet and exercise. Denies any chest pain, dizziness or edema. Obesity- Working on weight loss. Past Medical History:   Diagnosis Date    Benign prostatic hypertrophy     with elevated PSA. BPPV (benign paroxysmal positional vertigo) 2008    Chest pain 2021    Coronary heart disease     Status post CABG. Diverticulosis     Dupuytren's contracture     Mid finger, right hand. Erectile dysfunction     Bowens catheter in place 2018    has had one in place for 6weeks, this one in place x 2 weeks    Gout     Quiescent. History of blood transfusion     possible with open heart surgery 26years ago    Hyperlipidemia     Hypertension     Positive cardiac stress test 2021    Snores     Type II or unspecified type diabetes mellitus without mention of complication, not stated as uncontrolled      Family History   Problem Relation Age of Onset    Diabetes Mother     Kidney Disease Mother         Renal failure    Coronary Art Dis Mother     Hypertension Mother     Lung Cancer Father     Coronary Art Dis Father     Hypertension Brother     Other Brother         Hypernephroma     Social History     Tobacco Use    Smoking status: Former     Packs/day: 0.50     Years: 27.00     Pack years: 13.50     Types: Cigarettes     Start date: 1968     Quit date: 10/11/1992     Years since quittin.6    Smokeless tobacco: Never   Vaping Use    Vaping Use: Never used   Substance Use Topics    Alcohol use:  Yes

## 2023-05-23 NOTE — PROGRESS NOTES
[place] [address]  Dept: [phone]  Dept Fax: [phone]  Loc: [phone]     Lolis Garcia is a 68 y.o. male who presents today for his medical conditions/complaintsas noted below. Lolis Garcia is c/o of   Chief Complaint   Patient presents with    Hypertension    Hyperlipidemia    Diabetes         HPI:     Hypertension  This is a chronic problem. The current episode started more than 1 year ago. The problem has been waxing and waning since onset. The problem is controlled. Associated symptoms include chest pain. Pertinent negatives include no headaches, neck pain, palpitations or shortness of breath. Hyperlipidemia  This is a chronic problem. The current episode started more than 1 year ago. The problem is controlled. Recent lipid tests were reviewed and are variable. Associated symptoms include chest pain. Pertinent negatives include no shortness of breath. Diabetes  He presents for his follow-up diabetic visit. He has type 2 diabetes mellitus. The initial diagnosis of diabetes was made 11 years ago. His disease course has been fluctuating. Pertinent negatives for hypoglycemia include no confusion, dizziness, headaches, nervousness/anxiousness or pallor. Associated symptoms include chest pain. Pertinent negatives for diabetes include no polydipsia, no polyuria and no weakness. Chest Pain   This is a recurrent (Chest pain due to CAD, but stable and managed per cardiology) problem. The current episode started more than 1 year ago. The onset quality is undetermined. The problem occurs intermittently. The problem has been waxing and waning. Pertinent negatives include no abdominal pain, back pain, cough, dizziness, fever, headaches, nausea, numbness, palpitations, shortness of breath, vomiting or weakness. His past medical history is significant for hyperlipidemia.      Hemoglobin A1C

## 2023-07-14 DIAGNOSIS — E11.9 TYPE 2 DIABETES MELLITUS WITHOUT COMPLICATION, WITHOUT LONG-TERM CURRENT USE OF INSULIN (HCC): ICD-10-CM

## 2023-07-14 RX ORDER — LANCETS
1 EACH MISCELLANEOUS DAILY
Qty: 100 EACH | Refills: 3 | Status: SHIPPED | OUTPATIENT
Start: 2023-07-14

## 2023-07-14 NOTE — TELEPHONE ENCOUNTER
Bee Luong called requesting a refill of the below medication which has been pended for you:     Requested Prescriptions     Pending Prescriptions Disp Refills    ONE TOUCH ULTRASOFT LANCETS MISC 100 each 3     Si each by Does not apply route daily       Last Appointment Date: 2023  Next Appointment Date: 2023    No Known Allergies

## 2023-07-17 DIAGNOSIS — I10 ESSENTIAL HYPERTENSION: ICD-10-CM

## 2023-07-17 RX ORDER — HYDROCHLOROTHIAZIDE 25 MG/1
25 TABLET ORAL EVERY MORNING
Qty: 90 TABLET | Refills: 3 | Status: SHIPPED | OUTPATIENT
Start: 2023-07-17

## 2023-07-17 NOTE — TELEPHONE ENCOUNTER
Garrett Shirley called requesting a refill of the below medication which has been pended for you:     Requested Prescriptions     Pending Prescriptions Disp Refills    hydroCHLOROthiazide (HYDRODIURIL) 25 MG tablet 90 tablet 3     Sig: Take 1 tablet by mouth every morning       Last Appointment Date: 5/23/2023  Next Appointment Date: 8/24/2023    No Known Allergies

## 2023-08-13 ENCOUNTER — PATIENT MESSAGE (OUTPATIENT)
Dept: INTERNAL MEDICINE | Age: 78
End: 2023-08-13

## 2023-08-13 ENCOUNTER — PATIENT MESSAGE (OUTPATIENT)
Dept: DIABETES SERVICES | Age: 78
End: 2023-08-13

## 2023-08-14 NOTE — TELEPHONE ENCOUNTER
Sensors ordered for [place] [address] from [employer] [facility] via [employer]. Order will be sent to patients home.

## 2023-08-14 NOTE — TELEPHONE ENCOUNTER
From: Luis Dwyer  To: [employer]  Sent: 8/13/2023 4:05 PM EDT  Subject: [employer] Yves 2 sensor    I am having trouble reordering my sensors. I can't find a customer number they talk about. They try to look me up and then tell me I don't exist. Can you help with this?  Vale Tate

## 2023-08-14 NOTE — TELEPHONE ENCOUNTER
From: Pervis Camera  To: Dr. Catrachita Stewart  Sent: 8/13/2023 4:10 PM EDT  Subject: Low blood pressure. My average BP in the morning the last 30 days is 115/64 and afternoon is 110/62, Some days early afternoon it will drop to 90/50. I feel very tired and sometimes dizzy. Your thoughts?  [facility]

## 2023-08-21 ENCOUNTER — HOSPITAL ENCOUNTER (OUTPATIENT)
Age: 78
Discharge: HOME | End: 2023-08-21
Payer: MEDICARE

## 2023-08-21 DIAGNOSIS — E11.22 TYPE 2 DIABETES MELLITUS WITH STAGE 4 CHRONIC KIDNEY DISEASE, WITHOUT LONG-TERM CURRENT USE OF INSULIN (HCC): ICD-10-CM

## 2023-08-21 DIAGNOSIS — I10 PRIMARY HYPERTENSION: ICD-10-CM

## 2023-08-21 DIAGNOSIS — N18.4 TYPE 2 DIABETES MELLITUS WITH STAGE 4 CHRONIC KIDNEY DISEASE, WITHOUT LONG-TERM CURRENT USE OF INSULIN (HCC): ICD-10-CM

## 2023-08-21 LAB
ANION GAP SERPL CALCULATED.3IONS-SCNC: 10 MMOL/L (ref 9–17)
BUN SERPL-MCNC: 59 MG/DL (ref 8–23)
BUN/CREAT SERPL: 26 (ref 9–20)
CALCIUM SERPL-MCNC: 9.2 MG/DL (ref 8.6–10.4)
CHLORIDE SERPL-SCNC: 108 MMOL/L (ref 98–107)
CO2 SERPL-SCNC: 19 MMOL/L (ref 20–31)
CREAT SERPL-MCNC: 2.3 MG/DL (ref 0.7–1.2)
EST. AVERAGE GLUCOSE BLD GHB EST-MCNC: 200 MG/DL
GFR SERPL CREATININE-BSD FRML MDRD: 28 ML/MIN/1.73M2
GLUCOSE SERPL-MCNC: 304 MG/DL (ref 70–99)
HBA1C MFR BLD: 8.6 % (ref 4–6)
POTASSIUM SERPL-SCNC: 5.2 MMOL/L (ref 3.7–5.3)
SODIUM SERPL-SCNC: 137 MMOL/L (ref 135–144)

## 2023-08-21 PROCEDURE — 36415 COLL VENOUS BLD VENIPUNCTURE: CPT

## 2023-08-21 PROCEDURE — 80048 BASIC METABOLIC PNL TOTAL CA: CPT

## 2023-08-21 PROCEDURE — 83036 HEMOGLOBIN GLYCOSYLATED A1C: CPT

## 2023-08-24 ENCOUNTER — OFFICE VISIT (OUTPATIENT)
Dept: INTERNAL MEDICINE | Age: 78
End: 2023-08-24
Payer: MEDICARE

## 2023-08-24 ENCOUNTER — OFFICE VISIT (OUTPATIENT)
Dept: DIABETES SERVICES | Age: 78
End: 2023-08-24
Payer: MEDICARE

## 2023-08-24 VITALS
RESPIRATION RATE: 16 BRPM | OXYGEN SATURATION: 98 % | BODY MASS INDEX: 28.64 KG/M2 | WEIGHT: 189 LBS | HEIGHT: 68 IN | HEART RATE: 73 BPM | SYSTOLIC BLOOD PRESSURE: 126 MMHG | DIASTOLIC BLOOD PRESSURE: 70 MMHG

## 2023-08-24 VITALS
BODY MASS INDEX: 28.64 KG/M2 | DIASTOLIC BLOOD PRESSURE: 70 MMHG | WEIGHT: 189 LBS | SYSTOLIC BLOOD PRESSURE: 126 MMHG | HEART RATE: 73 BPM | HEIGHT: 68 IN

## 2023-08-24 DIAGNOSIS — I10 PRIMARY HYPERTENSION: ICD-10-CM

## 2023-08-24 DIAGNOSIS — E11.22 TYPE 2 DIABETES MELLITUS WITH STAGE 3B CHRONIC KIDNEY DISEASE, WITH LONG-TERM CURRENT USE OF INSULIN (HCC): Primary | ICD-10-CM

## 2023-08-24 DIAGNOSIS — E11.9 TYPE 2 DIABETES MELLITUS WITHOUT COMPLICATION, WITHOUT LONG-TERM CURRENT USE OF INSULIN (HCC): ICD-10-CM

## 2023-08-24 DIAGNOSIS — E78.49 OTHER HYPERLIPIDEMIA: ICD-10-CM

## 2023-08-24 DIAGNOSIS — N18.32 TYPE 2 DIABETES MELLITUS WITH STAGE 3B CHRONIC KIDNEY DISEASE, WITH LONG-TERM CURRENT USE OF INSULIN (HCC): Primary | ICD-10-CM

## 2023-08-24 DIAGNOSIS — Z79.4 TYPE 2 DIABETES MELLITUS WITH STAGE 3B CHRONIC KIDNEY DISEASE, WITH LONG-TERM CURRENT USE OF INSULIN (HCC): Primary | ICD-10-CM

## 2023-08-24 PROCEDURE — 95251 CONT GLUC MNTR ANALYSIS I&R: CPT | Performed by: NURSE PRACTITIONER

## 2023-08-24 PROCEDURE — G8427 DOCREV CUR MEDS BY ELIG CLIN: HCPCS | Performed by: NURSE PRACTITIONER

## 2023-08-24 PROCEDURE — 3074F SYST BP LT 130 MM HG: CPT | Performed by: INTERNAL MEDICINE

## 2023-08-24 PROCEDURE — 99214 OFFICE O/P EST MOD 30 MIN: CPT | Performed by: INTERNAL MEDICINE

## 2023-08-24 PROCEDURE — 99214 OFFICE O/P EST MOD 30 MIN: CPT | Performed by: NURSE PRACTITIONER

## 2023-08-24 PROCEDURE — 3052F HG A1C>EQUAL 8.0%<EQUAL 9.0%: CPT | Performed by: INTERNAL MEDICINE

## 2023-08-24 PROCEDURE — 1036F TOBACCO NON-USER: CPT | Performed by: NURSE PRACTITIONER

## 2023-08-24 PROCEDURE — G8417 CALC BMI ABV UP PARAM F/U: HCPCS | Performed by: INTERNAL MEDICINE

## 2023-08-24 PROCEDURE — G8427 DOCREV CUR MEDS BY ELIG CLIN: HCPCS | Performed by: INTERNAL MEDICINE

## 2023-08-24 PROCEDURE — 3052F HG A1C>EQUAL 8.0%<EQUAL 9.0%: CPT | Performed by: NURSE PRACTITIONER

## 2023-08-24 PROCEDURE — 3078F DIAST BP <80 MM HG: CPT | Performed by: INTERNAL MEDICINE

## 2023-08-24 PROCEDURE — 1123F ACP DISCUSS/DSCN MKR DOCD: CPT | Performed by: INTERNAL MEDICINE

## 2023-08-24 PROCEDURE — G8417 CALC BMI ABV UP PARAM F/U: HCPCS | Performed by: NURSE PRACTITIONER

## 2023-08-24 PROCEDURE — 1036F TOBACCO NON-USER: CPT | Performed by: INTERNAL MEDICINE

## 2023-08-24 PROCEDURE — 1123F ACP DISCUSS/DSCN MKR DOCD: CPT | Performed by: NURSE PRACTITIONER

## 2023-08-24 PROCEDURE — 3074F SYST BP LT 130 MM HG: CPT | Performed by: NURSE PRACTITIONER

## 2023-08-24 PROCEDURE — 99212 OFFICE O/P EST SF 10 MIN: CPT | Performed by: INTERNAL MEDICINE

## 2023-08-24 PROCEDURE — 3078F DIAST BP <80 MM HG: CPT | Performed by: NURSE PRACTITIONER

## 2023-08-24 RX ORDER — GLIMEPIRIDE 4 MG/1
8 TABLET ORAL DAILY
Qty: 180 TABLET | Refills: 3
Start: 2023-08-24

## 2023-08-24 ASSESSMENT — ENCOUNTER SYMPTOMS
ABDOMINAL PAIN: 0
DIARRHEA: 0
ABDOMINAL PAIN: 0
RESPIRATORY NEGATIVE: 1
SHORTNESS OF BREATH: 0
VOMITING: 0
CONSTIPATION: 0
COUGH: 0
DIARRHEA: 0
SHORTNESS OF BREATH: 0
EYE PAIN: 0
BLOOD IN STOOL: 0
BACK PAIN: 0
NAUSEA: 0

## 2023-08-24 NOTE — PROGRESS NOTES
Take 1 tablet by mouth daily      ONE TOUCH ULTRASOFT LANCETS MISC 1 each by Does not apply route daily 100 each 3    blood glucose test strips (CONTOUR TEST) strip Use to test blood sugars once daily. DX: E11.9 100 each 3    Alcohol Swabs PADS Use 1 nightly with lantus DX: E11.9 100 each 3    Insulin Pen Needle (PEN NEEDLES) 32G X 5 MM MISC Use 1 pen needle nightly 90 each 3    clotrimazole-betamethasone (LOTRISONE) 1-0.05 % cream Apply topically 2 times daily. 2 g 5    nitroGLYCERIN (NITROSTAT) 0.4 MG SL tablet Place 1 tablet under the tongue every 5 minutes as needed for Chest pain 25 tablet 3     No current facility-administered medications for this visit. No Known Allergies    Health Maintenance   Topic Date Due    Hepatitis C screen  Never done    DTaP/Tdap/Td vaccine (1 - Tdap) Never done    Annual Wellness Visit (AWV)  07/08/2023    Flu vaccine (1) 08/01/2023    Lipids  10/04/2023    Prostate Specific Antigen (PSA) Screening or Monitoring  12/12/2023    Depression Screen  02/20/2024    Shingles vaccine  Completed    Pneumococcal 65+ years Vaccine  Completed    COVID-19 Vaccine  Completed    Hepatitis A vaccine  Aged Out    Hib vaccine  Aged Out    Meningococcal (ACWY) vaccine  Aged Out    Diabetic foot exam  Discontinued    A1C test (Diabetic or Prediabetic)  Discontinued    Diabetic Alb to Cr ratio (uACR) test  Discontinued    Diabetic retinal exam  Discontinued    AAA screen  Discontinued    Colonoscopy  Discontinued       Subjective:      Review of Systems   Constitutional:  Negative for chills and fever. HENT:  Negative for hearing loss. Eyes:  Negative for pain and visual disturbance. Respiratory:  Negative for cough and shortness of breath. Cardiovascular:  Negative for chest pain, palpitations and leg swelling. Gastrointestinal:  Negative for abdominal pain, blood in stool, constipation, diarrhea, nausea and vomiting. Endocrine: Negative for cold intolerance, polydipsia and polyuria.

## 2023-09-02 ENCOUNTER — OFFICE VISIT (OUTPATIENT)
Dept: PRIMARY CARE CLINIC | Age: 78
End: 2023-09-02
Payer: MEDICARE

## 2023-09-02 VITALS
TEMPERATURE: 99.7 F | RESPIRATION RATE: 20 BRPM | BODY MASS INDEX: 28.79 KG/M2 | SYSTOLIC BLOOD PRESSURE: 138 MMHG | OXYGEN SATURATION: 97 % | HEIGHT: 68 IN | WEIGHT: 190 LBS | DIASTOLIC BLOOD PRESSURE: 70 MMHG | HEART RATE: 74 BPM

## 2023-09-02 DIAGNOSIS — U07.1 COVID: Primary | ICD-10-CM

## 2023-09-02 PROCEDURE — 3075F SYST BP GE 130 - 139MM HG: CPT | Performed by: NURSE PRACTITIONER

## 2023-09-02 PROCEDURE — 99213 OFFICE O/P EST LOW 20 MIN: CPT | Performed by: NURSE PRACTITIONER

## 2023-09-02 PROCEDURE — 1123F ACP DISCUSS/DSCN MKR DOCD: CPT | Performed by: NURSE PRACTITIONER

## 2023-09-02 PROCEDURE — G8417 CALC BMI ABV UP PARAM F/U: HCPCS | Performed by: NURSE PRACTITIONER

## 2023-09-02 PROCEDURE — 3078F DIAST BP <80 MM HG: CPT | Performed by: NURSE PRACTITIONER

## 2023-09-02 PROCEDURE — G8427 DOCREV CUR MEDS BY ELIG CLIN: HCPCS | Performed by: NURSE PRACTITIONER

## 2023-09-02 PROCEDURE — 1036F TOBACCO NON-USER: CPT | Performed by: NURSE PRACTITIONER

## 2023-09-02 RX ORDER — NIRMATRELVIR AND RITONAVIR 150-100 MG
KIT ORAL
Qty: 20 TABLET | Refills: 0 | Status: CANCELLED | OUTPATIENT
Start: 2023-09-02

## 2023-09-02 ASSESSMENT — ENCOUNTER SYMPTOMS
SORE THROAT: 1
COUGH: 1
RHINORRHEA: 1

## 2023-09-02 ASSESSMENT — PATIENT HEALTH QUESTIONNAIRE - PHQ9
SUM OF ALL RESPONSES TO PHQ QUESTIONS 1-9: 0
SUM OF ALL RESPONSES TO PHQ QUESTIONS 1-9: 0
SUM OF ALL RESPONSES TO PHQ9 QUESTIONS 1 & 2: 0
1. LITTLE INTEREST OR PLEASURE IN DOING THINGS: 0
SUM OF ALL RESPONSES TO PHQ QUESTIONS 1-9: 0
2. FEELING DOWN, DEPRESSED OR HOPELESS: 0
SUM OF ALL RESPONSES TO PHQ QUESTIONS 1-9: 0

## 2023-09-02 NOTE — PROGRESS NOTES
No facility-administered encounter medications on file as of 9/2/2023.             Franklin Hoffman, APRN - CNP

## 2023-09-28 ENCOUNTER — OFFICE VISIT (OUTPATIENT)
Dept: CARDIOLOGY | Age: 78
End: 2023-09-28
Payer: MEDICARE

## 2023-09-28 ENCOUNTER — HOSPITAL ENCOUNTER (OUTPATIENT)
Age: 78
Discharge: HOME | End: 2023-09-28
Payer: MEDICARE

## 2023-09-28 VITALS
HEART RATE: 58 BPM | OXYGEN SATURATION: 96 % | SYSTOLIC BLOOD PRESSURE: 138 MMHG | BODY MASS INDEX: 29.7 KG/M2 | DIASTOLIC BLOOD PRESSURE: 80 MMHG | WEIGHT: 196 LBS | HEIGHT: 68 IN

## 2023-09-28 DIAGNOSIS — I25.10 CORONARY ARTERY DISEASE INVOLVING NATIVE CORONARY ARTERY OF NATIVE HEART WITHOUT ANGINA PECTORIS: Primary | ICD-10-CM

## 2023-09-28 DIAGNOSIS — D64.9 ANEMIA, UNSPECIFIED TYPE: ICD-10-CM

## 2023-09-28 LAB
BASOPHILS # BLD: 0.03 K/UL (ref 0–0.2)
BASOPHILS NFR BLD: 1 % (ref 0–2)
EOSINOPHIL # BLD: 0.43 K/UL (ref 0–0.44)
EOSINOPHILS RELATIVE PERCENT: 7 % (ref 1–4)
ERYTHROCYTE [DISTWIDTH] IN BLOOD BY AUTOMATED COUNT: 15.9 % (ref 11.8–14.4)
HCT VFR BLD AUTO: 31.4 % (ref 40.7–50.3)
HGB BLD-MCNC: 10.1 G/DL (ref 13–17)
IMM GRANULOCYTES # BLD AUTO: <0.03 K/UL (ref 0–0.3)
IMM GRANULOCYTES NFR BLD: 0 %
LYMPHOCYTES NFR BLD: 1.44 K/UL (ref 1.1–3.7)
LYMPHOCYTES RELATIVE PERCENT: 24 % (ref 24–43)
MCH RBC QN AUTO: 31.9 PG (ref 25.2–33.5)
MCHC RBC AUTO-ENTMCNC: 32.2 G/DL (ref 25.2–33.5)
MCV RBC AUTO: 99.1 FL (ref 82.6–102.9)
MONOCYTES NFR BLD: 0.52 K/UL (ref 0.1–1.2)
MONOCYTES NFR BLD: 9 % (ref 3–12)
NEUTROPHILS NFR BLD: 59 % (ref 36–65)
NEUTS SEG NFR BLD: 3.47 K/UL (ref 1.5–8.1)
NRBC BLD-RTO: 0 PER 100 WBC
PLATELET # BLD AUTO: 170 K/UL (ref 138–453)
PMV BLD AUTO: 10.2 FL (ref 8.1–13.5)
RBC # BLD AUTO: 3.17 M/UL (ref 4.21–5.77)
RBC # BLD: ABNORMAL 10*6/UL
WBC OTHER # BLD: 5.9 K/UL (ref 3.5–11.3)

## 2023-09-28 PROCEDURE — 85025 COMPLETE CBC W/AUTO DIFF WBC: CPT

## 2023-09-28 PROCEDURE — 93005 ELECTROCARDIOGRAM TRACING: CPT | Performed by: INTERNAL MEDICINE

## 2023-09-28 PROCEDURE — 99214 OFFICE O/P EST MOD 30 MIN: CPT | Performed by: INTERNAL MEDICINE

## 2023-09-28 PROCEDURE — G8417 CALC BMI ABV UP PARAM F/U: HCPCS | Performed by: INTERNAL MEDICINE

## 2023-09-28 PROCEDURE — 1036F TOBACCO NON-USER: CPT | Performed by: INTERNAL MEDICINE

## 2023-09-28 PROCEDURE — 36415 COLL VENOUS BLD VENIPUNCTURE: CPT

## 2023-09-28 PROCEDURE — G8427 DOCREV CUR MEDS BY ELIG CLIN: HCPCS | Performed by: INTERNAL MEDICINE

## 2023-09-28 PROCEDURE — 3075F SYST BP GE 130 - 139MM HG: CPT | Performed by: INTERNAL MEDICINE

## 2023-09-28 PROCEDURE — 93010 ELECTROCARDIOGRAM REPORT: CPT | Performed by: INTERNAL MEDICINE

## 2023-09-28 PROCEDURE — 1123F ACP DISCUSS/DSCN MKR DOCD: CPT | Performed by: INTERNAL MEDICINE

## 2023-09-28 PROCEDURE — 3078F DIAST BP <80 MM HG: CPT | Performed by: INTERNAL MEDICINE

## 2023-09-28 RX ORDER — FUROSEMIDE 40 MG/1
40 TABLET ORAL DAILY PRN
Qty: 30 TABLET | Refills: 0 | Status: SHIPPED | OUTPATIENT
Start: 2023-09-28

## 2023-09-28 NOTE — PROGRESS NOTES
heart healthy diet, weight loss and exercise as tolerated. Patient's medications and side effects were discussed. Medication refills were provided if needed. Follow up appointment timing was discussed. All questions and concerns were addressed to patient's satisfaction. The patient is to follow up in 3 months or sooner if necessary. Thank you for allowing me to participate in the care of this patient, please do not hesitate to call if you have any questions. Gabino Councilman, MD  [facility]  [facility]  [phone]

## 2023-10-02 ENCOUNTER — OFFICE VISIT (OUTPATIENT)
Dept: ONCOLOGY | Age: 78
End: 2023-10-02
Payer: MEDICARE

## 2023-10-02 VITALS
DIASTOLIC BLOOD PRESSURE: 72 MMHG | TEMPERATURE: 98.4 F | BODY MASS INDEX: 29.43 KG/M2 | SYSTOLIC BLOOD PRESSURE: 134 MMHG | HEIGHT: 68 IN | OXYGEN SATURATION: 96 % | WEIGHT: 194.2 LBS | HEART RATE: 66 BPM

## 2023-10-02 DIAGNOSIS — D64.9 ANEMIA, UNSPECIFIED TYPE: Primary | ICD-10-CM

## 2023-10-02 PROCEDURE — G8417 CALC BMI ABV UP PARAM F/U: HCPCS | Performed by: INTERNAL MEDICINE

## 2023-10-02 PROCEDURE — G8427 DOCREV CUR MEDS BY ELIG CLIN: HCPCS | Performed by: INTERNAL MEDICINE

## 2023-10-02 PROCEDURE — 99214 OFFICE O/P EST MOD 30 MIN: CPT | Performed by: INTERNAL MEDICINE

## 2023-10-02 PROCEDURE — 1123F ACP DISCUSS/DSCN MKR DOCD: CPT | Performed by: INTERNAL MEDICINE

## 2023-10-02 PROCEDURE — 1036F TOBACCO NON-USER: CPT | Performed by: INTERNAL MEDICINE

## 2023-10-02 PROCEDURE — G8484 FLU IMMUNIZE NO ADMIN: HCPCS | Performed by: INTERNAL MEDICINE

## 2023-10-02 PROCEDURE — 3078F DIAST BP <80 MM HG: CPT | Performed by: INTERNAL MEDICINE

## 2023-10-02 PROCEDURE — 3075F SYST BP GE 130 - 139MM HG: CPT | Performed by: INTERNAL MEDICINE

## 2023-10-02 NOTE — PROGRESS NOTES
WBC 5.9 09/28/2023    HGB 10.1 (L) 09/28/2023    HCT 31.4 (L) 09/28/2023    MCV 99.1 09/28/2023     09/28/2023    LYMPHOPCT 24 09/28/2023    RBC 3.17 (L) 09/28/2023    MCH 31.9 09/28/2023    MCHC 32.2 09/28/2023    RDW 15.9 (H) 09/28/2023    NEUTOPHILPCT 59 09/28/2023    MONOPCT 9 09/28/2023    BASOPCT 1 09/28/2023    NEUTROABS 3.47 09/28/2023    LYMPHSABS 1.44 09/28/2023    MONOSABS 0.52 09/28/2023    EOSABS 0.43 09/28/2023    BASOSABS 0.03 09/28/2023         Chemistry        Component Value Date/Time     08/21/2023 1407    K 5.2 08/21/2023 1407     (H) 08/21/2023 1407    CO2 19 (L) 08/21/2023 1407    BUN 59 (H) 08/21/2023 1407    CREATININE 2.3 (H) 08/21/2023 1407        Component Value Date/Time    CALCIUM 9.2 08/21/2023 1407    ALKPHOS 71 10/04/2022 0826    AST 18 10/04/2022 0826    ALT 25 10/04/2022 0826    BILITOT 1.2 10/04/2022 0826         Ref. Range 2/1/2022 10:35   Iron Latest Ref Range: 59 - 158 ug/dL 73   Iron Saturation Latest Ref Range: 20 - 55 % 32   UIBC Latest Ref Range: 112 - 347 ug/dL 157   TIBC Latest Ref Range: 250 - 450 ug/dL 230 (L)   Folate Latest Ref Range: >4.8 ng/mL 11.4   Vitamin B-12 Latest Ref Range: 232 - 1245 pg/mL 1599 (H)       PATHOLOGY DATA:   Reviewed  Procedures/Addenda   PERIPHERAL BLOOD REPORT     Date Ordered:     2/15/2022     Status:   Signed Out        Date Complete:     2/15/2022     By: Ruiz Frances. Colleen Lott M.D. Date Reported:     2/15/2022       INTERPRETATION   PERIPHERAL BLOOD:   -  NORMOCYTIC NORMOCHROMIC ANEMIA (HEMOGLOBIN 11.1 g/dL),   HYPOPROLIFERATIVE. DIFFERENTIAL DIAGNOSES INCLUDE: ANEMIA OF CHRONIC   DISEASE OR HYPOPRODUCTION.      IMAGING DATA:      MRI SHOULDER LEFT WO CONTRAST  Narrative: EXAMINATION:  MRI OF THE LEFT SHOULDER WITHOUT CONTRAST   3/9/2023 2:22 pm    TECHNIQUE:  Multiplanar multisequence MRI of the left shoulder was performed without the  administration of intravenous contrast.    COMPARISON:  Radiographs dated

## 2023-10-10 ENCOUNTER — HOSPITAL ENCOUNTER (OUTPATIENT)
Age: 78
Discharge: HOME | End: 2023-10-10
Attending: INTERNAL MEDICINE
Payer: MEDICARE

## 2023-10-10 ENCOUNTER — HOSPITAL ENCOUNTER (OUTPATIENT)
Age: 78
Discharge: HOME | End: 2023-10-12
Attending: INTERNAL MEDICINE
Payer: MEDICARE

## 2023-10-10 ENCOUNTER — TELEPHONE (OUTPATIENT)
Dept: CARDIOLOGY | Age: 78
End: 2023-10-10

## 2023-10-10 VITALS
HEART RATE: 61 BPM | DIASTOLIC BLOOD PRESSURE: 70 MMHG | SYSTOLIC BLOOD PRESSURE: 155 MMHG | BODY MASS INDEX: 29.4 KG/M2 | HEIGHT: 68 IN | WEIGHT: 194 LBS

## 2023-10-10 DIAGNOSIS — I25.10 CORONARY ARTERY DISEASE INVOLVING NATIVE CORONARY ARTERY OF NATIVE HEART WITHOUT ANGINA PECTORIS: ICD-10-CM

## 2023-10-10 LAB
ANION GAP SERPL CALCULATED.3IONS-SCNC: 9 MMOL/L (ref 9–17)
BUN SERPL-MCNC: 31 MG/DL (ref 8–23)
BUN/CREAT SERPL: 16 (ref 9–20)
CALCIUM SERPL-MCNC: 8.8 MG/DL (ref 8.6–10.4)
CHLORIDE SERPL-SCNC: 106 MMOL/L (ref 98–107)
CO2 SERPL-SCNC: 25 MMOL/L (ref 20–31)
CREAT SERPL-MCNC: 2 MG/DL (ref 0.7–1.2)
ECHO AO ROOT DIAM: 3.4 CM
ECHO AO ROOT INDEX: 1.68 CM/M2
ECHO AV PEAK GRADIENT: 9 MMHG
ECHO AV PEAK VELOCITY: 1.5 M/S
ECHO AV VELOCITY RATIO: 0.6
ECHO BSA: 2.05 M2
ECHO EST RA PRESSURE: 8 MMHG
ECHO LA AREA 2C: 28.9 CM2
ECHO LA AREA 4C: 26.6 CM2
ECHO LA DIAMETER INDEX: 2.33 CM/M2
ECHO LA DIAMETER: 4.7 CM
ECHO LA MAJOR AXIS: 6.3 CM
ECHO LA MINOR AXIS: 6.6 CM
ECHO LA TO AORTIC ROOT RATIO: 1.38
ECHO LA VOL 2C: 102 ML (ref 18–58)
ECHO LA VOL 4C: 84 ML (ref 18–58)
ECHO LA VOL BP: 95 ML (ref 18–58)
ECHO LA VOL/BSA BIPLANE: 47 ML/M2 (ref 16–34)
ECHO LA VOLUME INDEX A2C: 50 ML/M2 (ref 16–34)
ECHO LA VOLUME INDEX A4C: 42 ML/M2 (ref 16–34)
ECHO LV E' LATERAL VELOCITY: 6 CM/S
ECHO LV E' SEPTAL VELOCITY: 6 CM/S
ECHO LV EJECTION FRACTION BIPLANE: 49 % (ref 55–100)
ECHO LV FRACTIONAL SHORTENING: 20 % (ref 28–44)
ECHO LV INTERNAL DIMENSION DIASTOLE INDEX: 2.23 CM/M2
ECHO LV INTERNAL DIMENSION DIASTOLIC: 4.5 CM (ref 4.2–5.9)
ECHO LV INTERNAL DIMENSION SYSTOLIC INDEX: 1.78 CM/M2
ECHO LV INTERNAL DIMENSION SYSTOLIC: 3.6 CM
ECHO LV IVSD: 1.2 CM (ref 0.6–1)
ECHO LV MASS 2D: 198.1 G (ref 88–224)
ECHO LV MASS INDEX 2D: 98.1 G/M2 (ref 49–115)
ECHO LV POSTERIOR WALL DIASTOLIC: 1.2 CM (ref 0.6–1)
ECHO LV RELATIVE WALL THICKNESS RATIO: 0.53
ECHO LVOT PEAK GRADIENT: 3 MMHG
ECHO LVOT PEAK VELOCITY: 0.9 M/S
ECHO MV A VELOCITY: 0.84 M/S
ECHO MV E DECELERATION TIME (DT): 211 MS
ECHO MV E VELOCITY: 1 M/S
ECHO MV E/A RATIO: 1.19
ECHO MV E/E' LATERAL: 16.67
ECHO MV E/E' RATIO (AVERAGED): 16.67
ECHO MV E/E' SEPTAL: 16.67
ECHO MV MAX VELOCITY: 1.1 M/S
ECHO MV PEAK GRADIENT: 4 MMHG
ECHO PV MAX VELOCITY: 1.1 M/S
ECHO PV PEAK GRADIENT: 5 MMHG
ECHO RIGHT VENTRICULAR SYSTOLIC PRESSURE (RVSP): 37 MMHG
ECHO TV PEAK GRADIENT: 2 MMHG
ECHO TV REGURGITANT MAX VELOCITY: 2.69 M/S
ECHO TV REGURGITANT PEAK GRADIENT: 29 MMHG
GFR SERPL CREATININE-BSD FRML MDRD: 34 ML/MIN/1.73M2
GLUCOSE SERPL-MCNC: 228 MG/DL (ref 70–99)
POTASSIUM SERPL-SCNC: 4.1 MMOL/L (ref 3.7–5.3)
SODIUM SERPL-SCNC: 140 MMOL/L (ref 135–144)

## 2023-10-10 PROCEDURE — 36415 COLL VENOUS BLD VENIPUNCTURE: CPT

## 2023-10-10 PROCEDURE — 80048 BASIC METABOLIC PNL TOTAL CA: CPT

## 2023-10-10 PROCEDURE — 93306 TTE W/DOPPLER COMPLETE: CPT | Performed by: INTERNAL MEDICINE

## 2023-10-10 PROCEDURE — 93306 TTE W/DOPPLER COMPLETE: CPT

## 2023-10-10 NOTE — TELEPHONE ENCOUNTER
----- Message from [address] [place] [address] sent at 10/10/2023  3:49 PM EDT -----  Echo per Dr. Alvarez Patella

## 2023-10-11 NOTE — TELEPHONE ENCOUNTER
Called and spoke to patient. Pt stated he still has some edema and some shortness of breath and increased fatigue.

## 2023-11-06 RX ORDER — FUROSEMIDE 40 MG/1
40 TABLET ORAL DAILY PRN
Qty: 90 TABLET | Refills: 3 | Status: SHIPPED | OUTPATIENT
Start: 2023-11-06

## 2023-11-20 DIAGNOSIS — I10 PRIMARY HYPERTENSION: ICD-10-CM

## 2023-11-20 RX ORDER — CARVEDILOL 12.5 MG/1
12.5 TABLET ORAL 2 TIMES DAILY
Qty: 180 TABLET | Refills: 1 | Status: SHIPPED | OUTPATIENT
Start: 2023-11-20

## 2023-11-20 NOTE — TELEPHONE ENCOUNTER
Oris Boas called requesting a refill of the below medication which has been pended for you:     Requested Prescriptions     Pending Prescriptions Disp Refills    carvedilol (COREG) 12.5 MG tablet 180 tablet 1     Sig: Take 1 tablet by mouth 2 times daily       Last Appointment Date: 8/24/2023  Next Appointment Date: 11/30/2023    No Known Allergies

## 2023-11-27 ENCOUNTER — HOSPITAL ENCOUNTER (OUTPATIENT)
Age: 78
Discharge: HOME | End: 2023-11-27
Payer: MEDICARE

## 2023-11-27 DIAGNOSIS — E78.49 OTHER HYPERLIPIDEMIA: ICD-10-CM

## 2023-11-27 DIAGNOSIS — N18.32 TYPE 2 DIABETES MELLITUS WITH STAGE 3B CHRONIC KIDNEY DISEASE, WITH LONG-TERM CURRENT USE OF INSULIN (HCC): ICD-10-CM

## 2023-11-27 DIAGNOSIS — Z79.4 TYPE 2 DIABETES MELLITUS WITH STAGE 3B CHRONIC KIDNEY DISEASE, WITH LONG-TERM CURRENT USE OF INSULIN (HCC): ICD-10-CM

## 2023-11-27 DIAGNOSIS — I10 PRIMARY HYPERTENSION: ICD-10-CM

## 2023-11-27 DIAGNOSIS — E11.22 TYPE 2 DIABETES MELLITUS WITH STAGE 3B CHRONIC KIDNEY DISEASE, WITH LONG-TERM CURRENT USE OF INSULIN (HCC): ICD-10-CM

## 2023-11-27 LAB
ALBUMIN SERPL-MCNC: 4.4 G/DL (ref 3.5–5.2)
ALBUMIN/GLOB SERPL: 1.5 {RATIO} (ref 1–2.5)
ALP SERPL-CCNC: 102 U/L (ref 40–129)
ALT SERPL-CCNC: 20 U/L (ref 5–41)
ANION GAP SERPL CALCULATED.3IONS-SCNC: 11 MMOL/L (ref 9–17)
AST SERPL-CCNC: 14 U/L
BILIRUB SERPL-MCNC: 0.9 MG/DL (ref 0.3–1.2)
BUN SERPL-MCNC: 56 MG/DL (ref 8–23)
BUN/CREAT SERPL: 23 (ref 9–20)
CALCIUM SERPL-MCNC: 9.2 MG/DL (ref 8.6–10.4)
CHLORIDE SERPL-SCNC: 102 MMOL/L (ref 98–107)
CHOLEST SERPL-MCNC: 102 MG/DL
CHOLESTEROL/HDL RATIO: 3.4
CO2 SERPL-SCNC: 26 MMOL/L (ref 20–31)
CREAT SERPL-MCNC: 2.4 MG/DL (ref 0.7–1.2)
EST. AVERAGE GLUCOSE BLD GHB EST-MCNC: 171 MG/DL
GFR SERPL CREATININE-BSD FRML MDRD: 27 ML/MIN/1.73M2
GLUCOSE SERPL-MCNC: 268 MG/DL (ref 70–99)
HBA1C MFR BLD: 7.6 % (ref 4–6)
HDLC SERPL-MCNC: 30 MG/DL
LDLC SERPL CALC-MCNC: 52 MG/DL (ref 0–130)
POTASSIUM SERPL-SCNC: 4.6 MMOL/L (ref 3.7–5.3)
PROT SERPL-MCNC: 7.3 G/DL (ref 6.4–8.3)
SODIUM SERPL-SCNC: 139 MMOL/L (ref 135–144)
TRIGL SERPL-MCNC: 99 MG/DL

## 2023-11-27 PROCEDURE — 36415 COLL VENOUS BLD VENIPUNCTURE: CPT

## 2023-11-27 PROCEDURE — 80061 LIPID PANEL: CPT

## 2023-11-27 PROCEDURE — 80053 COMPREHEN METABOLIC PANEL: CPT

## 2023-11-27 PROCEDURE — 83036 HEMOGLOBIN GLYCOSYLATED A1C: CPT

## 2023-11-30 ENCOUNTER — OFFICE VISIT (OUTPATIENT)
Dept: DIABETES SERVICES | Age: 78
End: 2023-11-30
Payer: MEDICARE

## 2023-11-30 ENCOUNTER — OFFICE VISIT (OUTPATIENT)
Dept: INTERNAL MEDICINE | Age: 78
End: 2023-11-30
Payer: MEDICARE

## 2023-11-30 VITALS
SYSTOLIC BLOOD PRESSURE: 100 MMHG | BODY MASS INDEX: 28.49 KG/M2 | HEART RATE: 80 BPM | RESPIRATION RATE: 20 BRPM | HEIGHT: 68 IN | DIASTOLIC BLOOD PRESSURE: 60 MMHG | WEIGHT: 188 LBS

## 2023-11-30 VITALS
HEIGHT: 68 IN | DIASTOLIC BLOOD PRESSURE: 60 MMHG | BODY MASS INDEX: 28.49 KG/M2 | OXYGEN SATURATION: 99 % | WEIGHT: 188 LBS | HEART RATE: 80 BPM | SYSTOLIC BLOOD PRESSURE: 100 MMHG

## 2023-11-30 DIAGNOSIS — E11.22 TYPE 2 DIABETES MELLITUS WITH STAGE 3B CHRONIC KIDNEY DISEASE, WITH LONG-TERM CURRENT USE OF INSULIN (HCC): Primary | ICD-10-CM

## 2023-11-30 DIAGNOSIS — E78.49 OTHER HYPERLIPIDEMIA: ICD-10-CM

## 2023-11-30 DIAGNOSIS — Z79.4 TYPE 2 DIABETES MELLITUS WITH STAGE 3B CHRONIC KIDNEY DISEASE, WITH LONG-TERM CURRENT USE OF INSULIN (HCC): ICD-10-CM

## 2023-11-30 DIAGNOSIS — N18.32 TYPE 2 DIABETES MELLITUS WITH STAGE 3B CHRONIC KIDNEY DISEASE, WITH LONG-TERM CURRENT USE OF INSULIN (HCC): Primary | ICD-10-CM

## 2023-11-30 DIAGNOSIS — E53.8 B12 DEFICIENCY: ICD-10-CM

## 2023-11-30 DIAGNOSIS — N18.32 TYPE 2 DIABETES MELLITUS WITH STAGE 3B CHRONIC KIDNEY DISEASE, WITH LONG-TERM CURRENT USE OF INSULIN (HCC): ICD-10-CM

## 2023-11-30 DIAGNOSIS — E11.22 TYPE 2 DIABETES MELLITUS WITH STAGE 3B CHRONIC KIDNEY DISEASE, WITH LONG-TERM CURRENT USE OF INSULIN (HCC): ICD-10-CM

## 2023-11-30 DIAGNOSIS — Z79.4 TYPE 2 DIABETES MELLITUS WITH STAGE 3B CHRONIC KIDNEY DISEASE, WITH LONG-TERM CURRENT USE OF INSULIN (HCC): Primary | ICD-10-CM

## 2023-11-30 DIAGNOSIS — I10 PRIMARY HYPERTENSION: Primary | ICD-10-CM

## 2023-11-30 DIAGNOSIS — I10 PRIMARY HYPERTENSION: ICD-10-CM

## 2023-11-30 PROCEDURE — 3051F HG A1C>EQUAL 7.0%<8.0%: CPT | Performed by: INTERNAL MEDICINE

## 2023-11-30 PROCEDURE — 1036F TOBACCO NON-USER: CPT | Performed by: INTERNAL MEDICINE

## 2023-11-30 PROCEDURE — 99212 OFFICE O/P EST SF 10 MIN: CPT | Performed by: INTERNAL MEDICINE

## 2023-11-30 PROCEDURE — 3078F DIAST BP <80 MM HG: CPT | Performed by: INTERNAL MEDICINE

## 2023-11-30 PROCEDURE — 3074F SYST BP LT 130 MM HG: CPT | Performed by: INTERNAL MEDICINE

## 2023-11-30 PROCEDURE — G8427 DOCREV CUR MEDS BY ELIG CLIN: HCPCS | Performed by: INTERNAL MEDICINE

## 2023-11-30 PROCEDURE — 95251 CONT GLUC MNTR ANALYSIS I&R: CPT | Performed by: NURSE PRACTITIONER

## 2023-11-30 PROCEDURE — 1036F TOBACCO NON-USER: CPT | Performed by: NURSE PRACTITIONER

## 2023-11-30 PROCEDURE — G8427 DOCREV CUR MEDS BY ELIG CLIN: HCPCS | Performed by: NURSE PRACTITIONER

## 2023-11-30 PROCEDURE — 3051F HG A1C>EQUAL 7.0%<8.0%: CPT | Performed by: NURSE PRACTITIONER

## 2023-11-30 PROCEDURE — G8417 CALC BMI ABV UP PARAM F/U: HCPCS | Performed by: INTERNAL MEDICINE

## 2023-11-30 PROCEDURE — G8417 CALC BMI ABV UP PARAM F/U: HCPCS | Performed by: NURSE PRACTITIONER

## 2023-11-30 PROCEDURE — 99214 OFFICE O/P EST MOD 30 MIN: CPT | Performed by: NURSE PRACTITIONER

## 2023-11-30 PROCEDURE — G8484 FLU IMMUNIZE NO ADMIN: HCPCS | Performed by: INTERNAL MEDICINE

## 2023-11-30 PROCEDURE — G8484 FLU IMMUNIZE NO ADMIN: HCPCS | Performed by: NURSE PRACTITIONER

## 2023-11-30 PROCEDURE — 3078F DIAST BP <80 MM HG: CPT | Performed by: NURSE PRACTITIONER

## 2023-11-30 PROCEDURE — 3074F SYST BP LT 130 MM HG: CPT | Performed by: NURSE PRACTITIONER

## 2023-11-30 PROCEDURE — 1123F ACP DISCUSS/DSCN MKR DOCD: CPT | Performed by: NURSE PRACTITIONER

## 2023-11-30 PROCEDURE — 99214 OFFICE O/P EST MOD 30 MIN: CPT | Performed by: INTERNAL MEDICINE

## 2023-11-30 PROCEDURE — 1123F ACP DISCUSS/DSCN MKR DOCD: CPT | Performed by: INTERNAL MEDICINE

## 2023-11-30 RX ORDER — HYDRALAZINE HYDROCHLORIDE 25 MG/1
25 TABLET, FILM COATED ORAL 2 TIMES DAILY
Qty: 180 TABLET | Refills: 3 | Status: SHIPPED | OUTPATIENT
Start: 2023-11-30

## 2023-11-30 RX ORDER — INSULIN ASPART 100 [IU]/ML
INJECTION, SOLUTION INTRAVENOUS; SUBCUTANEOUS
Qty: 5 ADJUSTABLE DOSE PRE-FILLED PEN SYRINGE | Refills: 3 | Status: SHIPPED | OUTPATIENT
Start: 2023-11-30

## 2023-11-30 ASSESSMENT — ENCOUNTER SYMPTOMS
EYE PAIN: 0
BACK PAIN: 0
SHORTNESS OF BREATH: 0
CONSTIPATION: 0
DIARRHEA: 0
SHORTNESS OF BREATH: 0
DIARRHEA: 0
BLOOD IN STOOL: 0
RESPIRATORY NEGATIVE: 1
ABDOMINAL PAIN: 0

## 2023-11-30 ASSESSMENT — PATIENT HEALTH QUESTIONNAIRE - PHQ9
2. FEELING DOWN, DEPRESSED OR HOPELESS: 0
1. LITTLE INTEREST OR PLEASURE IN DOING THINGS: 0
SUM OF ALL RESPONSES TO PHQ QUESTIONS 1-9: 0
SUM OF ALL RESPONSES TO PHQ9 QUESTIONS 1 & 2: 0

## 2023-11-30 ASSESSMENT — LIFESTYLE VARIABLES
HOW MANY STANDARD DRINKS CONTAINING ALCOHOL DO YOU HAVE ON A TYPICAL DAY: 1 OR 2
HOW OFTEN DO YOU HAVE A DRINK CONTAINING ALCOHOL: 2-4 TIMES A MONTH

## 2023-11-30 NOTE — PATIENT INSTRUCTIONS
Sliding Scale Insulin novolog before breakfast     Blood sugar Action     <70  Drink Juice      No extra insulin  150 - 200 2 units subcutaneous Insulin  201 - 250 4 units subcutaneous Insulin  251 - 300 6 units subcutaneous Insulin  301 - 350 8 units subcutaneous Insulin  351 - 400 10 units subcutaneous Insulin   > 400  12 units subcutaneous Insulin

## 2023-11-30 NOTE — PROGRESS NOTES
heart disease     Status post CABG. Diverticulosis     Dupuytren's contracture     Mid finger, right hand. Erectile dysfunction     Bowens catheter in place 11/02/2018    has had one in place for 6weeks, this one in place x 2 weeks    Gout     Quiescent. History of blood transfusion     possible with open heart surgery 26years ago    Hyperlipidemia     Hypertension     Positive cardiac stress test 05/06/2021    Snores     Type II or unspecified type diabetes mellitus without mention of complication, not stated as uncontrolled       Past Surgical History:   Procedure Laterality Date    CARDIAC CATHETERIZATION  6/2005    Triple vessel CAD with patent LIMA to LAD, patent saphenous vein graft to first obtuse marginal, patent saphenous vein graft to posterior descending artery, occluded jump graft from posterior descending artery to posterior left ventricular branch. CARDIAC CATHETERIZATION  3/16/16    CARDIAC CATHETERIZATION  05/07/2021    CATARACT REMOVAL Bilateral Nov. and Dec 2014    COLONOSCOPY  5/27/2008    Distal ileoscopy. Scattered diverticulosis and extensive diverticular disease of sigmoid colon and internal hemorrhoids. COLONOSCOPY N/A 10/28/2019    COLONOSCOPY performed by Shanda Santamaria MD at [facility] OR  severe diverticulosis    CORONARY ARTERY BYPASS GRAFT      ELBOW SURGERY Right     Surgical repair. EYE SURGERY      KNEE ARTHROSCOPY Right 3/16/2007    Partial medial meniscectomy, partial medial and lateral synovectomy, light chondroplasty.     ND LASER VAPORIZATION OF PROSTATE FOR URINE FLOW N/A 11/2/2018    CYSTOSCOPY, TRANSURETHRAL RESECTION PROSTATE - [facility] # [id number] HAILEE) performed by Yossi Cha MD at [address] [address]  Bilateral 9/15/2010, 2007    [facility] Dr. Angela Hidalgo Bilateral 05/10/2001    Benign-Dr. Monica Enrique    PROSTATE BIOPSY Bilateral 04/08/1999    Benign-Dr. Jono Castillo    PROSTATE BIOPSY Bilateral 06/18/1998    Benign-Dr. Teresa Win

## 2023-11-30 NOTE — PROGRESS NOTES
[address]  [phone]        HISTORY:    Yeny Drake presents today for evaluation and management of:  Chief Complaint   Patient presents with    Diabetes     3 month follow up       Patient Care Team:  Garrett Torres MD as PCP - General (Internal Medicine)  Garrett Torres MD [facility]  Remington Sebastian MD as Consulting Physician (Urology)  Arabella Lizama MD as Consulting Physician (Cardiology)      Interval History:    Past DM Medications   Metformin- ckd  Rybelsus- ineffective     Current Diabetic Medications  Lantus 15 units daily,   glimepiride 8 mg daily       DKA episodes: 0       08/24/23   Yeny Drake is a 66 y.o. male patient who presents to clinic today for his diabetes. he has a history of CAD, hypertension, hyperlipidemia, CKD and obesity which contributes to his diabetes. At previous visit diabetes counseling was provided. he denies any current signs or symptoms of hyper/hypoglycemia. he states they are taking their medications as prescribed without any adverse effects. Diet: low carb   Exercise: walking  BS testing: uses cgm daily with success and is adherent to cgm therapy  Hypoglycemia: No  Hypoglycemia as needed treatment: snack  Issues:   Diabetic foot exam up-to-date: Yes  Diabetic retinal exam up-to-date: Yes     Diabetes complications:nephropathy, impotence, and cardiovascular disease       11/30/23   Yeny Drake is a 66 y.o. male patient who presents to clinic today for his diabetes. he has a history of CAD, hypertension, hyperlipidemia, CKD and obesity  which contributes to his diabetes. At previous visit diabetes counseling was provided. he denies any current signs or symptoms of hyper/hypoglycemia. he states they are taking their medications as prescribed without any adverse effects.    Diet: low carb   Exercise:

## 2023-12-07 ENCOUNTER — HOSPITAL ENCOUNTER (OUTPATIENT)
Age: 78
Discharge: HOME | End: 2023-12-07
Payer: MEDICARE

## 2023-12-07 DIAGNOSIS — R97.20 ELEVATED PSA: ICD-10-CM

## 2023-12-07 LAB — PSA SERPL-MCNC: 20.23 NG/ML

## 2023-12-07 PROCEDURE — 36415 COLL VENOUS BLD VENIPUNCTURE: CPT

## 2023-12-07 PROCEDURE — 84153 ASSAY OF PSA TOTAL: CPT

## 2023-12-15 ENCOUNTER — OFFICE VISIT (OUTPATIENT)
Dept: UROLOGY | Age: 78
End: 2023-12-15
Payer: MEDICARE

## 2023-12-15 VITALS
BODY MASS INDEX: 28.49 KG/M2 | DIASTOLIC BLOOD PRESSURE: 82 MMHG | SYSTOLIC BLOOD PRESSURE: 124 MMHG | WEIGHT: 188 LBS | HEIGHT: 68 IN | RESPIRATION RATE: 16 BRPM | HEART RATE: 80 BPM | OXYGEN SATURATION: 99 %

## 2023-12-15 DIAGNOSIS — N52.9 ERECTILE DYSFUNCTION, UNSPECIFIED ERECTILE DYSFUNCTION TYPE: ICD-10-CM

## 2023-12-15 DIAGNOSIS — N40.0 BENIGN PROSTATIC HYPERPLASIA, UNSPECIFIED WHETHER LOWER URINARY TRACT SYMPTOMS PRESENT: ICD-10-CM

## 2023-12-15 DIAGNOSIS — R97.20 ELEVATED PSA: Primary | ICD-10-CM

## 2023-12-15 PROCEDURE — 3079F DIAST BP 80-89 MM HG: CPT | Performed by: UROLOGY

## 2023-12-15 PROCEDURE — G8484 FLU IMMUNIZE NO ADMIN: HCPCS | Performed by: UROLOGY

## 2023-12-15 PROCEDURE — 1123F ACP DISCUSS/DSCN MKR DOCD: CPT | Performed by: UROLOGY

## 2023-12-15 PROCEDURE — 99215 OFFICE O/P EST HI 40 MIN: CPT | Performed by: UROLOGY

## 2023-12-15 PROCEDURE — 3074F SYST BP LT 130 MM HG: CPT | Performed by: UROLOGY

## 2023-12-15 PROCEDURE — G8417 CALC BMI ABV UP PARAM F/U: HCPCS | Performed by: UROLOGY

## 2023-12-15 PROCEDURE — G8427 DOCREV CUR MEDS BY ELIG CLIN: HCPCS | Performed by: UROLOGY

## 2023-12-15 PROCEDURE — 1036F TOBACCO NON-USER: CPT | Performed by: UROLOGY

## 2023-12-15 PROCEDURE — 99214 OFFICE O/P EST MOD 30 MIN: CPT | Performed by: UROLOGY

## 2023-12-18 RX ORDER — ISOSORBIDE MONONITRATE 30 MG/1
30 TABLET, EXTENDED RELEASE ORAL DAILY
Qty: 90 TABLET | Refills: 2 | Status: SHIPPED | OUTPATIENT
Start: 2023-12-18

## 2023-12-20 ENCOUNTER — TELEPHONE (OUTPATIENT)
Dept: UROLOGY | Age: 78
End: 2023-12-20

## 2023-12-20 NOTE — TELEPHONE ENCOUNTER
[address] [facility]         Patient:Brant Regan           UJX:[id number]           Surgical/Procedure Planned: Saturation prostate biopsy     [facility]        Outpatient   Planned Length of OR: .5 hr     Sedation: MAC    Estimated Cardiac Risk for Non-Cardiac Surgery/Procedure     Low______ Moderate__x____ High______    Medication Instructions - Clarification needed by this date:   -Insulin:  -Other medications:    ASA 81 mg/325 mg Hold __5_ Days    Resume medications:     Lovenox indicated: _____Yes _x____NO    Suellen Guzman of Provider Giving Orders for Medication holds:  Jayna Baca MD    _____________________________________________

## 2024-01-02 ENCOUNTER — HOSPITAL ENCOUNTER (OUTPATIENT)
Age: 79
Discharge: HOME | End: 2024-01-02
Payer: MEDICARE

## 2024-01-02 DIAGNOSIS — I10 PRIMARY HYPERTENSION: ICD-10-CM

## 2024-01-02 LAB
ANION GAP SERPL CALCULATED.3IONS-SCNC: 11 MMOL/L (ref 9–17)
BUN SERPL-MCNC: 44 MG/DL (ref 8–23)
BUN/CREAT SERPL: 21 (ref 9–20)
CALCIUM SERPL-MCNC: 9.1 MG/DL (ref 8.6–10.4)
CHLORIDE SERPL-SCNC: 109 MMOL/L (ref 98–107)
CO2 SERPL-SCNC: 24 MMOL/L (ref 20–31)
CREAT SERPL-MCNC: 2.1 MG/DL (ref 0.7–1.2)
GFR SERPL CREATININE-BSD FRML MDRD: 32 ML/MIN/1.73M2
GLUCOSE SERPL-MCNC: 235 MG/DL (ref 70–99)
POTASSIUM SERPL-SCNC: 4.4 MMOL/L (ref 3.7–5.3)
SODIUM SERPL-SCNC: 144 MMOL/L (ref 135–144)

## 2024-01-02 PROCEDURE — 36415 COLL VENOUS BLD VENIPUNCTURE: CPT

## 2024-01-02 PROCEDURE — 80048 BASIC METABOLIC PNL TOTAL CA: CPT

## 2024-01-02 RX ORDER — CIPROFLOXACIN 500 MG/1
500 TABLET, FILM COATED ORAL 2 TIMES DAILY
Qty: 6 TABLET | Refills: 0 | Status: SHIPPED | OUTPATIENT
Start: 2024-01-21 | End: 2024-01-24

## 2024-01-02 RX ORDER — CEPHALEXIN 500 MG/1
500 CAPSULE ORAL 3 TIMES DAILY
Qty: 9 CAPSULE | Refills: 0 | Status: SHIPPED | OUTPATIENT
Start: 2024-01-21 | End: 2024-01-24

## 2024-01-08 ENCOUNTER — OFFICE VISIT (OUTPATIENT)
Dept: CARDIOLOGY | Age: 79
End: 2024-01-08
Payer: MEDICARE

## 2024-01-08 VITALS
WEIGHT: 193 LBS | SYSTOLIC BLOOD PRESSURE: 120 MMHG | OXYGEN SATURATION: 100 % | BODY MASS INDEX: 29.35 KG/M2 | HEART RATE: 54 BPM | DIASTOLIC BLOOD PRESSURE: 66 MMHG

## 2024-01-08 DIAGNOSIS — M10.00 IDIOPATHIC GOUT, UNSPECIFIED CHRONICITY, UNSPECIFIED SITE: ICD-10-CM

## 2024-01-08 DIAGNOSIS — I25.10 CORONARY ARTERY DISEASE DUE TO CALCIFIED CORONARY LESION: Primary | ICD-10-CM

## 2024-01-08 DIAGNOSIS — Z95.810 ISCHEMIC CARDIOMYOPATHY WITH IMPLANTABLE CARDIOVERTER-DEFIBRILLATOR (ICD): ICD-10-CM

## 2024-01-08 DIAGNOSIS — I10 ESSENTIAL HYPERTENSION: ICD-10-CM

## 2024-01-08 DIAGNOSIS — I25.84 CORONARY ARTERY DISEASE DUE TO CALCIFIED CORONARY LESION: Primary | ICD-10-CM

## 2024-01-08 DIAGNOSIS — I25.5 ISCHEMIC CARDIOMYOPATHY WITH IMPLANTABLE CARDIOVERTER-DEFIBRILLATOR (ICD): ICD-10-CM

## 2024-01-08 DIAGNOSIS — I10 HYPERTENSION, ESSENTIAL: ICD-10-CM

## 2024-01-08 DIAGNOSIS — E78.5 HYPERLIPIDEMIA LDL GOAL <70: ICD-10-CM

## 2024-01-08 DIAGNOSIS — E78.5 HYPERLIPIDEMIA, UNSPECIFIED HYPERLIPIDEMIA TYPE: ICD-10-CM

## 2024-01-08 PROCEDURE — 3078F DIAST BP <80 MM HG: CPT | Performed by: NURSE PRACTITIONER

## 2024-01-08 PROCEDURE — 99214 OFFICE O/P EST MOD 30 MIN: CPT | Performed by: NURSE PRACTITIONER

## 2024-01-08 PROCEDURE — G8417 CALC BMI ABV UP PARAM F/U: HCPCS | Performed by: NURSE PRACTITIONER

## 2024-01-08 PROCEDURE — 1036F TOBACCO NON-USER: CPT | Performed by: NURSE PRACTITIONER

## 2024-01-08 PROCEDURE — 1123F ACP DISCUSS/DSCN MKR DOCD: CPT | Performed by: NURSE PRACTITIONER

## 2024-01-08 PROCEDURE — G8427 DOCREV CUR MEDS BY ELIG CLIN: HCPCS | Performed by: NURSE PRACTITIONER

## 2024-01-08 PROCEDURE — 3074F SYST BP LT 130 MM HG: CPT | Performed by: NURSE PRACTITIONER

## 2024-01-08 PROCEDURE — G8484 FLU IMMUNIZE NO ADMIN: HCPCS | Performed by: NURSE PRACTITIONER

## 2024-01-08 RX ORDER — ALLOPURINOL 300 MG/1
300 TABLET ORAL DAILY
Qty: 90 TABLET | Refills: 3 | Status: SHIPPED | OUTPATIENT
Start: 2024-01-08 | End: 2024-01-08 | Stop reason: SDUPTHER

## 2024-01-08 RX ORDER — FUROSEMIDE 40 MG/1
40 TABLET ORAL DAILY
Qty: 90 TABLET | Refills: 3 | Status: SHIPPED | OUTPATIENT
Start: 2024-01-08

## 2024-01-08 RX ORDER — ALLOPURINOL 300 MG/1
300 TABLET ORAL DAILY
Qty: 90 TABLET | Refills: 3 | Status: SHIPPED | OUTPATIENT
Start: 2024-01-08

## 2024-01-08 RX ORDER — LISINOPRIL 40 MG/1
40 TABLET ORAL DAILY
Qty: 90 TABLET | Refills: 3 | Status: SHIPPED | OUTPATIENT
Start: 2024-01-08

## 2024-01-08 RX ORDER — ATORVASTATIN CALCIUM 80 MG/1
80 TABLET, FILM COATED ORAL DAILY
Qty: 90 TABLET | Refills: 3 | Status: SHIPPED | OUTPATIENT
Start: 2024-01-08

## 2024-01-08 NOTE — PROGRESS NOTES
[facility] DEPARTMENT OF [place] [address]  Dept: [phone]    CC: follow up for CAD, CABG    HPI:  The patient is a 78 y.o. year old, , male is in the office for follow up   He denies any chest pain or SOB at rest. States he does have some SKINNER which resolves pretty quickly with rest and this is a chronic problem. Denies any issues with medications. Moving bowels, urinating, sleeping \"normally.\" Does admit to LE ankle swelling, this is recurrent at times, about 2 months ago they swelled up and then went away and again about a week ago started swelling \"just a little.\" States this swelling is worse towards the end of the day and usually not present in AM. Denies any dizziness or pre-syncope/syncope.       Past Medical History:   has a past medical history of Benign prostatic hypertrophy, BPPV (benign paroxysmal positional vertigo), Chest pain, Coronary heart disease, Diverticulosis, Dupuytren's contracture, Erectile dysfunction, Bowens catheter in place, Gout, History of blood transfusion, Hyperlipidemia, Hypertension, Positive cardiac stress test, Snores, and Type II or unspecified type diabetes mellitus without mention of complication, not stated as uncontrolled.    Past Surgical History:   has a past surgical history that includes Prostate biopsy (Bilateral, 9/15/2010, 2007); Colonoscopy (5/27/2008); Prostate biopsy (Bilateral, 05/10/2001); Knee arthroscopy (Right, 3/16/2007); Elbow surgery (Right); Vasectomy; Coronary artery bypass graft; Cataract removal (Bilateral, Nov. and Dec 2014); Prostate Biopsy (Bilateral, 04/08/1999); Prostate Biopsy (Bilateral, 06/18/1998); Prostate Biopsy (Bilateral, 01/07/1998); Prostate Biopsy (Bilateral, 10/04/1996); Prostate surgery (11/02/2018); pr laser vaporization of prostate for urine flow (N/A, 11/2/2018); Colonoscopy (N/A,

## 2024-01-08 NOTE — TELEPHONE ENCOUNTER
Patient needs new scripts sent into [facility] since his pharmacy changed at the 1st of the year.    Scripts pended

## 2024-01-10 ENCOUNTER — TELEPHONE (OUTPATIENT)
Dept: UROLOGY | Age: 79
End: 2024-01-10

## 2024-01-10 NOTE — TELEPHONE ENCOUNTER
Please provide pre-op insulin instructions for patient. Thank you!           [facility]         Patient:Brant Tirado           :1945           Surgical/Procedure Planned: Transrectal US guided prostate biopsy     Date & Location: 24       Outpatient   Planned Length of OR: .5 hr    Sedation: MAC      Estimated Cardiac Risk for Non-Cardiac Surgery/Procedure     Low______ Moderate______ High______    Medication Instructions - Clarification needed by this date:   -Insulin: take half dose lantus day prior and hold novolog while npo. Resume when no longer npo.   -Other medications: hold amaryl while npo     Provider:Dr. Fabian Ramsey       Signature of Provider Giving Orders for Medication holds:    _____________________________________________

## 2024-01-10 NOTE — TELEPHONE ENCOUNTER
[facility]         Patient:Brant Tirado           :1945           Surgical/Procedure Planned: Trans-rectal ultrasound guided prostate biopsy    Date & Location: [facility]   Planned Length of OR: .5 hr    Sedation: MAC      Estimated Cardiac Risk for Non-Cardiac Surgery/Procedure     Low______ Moderate______ High______    Medication Instructions - Clarification needed by this date:   -Insulin:  -Other medications:    ASA 81 mg/325 mg Hold ___ Days      Resume medications:     Lovenox indicated: _____Yes _____NO    Provider:Dr. Fabian Ramsey       Signature of Provider Giving Orders for Medication holds:    _____________________________________________

## 2024-01-11 ENCOUNTER — OFFICE VISIT (OUTPATIENT)
Dept: DIABETES SERVICES | Age: 79
End: 2024-01-11
Payer: MEDICARE

## 2024-01-11 VITALS
DIASTOLIC BLOOD PRESSURE: 60 MMHG | SYSTOLIC BLOOD PRESSURE: 102 MMHG | OXYGEN SATURATION: 99 % | WEIGHT: 190.8 LBS | HEART RATE: 72 BPM | BODY MASS INDEX: 29.01 KG/M2

## 2024-01-11 DIAGNOSIS — Z79.4 TYPE 2 DIABETES MELLITUS WITH STAGE 3B CHRONIC KIDNEY DISEASE, WITH LONG-TERM CURRENT USE OF INSULIN (HCC): Primary | ICD-10-CM

## 2024-01-11 DIAGNOSIS — N18.32 TYPE 2 DIABETES MELLITUS WITH STAGE 3B CHRONIC KIDNEY DISEASE, WITH LONG-TERM CURRENT USE OF INSULIN (HCC): Primary | ICD-10-CM

## 2024-01-11 DIAGNOSIS — I10 PRIMARY HYPERTENSION: ICD-10-CM

## 2024-01-11 DIAGNOSIS — E78.49 OTHER HYPERLIPIDEMIA: ICD-10-CM

## 2024-01-11 DIAGNOSIS — E11.22 TYPE 2 DIABETES MELLITUS WITH STAGE 3B CHRONIC KIDNEY DISEASE, WITH LONG-TERM CURRENT USE OF INSULIN (HCC): Primary | ICD-10-CM

## 2024-01-11 PROCEDURE — 99212 OFFICE O/P EST SF 10 MIN: CPT | Performed by: NURSE PRACTITIONER

## 2024-01-11 RX ORDER — INSULIN GLARGINE 100 [IU]/ML
10 INJECTION, SOLUTION SUBCUTANEOUS NIGHTLY
Qty: 5 ADJUSTABLE DOSE PRE-FILLED PEN SYRINGE | Refills: 2
Start: 2024-01-11

## 2024-01-11 ASSESSMENT — ENCOUNTER SYMPTOMS
RESPIRATORY NEGATIVE: 1
ABDOMINAL PAIN: 0
SHORTNESS OF BREATH: 0
DIARRHEA: 0

## 2024-01-11 NOTE — PROGRESS NOTES
issues of diet, exercise, medication, complication avoidance, reviewed the signs and symptoms of diabetes, hypoglycemic episodes, significance of HbA1C.     - insulin glargine (LANTUS SOLOSTAR) 100 UNIT/ML injection pen; Inject 10 Units into the skin nightly  Dispense: 5 Adjustable Dose Pre-filled Pen Syringe; Refill: 2    2. Other hyperlipidemia  stable, lipid panel reviewed, continue current medications. Diet and exercise.       3. Primary hypertension   stable, continue current medications. Diet and exercise Seek emergent care if chest pain develops.               Answered all patient questions. Agrees to follow plan of care and to follow up in 2 months, sooner if needed. Call office if unexplained blood sugars less than 70 occur or above 400. Call office or access [employer] with any further questions or concerns. Be sure to bring glucometer/food log at next appointment.       Total time spent reviewing chart, labs, counseling patient and documenting on the date of the encounter: 30 min.      Electronically signed by AMANDA Helms CNP on 1/11/2024 at 11:49 AM      (Please note that portions of this note were completed with a voice-recognition program. Efforts were made to edit the dictation but occasionally words are mis-transcribed.)

## 2024-01-11 NOTE — PATIENT INSTRUCTIONS
Decrease lantus to 10 units once daily   Increase novolog before breakfast to 8 units     For upcomming procedure   Take lantus 5 units the night before   And hold novolog until next meal

## 2024-01-22 ENCOUNTER — ANESTHESIA EVENT (OUTPATIENT)
Dept: OPERATING ROOM | Age: 79
End: 2024-01-22
Payer: MEDICARE

## 2024-01-22 ENCOUNTER — ANESTHESIA (OUTPATIENT)
Dept: OPERATING ROOM | Age: 79
End: 2024-01-22
Payer: MEDICARE

## 2024-01-22 ENCOUNTER — HOSPITAL ENCOUNTER (OUTPATIENT)
Age: 79
Setting detail: OUTPATIENT SURGERY
Discharge: HOME | End: 2024-01-22
Attending: UROLOGY | Admitting: UROLOGY
Payer: MEDICARE

## 2024-01-22 ENCOUNTER — APPOINTMENT (OUTPATIENT)
Dept: ULTRASOUND IMAGING | Age: 79
End: 2024-01-22
Attending: UROLOGY
Payer: MEDICARE

## 2024-01-22 VITALS
WEIGHT: 190.6 LBS | HEART RATE: 60 BPM | OXYGEN SATURATION: 99 % | BODY MASS INDEX: 28.89 KG/M2 | HEIGHT: 68 IN | SYSTOLIC BLOOD PRESSURE: 138 MMHG | TEMPERATURE: 97.7 F | RESPIRATION RATE: 16 BRPM | DIASTOLIC BLOOD PRESSURE: 72 MMHG

## 2024-01-22 DIAGNOSIS — E11.22 TYPE 2 DIABETES MELLITUS WITH STAGE 3B CHRONIC KIDNEY DISEASE, WITH LONG-TERM CURRENT USE OF INSULIN (HCC): ICD-10-CM

## 2024-01-22 DIAGNOSIS — Z79.4 TYPE 2 DIABETES MELLITUS WITH STAGE 3B CHRONIC KIDNEY DISEASE, WITH LONG-TERM CURRENT USE OF INSULIN (HCC): ICD-10-CM

## 2024-01-22 DIAGNOSIS — R97.20 ELEVATED PSA: ICD-10-CM

## 2024-01-22 DIAGNOSIS — N18.32 TYPE 2 DIABETES MELLITUS WITH STAGE 3B CHRONIC KIDNEY DISEASE, WITH LONG-TERM CURRENT USE OF INSULIN (HCC): ICD-10-CM

## 2024-01-22 DIAGNOSIS — E11.9 TYPE 2 DIABETES MELLITUS WITHOUT COMPLICATION, WITHOUT LONG-TERM CURRENT USE OF INSULIN (HCC): ICD-10-CM

## 2024-01-22 PROCEDURE — 3700000001 HC ADD 15 MINUTES (ANESTHESIA): Performed by: UROLOGY

## 2024-01-22 PROCEDURE — 6360000002 HC RX W HCPCS: Performed by: UROLOGY

## 2024-01-22 PROCEDURE — 2500000003 HC RX 250 WO HCPCS: Performed by: NURSE ANESTHETIST, CERTIFIED REGISTERED

## 2024-01-22 PROCEDURE — 76942 ECHO GUIDE FOR BIOPSY: CPT

## 2024-01-22 PROCEDURE — 7100000011 HC PHASE II RECOVERY - ADDTL 15 MIN: Performed by: UROLOGY

## 2024-01-22 PROCEDURE — 6360000002 HC RX W HCPCS: Performed by: NURSE ANESTHETIST, CERTIFIED REGISTERED

## 2024-01-22 PROCEDURE — 3600000002 HC SURGERY LEVEL 2 BASE: Performed by: UROLOGY

## 2024-01-22 PROCEDURE — 2580000003 HC RX 258: Performed by: UROLOGY

## 2024-01-22 PROCEDURE — 3600000012 HC SURGERY LEVEL 2 ADDTL 15MIN: Performed by: UROLOGY

## 2024-01-22 PROCEDURE — 88305 TISSUE EXAM BY PATHOLOGIST: CPT

## 2024-01-22 PROCEDURE — 6370000000 HC RX 637 (ALT 250 FOR IP): Performed by: UROLOGY

## 2024-01-22 PROCEDURE — 3700000000 HC ANESTHESIA ATTENDED CARE: Performed by: UROLOGY

## 2024-01-22 PROCEDURE — 7100000010 HC PHASE II RECOVERY - FIRST 15 MIN: Performed by: UROLOGY

## 2024-01-22 RX ORDER — SODIUM CHLORIDE 9 MG/ML
INJECTION, SOLUTION INTRAVENOUS PRN
Status: DISCONTINUED | OUTPATIENT
Start: 2024-01-22 | End: 2024-01-22 | Stop reason: HOSPADM

## 2024-01-22 RX ORDER — PROPOFOL 10 MG/ML
INJECTION, EMULSION INTRAVENOUS PRN
Status: DISCONTINUED | OUTPATIENT
Start: 2024-01-22 | End: 2024-01-22 | Stop reason: SDUPTHER

## 2024-01-22 RX ORDER — SODIUM CHLORIDE, SODIUM LACTATE, POTASSIUM CHLORIDE, CALCIUM CHLORIDE 600; 310; 30; 20 MG/100ML; MG/100ML; MG/100ML; MG/100ML
INJECTION, SOLUTION INTRAVENOUS CONTINUOUS
Status: DISCONTINUED | OUTPATIENT
Start: 2024-01-22 | End: 2024-01-22 | Stop reason: HOSPADM

## 2024-01-22 RX ORDER — SODIUM CHLORIDE 0.9 % (FLUSH) 0.9 %
5-40 SYRINGE (ML) INJECTION PRN
Status: DISCONTINUED | OUTPATIENT
Start: 2024-01-22 | End: 2024-01-22 | Stop reason: HOSPADM

## 2024-01-22 RX ORDER — SODIUM CHLORIDE 0.9 % (FLUSH) 0.9 %
5-40 SYRINGE (ML) INJECTION EVERY 12 HOURS SCHEDULED
Status: DISCONTINUED | OUTPATIENT
Start: 2024-01-22 | End: 2024-01-22 | Stop reason: HOSPADM

## 2024-01-22 RX ORDER — LIDOCAINE HYDROCHLORIDE 10 MG/ML
INJECTION, SOLUTION INFILTRATION; PERINEURAL PRN
Status: DISCONTINUED | OUTPATIENT
Start: 2024-01-22 | End: 2024-01-22 | Stop reason: SDUPTHER

## 2024-01-22 RX ORDER — ACETAMINOPHEN 500 MG
1000 TABLET ORAL ONCE
Status: COMPLETED | OUTPATIENT
Start: 2024-01-22 | End: 2024-01-22

## 2024-01-22 RX ADMIN — SODIUM CHLORIDE, POTASSIUM CHLORIDE, SODIUM LACTATE AND CALCIUM CHLORIDE: 600; 310; 30; 20 INJECTION, SOLUTION INTRAVENOUS at 10:34

## 2024-01-22 RX ADMIN — Medication 2000 MG: at 11:24

## 2024-01-22 RX ADMIN — PROPOFOL 100 MG: 10 INJECTION, EMULSION INTRAVENOUS at 11:18

## 2024-01-22 RX ADMIN — LIDOCAINE HYDROCHLORIDE 50 MG: 10 INJECTION, SOLUTION INFILTRATION; PERINEURAL at 11:18

## 2024-01-22 RX ADMIN — PROPOFOL 120 MCG/KG/MIN: 10 INJECTION, EMULSION INTRAVENOUS at 11:19

## 2024-01-22 RX ADMIN — ACETAMINOPHEN 1000 MG: 500 TABLET ORAL at 12:00

## 2024-01-22 ASSESSMENT — PAIN SCALES - GENERAL
PAINLEVEL_OUTOF10: 0
PAINLEVEL_OUTOF10: 4

## 2024-01-22 ASSESSMENT — PAIN - FUNCTIONAL ASSESSMENT
PAIN_FUNCTIONAL_ASSESSMENT: NONE - DENIES PAIN
PAIN_FUNCTIONAL_ASSESSMENT: NONE - DENIES PAIN

## 2024-01-22 ASSESSMENT — PAIN DESCRIPTION - DESCRIPTORS: DESCRIPTORS: ACHING

## 2024-01-22 ASSESSMENT — PAIN DESCRIPTION - LOCATION: LOCATION: RECTUM

## 2024-01-22 NOTE — DISCHARGE INSTRUCTIONS
Pt ok to discharge home in good condition  No heavy lifting, >10 lbs for today  Pt should avoid strenuous activity for today  Pt should walk moderately at home  Pt ok to shower   Pt may resume diet as tolerated  Pt should take Rx as directed  No driving while on narcotics  Please call attending physician or hospital  with questions  Call or Present to ED if fever (> 101F), intractable nausea vomiting or pain.  Rx in chart    Pt should follow up with WARNER LEONARDO MD, in 4 weeks, call to confirm appointment

## 2024-01-22 NOTE — TELEPHONE ENCOUNTER
Brant called requesting a refill of the below medication which has been pended for you:     Requested Prescriptions     Pending Prescriptions Disp Refills    Insulin Pen Needle (PEN NEEDLES) 32G X 5 MM MISC 90 each 3     Sig: Use 2 pen needle daily       Last Appointment Date: 11/30/2023  Next Appointment Date: 3/4/2024    No Known Allergies

## 2024-01-22 NOTE — H&P
WARNER LEONARDO MD  History and Physical    Patient:  Brant Tirado  MRN: [id number]  YOB: 1945    HISTORY OF PRESENT ILLNESS:     The patient is a 78 y.o. male who presents with elevated psa. Here for procedure.    Patient's old records, notes and chart reviewed and summarized above.     WARNER LEONARDO MD independently reviewed the images and verified the radiology reports from:    No results found.      Past Medical History:    Past Medical History:   Diagnosis Date    Benign prostatic hypertrophy     with elevated PSA.    BPPV (benign paroxysmal positional vertigo) 2/2008    Chest pain 05/2021    Coronary heart disease     Status post CABG.    Diverticulosis     Dupuytren's contracture     Mid finger, right hand.    Erectile dysfunction     Bowens catheter in place 11/02/2018    has had one in place for 6weeks, this one in place x 2 weeks    Gout     Quiescent.    History of blood transfusion     possible with open heart surgery 26years ago    Hyperlipidemia     Hypertension     Positive cardiac stress test 05/06/2021    Snores     Type II or unspecified type diabetes mellitus without mention of complication, not stated as uncontrolled        Past Surgical History:    Past Surgical History:   Procedure Laterality Date    CARDIAC CATHETERIZATION  6/2005    Triple vessel CAD with patent LIMA to LAD, patent saphenous vein graft to first obtuse marginal, patent saphenous vein graft to posterior descending artery, occluded jump graft from posterior descending artery to posterior left ventricular branch.    CARDIAC CATHETERIZATION  3/16/16    CARDIAC CATHETERIZATION  05/07/2021    CATARACT REMOVAL Bilateral Nov. and Dec 2014    COLONOSCOPY  5/27/2008    Distal ileoscopy. Scattered diverticulosis and extensive diverticular disease of sigmoid colon and internal hemorrhoids.    COLONOSCOPY N/A 10/28/2019    COLONOSCOPY performed by Hal Carr MD at [facility] OR  severe diverticulosis    CORONARY ARTERY BYPASS

## 2024-01-22 NOTE — ANESTHESIA POSTPROCEDURE EVALUATION
Department of Anesthesiology  Postprocedure Note    Patient: Brant Tirado  MRN: [id number]  YOB: 1945  Date of evaluation: 1/22/2024    Procedure Summary       Date: 01/22/24 Room / Location: [address]    Anesthesia Start: 1116 Anesthesia Stop: 1136    Procedure: Trans Rectal Ultrasound Guided PROSTATE BIOPSY Diagnosis:       Elevated PSA      (Elevated PSA [R97.20])    Surgeons: Fabian Ramsey MD Responsible Provider: Nael Harrell II, APRN - CRNA    Anesthesia Type: MAC ASA Status: 3            Anesthesia Type: MAC    Kenyon Phase I: Kenyon Score: 10    Kenyon Phase II: Kenyon Score: 10    Anesthesia Post Evaluation    Patient location during evaluation: bedside  Patient participation: complete - patient participated  Level of consciousness: awake  Pain score: 2  Airway patency: patent  Nausea & Vomiting: no nausea and no vomiting  Cardiovascular status: hemodynamically stable  Respiratory status: spontaneous ventilation  Hydration status: stable  Pain management: satisfactory to patient    No notable events documented.

## 2024-01-22 NOTE — BRIEF OP NOTE
Brief Postoperative Note      Patient: Brant Tirado  YOB: 1945  MRN: [id number]    Date of Procedure: 1/22/2024    Pre-Op Diagnosis Codes:     * Elevated PSA [R97.20]    Post-Op Diagnosis: Same       Procedure(s):  Trans Rectal Ultrasound Guided PROSTATE BIOPSY    Surgeon(s):  Warner Ramsey MD    Assistant:  * No surgical staff found *    Anesthesia: Monitor Anesthesia Care    Estimated Blood Loss (mL): Minimal    Complications: None    Specimens:   ID Type Source Tests Collected by Time Destination   A : RLB- RIGHT LATERAL BASE Tissue Prostate SURGICAL PATHOLOGY Warner Ramsey MD 1/22/2024 1125    B : RB- RIGHT BASE Tissue Prostate SURGICAL PATHOLOGY Warner Ramsey MD 1/22/2024 1125    C : RLM- RIGHT LATERAL MID Tissue Prostate SURGICAL PATHOLOGY Warner Ramsey MD 1/22/2024 1125    D : RM- RIGHT MID Tissue Prostate SURGICAL PATHOLOGY Warner Ramsey MD 1/22/2024 1125    E : RLA- RIGHT LATERAL APEX Tissue Prostate SURGICAL PATHOLOGY Warner Ramsey MD 1/22/2024 1125    F : RA- RIGHT APEX Tissue Prostate SURGICAL PATHOLOGY Warner Ramsey MD 1/22/2024 1126    G : LLB- LEFT LATERAL BASE Tissue Prostate SURGICAL PATHOLOGY Warner Ramsey MD 1/22/2024 1126    H : LB- LEFT BASE Tissue Prostate SURGICAL PATHOLOGY Warner Ramsey MD 1/22/2024 1126    I : LLM- LEFT LATERAL MID Tissue Prostate SURGICAL PATHOLOGY Warner Ramsey MD 1/22/2024 1126    J : LM- LEFT MID Tissue Prostate SURGICAL PATHOLOGY Warner Ramsey MD 1/22/2024 1126    K : LLA- LEFT LATERAL APEX Tissue Prostate SURGICAL PATHOLOGY Warner Ramsey MD 1/22/2024 1126    L : LA- LEFT APEX Tissue Prostate SURGICAL PATHOLOGY Warner Ramsey MD 1/22/2024 1126        Implants:  * No implants in log *      Drains: * No LDAs found *    Findings:     Martha next week with results.       Electronically signed by WARNER RAMSEY MD on 1/22/2024 at 11:49 AM

## 2024-01-22 NOTE — FLOWSHEET NOTE
rounding in OR.    Assessment: Patient was prepped for surgery and accompanied by his wife (Parris).  Patient was receiving his 7th biopsy and was anxious about the results because his \"numbers jumped\" and his blood pressure was \"through the roof.\"  Patient had traumatic former experiences at other facilities and was glad to be sedated.  Patient has the support of his two adult sons, their families (4 grandchildren and 7 great grandchildren), and their [employer] ([employer]).    Intervention: Engaged in conversation and active listening. Prayed with Patient and his wife.     Outcome: Patient expressed appreciation for visit and offer of continued prayer.    Plan: Chaplains are available on site or on call 24/7 for spiritual and emotional support.    Electronically signed by Talisha Flores on 1/22/2024 at 11:15 AM

## 2024-01-22 NOTE — ANESTHESIA PRE PROCEDURE
Department of Anesthesiology  Preprocedure Note       Name:  Brant Tirado   Age:  78 y.o.  :  1945                                          MRN:  [id number]         Date:  2024      Surgeon: Surgeon(s):  Fabian Ramsey MD    Procedure: Trans Rectal Ultrasound Guided PROSTATE BIOPSY ([id number] Brit/ju)    Medications prior to admission:   Prior to Admission medications    Medication Sig Start Date End Date Taking? Authorizing Provider   insulin glargine (LANTUS SOLOSTAR) 100 UNIT/ML injection pen Inject 10 Units into the skin nightly 24   Katey Mason APRN - CNP   furosemide (LASIX) 40 MG tablet Take 1 tablet by mouth daily 24   Miryam Holguin APRN - CNP   atorvastatin (LIPITOR) 80 MG tablet Take 1 tablet by mouth daily 24   Roby Chowdhury MD   lisinopril (PRINIVIL;ZESTRIL) 40 MG tablet Take 1 tablet by mouth daily 24   Roby Chowdhury MD   allopurinol (ZYLOPRIM) 300 MG tablet Take 1 tablet by mouth daily 24   Roby Chowdhury MD   ciprofloxacin (CIPRO) 500 MG tablet Take 1 tablet by mouth 2 times daily for 3 days 24  Fabian Ramsey MD   cephALEXin (KEFLEX) 500 MG capsule Take 1 capsule by mouth 3 times daily for 3 days 24  Fabian Ramsey MD   isosorbide mononitrate (IMDUR) 30 MG extended release tablet Take 1 tablet by mouth daily 23   Magno Bar MD   insulin aspart (NOVOLOG FLEXPEN) 100 UNIT/ML injection pen Inject before breakfast using sliding scale max daily dose 36 units 23   Katey Mason APRN - CNP   hydrALAZINE (APRESOLINE) 25 MG tablet Take 1 tablet by mouth in the morning and 1 tablet in the evening. 23   Roby Chowdhury MD   carvedilol (COREG) 12.5 MG tablet Take 1 tablet by mouth 2 times daily 23   Roby Chowdhury MD   glimepiride (AMARYL) 4 MG tablet Take 2 tablets by mouth daily 23   Katey Mason, APRN - CNP   ONE TOUCH ULTRASOFT LANCETS MISC 1 each by Does not apply route daily

## 2024-01-23 RX ORDER — BLOOD SUGAR DIAGNOSTIC
STRIP MISCELLANEOUS
Qty: 90 EACH | Refills: 3 | Status: SHIPPED | OUTPATIENT
Start: 2024-01-23

## 2024-01-23 RX ORDER — BLOOD PRESSURE TEST KIT
KIT MISCELLANEOUS
Qty: 100 EACH | Refills: 5 | Status: SHIPPED | OUTPATIENT
Start: 2024-01-23

## 2024-01-24 LAB — SURGICAL PATHOLOGY REPORT: NORMAL

## 2024-01-27 NOTE — OP NOTE
Fabian Ramsey MD.  Urologic Surgery      [place], [place]    DATE: 1/22/2024  Patient:  Brant Tirado  MRN: [id number]  YOB: 1945    SURGEON: Fabian Ramsey MD.    ASSISTANT: none    PREOPERATIVE DIAGNOSIS: elevated PSA    POSTOPERATIVE DIAGNOSIS: elevated PSA    PROCEDURE PERFORMED: Transrectal Ultrasound Guided Prostate Biopsy     ANESTHESIA: Monitor Anesthesia Care    COMPLICATIONS: none    OR BLOOD LOSS:  Minimal    FLUIDS: Cystalloids per Anesthesia    SPECIMENS:  ID Type Source Tests Collected by Time Destination   A : RLB- RIGHT LATERAL BASE Tissue Prostate SURGICAL PATHOLOGY Fabian Ramsey MD 1/22/2024 1125    B : RB- RIGHT BASE Tissue Prostate SURGICAL PATHOLOGY Fabian Ramsey MD 1/22/2024 1125    C : RLM- RIGHT LATERAL MID Tissue Prostate SURGICAL PATHOLOGY Fabian Ramsey MD 1/22/2024 1125    D : RM- RIGHT MID Tissue Prostate SURGICAL PATHOLOGY Fabian Ramsey MD 1/22/2024 1125    E : RLA- RIGHT LATERAL APEX Tissue Prostate SURGICAL PATHOLOGY Fabian Ramsey MD 1/22/2024 1125    F : RA- RIGHT APEX Tissue Prostate SURGICAL PATHOLOGY Fabian Ramsey MD 1/22/2024 1126    G : LLB- LEFT LATERAL BASE Tissue Prostate SURGICAL PATHOLOGY Fabian Ramsey MD 1/22/2024 1126    H : LB- LEFT BASE Tissue Prostate SURGICAL PATHOLOGY Fabian Ramsey MD 1/22/2024 1126    I : LLM- LEFT LATERAL MID Tissue Prostate SURGICAL PATHOLOGY Fabian Ramsey MD 1/22/2024 1126    J : LM- LEFT MID Tissue Prostate SURGICAL PATHOLOGY Fabian Ramsey MD 1/22/2024 1126    K : LLA- LEFT LATERAL APEX Tissue Prostate SURGICAL PATHOLOGY Fabian Ramsey MD 1/22/2024 1126    L : LA- LEFT APEX Tissue Prostate SURGICAL PATHOLOGY Fabian Ramsey MD 1/22/2024 1126          DRAINS: none    DETAILS OF PROCEDURE:  After informed consent was obtain, the patient was taken to the operative suite. He remained on the [facility].  .     He was rolled onto the side. The legs were brought toward the chest. Anesthesia was induced. Antibiotics were given. A timeout occurred

## 2024-01-31 ENCOUNTER — HOSPITAL ENCOUNTER (OUTPATIENT)
Age: 79
Discharge: HOME | End: 2024-01-31
Payer: MEDICARE

## 2024-01-31 DIAGNOSIS — D64.9 ANEMIA, UNSPECIFIED TYPE: ICD-10-CM

## 2024-01-31 LAB
BASOPHILS # BLD: 0.06 K/UL (ref 0–0.2)
BASOPHILS NFR BLD: 1 % (ref 0–2)
EOSINOPHIL # BLD: 0.51 K/UL (ref 0–0.44)
EOSINOPHILS RELATIVE PERCENT: 7 % (ref 1–4)
ERYTHROCYTE [DISTWIDTH] IN BLOOD BY AUTOMATED COUNT: 15.7 % (ref 11.8–14.4)
HCT VFR BLD AUTO: 32.5 % (ref 40.7–50.3)
HGB BLD-MCNC: 10.6 G/DL (ref 13–17)
IMM GRANULOCYTES # BLD AUTO: 0.03 K/UL (ref 0–0.3)
IMM GRANULOCYTES NFR BLD: 0 %
LYMPHOCYTES NFR BLD: 2.13 K/UL (ref 1.1–3.7)
LYMPHOCYTES RELATIVE PERCENT: 28 % (ref 24–43)
MCH RBC QN AUTO: 31.2 PG (ref 25.2–33.5)
MCHC RBC AUTO-ENTMCNC: 32.6 G/DL (ref 25.2–33.5)
MCV RBC AUTO: 95.6 FL (ref 82.6–102.9)
MONOCYTES NFR BLD: 0.57 K/UL (ref 0.1–1.2)
MONOCYTES NFR BLD: 7 % (ref 3–12)
NEUTROPHILS NFR BLD: 57 % (ref 36–65)
NEUTS SEG NFR BLD: 4.43 K/UL (ref 1.5–8.1)
NRBC BLD-RTO: 0 PER 100 WBC
PLATELET # BLD AUTO: 168 K/UL (ref 138–453)
PMV BLD AUTO: 10.3 FL (ref 8.1–13.5)
RBC # BLD AUTO: 3.4 M/UL (ref 4.21–5.77)
RBC # BLD: ABNORMAL 10*6/UL
WBC OTHER # BLD: 7.7 K/UL (ref 3.5–11.3)

## 2024-01-31 PROCEDURE — 36415 COLL VENOUS BLD VENIPUNCTURE: CPT

## 2024-01-31 PROCEDURE — 85025 COMPLETE CBC W/AUTO DIFF WBC: CPT

## 2024-02-02 NOTE — PROGRESS NOTES
(Bilateral, 05/10/2001); Knee arthroscopy (Right, 3/16/2007); Elbow surgery (Right); Vasectomy; Coronary artery bypass graft; Cataract removal (Bilateral, Nov. and Dec 2014); Prostate Biopsy (Bilateral, 04/08/1999); Prostate Biopsy (Bilateral, 06/18/1998); Prostate Biopsy (Bilateral, 01/07/1998); Prostate Biopsy (Bilateral, 10/04/1996); Prostate surgery (11/02/2018); pr laser vaporization of prostate for urine flow (N/A, 11/2/2018); Colonoscopy (N/A, 10/28/2019); eye surgery; Cardiac catheterization (6/2005); Cardiac catheterization (3/16/16); Cardiac catheterization (05/07/2021); and Prostate biopsy (N/A, 1/22/2024).    Family History  This patient's family history includes Coronary Art Dis in his father and mother; Diabetes in his mother; Hypertension in his brother and mother; Kidney Disease in his mother; Lung Cancer in his father; Other in his brother.    Social History  Brant  reports that he quit smoking about 31 years ago. His smoking use included cigarettes. He started smoking about 56 years ago. He has a 13.5 pack-year smoking history. He has never used smokeless tobacco. He reports current alcohol use of about 1.0 standard drink of alcohol per week. He reports that he does not use drugs.      Subjective:      REVIEW OF SYSTEMS:  Constitutional: negative  Eyes: negative  Respiratory: negative  Cardiovascular: negative  Gastrointestinal: negative  Musculoskeletal: negative  Genitourinary: negative except for what is in HPI  Skin: negative   Neurological: negative  Hematological/Lymphatic: negative  Psychological: negative    Objective:   /66   Pulse 60   Ht 1.727 m (5' 8\")   Wt 86.6 kg (191 lb)   BMI 29.04 kg/m²     Patient is a 78 y.o. male in no acute distress and alert and oriented to person, place and time.    Pulmonary: Non-labored respiration.  Cardiovascular: Normal rate and regular rhythm  Skin: Warm and dry.  Psych: Normal mood and affect.  Genitourinary: Bladder non-distended and

## 2024-02-05 ENCOUNTER — OFFICE VISIT (OUTPATIENT)
Dept: UROLOGY | Age: 79
End: 2024-02-05
Payer: MEDICARE

## 2024-02-05 ENCOUNTER — TELEPHONE (OUTPATIENT)
Dept: INTERNAL MEDICINE | Age: 79
End: 2024-02-05

## 2024-02-05 ENCOUNTER — OFFICE VISIT (OUTPATIENT)
Dept: ONCOLOGY | Age: 79
End: 2024-02-05
Payer: MEDICARE

## 2024-02-05 VITALS
WEIGHT: 191 LBS | HEIGHT: 68 IN | SYSTOLIC BLOOD PRESSURE: 124 MMHG | HEART RATE: 60 BPM | DIASTOLIC BLOOD PRESSURE: 66 MMHG | BODY MASS INDEX: 28.95 KG/M2

## 2024-02-05 VITALS
BODY MASS INDEX: 29.04 KG/M2 | HEIGHT: 68 IN | HEART RATE: 60 BPM | TEMPERATURE: 96.6 F | SYSTOLIC BLOOD PRESSURE: 124 MMHG | OXYGEN SATURATION: 99 % | DIASTOLIC BLOOD PRESSURE: 66 MMHG | WEIGHT: 191.6 LBS

## 2024-02-05 DIAGNOSIS — E11.9 TYPE 2 DIABETES MELLITUS WITHOUT COMPLICATION, WITHOUT LONG-TERM CURRENT USE OF INSULIN (HCC): ICD-10-CM

## 2024-02-05 DIAGNOSIS — I10 PRIMARY HYPERTENSION: ICD-10-CM

## 2024-02-05 DIAGNOSIS — R97.20 ELEVATED PSA: ICD-10-CM

## 2024-02-05 DIAGNOSIS — N52.9 ERECTILE DYSFUNCTION, UNSPECIFIED ERECTILE DYSFUNCTION TYPE: ICD-10-CM

## 2024-02-05 DIAGNOSIS — N40.0 BENIGN PROSTATIC HYPERPLASIA, UNSPECIFIED WHETHER LOWER URINARY TRACT SYMPTOMS PRESENT: Primary | ICD-10-CM

## 2024-02-05 DIAGNOSIS — D64.9 ANEMIA, UNSPECIFIED TYPE: Primary | ICD-10-CM

## 2024-02-05 PROCEDURE — G8484 FLU IMMUNIZE NO ADMIN: HCPCS | Performed by: INTERNAL MEDICINE

## 2024-02-05 PROCEDURE — 3078F DIAST BP <80 MM HG: CPT | Performed by: INTERNAL MEDICINE

## 2024-02-05 PROCEDURE — 3074F SYST BP LT 130 MM HG: CPT | Performed by: NURSE PRACTITIONER

## 2024-02-05 PROCEDURE — 99213 OFFICE O/P EST LOW 20 MIN: CPT | Performed by: INTERNAL MEDICINE

## 2024-02-05 PROCEDURE — G8417 CALC BMI ABV UP PARAM F/U: HCPCS | Performed by: INTERNAL MEDICINE

## 2024-02-05 PROCEDURE — 99212 OFFICE O/P EST SF 10 MIN: CPT | Performed by: NURSE PRACTITIONER

## 2024-02-05 PROCEDURE — 1123F ACP DISCUSS/DSCN MKR DOCD: CPT | Performed by: INTERNAL MEDICINE

## 2024-02-05 PROCEDURE — 99214 OFFICE O/P EST MOD 30 MIN: CPT | Performed by: INTERNAL MEDICINE

## 2024-02-05 PROCEDURE — 99214 OFFICE O/P EST MOD 30 MIN: CPT | Performed by: NURSE PRACTITIONER

## 2024-02-05 PROCEDURE — G8427 DOCREV CUR MEDS BY ELIG CLIN: HCPCS | Performed by: INTERNAL MEDICINE

## 2024-02-05 PROCEDURE — G8484 FLU IMMUNIZE NO ADMIN: HCPCS | Performed by: NURSE PRACTITIONER

## 2024-02-05 PROCEDURE — 3078F DIAST BP <80 MM HG: CPT | Performed by: NURSE PRACTITIONER

## 2024-02-05 PROCEDURE — 3074F SYST BP LT 130 MM HG: CPT | Performed by: INTERNAL MEDICINE

## 2024-02-05 PROCEDURE — 1123F ACP DISCUSS/DSCN MKR DOCD: CPT | Performed by: NURSE PRACTITIONER

## 2024-02-05 PROCEDURE — 1036F TOBACCO NON-USER: CPT | Performed by: NURSE PRACTITIONER

## 2024-02-05 PROCEDURE — G8417 CALC BMI ABV UP PARAM F/U: HCPCS | Performed by: NURSE PRACTITIONER

## 2024-02-05 PROCEDURE — 1036F TOBACCO NON-USER: CPT | Performed by: INTERNAL MEDICINE

## 2024-02-05 PROCEDURE — G8427 DOCREV CUR MEDS BY ELIG CLIN: HCPCS | Performed by: NURSE PRACTITIONER

## 2024-02-05 RX ORDER — CARVEDILOL 12.5 MG/1
12.5 TABLET ORAL 2 TIMES DAILY
Qty: 180 TABLET | Refills: 1 | Status: SHIPPED | OUTPATIENT
Start: 2024-02-05

## 2024-02-05 NOTE — PATIENT INSTRUCTIONS
Avoid sexual activity, riding , tractor or bicycle 1 week prior to getting PSA drawn.    Call sooner with any questions or concerns.

## 2024-02-05 NOTE — TELEPHONE ENCOUNTER
The attached needle, is not covered by formulary.  Writer attempted to review formulary, however was unable to find a list.  Can you write for a generic script please?

## 2024-02-05 NOTE — TELEPHONE ENCOUNTER
Pharmacy faxed requesting a refill of the below medication which has been pended for you:     Requested Prescriptions     Pending Prescriptions Disp Refills    carvedilol (COREG) 12.5 MG tablet 180 tablet 1     Sig: Take 1 tablet by mouth 2 times daily       Last Appointment Date: 11/30/2023  Next Appointment Date: 3/4/2024    No Known Allergies

## 2024-02-05 NOTE — PROGRESS NOTES
Patient ID: Brant Tirado, 1945, [id number], 78 y.o.  Referred by : Roby Chowdhury MD  Reason for consultation:   Anemia  CKD  HISTORY OF PRESENT ILLNESS:    Oncologic History:  Brant Tirado is a 78 y.o. male with past medical history of prostatic hypertrophy, CAD status post CABG was interventional consultation visit for anemia.    He denies any unintentional weight loss, drenching night sweats fever chills.  H denied any blood in stool. He is on b12 pills.   His recent lab work showed hb 10.7.  Pt has mild CKD.  Denied any SOB chest pain.    Interval history:  Patient is return for follow-up visit and to discuss lab results and further recommendations.  His hemoglobin is stable at 10.6 he denies any bleeding symptoms.  He is active physically and exercising 3 times weekly.  He denies any chest pain or shortness of breath.  He recently was seen by urology for enlarged prostate and had a prostate biopsy which was negative for malignancy.        During this visit patient's allergy, social, medical, surgical history and medications were reviewed and updated.    Past Medical History:   Diagnosis Date    Benign prostatic hypertrophy     with elevated PSA.    BPPV (benign paroxysmal positional vertigo) 2/2008    Chest pain 05/2021    Coronary heart disease     Status post CABG.    Diverticulosis     Dupuytren's contracture     Mid finger, right hand.    Erectile dysfunction     Bowens catheter in place 11/02/2018    has had one in place for 6weeks, this one in place x 2 weeks    Gout     Quiescent.    History of blood transfusion     possible with open heart surgery 26years ago    Hyperlipidemia     Hypertension     Positive cardiac stress test 05/06/2021    Snores     Type II or unspecified type diabetes mellitus without mention of complication, not stated as uncontrolled        Past Surgical History:   Procedure Laterality Date    CARDIAC CATHETERIZATION  6/2005    Triple vessel CAD with patent LIMA to

## 2024-02-06 RX ORDER — BLOOD SUGAR DIAGNOSTIC
STRIP MISCELLANEOUS
Qty: 90 EACH | Refills: 3 | Status: SHIPPED | OUTPATIENT
Start: 2024-02-06

## 2024-02-28 ENCOUNTER — HOSPITAL ENCOUNTER (OUTPATIENT)
Age: 79
Discharge: HOME | End: 2024-02-28
Payer: MEDICARE

## 2024-02-28 DIAGNOSIS — N18.32 TYPE 2 DIABETES MELLITUS WITH STAGE 3B CHRONIC KIDNEY DISEASE, WITH LONG-TERM CURRENT USE OF INSULIN (HCC): ICD-10-CM

## 2024-02-28 DIAGNOSIS — Z79.4 TYPE 2 DIABETES MELLITUS WITH STAGE 3B CHRONIC KIDNEY DISEASE, WITH LONG-TERM CURRENT USE OF INSULIN (HCC): ICD-10-CM

## 2024-02-28 DIAGNOSIS — I10 PRIMARY HYPERTENSION: ICD-10-CM

## 2024-02-28 DIAGNOSIS — E53.8 B12 DEFICIENCY: ICD-10-CM

## 2024-02-28 DIAGNOSIS — E11.22 TYPE 2 DIABETES MELLITUS WITH STAGE 3B CHRONIC KIDNEY DISEASE, WITH LONG-TERM CURRENT USE OF INSULIN (HCC): ICD-10-CM

## 2024-02-28 LAB
ANION GAP SERPL CALCULATED.3IONS-SCNC: 10 MMOL/L (ref 9–17)
BUN SERPL-MCNC: 37 MG/DL (ref 8–23)
BUN/CREAT SERPL: 16 (ref 9–20)
CALCIUM SERPL-MCNC: 8.8 MG/DL (ref 8.6–10.4)
CHLORIDE SERPL-SCNC: 106 MMOL/L (ref 98–107)
CO2 SERPL-SCNC: 25 MMOL/L (ref 20–31)
CREAT SERPL-MCNC: 2.3 MG/DL (ref 0.7–1.2)
CREAT UR-MCNC: 54.4 MG/DL (ref 39–259)
EST. AVERAGE GLUCOSE BLD GHB EST-MCNC: 154 MG/DL
FOLATE SERPL-MCNC: 9.3 NG/ML
GFR SERPL CREATININE-BSD FRML MDRD: 28 ML/MIN/1.73M2
GLUCOSE SERPL-MCNC: 124 MG/DL (ref 70–99)
HBA1C MFR BLD: 7 % (ref 4–6)
MICROALBUMIN UR-MCNC: 40 MG/L
MICROALBUMIN/CREAT UR-RTO: 74 MCG/MG CREAT
POTASSIUM SERPL-SCNC: 4.1 MMOL/L (ref 3.7–5.3)
SODIUM SERPL-SCNC: 141 MMOL/L (ref 135–144)
VIT B12 SERPL-MCNC: 370 PG/ML (ref 232–1245)

## 2024-02-28 PROCEDURE — 83036 HEMOGLOBIN GLYCOSYLATED A1C: CPT

## 2024-02-28 PROCEDURE — 82607 VITAMIN B-12: CPT

## 2024-02-28 PROCEDURE — 82043 UR ALBUMIN QUANTITATIVE: CPT

## 2024-02-28 PROCEDURE — 80048 BASIC METABOLIC PNL TOTAL CA: CPT

## 2024-02-28 PROCEDURE — 36415 COLL VENOUS BLD VENIPUNCTURE: CPT

## 2024-02-28 PROCEDURE — 82746 ASSAY OF FOLIC ACID SERUM: CPT

## 2024-02-28 PROCEDURE — 82570 ASSAY OF URINE CREATININE: CPT

## 2024-03-04 ENCOUNTER — OFFICE VISIT (OUTPATIENT)
Dept: INTERNAL MEDICINE | Age: 79
End: 2024-03-04
Payer: MEDICARE

## 2024-03-04 VITALS
WEIGHT: 195 LBS | HEART RATE: 66 BPM | RESPIRATION RATE: 16 BRPM | OXYGEN SATURATION: 97 % | BODY MASS INDEX: 29.55 KG/M2 | SYSTOLIC BLOOD PRESSURE: 116 MMHG | HEIGHT: 68 IN | DIASTOLIC BLOOD PRESSURE: 64 MMHG

## 2024-03-04 DIAGNOSIS — E11.22 TYPE 2 DIABETES MELLITUS WITH STAGE 4 CHRONIC KIDNEY DISEASE, WITHOUT LONG-TERM CURRENT USE OF INSULIN (HCC): ICD-10-CM

## 2024-03-04 DIAGNOSIS — E78.49 OTHER HYPERLIPIDEMIA: ICD-10-CM

## 2024-03-04 DIAGNOSIS — R60.0 PEDAL EDEMA: ICD-10-CM

## 2024-03-04 DIAGNOSIS — N18.4 TYPE 2 DIABETES MELLITUS WITH STAGE 4 CHRONIC KIDNEY DISEASE, WITHOUT LONG-TERM CURRENT USE OF INSULIN (HCC): ICD-10-CM

## 2024-03-04 DIAGNOSIS — I25.119 CHEST PAIN DUE TO CAD (HCC): ICD-10-CM

## 2024-03-04 DIAGNOSIS — R79.89 ELEVATED SERUM CREATININE: ICD-10-CM

## 2024-03-04 DIAGNOSIS — I10 PRIMARY HYPERTENSION: Primary | ICD-10-CM

## 2024-03-04 PROCEDURE — 1036F TOBACCO NON-USER: CPT | Performed by: INTERNAL MEDICINE

## 2024-03-04 PROCEDURE — 3074F SYST BP LT 130 MM HG: CPT | Performed by: INTERNAL MEDICINE

## 2024-03-04 PROCEDURE — 3078F DIAST BP <80 MM HG: CPT | Performed by: INTERNAL MEDICINE

## 2024-03-04 PROCEDURE — 3051F HG A1C>EQUAL 7.0%<8.0%: CPT | Performed by: INTERNAL MEDICINE

## 2024-03-04 PROCEDURE — G8417 CALC BMI ABV UP PARAM F/U: HCPCS | Performed by: INTERNAL MEDICINE

## 2024-03-04 PROCEDURE — 99212 OFFICE O/P EST SF 10 MIN: CPT | Performed by: INTERNAL MEDICINE

## 2024-03-04 PROCEDURE — 1123F ACP DISCUSS/DSCN MKR DOCD: CPT | Performed by: INTERNAL MEDICINE

## 2024-03-04 PROCEDURE — G8484 FLU IMMUNIZE NO ADMIN: HCPCS | Performed by: INTERNAL MEDICINE

## 2024-03-04 PROCEDURE — G8427 DOCREV CUR MEDS BY ELIG CLIN: HCPCS | Performed by: INTERNAL MEDICINE

## 2024-03-04 PROCEDURE — 99214 OFFICE O/P EST MOD 30 MIN: CPT | Performed by: INTERNAL MEDICINE

## 2024-03-04 RX ORDER — GLIMEPIRIDE 4 MG/1
8 TABLET ORAL DAILY
Qty: 180 TABLET | Refills: 3 | Status: SHIPPED | OUTPATIENT
Start: 2024-03-04

## 2024-03-04 SDOH — ECONOMIC STABILITY: INCOME INSECURITY: HOW HARD IS IT FOR YOU TO PAY FOR THE VERY BASICS LIKE FOOD, HOUSING, MEDICAL CARE, AND HEATING?: NOT HARD AT ALL

## 2024-03-04 SDOH — ECONOMIC STABILITY: FOOD INSECURITY: WITHIN THE PAST 12 MONTHS, THE FOOD YOU BOUGHT JUST DIDN'T LAST AND YOU DIDN'T HAVE MONEY TO GET MORE.: NEVER TRUE

## 2024-03-04 SDOH — ECONOMIC STABILITY: FOOD INSECURITY: WITHIN THE PAST 12 MONTHS, YOU WORRIED THAT YOUR FOOD WOULD RUN OUT BEFORE YOU GOT MONEY TO BUY MORE.: NEVER TRUE

## 2024-03-04 ASSESSMENT — ENCOUNTER SYMPTOMS
SHORTNESS OF BREATH: 0
COUGH: 0
BLOOD IN STOOL: 0
ABDOMINAL PAIN: 0
DIARRHEA: 0
BACK PAIN: 0
EYE PAIN: 0
NAUSEA: 0
CONSTIPATION: 0
VOMITING: 0

## 2024-03-04 ASSESSMENT — PATIENT HEALTH QUESTIONNAIRE - PHQ9
2. FEELING DOWN, DEPRESSED OR HOPELESS: 0
SUM OF ALL RESPONSES TO PHQ9 QUESTIONS 1 & 2: 0
SUM OF ALL RESPONSES TO PHQ QUESTIONS 1-9: 0
1. LITTLE INTEREST OR PLEASURE IN DOING THINGS: 0
SUM OF ALL RESPONSES TO PHQ QUESTIONS 1-9: 0

## 2024-03-04 NOTE — PROGRESS NOTES
3/16/16    CARDIAC CATHETERIZATION  2021    CATARACT REMOVAL Bilateral Nov. and Dec 2014    COLONOSCOPY  2008    Distal ileoscopy. Scattered diverticulosis and extensive diverticular disease of sigmoid colon and internal hemorrhoids.    COLONOSCOPY N/A 10/28/2019    COLONOSCOPY performed by Hal Carr MD at [facility] OR  severe diverticulosis    CORONARY ARTERY BYPASS GRAFT      ELBOW SURGERY Right     Surgical repair.    EYE SURGERY      KNEE ARTHROSCOPY Right 3/16/2007    Partial medial meniscectomy, partial medial and lateral synovectomy, light chondroplasty.    OR LASER VAPORIZATION OF PROSTATE FOR URINE FLOW N/A 2018    CYSTOSCOPY, TRANSURETHRAL RESECTION PROSTATE - GREENLIGHT XPS LASER ([facility] # [id number] HAILEE) performed by Fabian Ramsey MD at [facility] OR    PROSTATE BIOPSY Bilateral 9/15/2010,     [facility] Dr. Orozco    PROSTATE BIOPSY Bilateral 05/10/2001    Benign-Dr. Balderas    PROSTATE BIOPSY Bilateral 1999    Benign-Dr. Sullivan    PROSTATE BIOPSY Bilateral 1998    Benign-Dr. Velasquez    PROSTATE BIOPSY Bilateral 1998    Benign-Dr. Velasquez    PROSTATE BIOPSY Bilateral 10/04/1996    Benign-Dr. Gonzalez    PROSTATE BIOPSY N/A 2024    Trans Rectal Ultrasound Guided PROSTATE BIOPSY performed by Fabian Ramsey MD at [facility] OR    PROSTATE SURGERY  2018    CYSTOSCOPY, TRANSURETHRAL RESECTION PROSTATE - GREENLIGHT     VASECTOMY         Family History   Problem Relation Age of Onset    Diabetes Mother     Kidney Disease Mother         Renal failure    Coronary Art Dis Mother     Hypertension Mother     Lung Cancer Father     Coronary Art Dis Father     Hypertension Brother     Other Brother         Hypernephroma          Social History     Tobacco Use    Smoking status: Former     Current packs/day: 0.00     Average packs/day: 0.5 packs/day for 27.0 years (13.5 ttl pk-yrs)     Types: Cigarettes     Start date: 1968     Quit date: 10/11/1992     Years since quittin.4    Smokeless

## 2024-03-11 ENCOUNTER — OFFICE VISIT (OUTPATIENT)
Dept: DIABETES SERVICES | Age: 79
End: 2024-03-11
Payer: MEDICARE

## 2024-03-11 VITALS
OXYGEN SATURATION: 99 % | WEIGHT: 194 LBS | HEART RATE: 60 BPM | BODY MASS INDEX: 29.4 KG/M2 | HEIGHT: 68 IN | SYSTOLIC BLOOD PRESSURE: 138 MMHG | DIASTOLIC BLOOD PRESSURE: 80 MMHG

## 2024-03-11 DIAGNOSIS — Z79.4 TYPE 2 DIABETES MELLITUS WITH STAGE 3B CHRONIC KIDNEY DISEASE, WITH LONG-TERM CURRENT USE OF INSULIN (HCC): Primary | ICD-10-CM

## 2024-03-11 DIAGNOSIS — I10 PRIMARY HYPERTENSION: ICD-10-CM

## 2024-03-11 DIAGNOSIS — E78.49 OTHER HYPERLIPIDEMIA: ICD-10-CM

## 2024-03-11 DIAGNOSIS — N18.32 TYPE 2 DIABETES MELLITUS WITH STAGE 3B CHRONIC KIDNEY DISEASE, WITH LONG-TERM CURRENT USE OF INSULIN (HCC): Primary | ICD-10-CM

## 2024-03-11 DIAGNOSIS — E11.22 TYPE 2 DIABETES MELLITUS WITH STAGE 3B CHRONIC KIDNEY DISEASE, WITH LONG-TERM CURRENT USE OF INSULIN (HCC): Primary | ICD-10-CM

## 2024-03-11 PROCEDURE — 3079F DIAST BP 80-89 MM HG: CPT | Performed by: NURSE PRACTITIONER

## 2024-03-11 PROCEDURE — 99212 OFFICE O/P EST SF 10 MIN: CPT | Performed by: NURSE PRACTITIONER

## 2024-03-11 PROCEDURE — 99214 OFFICE O/P EST MOD 30 MIN: CPT | Performed by: NURSE PRACTITIONER

## 2024-03-11 PROCEDURE — 3075F SYST BP GE 130 - 139MM HG: CPT | Performed by: NURSE PRACTITIONER

## 2024-03-11 PROCEDURE — G8427 DOCREV CUR MEDS BY ELIG CLIN: HCPCS | Performed by: NURSE PRACTITIONER

## 2024-03-11 PROCEDURE — 95251 CONT GLUC MNTR ANALYSIS I&R: CPT | Performed by: NURSE PRACTITIONER

## 2024-03-11 PROCEDURE — G8417 CALC BMI ABV UP PARAM F/U: HCPCS | Performed by: NURSE PRACTITIONER

## 2024-03-11 PROCEDURE — 1036F TOBACCO NON-USER: CPT | Performed by: NURSE PRACTITIONER

## 2024-03-11 PROCEDURE — 1123F ACP DISCUSS/DSCN MKR DOCD: CPT | Performed by: NURSE PRACTITIONER

## 2024-03-11 PROCEDURE — G8484 FLU IMMUNIZE NO ADMIN: HCPCS | Performed by: NURSE PRACTITIONER

## 2024-03-11 PROCEDURE — 3051F HG A1C>EQUAL 7.0%<8.0%: CPT | Performed by: NURSE PRACTITIONER

## 2024-03-11 RX ORDER — BLOOD SUGAR DIAGNOSTIC
STRIP MISCELLANEOUS
Qty: 200 EACH | Refills: 3 | Status: SHIPPED | OUTPATIENT
Start: 2024-03-11

## 2024-03-11 RX ORDER — INSULIN DEGLUDEC 200 U/ML
10 INJECTION, SOLUTION SUBCUTANEOUS DAILY
Qty: 3 ADJUSTABLE DOSE PRE-FILLED PEN SYRINGE | Refills: 3 | Status: SHIPPED | OUTPATIENT
Start: 2024-03-11

## 2024-03-11 RX ORDER — INSULIN ASPART 100 [IU]/ML
10 INJECTION, SOLUTION INTRAVENOUS; SUBCUTANEOUS
Qty: 5 ADJUSTABLE DOSE PRE-FILLED PEN SYRINGE | Refills: 3
Start: 2024-03-11 | End: 2024-03-11

## 2024-03-11 RX ORDER — INSULIN ASPART INJECTION 100 [IU]/ML
10 INJECTION, SOLUTION SUBCUTANEOUS
Qty: 10 ADJUSTABLE DOSE PRE-FILLED PEN SYRINGE | Refills: 3 | Status: SHIPPED | OUTPATIENT
Start: 2024-03-11

## 2024-03-11 RX ORDER — BLOOD PRESSURE TEST KIT
KIT MISCELLANEOUS
Qty: 200 EACH | Refills: 5 | Status: SHIPPED | OUTPATIENT
Start: 2024-03-11

## 2024-03-11 ASSESSMENT — ENCOUNTER SYMPTOMS
DIARRHEA: 0
ABDOMINAL PAIN: 0
SHORTNESS OF BREATH: 0
RESPIRATORY NEGATIVE: 1

## 2024-03-11 NOTE — PROGRESS NOTES
hyper/hypoglycemia. he states they are taking their medications as prescribed without any adverse effects.   Diet: low carb   Exercise: walking  BS testing: uses cgm daily with success and is adherent to cgm therapy  Hypoglycemia: No  Hypoglycemia as needed treatment: snack  Issues:   Diabetic foot exam up-to-date: Yes  Diabetic retinal exam up-to-date: Yes     Diabetes complications:nephropathy, impotence, and cardiovascular disease      2. Other hyperlipidemia  stable, lipid panel reviewed, continue current medications. Diet and exercise.         3. Primary hypertension   stable, continue current medications. Diet and exercise Seek emergent care if chest pain develops.         Past Medical History:   Diagnosis Date    Benign prostatic hypertrophy     with elevated PSA.    BPPV (benign paroxysmal positional vertigo) 2/2008    Chest pain 05/2021    Coronary heart disease     Status post CABG.    Diverticulosis     Dupuytren's contracture     Mid finger, right hand.    Erectile dysfunction     Bowens catheter in place 11/02/2018    has had one in place for 6weeks, this one in place x 2 weeks    Gout     Quiescent.    History of blood transfusion     possible with open heart surgery 26years ago    Hyperlipidemia     Hypertension     Positive cardiac stress test 05/06/2021    Snores     Type II or unspecified type diabetes mellitus without mention of complication, not stated as uncontrolled      Family History   Problem Relation Age of Onset    Diabetes Mother     Kidney Disease Mother         Renal failure    Coronary Art Dis Mother     Hypertension Mother     Lung Cancer Father     Coronary Art Dis Father     Hypertension Brother     Other Brother         Hypernephroma     Social History     Tobacco Use    Smoking status: Former     Current packs/day: 0.00     Average packs/day: 0.5 packs/day for 27.0 years (13.5 ttl pk-yrs)     Types: Cigarettes     Start date: 1/1/1968     Quit date: 10/11/1992     Years since

## 2024-03-12 ENCOUNTER — HOSPITAL ENCOUNTER (OUTPATIENT)
Age: 79
Discharge: HOME | End: 2024-03-12
Payer: MEDICARE

## 2024-03-12 DIAGNOSIS — R60.0 PEDAL EDEMA: ICD-10-CM

## 2024-03-12 DIAGNOSIS — I10 PRIMARY HYPERTENSION: ICD-10-CM

## 2024-03-12 LAB
ANION GAP SERPL CALCULATED.3IONS-SCNC: 11 MMOL/L (ref 9–17)
BUN SERPL-MCNC: 37 MG/DL (ref 8–23)
BUN/CREAT SERPL: 18 (ref 9–20)
CALCIUM SERPL-MCNC: 9 MG/DL (ref 8.6–10.4)
CHLORIDE SERPL-SCNC: 106 MMOL/L (ref 98–107)
CO2 SERPL-SCNC: 26 MMOL/L (ref 20–31)
CREAT SERPL-MCNC: 2.1 MG/DL (ref 0.7–1.2)
GFR SERPL CREATININE-BSD FRML MDRD: 32 ML/MIN/1.73M2
GLUCOSE SERPL-MCNC: 246 MG/DL (ref 70–99)
POTASSIUM SERPL-SCNC: 4.6 MMOL/L (ref 3.7–5.3)
SODIUM SERPL-SCNC: 143 MMOL/L (ref 135–144)

## 2024-03-12 PROCEDURE — 80048 BASIC METABOLIC PNL TOTAL CA: CPT

## 2024-03-12 PROCEDURE — 36415 COLL VENOUS BLD VENIPUNCTURE: CPT

## 2024-04-22 ENCOUNTER — TELEPHONE (OUTPATIENT)
Dept: DIABETES SERVICES | Age: 79
End: 2024-04-22

## 2024-04-22 NOTE — TELEPHONE ENCOUNTER
Patient rescheduled from 8/12/24 at 12pm to 8/15/24 at 11am with Crystal. Last OV with Crystal 3/11/24. Hemoglobin A1c and BMP ordered for prior to PCP appt 6/10/24. Are there any additional labs needed prior to 8/15/24 OV? Please advise - patient wants to double check. Thank you.

## 2024-04-23 NOTE — TELEPHONE ENCOUNTER
Called patient.  Informed patient that no further labs would be needed in August, and that his next A1C would be due in September. Patient stated understanding. No further questions at this time.

## 2024-06-05 ENCOUNTER — HOSPITAL ENCOUNTER (OUTPATIENT)
Age: 79
Discharge: HOME | End: 2024-06-05
Payer: MEDICARE

## 2024-06-05 DIAGNOSIS — I10 PRIMARY HYPERTENSION: ICD-10-CM

## 2024-06-05 DIAGNOSIS — R60.0 PEDAL EDEMA: ICD-10-CM

## 2024-06-05 DIAGNOSIS — E11.22 TYPE 2 DIABETES MELLITUS WITH STAGE 4 CHRONIC KIDNEY DISEASE, WITHOUT LONG-TERM CURRENT USE OF INSULIN (HCC): ICD-10-CM

## 2024-06-05 DIAGNOSIS — N18.4 TYPE 2 DIABETES MELLITUS WITH STAGE 4 CHRONIC KIDNEY DISEASE, WITHOUT LONG-TERM CURRENT USE OF INSULIN (HCC): ICD-10-CM

## 2024-06-05 LAB
ANION GAP SERPL CALCULATED.3IONS-SCNC: 8 MMOL/L (ref 9–17)
BUN SERPL-MCNC: 38 MG/DL (ref 8–23)
BUN/CREAT SERPL: 19 (ref 9–20)
CALCIUM SERPL-MCNC: 9.1 MG/DL (ref 8.6–10.4)
CHLORIDE SERPL-SCNC: 110 MMOL/L (ref 98–107)
CO2 SERPL-SCNC: 21 MMOL/L (ref 20–31)
CREAT SERPL-MCNC: 2 MG/DL (ref 0.7–1.2)
EST. AVERAGE GLUCOSE BLD GHB EST-MCNC: 169 MG/DL
GFR, ESTIMATED: 34 ML/MIN/1.73M2
GLUCOSE SERPL-MCNC: 100 MG/DL (ref 70–99)
HBA1C MFR BLD: 7.5 % (ref 4–6)
POTASSIUM SERPL-SCNC: 4.1 MMOL/L (ref 3.7–5.3)
SODIUM SERPL-SCNC: 139 MMOL/L (ref 135–144)

## 2024-06-05 PROCEDURE — 80048 BASIC METABOLIC PNL TOTAL CA: CPT

## 2024-06-05 PROCEDURE — 83036 HEMOGLOBIN GLYCOSYLATED A1C: CPT

## 2024-06-05 PROCEDURE — 36415 COLL VENOUS BLD VENIPUNCTURE: CPT

## 2024-06-10 ENCOUNTER — OFFICE VISIT (OUTPATIENT)
Dept: INTERNAL MEDICINE | Age: 79
End: 2024-06-10
Payer: MEDICARE

## 2024-06-10 VITALS
WEIGHT: 192 LBS | HEIGHT: 68 IN | OXYGEN SATURATION: 99 % | SYSTOLIC BLOOD PRESSURE: 128 MMHG | DIASTOLIC BLOOD PRESSURE: 70 MMHG | RESPIRATION RATE: 16 BRPM | HEART RATE: 71 BPM | BODY MASS INDEX: 29.1 KG/M2

## 2024-06-10 DIAGNOSIS — I25.119 CORONARY ARTERY DISEASE INVOLVING NATIVE HEART WITH ANGINA PECTORIS, UNSPECIFIED VESSEL OR LESION TYPE (HCC): ICD-10-CM

## 2024-06-10 DIAGNOSIS — E78.49 OTHER HYPERLIPIDEMIA: ICD-10-CM

## 2024-06-10 DIAGNOSIS — I10 PRIMARY HYPERTENSION: Primary | ICD-10-CM

## 2024-06-10 DIAGNOSIS — Z79.4 TYPE 2 DIABETES MELLITUS WITH STAGE 3B CHRONIC KIDNEY DISEASE, WITH LONG-TERM CURRENT USE OF INSULIN (HCC): ICD-10-CM

## 2024-06-10 DIAGNOSIS — N18.32 TYPE 2 DIABETES MELLITUS WITH STAGE 3B CHRONIC KIDNEY DISEASE, WITH LONG-TERM CURRENT USE OF INSULIN (HCC): ICD-10-CM

## 2024-06-10 DIAGNOSIS — E11.22 TYPE 2 DIABETES MELLITUS WITH STAGE 3B CHRONIC KIDNEY DISEASE, WITH LONG-TERM CURRENT USE OF INSULIN (HCC): ICD-10-CM

## 2024-06-10 PROCEDURE — G8417 CALC BMI ABV UP PARAM F/U: HCPCS | Performed by: INTERNAL MEDICINE

## 2024-06-10 PROCEDURE — 3074F SYST BP LT 130 MM HG: CPT | Performed by: INTERNAL MEDICINE

## 2024-06-10 PROCEDURE — 99214 OFFICE O/P EST MOD 30 MIN: CPT | Performed by: INTERNAL MEDICINE

## 2024-06-10 PROCEDURE — 1036F TOBACCO NON-USER: CPT | Performed by: INTERNAL MEDICINE

## 2024-06-10 PROCEDURE — 3078F DIAST BP <80 MM HG: CPT | Performed by: INTERNAL MEDICINE

## 2024-06-10 PROCEDURE — G8427 DOCREV CUR MEDS BY ELIG CLIN: HCPCS | Performed by: INTERNAL MEDICINE

## 2024-06-10 PROCEDURE — 1123F ACP DISCUSS/DSCN MKR DOCD: CPT | Performed by: INTERNAL MEDICINE

## 2024-06-10 PROCEDURE — 3051F HG A1C>EQUAL 7.0%<8.0%: CPT | Performed by: INTERNAL MEDICINE

## 2024-06-10 RX ORDER — NITROGLYCERIN 0.4 MG/1
0.4 TABLET SUBLINGUAL EVERY 5 MIN PRN
Qty: 25 TABLET | Refills: 3 | Status: SHIPPED | OUTPATIENT
Start: 2024-06-10

## 2024-06-10 ASSESSMENT — ENCOUNTER SYMPTOMS
EYE PAIN: 0
VOMITING: 0
BLOOD IN STOOL: 0
NAUSEA: 0
ABDOMINAL PAIN: 0
DIARRHEA: 0
CONSTIPATION: 0
SHORTNESS OF BREATH: 0
COUGH: 0
BACK PAIN: 0

## 2024-06-10 NOTE — PROGRESS NOTES
[facility]  [facility]PART[facility]  [address] [facility] [address]  Dept: [phone]  Dept Fax: [phone]  Loc: [phone]     Brant Tirado is a 78 y.o. male who presents today for his medical conditions/complaintsas noted below.  Brant Tirado is c/o of   Chief Complaint   Patient presents with    Hypertension    Hyperlipidemia    Diabetes    Edema     Yunier feet and ankle         HPI:     Hypertension  This is a chronic problem. The current episode started more than 1 year ago. The problem has been waxing and waning since onset. The problem is controlled. Pertinent negatives include no chest pain, headaches, neck pain, palpitations or shortness of breath.   Hyperlipidemia  This is a chronic problem. The current episode started more than 1 year ago. The problem is controlled. Recent lipid tests were reviewed and are variable. Pertinent negatives include no chest pain or shortness of breath.   Diabetes  He presents for his follow-up diabetic visit. He has type 2 diabetes mellitus. The initial diagnosis of diabetes was made 13 years ago. His disease course has been fluctuating. Pertinent negatives for hypoglycemia include no confusion, dizziness, headaches, nervousness/anxiousness or pallor. Pertinent negatives for diabetes include no chest pain, no polydipsia, no polyuria and no weakness.       Hemoglobin A1C (%)   Date Value   06/05/2024 7.5 (H)   02/28/2024 7.0 (H)   11/27/2023 7.6 (H)            No components found for: \"LABMICR\"  No components found for: \"LDLCHOLESTEROL\", \"LDLCALC\"      AST (U/L)   Date Value   11/27/2023 14     ALT (U/L)   Date Value   11/27/2023 20     BUN (mg/dL)   Date Value   06/05/2024 38 (H)     BP Readings from Last 3 Encounters:   06/10/24 128/70   03/11/24 138/80   03/04/24 116/64              Past Medical History:   Diagnosis Date    Benign prostatic hypertrophy     with elevated PSA.    BPPV (benign paroxysmal

## 2024-06-20 DIAGNOSIS — E11.9 TYPE 2 DIABETES MELLITUS WITHOUT COMPLICATION, WITHOUT LONG-TERM CURRENT USE OF INSULIN (HCC): ICD-10-CM

## 2024-06-20 DIAGNOSIS — N18.32 TYPE 2 DIABETES MELLITUS WITH STAGE 3B CHRONIC KIDNEY DISEASE, WITH LONG-TERM CURRENT USE OF INSULIN (HCC): ICD-10-CM

## 2024-06-20 DIAGNOSIS — E11.22 TYPE 2 DIABETES MELLITUS WITH STAGE 3B CHRONIC KIDNEY DISEASE, WITH LONG-TERM CURRENT USE OF INSULIN (HCC): ICD-10-CM

## 2024-06-20 DIAGNOSIS — Z79.4 TYPE 2 DIABETES MELLITUS WITH STAGE 3B CHRONIC KIDNEY DISEASE, WITH LONG-TERM CURRENT USE OF INSULIN (HCC): ICD-10-CM

## 2024-06-20 RX ORDER — LANCETS
1 EACH MISCELLANEOUS DAILY
Qty: 100 EACH | Refills: 3 | Status: SHIPPED | OUTPATIENT
Start: 2024-06-20

## 2024-06-20 RX ORDER — CARVEDILOL 25 MG/1
TABLET, FILM COATED ORAL
Qty: 100 EACH | Refills: 3 | Status: SHIPPED | OUTPATIENT
Start: 2024-06-20

## 2024-06-20 NOTE — TELEPHONE ENCOUNTER
[facility] called in asking for a new script for pen needles ([id number]), lancets, and test strips as the patient is transitioning from [facility] and Medicare requires this. Scripts pended for all.       Requested Prescriptions     Pending Prescriptions Disp Refills    blood glucose test strips (CONTOUR TEST) strip 100 each 3     Sig: Use to test blood sugars once daily. DX: E11.9    ONE TOUCH ULTRASOFT LANCETS MISC 100 each 3     Si each by Does not apply route daily    Insulin Pen Needle 32G X 4 MM MISC 200 each 3     Sig: Use 2 pen needle daily       Last Appointment Date: 6/10/2024  Next Appointment Date: 2024    No Known Allergies

## 2024-07-02 DIAGNOSIS — Z79.4 TYPE 2 DIABETES MELLITUS WITH STAGE 3B CHRONIC KIDNEY DISEASE, WITH LONG-TERM CURRENT USE OF INSULIN (HCC): ICD-10-CM

## 2024-07-02 DIAGNOSIS — E11.22 TYPE 2 DIABETES MELLITUS WITH STAGE 3B CHRONIC KIDNEY DISEASE, WITH LONG-TERM CURRENT USE OF INSULIN (HCC): ICD-10-CM

## 2024-07-02 DIAGNOSIS — N18.32 TYPE 2 DIABETES MELLITUS WITH STAGE 3B CHRONIC KIDNEY DISEASE, WITH LONG-TERM CURRENT USE OF INSULIN (HCC): ICD-10-CM

## 2024-07-02 RX ORDER — INSULIN ASPART INJECTION 100 [IU]/ML
10 INJECTION, SOLUTION SUBCUTANEOUS
Qty: 10 ADJUSTABLE DOSE PRE-FILLED PEN SYRINGE | Refills: 3 | Status: SHIPPED | OUTPATIENT
Start: 2024-07-02

## 2024-07-02 NOTE — TELEPHONE ENCOUNTER
Refill request fiasp sent to [facility]      Medication pended if agreeable     Last Appt:  6/10/2024  Next Appt:   9/20/2024  Med verified in Epic

## 2024-07-11 ENCOUNTER — OFFICE VISIT (OUTPATIENT)
Dept: CARDIOLOGY | Age: 79
End: 2024-07-11
Payer: MEDICARE

## 2024-07-11 VITALS
HEART RATE: 68 BPM | HEIGHT: 68 IN | WEIGHT: 194.6 LBS | SYSTOLIC BLOOD PRESSURE: 130 MMHG | BODY MASS INDEX: 29.49 KG/M2 | DIASTOLIC BLOOD PRESSURE: 74 MMHG

## 2024-07-11 DIAGNOSIS — Z98.890 S/P CARDIAC CATH: ICD-10-CM

## 2024-07-11 DIAGNOSIS — I25.10 CORONARY ARTERY DISEASE INVOLVING NATIVE CORONARY ARTERY OF NATIVE HEART WITHOUT ANGINA PECTORIS: ICD-10-CM

## 2024-07-11 DIAGNOSIS — I10 HYPERTENSION, ESSENTIAL: ICD-10-CM

## 2024-07-11 DIAGNOSIS — R06.02 SOB (SHORTNESS OF BREATH): ICD-10-CM

## 2024-07-11 DIAGNOSIS — I51.89 DIASTOLIC DYSFUNCTION: ICD-10-CM

## 2024-07-11 DIAGNOSIS — E78.00 PURE HYPERCHOLESTEROLEMIA: ICD-10-CM

## 2024-07-11 DIAGNOSIS — I20.9 TYPICAL ANGINA (HCC): ICD-10-CM

## 2024-07-11 DIAGNOSIS — I50.23 CHF NYHA CLASS II, ACUTE ON CHRONIC, SYSTOLIC (HCC): ICD-10-CM

## 2024-07-11 DIAGNOSIS — E66.01 CLASS 2 SEVERE OBESITY DUE TO EXCESS CALORIES WITH SERIOUS COMORBIDITY IN ADULT, UNSPECIFIED BMI (HCC): ICD-10-CM

## 2024-07-11 DIAGNOSIS — I25.10 CORONARY ARTERY DISEASE DUE TO CALCIFIED CORONARY LESION: Primary | ICD-10-CM

## 2024-07-11 DIAGNOSIS — I25.84 CORONARY ARTERY DISEASE DUE TO CALCIFIED CORONARY LESION: Primary | ICD-10-CM

## 2024-07-11 PROCEDURE — 93005 ELECTROCARDIOGRAM TRACING: CPT | Performed by: INTERNAL MEDICINE

## 2024-07-11 PROCEDURE — 99213 OFFICE O/P EST LOW 20 MIN: CPT | Performed by: INTERNAL MEDICINE

## 2024-07-11 PROCEDURE — 1036F TOBACCO NON-USER: CPT | Performed by: INTERNAL MEDICINE

## 2024-07-11 PROCEDURE — 3075F SYST BP GE 130 - 139MM HG: CPT | Performed by: INTERNAL MEDICINE

## 2024-07-11 PROCEDURE — G8427 DOCREV CUR MEDS BY ELIG CLIN: HCPCS | Performed by: INTERNAL MEDICINE

## 2024-07-11 PROCEDURE — 93010 ELECTROCARDIOGRAM REPORT: CPT | Performed by: INTERNAL MEDICINE

## 2024-07-11 PROCEDURE — G8417 CALC BMI ABV UP PARAM F/U: HCPCS | Performed by: INTERNAL MEDICINE

## 2024-07-11 PROCEDURE — 99214 OFFICE O/P EST MOD 30 MIN: CPT | Performed by: INTERNAL MEDICINE

## 2024-07-11 PROCEDURE — 1123F ACP DISCUSS/DSCN MKR DOCD: CPT | Performed by: INTERNAL MEDICINE

## 2024-07-11 PROCEDURE — 3078F DIAST BP <80 MM HG: CPT | Performed by: INTERNAL MEDICINE

## 2024-07-11 NOTE — PROGRESS NOTES
auscultation: clear to auscultation bilaterally, minimal bibasilar rales  Cardiovascular:  The apical impulse is not displaced  Heart tones are crisp and normal. regular S1 and S2.  Jugular venous pulsation Normal  The carotid upstroke is normal in amplitude and contour without delay or bruit  Peripheral pulses are symmetrical and full   Abdomen:   No masses or tenderness  Bowel sounds present  Extremities:   No Cyanosis or Clubbing   Lower extremity edema: +1 bilateral pitting edema   Skin: Warm and dry    Cardiac Data:    EKG 9/28/23: sinus bradycardia, PVC, nonspecific T wave abnormality    Cath 5/7/2021   1. Severe Native Multivessel CAD   2. Patent LIMA-LAD, SVG-OM and SVG-RPDA grafts with mild disease   3. No significant Change from last angiogram in 2016    Echo 10/10/23    Left Ventricle: Low normal left ventricular systolic function with a visually estimated EF of 45 - 50%. EF by 2D Simpsons Biplane is 49%. Left ventricle size is normal. Mildly increased wall thickness. Normal wall motion. Abnormal diastolic function. Grade I (mild).    Right Ventricle: Right ventricle is mildly dilated. Normal systolic function.    Aortic Valve: Trileaflet valve. Mild sclerosis of the aortic valve cusp.    Mitral Valve: Mild annular calcification of the mitral valve. Trace regurgitation.    Tricuspid Valve: The estimated RVSP is 37 mmHg.    Left Atrium: Left atrium is moderately dilated.    Right Atrium: Right atrium is dilated.     Labs:   Lab Results   Component Value Date    CHOL 102 11/27/2023    TRIG 99 11/27/2023    HDL 30 (L) 11/27/2023    VLDL NOT REPORTED 09/28/2021    CHOLHDLRATIO 3.4 11/27/2023       Lab Results   Component Value Date     06/05/2024    K 4.1 06/05/2024     (H) 06/05/2024    CO2 21 06/05/2024    BUN 38 (H) 06/05/2024    CREATININE 2.0 (H) 06/05/2024    GLUCOSE 100 (H) 06/05/2024    CALCIUM 9.1 06/05/2024    BILITOT 0.9 11/27/2023    ALKPHOS 102 11/27/2023    AST 14 11/27/2023    ALT

## 2024-07-29 DIAGNOSIS — I10 PRIMARY HYPERTENSION: ICD-10-CM

## 2024-07-29 RX ORDER — CARVEDILOL 12.5 MG/1
12.5 TABLET ORAL 2 TIMES DAILY
Qty: 180 TABLET | Refills: 1 | Status: SHIPPED | OUTPATIENT
Start: 2024-07-29

## 2024-07-29 NOTE — TELEPHONE ENCOUNTER
Brant called requesting a refill of the below medication which has been pended for you:     Requested Prescriptions     Pending Prescriptions Disp Refills    carvedilol (COREG) 12.5 MG tablet 180 tablet 1     Sig: Take 1 tablet by mouth 2 times daily       Last Appointment Date: 6/10/2024  Next Appointment Date: 9/20/2024    No Known Allergies

## 2024-08-01 ENCOUNTER — HOSPITAL ENCOUNTER (OUTPATIENT)
Age: 79
Discharge: HOME | End: 2024-08-01
Payer: MEDICARE

## 2024-08-01 DIAGNOSIS — D64.9 ANEMIA, UNSPECIFIED TYPE: ICD-10-CM

## 2024-08-01 DIAGNOSIS — R97.20 ELEVATED PSA: ICD-10-CM

## 2024-08-01 DIAGNOSIS — N40.0 BENIGN PROSTATIC HYPERPLASIA, UNSPECIFIED WHETHER LOWER URINARY TRACT SYMPTOMS PRESENT: ICD-10-CM

## 2024-08-01 LAB
BASOPHILS # BLD: 0.04 K/UL (ref 0–0.2)
BASOPHILS NFR BLD: 1 % (ref 0–2)
EOSINOPHIL # BLD: 0.35 K/UL (ref 0–0.44)
EOSINOPHILS RELATIVE PERCENT: 6 % (ref 1–4)
ERYTHROCYTE [DISTWIDTH] IN BLOOD BY AUTOMATED COUNT: 15.3 % (ref 11.8–14.4)
HCT VFR BLD AUTO: 32.3 % (ref 40.7–50.3)
HGB BLD-MCNC: 10.6 G/DL (ref 13–17)
IMM GRANULOCYTES # BLD AUTO: <0.03 K/UL (ref 0–0.3)
IMM GRANULOCYTES NFR BLD: 0 %
LYMPHOCYTES NFR BLD: 1.38 K/UL (ref 1.1–3.7)
LYMPHOCYTES RELATIVE PERCENT: 25 % (ref 24–43)
MCH RBC QN AUTO: 32.1 PG (ref 25.2–33.5)
MCHC RBC AUTO-ENTMCNC: 32.8 G/DL (ref 25.2–33.5)
MCV RBC AUTO: 97.9 FL (ref 82.6–102.9)
MONOCYTES NFR BLD: 0.43 K/UL (ref 0.1–1.2)
MONOCYTES NFR BLD: 8 % (ref 3–12)
NEUTROPHILS NFR BLD: 60 % (ref 36–65)
NEUTS SEG NFR BLD: 3.37 K/UL (ref 1.5–8.1)
NRBC BLD-RTO: 0 PER 100 WBC
PLATELET # BLD AUTO: 139 K/UL (ref 138–453)
PMV BLD AUTO: 9.4 FL (ref 8.1–13.5)
PSA SERPL-MCNC: 9.4 NG/ML (ref 0–4)
RBC # BLD AUTO: 3.3 M/UL (ref 4.21–5.77)
RBC # BLD: ABNORMAL 10*6/UL
WBC OTHER # BLD: 5.6 K/UL (ref 3.5–11.3)

## 2024-08-01 PROCEDURE — 85025 COMPLETE CBC W/AUTO DIFF WBC: CPT

## 2024-08-01 PROCEDURE — 36415 COLL VENOUS BLD VENIPUNCTURE: CPT

## 2024-08-01 PROCEDURE — 84153 ASSAY OF PSA TOTAL: CPT

## 2024-08-05 ENCOUNTER — OFFICE VISIT (OUTPATIENT)
Dept: UROLOGY | Age: 79
End: 2024-08-05
Payer: MEDICARE

## 2024-08-05 VITALS
OXYGEN SATURATION: 98 % | HEART RATE: 70 BPM | BODY MASS INDEX: 29.55 KG/M2 | WEIGHT: 195 LBS | SYSTOLIC BLOOD PRESSURE: 134 MMHG | DIASTOLIC BLOOD PRESSURE: 80 MMHG | HEIGHT: 68 IN | RESPIRATION RATE: 16 BRPM

## 2024-08-05 DIAGNOSIS — R97.20 ELEVATED PSA: ICD-10-CM

## 2024-08-05 DIAGNOSIS — N40.0 BENIGN PROSTATIC HYPERPLASIA, UNSPECIFIED WHETHER LOWER URINARY TRACT SYMPTOMS PRESENT: Primary | ICD-10-CM

## 2024-08-05 PROCEDURE — G8427 DOCREV CUR MEDS BY ELIG CLIN: HCPCS | Performed by: NURSE PRACTITIONER

## 2024-08-05 PROCEDURE — 1036F TOBACCO NON-USER: CPT | Performed by: NURSE PRACTITIONER

## 2024-08-05 PROCEDURE — 3079F DIAST BP 80-89 MM HG: CPT | Performed by: NURSE PRACTITIONER

## 2024-08-05 PROCEDURE — 1123F ACP DISCUSS/DSCN MKR DOCD: CPT | Performed by: NURSE PRACTITIONER

## 2024-08-05 PROCEDURE — 3075F SYST BP GE 130 - 139MM HG: CPT | Performed by: NURSE PRACTITIONER

## 2024-08-05 PROCEDURE — 99213 OFFICE O/P EST LOW 20 MIN: CPT | Performed by: NURSE PRACTITIONER

## 2024-08-05 PROCEDURE — 99214 OFFICE O/P EST MOD 30 MIN: CPT | Performed by: NURSE PRACTITIONER

## 2024-08-05 PROCEDURE — G8417 CALC BMI ABV UP PARAM F/U: HCPCS | Performed by: NURSE PRACTITIONER

## 2024-08-05 NOTE — PROGRESS NOTES
[facility] UROLOGY A DEPARTMENT OF [facility]  [address] [place] [address]  Dept: [phone]    Visit Date: 8/5/2024        HPI:   Ed is a 79 year old male seen typically for BPH, ED, and elevated PSA.     Ed underwent Trans Rectal Ultrasound Guided PROSTATE BIOPSY by Dr. Ramsey 1/22/2024.  Pathology negative.   BENIGN PROSTATE TISSUE in all cores.      He is doing well 2/5/24. Denied flank pain, suprapubic pressure, gross hematuria, dysuria, fever or chills.   Reports nocturia x 2, feels stream is weak. Discussed adding tamsulosin, patient reports not bothersome enough to start meds.     Seen today 8/5/24.  PSA 9.4. Was 20.23. Stable.  Neg biopsy1/2024.  PSA Oscilates.  Nocturia 2-3/night.  Stream is okay but little weaker at times at night. No urinary inc.  Feels he empties.  Pt is not bothered by urinary symptoms.       Additional History:  BPH  Not on meds  Very satisfied. No new infections.  No incontinence. Nocturia x 2.  greenlight in 2018     Elevated PSA- normally oscillates. Biopsy neg 1/2024.     ED-  Not bothered. Tried medications in the past with no improvement.        Previous records:  Overall the PSA level is: oscillating. Range 11-12  Recent history of urinary tract infection/prostatitis? no  Previous prostate biopsy? Yes x 6. NEG  Lower urinary tract symptoms: no  Previous patient of Dr. Orozco --[facility]  Has had episodes of retention after alcohol use in past     Was in [place] on vacation and went into retention. Failed voiding trial.       Current Outpatient Medications   Medication Sig Dispense Refill    carvedilol (COREG) 12.5 MG tablet Take 1 tablet by mouth 2 times daily 180 tablet 1    ONE TOUCH ULTRASOFT LANCETS MISC 1 each by Does not apply route daily 100 each 3    Insulin Pen Needle 32G X 4 MM MISC Use 2 pen needle daily 200 each 3    nitroGLYCERIN (NITROSTAT) 0.4 MG SL tablet Place 1 tablet

## 2024-08-12 ENCOUNTER — OFFICE VISIT (OUTPATIENT)
Dept: ONCOLOGY | Age: 79
End: 2024-08-12
Payer: MEDICARE

## 2024-08-12 VITALS
HEART RATE: 71 BPM | HEIGHT: 68 IN | BODY MASS INDEX: 29.67 KG/M2 | WEIGHT: 195.8 LBS | RESPIRATION RATE: 16 BRPM | DIASTOLIC BLOOD PRESSURE: 60 MMHG | SYSTOLIC BLOOD PRESSURE: 124 MMHG | OXYGEN SATURATION: 97 % | TEMPERATURE: 97.7 F

## 2024-08-12 DIAGNOSIS — D64.9 ANEMIA, UNSPECIFIED TYPE: Primary | ICD-10-CM

## 2024-08-12 PROCEDURE — 99213 OFFICE O/P EST LOW 20 MIN: CPT | Performed by: INTERNAL MEDICINE

## 2024-08-12 PROCEDURE — G8427 DOCREV CUR MEDS BY ELIG CLIN: HCPCS | Performed by: INTERNAL MEDICINE

## 2024-08-12 PROCEDURE — 3074F SYST BP LT 130 MM HG: CPT | Performed by: INTERNAL MEDICINE

## 2024-08-12 PROCEDURE — 99214 OFFICE O/P EST MOD 30 MIN: CPT | Performed by: INTERNAL MEDICINE

## 2024-08-12 PROCEDURE — 3078F DIAST BP <80 MM HG: CPT | Performed by: INTERNAL MEDICINE

## 2024-08-12 PROCEDURE — 1036F TOBACCO NON-USER: CPT | Performed by: INTERNAL MEDICINE

## 2024-08-12 PROCEDURE — G8417 CALC BMI ABV UP PARAM F/U: HCPCS | Performed by: INTERNAL MEDICINE

## 2024-08-12 PROCEDURE — 1123F ACP DISCUSS/DSCN MKR DOCD: CPT | Performed by: INTERNAL MEDICINE

## 2024-08-12 NOTE — PROGRESS NOTES
patent saphenous vein graft to first obtuse marginal, patent saphenous vein graft to posterior descending artery, occluded jump graft from posterior descending artery to posterior left ventricular branch.    CARDIAC CATHETERIZATION  3/16/16    CARDIAC CATHETERIZATION  05/07/2021    CATARACT REMOVAL Bilateral Nov. and Dec 2014    COLONOSCOPY  5/27/2008    Distal ileoscopy. Scattered diverticulosis and extensive diverticular disease of sigmoid colon and internal hemorrhoids.    COLONOSCOPY N/A 10/28/2019    COLONOSCOPY performed by Hal Carr MD at [facility] OR  severe diverticulosis    CORONARY ARTERY BYPASS GRAFT      ELBOW SURGERY Right     Surgical repair.    EYE SURGERY      KNEE ARTHROSCOPY Right 3/16/2007    Partial medial meniscectomy, partial medial and lateral synovectomy, light chondroplasty.    OK LASER VAPORIZATION OF PROSTATE FOR URINE FLOW N/A 11/2/2018    CYSTOSCOPY, TRANSURETHRAL RESECTION PROSTATE - GREENLIGHT XPS LASER ([facility] # [id number] HAILEE) performed by Fabian Ramsey MD at [facility] OR    PROSTATE BIOPSY Bilateral 9/15/2010, 2007    [facility] Dr. Orozco    PROSTATE BIOPSY Bilateral 05/10/2001    Benign-Dr. Balderas    PROSTATE BIOPSY Bilateral 04/08/1999    Benign-Dr. Sullivan    PROSTATE BIOPSY Bilateral 06/18/1998    Benign-Dr. Velasquez    PROSTATE BIOPSY Bilateral 01/07/1998    Benign-Dr. Velasquez    PROSTATE BIOPSY Bilateral 10/04/1996    Benign-Dr. Gonzalez    PROSTATE BIOPSY N/A 1/22/2024    Trans Rectal Ultrasound Guided PROSTATE BIOPSY performed by Fabian Ramsey MD at [facility] OR    PROSTATE SURGERY  11/02/2018    CYSTOSCOPY, TRANSURETHRAL RESECTION PROSTATE - GREENLIGHT     VASECTOMY         No Known Allergies    Current Outpatient Medications   Medication Sig Dispense Refill    carvedilol (COREG) 12.5 MG tablet Take 1 tablet by mouth 2 times daily 180 tablet 1    Insulin Aspart, w/Niacinamide, (FIASP FLEXTOUCH) 100 UNIT/ML SOPN Inject 10 Units into the skin daily (with breakfast) 10 Adjustable Dose Pre-filled Pen

## 2024-08-15 ENCOUNTER — OFFICE VISIT (OUTPATIENT)
Dept: DIABETES SERVICES | Age: 79
End: 2024-08-15
Payer: MEDICARE

## 2024-08-15 VITALS
DIASTOLIC BLOOD PRESSURE: 68 MMHG | SYSTOLIC BLOOD PRESSURE: 126 MMHG | BODY MASS INDEX: 29.64 KG/M2 | HEIGHT: 68 IN | HEART RATE: 64 BPM | OXYGEN SATURATION: 98 % | WEIGHT: 195.6 LBS

## 2024-08-15 DIAGNOSIS — E78.49 OTHER HYPERLIPIDEMIA: ICD-10-CM

## 2024-08-15 DIAGNOSIS — E11.22 TYPE 2 DIABETES MELLITUS WITH STAGE 3B CHRONIC KIDNEY DISEASE, WITH LONG-TERM CURRENT USE OF INSULIN (HCC): Primary | ICD-10-CM

## 2024-08-15 DIAGNOSIS — Z79.4 TYPE 2 DIABETES MELLITUS WITH STAGE 3B CHRONIC KIDNEY DISEASE, WITH LONG-TERM CURRENT USE OF INSULIN (HCC): Primary | ICD-10-CM

## 2024-08-15 DIAGNOSIS — I10 PRIMARY HYPERTENSION: ICD-10-CM

## 2024-08-15 DIAGNOSIS — N18.32 TYPE 2 DIABETES MELLITUS WITH STAGE 3B CHRONIC KIDNEY DISEASE, WITH LONG-TERM CURRENT USE OF INSULIN (HCC): Primary | ICD-10-CM

## 2024-08-15 PROCEDURE — 99212 OFFICE O/P EST SF 10 MIN: CPT | Performed by: NURSE PRACTITIONER

## 2024-08-15 RX ORDER — INSULIN ASPART INJECTION 100 [IU]/ML
12 INJECTION, SOLUTION SUBCUTANEOUS
Qty: 12 ML | Refills: 3 | Status: SHIPPED | OUTPATIENT
Start: 2024-08-15

## 2024-08-15 RX ORDER — CARVEDILOL 25 MG/1
TABLET, FILM COATED ORAL
Qty: 100 EACH | Refills: 3 | Status: SHIPPED | OUTPATIENT
Start: 2024-08-15

## 2024-08-15 ASSESSMENT — ENCOUNTER SYMPTOMS
SHORTNESS OF BREATH: 0
RESPIRATORY NEGATIVE: 1
DIARRHEA: 0
ABDOMINAL PAIN: 0

## 2024-08-15 NOTE — PROGRESS NOTES
components found for: \"LDLCHOLESTEROL\", \"LDLCALC\"  Lab Results   Component Value Date    VLDL NOT REPORTED 09/28/2021    VLDL NOT REPORTED 09/11/2020    VLDL NOT REPORTED 09/12/2019     Lab Results   Component Value Date    CHOLHDLRATIO 3.4 11/27/2023    CHOLHDLRATIO 2.9 10/04/2022    CHOLHDLRATIO 3.0 09/28/2021           Physical Exam  Constitutional:       Appearance: Normal appearance.   HENT:      Head: Normocephalic.   Pulmonary:      Effort: Pulmonary effort is normal.   Musculoskeletal:      Cervical back: Normal range of motion.   Neurological:      General: No focal deficit present.      Mental Status: He is alert and oriented to person, place, and time.   Psychiatric:         Mood and Affect: Mood normal.         Behavior: Behavior normal.         Thought Content: Thought content normal.         Judgment: Judgment normal.             ASSESSMENT/PLAN:     Diagnosis Orders   1. Type 2 diabetes mellitus with stage 3b chronic kidney disease, with long-term current use of insulin ([facility])  blood glucose test strips (CONTOUR TEST) strip    Insulin Aspart, w/Niacinamide, (FIASP FLEXTOUCH) 100 UNIT/ML SOPN      2. Other hyperlipidemia        3. Primary hypertension          No orders of the defined types were placed in this encounter.    Orders Placed This Encounter   Medications    blood glucose test strips (CONTOUR TEST) strip     Sig: Use to test blood sugars once daily. DX: E11.9     Dispense:  100 each     Refill:  3    Insulin Aspart, w/Niacinamide, (FIASP FLEXTOUCH) 100 UNIT/ML SOPN     Sig: Inject 12 Units into the skin daily (with breakfast)     Dispense:  12 mL     Refill:  3     Requested Prescriptions     Signed Prescriptions Disp Refills    blood glucose test strips (CONTOUR TEST) strip 100 each 3     Sig: Use to test blood sugars once daily. DX: E11.9    Insulin Aspart, w/Niacinamide, (FIASP FLEXTOUCH) 100 UNIT/ML SOPN 12 mL 3     Sig: Inject 12 Units into the skin daily (with breakfast)       1. Type 2

## 2024-08-26 DIAGNOSIS — N18.4 TYPE 2 DIABETES MELLITUS WITH STAGE 4 CHRONIC KIDNEY DISEASE, WITHOUT LONG-TERM CURRENT USE OF INSULIN (HCC): ICD-10-CM

## 2024-08-26 DIAGNOSIS — E11.22 TYPE 2 DIABETES MELLITUS WITH STAGE 4 CHRONIC KIDNEY DISEASE, WITHOUT LONG-TERM CURRENT USE OF INSULIN (HCC): ICD-10-CM

## 2024-08-26 RX ORDER — GLIMEPIRIDE 4 MG/1
4 TABLET ORAL 2 TIMES DAILY
Qty: 180 TABLET | Refills: 3 | Status: SHIPPED | OUTPATIENT
Start: 2024-08-26

## 2024-08-26 NOTE — TELEPHONE ENCOUNTER
Brant called requesting a refill of the below medication which has been pended for you:     Requested Prescriptions     Pending Prescriptions Disp Refills    glimepiride (AMARYL) 4 MG tablet [Pharmacy Med Name: GLIMEPIRIDE TABS 4MG]  0       Last Appointment Date: 6/10/2024  Next Appointment Date: 9/20/2024    No Known Allergies    minimum assist (75% patients effort) normal balance

## 2024-09-02 ENCOUNTER — PATIENT MESSAGE (OUTPATIENT)
Dept: DIABETES SERVICES | Age: 79
End: 2024-09-02

## 2024-09-02 DIAGNOSIS — N18.32 TYPE 2 DIABETES MELLITUS WITH STAGE 3B CHRONIC KIDNEY DISEASE, WITH LONG-TERM CURRENT USE OF INSULIN (HCC): ICD-10-CM

## 2024-09-02 DIAGNOSIS — Z79.4 TYPE 2 DIABETES MELLITUS WITH STAGE 3B CHRONIC KIDNEY DISEASE, WITH LONG-TERM CURRENT USE OF INSULIN (HCC): ICD-10-CM

## 2024-09-02 DIAGNOSIS — E11.22 TYPE 2 DIABETES MELLITUS WITH STAGE 3B CHRONIC KIDNEY DISEASE, WITH LONG-TERM CURRENT USE OF INSULIN (HCC): ICD-10-CM

## 2024-09-03 NOTE — TELEPHONE ENCOUNTER
Called [facility].  They asked for a new script to be sent over to start the process over.  States the detailed order form did not match the escribed script.      Brant called requesting a refill of the below medication which has been pended for you:     Requested Prescriptions      No prescriptions requested or ordered in this encounter       Last Appointment Date: 8/15/2024  Next Appointment Date: 3/10/2025    No Known Allergies

## 2024-09-04 RX ORDER — CARVEDILOL 25 MG/1
TABLET, FILM COATED ORAL
Qty: 100 EACH | Refills: 3 | Status: SHIPPED | OUTPATIENT
Start: 2024-09-04

## 2024-09-17 ENCOUNTER — HOSPITAL ENCOUNTER (OUTPATIENT)
Age: 79
Discharge: HOME | End: 2024-09-17
Payer: MEDICARE

## 2024-09-17 DIAGNOSIS — I10 PRIMARY HYPERTENSION: ICD-10-CM

## 2024-09-17 DIAGNOSIS — Z79.4 TYPE 2 DIABETES MELLITUS WITH STAGE 3B CHRONIC KIDNEY DISEASE, WITH LONG-TERM CURRENT USE OF INSULIN (HCC): ICD-10-CM

## 2024-09-17 DIAGNOSIS — E11.22 TYPE 2 DIABETES MELLITUS WITH STAGE 3B CHRONIC KIDNEY DISEASE, WITH LONG-TERM CURRENT USE OF INSULIN (HCC): ICD-10-CM

## 2024-09-17 DIAGNOSIS — N18.32 TYPE 2 DIABETES MELLITUS WITH STAGE 3B CHRONIC KIDNEY DISEASE, WITH LONG-TERM CURRENT USE OF INSULIN (HCC): ICD-10-CM

## 2024-09-17 LAB
ANION GAP SERPL CALCULATED.3IONS-SCNC: 9 MMOL/L (ref 9–17)
BUN SERPL-MCNC: 34 MG/DL (ref 8–23)
BUN/CREAT SERPL: 16 (ref 9–20)
CALCIUM SERPL-MCNC: 8.6 MG/DL (ref 8.6–10.4)
CHLORIDE SERPL-SCNC: 107 MMOL/L (ref 98–107)
CO2 SERPL-SCNC: 24 MMOL/L (ref 20–31)
CREAT SERPL-MCNC: 2.1 MG/DL (ref 0.7–1.2)
EST. AVERAGE GLUCOSE BLD GHB EST-MCNC: 180 MG/DL
GFR, ESTIMATED: 31 ML/MIN/1.73M2
GLUCOSE SERPL-MCNC: 187 MG/DL (ref 70–99)
HBA1C MFR BLD: 7.9 % (ref 4–6)
POTASSIUM SERPL-SCNC: 4.5 MMOL/L (ref 3.7–5.3)
SODIUM SERPL-SCNC: 140 MMOL/L (ref 135–144)

## 2024-09-17 PROCEDURE — 36415 COLL VENOUS BLD VENIPUNCTURE: CPT

## 2024-09-17 PROCEDURE — 83036 HEMOGLOBIN GLYCOSYLATED A1C: CPT

## 2024-09-17 PROCEDURE — 80048 BASIC METABOLIC PNL TOTAL CA: CPT

## 2024-09-17 RX ORDER — ISOSORBIDE MONONITRATE 30 MG/1
30 TABLET, EXTENDED RELEASE ORAL DAILY
Qty: 90 TABLET | Refills: 3 | Status: SHIPPED | OUTPATIENT
Start: 2024-09-17

## 2024-09-20 ENCOUNTER — OFFICE VISIT (OUTPATIENT)
Dept: INTERNAL MEDICINE | Age: 79
End: 2024-09-20
Payer: MEDICARE

## 2024-09-20 VITALS
RESPIRATION RATE: 18 BRPM | HEART RATE: 69 BPM | DIASTOLIC BLOOD PRESSURE: 80 MMHG | OXYGEN SATURATION: 96 % | HEIGHT: 68 IN | SYSTOLIC BLOOD PRESSURE: 128 MMHG | WEIGHT: 194.6 LBS | BODY MASS INDEX: 29.49 KG/M2

## 2024-09-20 DIAGNOSIS — Z79.4 TYPE 2 DIABETES MELLITUS WITH STAGE 3B CHRONIC KIDNEY DISEASE, WITH LONG-TERM CURRENT USE OF INSULIN (HCC): ICD-10-CM

## 2024-09-20 DIAGNOSIS — E11.22 TYPE 2 DIABETES MELLITUS WITH STAGE 3B CHRONIC KIDNEY DISEASE, WITH LONG-TERM CURRENT USE OF INSULIN (HCC): ICD-10-CM

## 2024-09-20 DIAGNOSIS — I10 PRIMARY HYPERTENSION: Primary | ICD-10-CM

## 2024-09-20 DIAGNOSIS — N18.32 TYPE 2 DIABETES MELLITUS WITH STAGE 3B CHRONIC KIDNEY DISEASE, WITH LONG-TERM CURRENT USE OF INSULIN (HCC): ICD-10-CM

## 2024-09-20 DIAGNOSIS — E78.49 OTHER HYPERLIPIDEMIA: ICD-10-CM

## 2024-09-20 PROCEDURE — 90653 IIV ADJUVANT VACCINE IM: CPT | Performed by: INTERNAL MEDICINE

## 2024-09-20 ASSESSMENT — PATIENT HEALTH QUESTIONNAIRE - PHQ9
SUM OF ALL RESPONSES TO PHQ9 QUESTIONS 1 & 2: 0
2. FEELING DOWN, DEPRESSED OR HOPELESS: NOT AT ALL
1. LITTLE INTEREST OR PLEASURE IN DOING THINGS: NOT AT ALL
SUM OF ALL RESPONSES TO PHQ QUESTIONS 1-9: 0

## 2024-09-20 ASSESSMENT — ENCOUNTER SYMPTOMS
NAUSEA: 0
BLOOD IN STOOL: 0
SHORTNESS OF BREATH: 0
EYE PAIN: 0
COUGH: 0
DIARRHEA: 0
BACK PAIN: 0
VOMITING: 0
ABDOMINAL PAIN: 0
CONSTIPATION: 0

## 2024-09-20 ASSESSMENT — LIFESTYLE VARIABLES
HOW OFTEN DO YOU HAVE A DRINK CONTAINING ALCOHOL: 2-4 TIMES A MONTH
HOW MANY STANDARD DRINKS CONTAINING ALCOHOL DO YOU HAVE ON A TYPICAL DAY: 1 OR 2

## 2024-09-28 DIAGNOSIS — Z79.4 TYPE 2 DIABETES MELLITUS WITH STAGE 3B CHRONIC KIDNEY DISEASE, WITH LONG-TERM CURRENT USE OF INSULIN (HCC): ICD-10-CM

## 2024-09-28 DIAGNOSIS — N18.32 TYPE 2 DIABETES MELLITUS WITH STAGE 3B CHRONIC KIDNEY DISEASE, WITH LONG-TERM CURRENT USE OF INSULIN (HCC): ICD-10-CM

## 2024-09-28 DIAGNOSIS — E11.22 TYPE 2 DIABETES MELLITUS WITH STAGE 3B CHRONIC KIDNEY DISEASE, WITH LONG-TERM CURRENT USE OF INSULIN (HCC): ICD-10-CM

## 2024-09-30 RX ORDER — BLOOD PRESSURE TEST KIT
KIT MISCELLANEOUS
Qty: 200 EACH | Refills: 5 | Status: SHIPPED | OUTPATIENT
Start: 2024-09-30

## 2024-09-30 NOTE — TELEPHONE ENCOUNTER
Brant called requesting a refill of the below medication which has been pended for you:     Requested Prescriptions     Pending Prescriptions Disp Refills    Insulin Pen Needle 32G X 4 MM MISC 200 each 3     Sig: Use 2 pen needle daily       Last Appointment Date: 9/20/2024  Next Appointment Date: 1/7/2025    No Known Allergies

## 2024-11-04 DIAGNOSIS — I10 ESSENTIAL HYPERTENSION: ICD-10-CM

## 2024-11-04 DIAGNOSIS — I10 PRIMARY HYPERTENSION: ICD-10-CM

## 2024-11-04 DIAGNOSIS — M10.00 IDIOPATHIC GOUT, UNSPECIFIED CHRONICITY, UNSPECIFIED SITE: ICD-10-CM

## 2024-11-04 RX ORDER — LISINOPRIL 40 MG/1
40 TABLET ORAL DAILY
Qty: 90 TABLET | Refills: 3 | Status: SHIPPED | OUTPATIENT
Start: 2024-11-04

## 2024-11-04 RX ORDER — HYDRALAZINE HYDROCHLORIDE 25 MG/1
25 TABLET, FILM COATED ORAL 2 TIMES DAILY
Qty: 180 TABLET | Refills: 3 | Status: SHIPPED | OUTPATIENT
Start: 2024-11-04 | End: 2024-11-08 | Stop reason: SDUPTHER

## 2024-11-04 RX ORDER — CARVEDILOL 12.5 MG/1
12.5 TABLET ORAL 2 TIMES DAILY
Qty: 180 TABLET | Refills: 3 | Status: SHIPPED | OUTPATIENT
Start: 2024-11-04

## 2024-11-04 RX ORDER — ALLOPURINOL 300 MG/1
300 TABLET ORAL DAILY
Qty: 90 TABLET | Refills: 3 | Status: SHIPPED | OUTPATIENT
Start: 2024-11-04

## 2024-11-04 NOTE — TELEPHONE ENCOUNTER
Fax received from [facility] requesting refills on 3 medications.    Those medications have been pended for you.

## 2024-11-08 DIAGNOSIS — I10 PRIMARY HYPERTENSION: ICD-10-CM

## 2024-11-08 RX ORDER — HYDRALAZINE HYDROCHLORIDE 25 MG/1
25 TABLET, FILM COATED ORAL 2 TIMES DAILY
Qty: 180 TABLET | Refills: 3 | Status: SHIPPED | OUTPATIENT
Start: 2024-11-08

## 2024-11-08 NOTE — TELEPHONE ENCOUNTER
**Patient put a medication refill request in on [employer] and accidentally selected the wrong pharmacy. He would like to cancel refill to [facility] and send to [facility]. Patient is calling [facility].**        [facility] called requesting a refill of the below medication which has been pended for you:     Requested Prescriptions     Pending Prescriptions Disp Refills    hydrALAZINE (APRESOLINE) 25 MG tablet 180 tablet 3     Sig: Take 1 tablet by mouth in the morning and 1 tablet in the evening.       Last Appointment Date: 9/20/2024  Next Appointment Date: 1/7/2025    No Known Allergies

## 2024-12-30 ENCOUNTER — HOSPITAL ENCOUNTER (OUTPATIENT)
Age: 79
Discharge: HOME | End: 2024-12-30
Payer: MEDICARE

## 2024-12-30 ENCOUNTER — NURSE ONLY (OUTPATIENT)
Dept: LAB | Age: 79
End: 2024-12-30
Payer: MEDICARE

## 2024-12-30 DIAGNOSIS — I10 PRIMARY HYPERTENSION: ICD-10-CM

## 2024-12-30 DIAGNOSIS — N18.32 TYPE 2 DIABETES MELLITUS WITH STAGE 3B CHRONIC KIDNEY DISEASE, WITH LONG-TERM CURRENT USE OF INSULIN (HCC): ICD-10-CM

## 2024-12-30 DIAGNOSIS — Z79.4 TYPE 2 DIABETES MELLITUS WITH STAGE 3B CHRONIC KIDNEY DISEASE, WITH LONG-TERM CURRENT USE OF INSULIN (HCC): ICD-10-CM

## 2024-12-30 DIAGNOSIS — Z23 NEED FOR VACCINATION: Primary | ICD-10-CM

## 2024-12-30 DIAGNOSIS — E11.22 TYPE 2 DIABETES MELLITUS WITH STAGE 3B CHRONIC KIDNEY DISEASE, WITH LONG-TERM CURRENT USE OF INSULIN (HCC): ICD-10-CM

## 2024-12-30 DIAGNOSIS — E78.49 OTHER HYPERLIPIDEMIA: ICD-10-CM

## 2024-12-30 LAB
ALBUMIN SERPL-MCNC: 4.2 G/DL (ref 3.5–5.2)
ALBUMIN/GLOB SERPL: 1.4 {RATIO} (ref 1–2.5)
ALP SERPL-CCNC: 99 U/L (ref 40–129)
ALT SERPL-CCNC: 25 U/L (ref 5–41)
ANION GAP SERPL CALCULATED.3IONS-SCNC: 11 MMOL/L (ref 9–17)
AST SERPL-CCNC: 22 U/L
BILIRUB SERPL-MCNC: 1 MG/DL (ref 0.3–1.2)
BUN SERPL-MCNC: 41 MG/DL (ref 8–23)
BUN/CREAT SERPL: 19 (ref 9–20)
CALCIUM SERPL-MCNC: 9.1 MG/DL (ref 8.6–10.4)
CHLORIDE SERPL-SCNC: 107 MMOL/L (ref 98–107)
CHOLEST SERPL-MCNC: 108 MG/DL (ref 0–199)
CHOLESTEROL/HDL RATIO: 3.4
CO2 SERPL-SCNC: 27 MMOL/L (ref 20–31)
CREAT SERPL-MCNC: 2.2 MG/DL (ref 0.7–1.2)
EST. AVERAGE GLUCOSE BLD GHB EST-MCNC: 183 MG/DL
GFR, ESTIMATED: 30 ML/MIN/1.73M2
GLUCOSE SERPL-MCNC: 211 MG/DL (ref 70–99)
HBA1C MFR BLD: 8 % (ref 4–6)
HDLC SERPL-MCNC: 32 MG/DL
LDLC SERPL CALC-MCNC: 50 MG/DL (ref 0–100)
POTASSIUM SERPL-SCNC: 4.4 MMOL/L (ref 3.7–5.3)
PROT SERPL-MCNC: 7.1 G/DL (ref 6.4–8.3)
SODIUM SERPL-SCNC: 145 MMOL/L (ref 135–144)
TRIGL SERPL-MCNC: 129 MG/DL
VLDLC SERPL CALC-MCNC: 26 MG/DL (ref 1–30)

## 2024-12-30 PROCEDURE — 80053 COMPREHEN METABOLIC PANEL: CPT

## 2024-12-30 PROCEDURE — 83036 HEMOGLOBIN GLYCOSYLATED A1C: CPT

## 2024-12-30 PROCEDURE — 36415 COLL VENOUS BLD VENIPUNCTURE: CPT

## 2024-12-30 PROCEDURE — 80061 LIPID PANEL: CPT

## 2024-12-30 PROCEDURE — PBSHW COVID-19, COMIRNATY, (AGE 12Y+), IM, PF, 30MCG/0.3ML: Performed by: INTERNAL MEDICINE

## 2024-12-30 PROCEDURE — 91320 SARSCV2 VAC 30MCG TRS-SUC IM: CPT | Performed by: INTERNAL MEDICINE

## 2025-01-07 ENCOUNTER — OFFICE VISIT (OUTPATIENT)
Dept: INTERNAL MEDICINE | Age: 80
End: 2025-01-07
Payer: MEDICARE

## 2025-01-07 VITALS
HEART RATE: 61 BPM | HEIGHT: 68 IN | SYSTOLIC BLOOD PRESSURE: 130 MMHG | RESPIRATION RATE: 16 BRPM | WEIGHT: 193 LBS | DIASTOLIC BLOOD PRESSURE: 68 MMHG | OXYGEN SATURATION: 99 % | BODY MASS INDEX: 29.25 KG/M2

## 2025-01-07 DIAGNOSIS — N18.4 TYPE 2 DIABETES MELLITUS WITH STAGE 4 CHRONIC KIDNEY DISEASE, WITHOUT LONG-TERM CURRENT USE OF INSULIN (HCC): ICD-10-CM

## 2025-01-07 DIAGNOSIS — I50.23 CHF NYHA CLASS II, ACUTE ON CHRONIC, SYSTOLIC (HCC): ICD-10-CM

## 2025-01-07 DIAGNOSIS — I10 PRIMARY HYPERTENSION: Primary | ICD-10-CM

## 2025-01-07 DIAGNOSIS — E78.5 HYPERLIPIDEMIA, UNSPECIFIED HYPERLIPIDEMIA TYPE: ICD-10-CM

## 2025-01-07 DIAGNOSIS — E11.22 TYPE 2 DIABETES MELLITUS WITH STAGE 4 CHRONIC KIDNEY DISEASE, WITHOUT LONG-TERM CURRENT USE OF INSULIN (HCC): ICD-10-CM

## 2025-01-07 DIAGNOSIS — E53.8 B12 DEFICIENCY: ICD-10-CM

## 2025-01-07 PROCEDURE — G8427 DOCREV CUR MEDS BY ELIG CLIN: HCPCS | Performed by: INTERNAL MEDICINE

## 2025-01-07 PROCEDURE — 1160F RVW MEDS BY RX/DR IN RCRD: CPT | Performed by: INTERNAL MEDICINE

## 2025-01-07 PROCEDURE — 1159F MED LIST DOCD IN RCRD: CPT | Performed by: INTERNAL MEDICINE

## 2025-01-07 PROCEDURE — 99212 OFFICE O/P EST SF 10 MIN: CPT | Performed by: INTERNAL MEDICINE

## 2025-01-07 PROCEDURE — 1036F TOBACCO NON-USER: CPT | Performed by: INTERNAL MEDICINE

## 2025-01-07 PROCEDURE — M1299 PR FLU IMMUNIZE ORDER/ADMIN: HCPCS | Performed by: INTERNAL MEDICINE

## 2025-01-07 PROCEDURE — 99214 OFFICE O/P EST MOD 30 MIN: CPT | Performed by: INTERNAL MEDICINE

## 2025-01-07 PROCEDURE — 1123F ACP DISCUSS/DSCN MKR DOCD: CPT | Performed by: INTERNAL MEDICINE

## 2025-01-07 PROCEDURE — 3078F DIAST BP <80 MM HG: CPT | Performed by: INTERNAL MEDICINE

## 2025-01-07 PROCEDURE — 3075F SYST BP GE 130 - 139MM HG: CPT | Performed by: INTERNAL MEDICINE

## 2025-01-07 PROCEDURE — G8417 CALC BMI ABV UP PARAM F/U: HCPCS | Performed by: INTERNAL MEDICINE

## 2025-01-07 RX ORDER — ATORVASTATIN CALCIUM 80 MG/1
80 TABLET, FILM COATED ORAL DAILY
Qty: 90 TABLET | Refills: 3 | Status: SHIPPED | OUTPATIENT
Start: 2025-01-07

## 2025-01-07 RX ORDER — INSULIN ASPART 100 [IU]/ML
12 INJECTION, SOLUTION INTRAVENOUS; SUBCUTANEOUS DAILY
Qty: 12 ML | Refills: 3 | Status: SHIPPED | OUTPATIENT
Start: 2025-01-07

## 2025-01-07 ASSESSMENT — PATIENT HEALTH QUESTIONNAIRE - PHQ9
SUM OF ALL RESPONSES TO PHQ QUESTIONS 1-9: 0
1. LITTLE INTEREST OR PLEASURE IN DOING THINGS: NOT AT ALL
SUM OF ALL RESPONSES TO PHQ9 QUESTIONS 1 & 2: 0
SUM OF ALL RESPONSES TO PHQ QUESTIONS 1-9: 0
SUM OF ALL RESPONSES TO PHQ QUESTIONS 1-9: 0
2. FEELING DOWN, DEPRESSED OR HOPELESS: NOT AT ALL
SUM OF ALL RESPONSES TO PHQ QUESTIONS 1-9: 0

## 2025-01-07 ASSESSMENT — ENCOUNTER SYMPTOMS
CONSTIPATION: 0
COUGH: 0
ABDOMINAL PAIN: 0
NAUSEA: 0
EYE PAIN: 0
VOMITING: 0
SHORTNESS OF BREATH: 0
DIARRHEA: 0
BACK PAIN: 0
BLOOD IN STOOL: 0

## 2025-01-07 NOTE — TELEPHONE ENCOUNTER
Patient needs formulary change.  Please advise.     Last Appt:  8/15/2024  Next Appt:  3/10/2025

## 2025-01-07 NOTE — PROGRESS NOTES
[facility]  [facility] INTERNAL MED A DEPARTMENT OF [facility]  [address] [facility] [address]  Dept: [phone]  Dept Fax: [phone]  Loc: [phone]     Brant Tirado is a 79 y.o. male who presents today for his medical conditions/complaintsas noted below.  Brant Tirado is c/o of   Chief Complaint   Patient presents with    Hypertension    Hyperlipidemia    Diabetes         HPI:     Hypertension  This is a chronic problem. The current episode started more than 1 year ago. The problem has been waxing and waning since onset. The problem is controlled. Pertinent negatives include no chest pain, headaches, neck pain, palpitations or shortness of breath.   Hyperlipidemia  This is a chronic problem. The current episode started more than 1 year ago. The problem is controlled. Recent lipid tests were reviewed and are variable. Pertinent negatives include no chest pain or shortness of breath.   Diabetes  He presents for his follow-up diabetic visit. He has type 2 diabetes mellitus. The initial diagnosis of diabetes was made 14 years ago. His disease course has been fluctuating. Pertinent negatives for hypoglycemia include no confusion, dizziness, headaches, nervousness/anxiousness or pallor. Pertinent negatives for diabetes include no chest pain, no polydipsia, no polyuria and no weakness.   Congestive Heart Failure  Presents for follow-up visit. Pertinent negatives include no abdominal pain, chest pain, palpitations or shortness of breath. The symptoms have been stable. Compliance with total regimen is %. Compliance with diet is %. Compliance with exercise is %. Compliance with medications is %.       Hemoglobin A1C (%)   Date Value   12/30/2024 8.0 (H)   09/17/2024 7.9 (H)   06/05/2024 7.5 (H)            No components found for: \"LABMICR\"  No components found for: \"LDLCHOLESTEROL\", \"LDLCALC\"      AST (U/L)   Date Value   12/30/2024 22     ALT (U/L)

## 2025-01-07 NOTE — TELEPHONE ENCOUNTER
Insurance no longer covering Fiaspa.   Alternative Insulin Aspart or if you know of something else  Please send new script to [facility]

## 2025-01-14 RX ORDER — FUROSEMIDE 40 MG/1
TABLET ORAL
Qty: 90 TABLET | Refills: 3 | Status: SHIPPED | OUTPATIENT
Start: 2025-01-14

## 2025-01-27 ENCOUNTER — OFFICE VISIT (OUTPATIENT)
Dept: CARDIOLOGY | Age: 80
End: 2025-01-27
Payer: MEDICARE

## 2025-01-27 VITALS
DIASTOLIC BLOOD PRESSURE: 70 MMHG | HEART RATE: 64 BPM | HEIGHT: 68 IN | WEIGHT: 193 LBS | BODY MASS INDEX: 29.25 KG/M2 | SYSTOLIC BLOOD PRESSURE: 130 MMHG

## 2025-01-27 DIAGNOSIS — I25.10 CORONARY ARTERY DISEASE DUE TO CALCIFIED CORONARY LESION: Primary | ICD-10-CM

## 2025-01-27 DIAGNOSIS — Z95.810 ISCHEMIC CARDIOMYOPATHY WITH IMPLANTABLE CARDIOVERTER-DEFIBRILLATOR (ICD): ICD-10-CM

## 2025-01-27 DIAGNOSIS — I25.5 ISCHEMIC CARDIOMYOPATHY WITH IMPLANTABLE CARDIOVERTER-DEFIBRILLATOR (ICD): ICD-10-CM

## 2025-01-27 DIAGNOSIS — I10 HYPERTENSION, ESSENTIAL: ICD-10-CM

## 2025-01-27 DIAGNOSIS — E78.5 HYPERLIPIDEMIA LDL GOAL <70: ICD-10-CM

## 2025-01-27 DIAGNOSIS — I25.84 CORONARY ARTERY DISEASE DUE TO CALCIFIED CORONARY LESION: Primary | ICD-10-CM

## 2025-01-27 DIAGNOSIS — I50.23 CHF NYHA CLASS II, ACUTE ON CHRONIC, SYSTOLIC (HCC): ICD-10-CM

## 2025-01-27 PROCEDURE — 99214 OFFICE O/P EST MOD 30 MIN: CPT | Performed by: NURSE PRACTITIONER

## 2025-01-27 PROCEDURE — 3075F SYST BP GE 130 - 139MM HG: CPT | Performed by: NURSE PRACTITIONER

## 2025-01-27 PROCEDURE — 93005 ELECTROCARDIOGRAM TRACING: CPT | Performed by: NURSE PRACTITIONER

## 2025-01-27 PROCEDURE — 99213 OFFICE O/P EST LOW 20 MIN: CPT | Performed by: NURSE PRACTITIONER

## 2025-01-27 PROCEDURE — 3078F DIAST BP <80 MM HG: CPT | Performed by: NURSE PRACTITIONER

## 2025-01-27 PROCEDURE — 1036F TOBACCO NON-USER: CPT | Performed by: NURSE PRACTITIONER

## 2025-01-27 PROCEDURE — 93010 ELECTROCARDIOGRAM REPORT: CPT | Performed by: NURSE PRACTITIONER

## 2025-01-27 PROCEDURE — G8417 CALC BMI ABV UP PARAM F/U: HCPCS | Performed by: NURSE PRACTITIONER

## 2025-01-27 PROCEDURE — 1126F AMNT PAIN NOTED NONE PRSNT: CPT | Performed by: NURSE PRACTITIONER

## 2025-01-27 PROCEDURE — 1159F MED LIST DOCD IN RCRD: CPT | Performed by: NURSE PRACTITIONER

## 2025-01-27 PROCEDURE — G8427 DOCREV CUR MEDS BY ELIG CLIN: HCPCS | Performed by: NURSE PRACTITIONER

## 2025-01-27 PROCEDURE — 1123F ACP DISCUSS/DSCN MKR DOCD: CPT | Performed by: NURSE PRACTITIONER

## 2025-01-27 NOTE — PROGRESS NOTES
[facility]  Progress Note            [facility]  [address] [place] [address]  Dept: [phone]    CC: follow up for CAD, CABG    HPI:  The patient is a 79 y.o. year old, , male is in the office for follow up     He denies any chest pain or SOB at rest. States he does still have some SKINNER which resolves pretty quickly with rest and this is a chronic problem. He is now exercises 3x weekly with walking and weight lifting several days. He feels the SKINNER when walking but never with lifting or with rest.  Denies any issues with medications.. Denies any dizziness or pre-syncope/syncope.       Past Medical History:   has a past medical history of Benign prostatic hypertrophy, BPPV (benign paroxysmal positional vertigo), Chest pain, Coronary heart disease, Diverticulosis, Dupuytren's contracture, Erectile dysfunction, Bowens catheter in place, Gout, History of blood transfusion, Hyperlipidemia, Hypertension, Positive cardiac stress test, Snores, and Type II or unspecified type diabetes mellitus without mention of complication, not stated as uncontrolled.    Past Surgical History:   has a past surgical history that includes Prostate biopsy (Bilateral, 9/15/2010, 2007); Colonoscopy (5/27/2008); Prostate biopsy (Bilateral, 05/10/2001); Knee arthroscopy (Right, 3/16/2007); Elbow surgery (Right); Vasectomy; Coronary artery bypass graft; Cataract removal (Bilateral, Nov. and Dec 2014); Prostate Biopsy (Bilateral, 04/08/1999); Prostate Biopsy (Bilateral, 06/18/1998); Prostate Biopsy (Bilateral, 01/07/1998); Prostate Biopsy (Bilateral, 10/04/1996); Prostate surgery (11/02/2018); pr laser vaporization of prostate for urine flow (N/A, 11/2/2018); Colonoscopy (N/A, 10/28/2019); eye surgery; Cardiac catheterization (6/2005); Cardiac catheterization (3/16/16); Cardiac

## 2025-03-06 ENCOUNTER — HOSPITAL ENCOUNTER (OUTPATIENT)
Age: 80
Discharge: HOME | End: 2025-03-06
Payer: MEDICARE

## 2025-03-06 DIAGNOSIS — D64.9 ANEMIA, UNSPECIFIED TYPE: ICD-10-CM

## 2025-03-06 DIAGNOSIS — R97.20 ELEVATED PSA: ICD-10-CM

## 2025-03-06 DIAGNOSIS — N40.0 BENIGN PROSTATIC HYPERPLASIA, UNSPECIFIED WHETHER LOWER URINARY TRACT SYMPTOMS PRESENT: ICD-10-CM

## 2025-03-06 LAB
BASOPHILS # BLD: 0.06 K/UL (ref 0–0.2)
BASOPHILS NFR BLD: 1 % (ref 0–2)
EOSINOPHIL # BLD: 0.41 K/UL (ref 0–0.44)
EOSINOPHILS RELATIVE PERCENT: 7 % (ref 1–4)
ERYTHROCYTE [DISTWIDTH] IN BLOOD BY AUTOMATED COUNT: 15.3 % (ref 11.8–14.4)
HCT VFR BLD AUTO: 35.5 % (ref 40.7–50.3)
HGB BLD-MCNC: 11.6 G/DL (ref 13–17)
IMM GRANULOCYTES # BLD AUTO: <0.03 K/UL (ref 0–0.3)
IMM GRANULOCYTES NFR BLD: 0 %
LYMPHOCYTES NFR BLD: 1.53 K/UL (ref 1.1–3.7)
LYMPHOCYTES RELATIVE PERCENT: 26 % (ref 24–43)
MCH RBC QN AUTO: 31.8 PG (ref 25.2–33.5)
MCHC RBC AUTO-ENTMCNC: 32.7 G/DL (ref 25.2–33.5)
MCV RBC AUTO: 97.3 FL (ref 82.6–102.9)
MONOCYTES NFR BLD: 0.38 K/UL (ref 0.1–1.2)
MONOCYTES NFR BLD: 7 % (ref 3–12)
NEUTROPHILS NFR BLD: 59 % (ref 36–65)
NEUTS SEG NFR BLD: 3.44 K/UL (ref 1.5–8.1)
NRBC BLD-RTO: 0 PER 100 WBC
PLATELET # BLD AUTO: 170 K/UL (ref 138–453)
PMV BLD AUTO: 10.3 FL (ref 8.1–13.5)
PSA SERPL-MCNC: 10.5 NG/ML (ref 0–4)
RBC # BLD AUTO: 3.65 M/UL (ref 4.21–5.77)
RBC # BLD: ABNORMAL 10*6/UL
WBC OTHER # BLD: 5.8 K/UL (ref 3.5–11.3)

## 2025-03-06 PROCEDURE — 84153 ASSAY OF PSA TOTAL: CPT

## 2025-03-06 PROCEDURE — 36415 COLL VENOUS BLD VENIPUNCTURE: CPT

## 2025-03-06 PROCEDURE — 85025 COMPLETE CBC W/AUTO DIFF WBC: CPT

## 2025-03-10 ENCOUNTER — OFFICE VISIT (OUTPATIENT)
Dept: ONCOLOGY | Age: 80
End: 2025-03-10
Payer: MEDICARE

## 2025-03-10 VITALS
SYSTOLIC BLOOD PRESSURE: 130 MMHG | BODY MASS INDEX: 29.92 KG/M2 | RESPIRATION RATE: 18 BRPM | DIASTOLIC BLOOD PRESSURE: 72 MMHG | HEART RATE: 61 BPM | OXYGEN SATURATION: 96 % | WEIGHT: 196.8 LBS | TEMPERATURE: 97.6 F

## 2025-03-10 DIAGNOSIS — D64.9 ANEMIA, UNSPECIFIED TYPE: Primary | ICD-10-CM

## 2025-03-10 PROCEDURE — 99213 OFFICE O/P EST LOW 20 MIN: CPT | Performed by: INTERNAL MEDICINE

## 2025-03-10 PROCEDURE — 1159F MED LIST DOCD IN RCRD: CPT | Performed by: INTERNAL MEDICINE

## 2025-03-10 PROCEDURE — G8427 DOCREV CUR MEDS BY ELIG CLIN: HCPCS | Performed by: INTERNAL MEDICINE

## 2025-03-10 PROCEDURE — G8417 CALC BMI ABV UP PARAM F/U: HCPCS | Performed by: INTERNAL MEDICINE

## 2025-03-10 PROCEDURE — 1123F ACP DISCUSS/DSCN MKR DOCD: CPT | Performed by: INTERNAL MEDICINE

## 2025-03-10 PROCEDURE — 1036F TOBACCO NON-USER: CPT | Performed by: INTERNAL MEDICINE

## 2025-03-10 PROCEDURE — 99204 OFFICE O/P NEW MOD 45 MIN: CPT | Performed by: INTERNAL MEDICINE

## 2025-03-10 PROCEDURE — 3075F SYST BP GE 130 - 139MM HG: CPT | Performed by: INTERNAL MEDICINE

## 2025-03-10 PROCEDURE — 1160F RVW MEDS BY RX/DR IN RCRD: CPT | Performed by: INTERNAL MEDICINE

## 2025-03-10 PROCEDURE — 3078F DIAST BP <80 MM HG: CPT | Performed by: INTERNAL MEDICINE

## 2025-03-10 NOTE — PROGRESS NOTES
Patient ID: Brant Tirado, 1945, [id number], 79 y.o.  Referred by : Roby Chowdhury MD  Reason for consultation:   Anemia  CKD  HISTORY OF PRESENT ILLNESS:    Oncologic History:  Brant Tirado is a 79 y.o. male with past medical history of prostatic hypertrophy, CAD status post CABG was interventional consultation visit for anemia.    He denies any unintentional weight loss, drenching night sweats fever chills.  H denied any blood in stool. He is on b12 pills.   His recent lab work showed hb 10.7.  Pt has mild CKD.  Denied any SOB chest pain.    Interval history:  Patient is returning for follow-up visit and to discuss lab results and further recommendations.  He denied any chest pain shortness of breath, worsening fatigue.  Recent lab work showed improvement in his hemoglobin to 11.6.    He has been following with urologist for elevated PSA and his PSA is stable around 10.        During this visit patient's allergy, social, medical, surgical history and medications were reviewed and updated.    Past Medical History:   Diagnosis Date    Benign prostatic hypertrophy     with elevated PSA.    BPPV (benign paroxysmal positional vertigo) 2/2008    Chest pain 05/2021    Coronary heart disease     Status post CABG.    Diverticulosis     Dupuytren's contracture     Mid finger, right hand.    Erectile dysfunction     Bowens catheter in place 11/02/2018    has had one in place for 6weeks, this one in place x 2 weeks    Gout     Quiescent.    History of blood transfusion     possible with open heart surgery 26years ago    Hyperlipidemia     Hypertension     Positive cardiac stress test 05/06/2021    Snores     Type II or unspecified type diabetes mellitus without mention of complication, not stated as uncontrolled        Past Surgical History:   Procedure Laterality Date    CARDIAC CATHETERIZATION  6/2005    Triple vessel CAD with patent LIMA to LAD, patent saphenous vein graft to first obtuse marginal, patent

## 2025-03-19 ENCOUNTER — OFFICE VISIT (OUTPATIENT)
Dept: DIABETES SERVICES | Age: 80
End: 2025-03-19
Payer: MEDICARE

## 2025-03-19 VITALS
OXYGEN SATURATION: 97 % | SYSTOLIC BLOOD PRESSURE: 120 MMHG | WEIGHT: 196 LBS | HEIGHT: 68 IN | HEART RATE: 69 BPM | DIASTOLIC BLOOD PRESSURE: 60 MMHG | BODY MASS INDEX: 29.7 KG/M2

## 2025-03-19 DIAGNOSIS — Z79.4 TYPE 2 DIABETES MELLITUS WITH STAGE 3B CHRONIC KIDNEY DISEASE, WITH LONG-TERM CURRENT USE OF INSULIN (HCC): Primary | ICD-10-CM

## 2025-03-19 DIAGNOSIS — I10 PRIMARY HYPERTENSION: ICD-10-CM

## 2025-03-19 DIAGNOSIS — E11.22 TYPE 2 DIABETES MELLITUS WITH STAGE 3B CHRONIC KIDNEY DISEASE, WITH LONG-TERM CURRENT USE OF INSULIN (HCC): Primary | ICD-10-CM

## 2025-03-19 DIAGNOSIS — E78.49 OTHER HYPERLIPIDEMIA: ICD-10-CM

## 2025-03-19 DIAGNOSIS — N18.32 TYPE 2 DIABETES MELLITUS WITH STAGE 3B CHRONIC KIDNEY DISEASE, WITH LONG-TERM CURRENT USE OF INSULIN (HCC): Primary | ICD-10-CM

## 2025-03-19 PROCEDURE — G2211 COMPLEX E/M VISIT ADD ON: HCPCS | Performed by: NURSE PRACTITIONER

## 2025-03-19 PROCEDURE — 99212 OFFICE O/P EST SF 10 MIN: CPT

## 2025-03-19 PROCEDURE — 1123F ACP DISCUSS/DSCN MKR DOCD: CPT | Performed by: NURSE PRACTITIONER

## 2025-03-19 PROCEDURE — 1159F MED LIST DOCD IN RCRD: CPT | Performed by: NURSE PRACTITIONER

## 2025-03-19 PROCEDURE — 1036F TOBACCO NON-USER: CPT | Performed by: NURSE PRACTITIONER

## 2025-03-19 PROCEDURE — 1160F RVW MEDS BY RX/DR IN RCRD: CPT | Performed by: NURSE PRACTITIONER

## 2025-03-19 PROCEDURE — 3078F DIAST BP <80 MM HG: CPT | Performed by: NURSE PRACTITIONER

## 2025-03-19 PROCEDURE — G8417 CALC BMI ABV UP PARAM F/U: HCPCS | Performed by: NURSE PRACTITIONER

## 2025-03-19 PROCEDURE — 99214 OFFICE O/P EST MOD 30 MIN: CPT | Performed by: NURSE PRACTITIONER

## 2025-03-19 PROCEDURE — 95251 CONT GLUC MNTR ANALYSIS I&R: CPT | Performed by: NURSE PRACTITIONER

## 2025-03-19 PROCEDURE — G8427 DOCREV CUR MEDS BY ELIG CLIN: HCPCS | Performed by: NURSE PRACTITIONER

## 2025-03-19 PROCEDURE — 3074F SYST BP LT 130 MM HG: CPT | Performed by: NURSE PRACTITIONER

## 2025-03-19 RX ORDER — INSULIN ASPART INJECTION 100 [IU]/ML
12 INJECTION, SOLUTION SUBCUTANEOUS
Qty: 12 ML | Refills: 3
Start: 2025-03-19

## 2025-03-19 ASSESSMENT — ENCOUNTER SYMPTOMS
RESPIRATORY NEGATIVE: 1
SHORTNESS OF BREATH: 0

## 2025-03-19 NOTE — PROGRESS NOTES
Hemoglobin A1C   Date Value Ref Range Status   12/30/2024 8.0 (H) 4.0 - 6.0 % Final      Results       ** No results found for the last 336 hours. **          Lab Results   Component Value Date/Time     12/30/2024 08:51 AM    K 4.4 12/30/2024 08:51 AM     12/30/2024 08:51 AM    CO2 27 12/30/2024 08:51 AM    BUN 41 12/30/2024 08:51 AM    CREATININE 2.2 12/30/2024 08:51 AM    GLUCOSE 211 12/30/2024 08:51 AM    CALCIUM 9.1 12/30/2024 08:51 AM    LABGLOM 30 12/30/2024 08:51 AM    LABGLOM 32 03/12/2024 01:43 PM      Lab Results   Component Value Date    CHOL 108 12/30/2024    TRIG 129 12/30/2024    HDL 32 (L) 12/30/2024    LDL 50 12/30/2024    VLDL 26 12/30/2024    CHOLHDLRATIO 3.4 12/30/2024     Lab Results   Component Value Date    ALT 25 12/30/2024    AST 22 12/30/2024    ALKPHOS 99 12/30/2024    BILITOT 1.0 12/30/2024             Attestation    The patient (or guardian, if applicable) and other individuals in attendance with the patient were advised that Artificial Intelligence will be utilized during this visit to record, process the conversation to generate a clinical note, and support improvement of the AI technology. The patient (or guardian, if applicable) and other individuals in attendance at the appointment consented to the use of AI, including the recording.        Electronically signed by AMANDA Helms CNP on 3/19/2025 at 9:18 AM      (Please note that portions of this note were completed with a voice-recognition program. Efforts were made to edit the dictation but occasionally words are mis-transcribed.)

## 2025-03-20 DIAGNOSIS — N18.32 TYPE 2 DIABETES MELLITUS WITH STAGE 3B CHRONIC KIDNEY DISEASE, WITH LONG-TERM CURRENT USE OF INSULIN (HCC): ICD-10-CM

## 2025-03-20 DIAGNOSIS — E11.22 TYPE 2 DIABETES MELLITUS WITH STAGE 3B CHRONIC KIDNEY DISEASE, WITH LONG-TERM CURRENT USE OF INSULIN (HCC): ICD-10-CM

## 2025-03-20 DIAGNOSIS — Z79.4 TYPE 2 DIABETES MELLITUS WITH STAGE 3B CHRONIC KIDNEY DISEASE, WITH LONG-TERM CURRENT USE OF INSULIN (HCC): ICD-10-CM

## 2025-03-20 RX ORDER — INSULIN DEGLUDEC 200 U/ML
INJECTION, SOLUTION SUBCUTANEOUS
Qty: 9 ML | Refills: 3 | Status: SHIPPED | OUTPATIENT
Start: 2025-03-20

## 2025-04-07 ENCOUNTER — RESULTS FOLLOW-UP (OUTPATIENT)
Dept: INTERNAL MEDICINE | Age: 80
End: 2025-04-07

## 2025-04-07 ENCOUNTER — HOSPITAL ENCOUNTER (OUTPATIENT)
Age: 80
Discharge: HOME | End: 2025-04-07
Payer: MEDICARE

## 2025-04-07 DIAGNOSIS — N18.4 TYPE 2 DIABETES MELLITUS WITH STAGE 4 CHRONIC KIDNEY DISEASE, WITHOUT LONG-TERM CURRENT USE OF INSULIN (HCC): ICD-10-CM

## 2025-04-07 DIAGNOSIS — E11.22 TYPE 2 DIABETES MELLITUS WITH STAGE 4 CHRONIC KIDNEY DISEASE, WITHOUT LONG-TERM CURRENT USE OF INSULIN (HCC): ICD-10-CM

## 2025-04-07 DIAGNOSIS — E53.8 B12 DEFICIENCY: ICD-10-CM

## 2025-04-07 DIAGNOSIS — I10 PRIMARY HYPERTENSION: ICD-10-CM

## 2025-04-07 LAB
ANION GAP SERPL CALCULATED.3IONS-SCNC: 11 MMOL/L (ref 9–16)
BUN SERPL-MCNC: 35 MG/DL (ref 8–23)
BUN/CREAT SERPL: 18 (ref 9–20)
CALCIUM SERPL-MCNC: 9.4 MG/DL (ref 8.6–10.4)
CHLORIDE SERPL-SCNC: 108 MMOL/L (ref 98–107)
CO2 SERPL-SCNC: 24 MMOL/L (ref 20–31)
CREAT SERPL-MCNC: 2 MG/DL (ref 0.7–1.2)
CREAT UR-MCNC: 225 MG/DL (ref 39–259)
EST. AVERAGE GLUCOSE BLD GHB EST-MCNC: 166 MG/DL
FOLATE SERPL-MCNC: 6.8 NG/ML (ref 4.8–24.2)
GFR, ESTIMATED: 33 ML/MIN/1.73M2
GLUCOSE SERPL-MCNC: 205 MG/DL (ref 74–99)
HBA1C MFR BLD: 7.4 % (ref 4–6)
MICROALBUMIN UR-MCNC: 287 MG/L (ref 0–20)
MICROALBUMIN/CREAT UR-RTO: 128 MCG/MG CREAT (ref 0–17)
POTASSIUM SERPL-SCNC: 4.1 MMOL/L (ref 3.7–5.3)
SODIUM SERPL-SCNC: 143 MMOL/L (ref 136–145)
VIT B12 SERPL-MCNC: 329 PG/ML (ref 232–1245)

## 2025-04-07 PROCEDURE — 82607 VITAMIN B-12: CPT

## 2025-04-07 PROCEDURE — 36415 COLL VENOUS BLD VENIPUNCTURE: CPT

## 2025-04-07 PROCEDURE — 82043 UR ALBUMIN QUANTITATIVE: CPT

## 2025-04-07 PROCEDURE — 80048 BASIC METABOLIC PNL TOTAL CA: CPT

## 2025-04-07 PROCEDURE — 82570 ASSAY OF URINE CREATININE: CPT

## 2025-04-07 PROCEDURE — 82746 ASSAY OF FOLIC ACID SERUM: CPT

## 2025-04-07 PROCEDURE — 83036 HEMOGLOBIN GLYCOSYLATED A1C: CPT

## 2025-04-07 SDOH — ECONOMIC STABILITY: FOOD INSECURITY: WITHIN THE PAST 12 MONTHS, YOU WORRIED THAT YOUR FOOD WOULD RUN OUT BEFORE YOU GOT MONEY TO BUY MORE.: NEVER TRUE

## 2025-04-07 SDOH — ECONOMIC STABILITY: TRANSPORTATION INSECURITY
IN THE PAST 12 MONTHS, HAS LACK OF TRANSPORTATION KEPT YOU FROM MEETINGS, WORK, OR FROM GETTING THINGS NEEDED FOR DAILY LIVING?: NO

## 2025-04-07 SDOH — ECONOMIC STABILITY: INCOME INSECURITY: IN THE LAST 12 MONTHS, WAS THERE A TIME WHEN YOU WERE NOT ABLE TO PAY THE MORTGAGE OR RENT ON TIME?: NO

## 2025-04-07 SDOH — ECONOMIC STABILITY: FOOD INSECURITY: WITHIN THE PAST 12 MONTHS, THE FOOD YOU BOUGHT JUST DIDN'T LAST AND YOU DIDN'T HAVE MONEY TO GET MORE.: NEVER TRUE

## 2025-04-07 SDOH — ECONOMIC STABILITY: TRANSPORTATION INSECURITY
IN THE PAST 12 MONTHS, HAS THE LACK OF TRANSPORTATION KEPT YOU FROM MEDICAL APPOINTMENTS OR FROM GETTING MEDICATIONS?: NO

## 2025-04-10 ENCOUNTER — OFFICE VISIT (OUTPATIENT)
Dept: INTERNAL MEDICINE | Age: 80
End: 2025-04-10
Payer: MEDICARE

## 2025-04-10 VITALS
HEIGHT: 68 IN | BODY MASS INDEX: 29.86 KG/M2 | SYSTOLIC BLOOD PRESSURE: 120 MMHG | WEIGHT: 197 LBS | HEART RATE: 60 BPM | DIASTOLIC BLOOD PRESSURE: 62 MMHG

## 2025-04-10 DIAGNOSIS — Z79.4 TYPE 2 DIABETES MELLITUS WITH STAGE 3B CHRONIC KIDNEY DISEASE, WITH LONG-TERM CURRENT USE OF INSULIN (HCC): ICD-10-CM

## 2025-04-10 DIAGNOSIS — E78.49 OTHER HYPERLIPIDEMIA: ICD-10-CM

## 2025-04-10 DIAGNOSIS — N18.32 TYPE 2 DIABETES MELLITUS WITH STAGE 3B CHRONIC KIDNEY DISEASE, WITH LONG-TERM CURRENT USE OF INSULIN (HCC): ICD-10-CM

## 2025-04-10 DIAGNOSIS — I10 PRIMARY HYPERTENSION: Primary | ICD-10-CM

## 2025-04-10 DIAGNOSIS — E11.22 TYPE 2 DIABETES MELLITUS WITH STAGE 3B CHRONIC KIDNEY DISEASE, WITH LONG-TERM CURRENT USE OF INSULIN (HCC): ICD-10-CM

## 2025-04-10 PROCEDURE — 1160F RVW MEDS BY RX/DR IN RCRD: CPT | Performed by: INTERNAL MEDICINE

## 2025-04-10 PROCEDURE — 3078F DIAST BP <80 MM HG: CPT | Performed by: INTERNAL MEDICINE

## 2025-04-10 PROCEDURE — G8417 CALC BMI ABV UP PARAM F/U: HCPCS | Performed by: INTERNAL MEDICINE

## 2025-04-10 PROCEDURE — 1036F TOBACCO NON-USER: CPT | Performed by: INTERNAL MEDICINE

## 2025-04-10 PROCEDURE — 99214 OFFICE O/P EST MOD 30 MIN: CPT | Performed by: INTERNAL MEDICINE

## 2025-04-10 PROCEDURE — 3074F SYST BP LT 130 MM HG: CPT | Performed by: INTERNAL MEDICINE

## 2025-04-10 PROCEDURE — 1159F MED LIST DOCD IN RCRD: CPT | Performed by: INTERNAL MEDICINE

## 2025-04-10 PROCEDURE — 1123F ACP DISCUSS/DSCN MKR DOCD: CPT | Performed by: INTERNAL MEDICINE

## 2025-04-10 PROCEDURE — G8427 DOCREV CUR MEDS BY ELIG CLIN: HCPCS | Performed by: INTERNAL MEDICINE

## 2025-04-10 PROCEDURE — 99212 OFFICE O/P EST SF 10 MIN: CPT | Performed by: INTERNAL MEDICINE

## 2025-04-10 PROCEDURE — 3051F HG A1C>EQUAL 7.0%<8.0%: CPT | Performed by: INTERNAL MEDICINE

## 2025-04-10 ASSESSMENT — ENCOUNTER SYMPTOMS
NAUSEA: 0
SHORTNESS OF BREATH: 0
BACK PAIN: 0
DIARRHEA: 0
VOMITING: 0
BLOOD IN STOOL: 0
ABDOMINAL PAIN: 0
COUGH: 0
CONSTIPATION: 0
EYE PAIN: 0

## 2025-04-10 NOTE — PROGRESS NOTES
rash.   Neurological:      Mental Status: He is alert and oriented to person, place, and time.      Cranial Nerves: No cranial nerve deficit (grossly).   Psychiatric:         Thought Content: Thought content normal.        /62 (BP Site: Right Upper Arm, Patient Position: Sitting, BP Cuff Size: Medium Adult)   Pulse 60   Ht 1.727 m (5' 8\")   Wt 89.4 kg (197 lb)   BMI 29.95 kg/m²     Assessment:       Diagnosis Orders   1. Primary hypertension  Basic Metabolic Panel      2. Other hyperlipidemia        3. Type 2 diabetes mellitus with stage 3b chronic kidney disease, with long-term current use of insulin ([facility])  Hemoglobin A1C      I reviewed multiple test results.    4/7/2025 stable and OK creatinine at 2.0    4/7/2025 better and okay A1c at 7.4    3/6/2025 stable but high PSA at 10.5    Patient will continue Lipitor, Tresiba insulin, and hydralazine         Plan:    Assessment & Plan   Return in about 14 weeks (around 7/17/2025) for Hypertension, Hyperlipidemia, Diabetes.    Orders Placed This Encounter   Procedures    Basic Metabolic Panel     Standing Status:   Future     Expected Date:   7/10/2025     Expiration Date:   4/10/2026    Hemoglobin A1C     Standing Status:   Future     Expected Date:   7/10/2025     Expiration Date:   4/10/2026     No orders of the defined types were placed in this encounter.      Patientgiven educational materials - see patient instructions.  Discussed use, benefit,and side effects of prescribed medications.  All patient questions answered. Ptvoiced understanding. Reviewed health maintenance.  Instructed to continue currentmedications, diet and exercise.  Patient agreed with treatment plan. Follow up asdirected.     Electronically signed by Roby Chowdhury MD on 4/10/2025 at 2:52 PM

## 2025-04-28 DIAGNOSIS — N18.32 TYPE 2 DIABETES MELLITUS WITH STAGE 3B CHRONIC KIDNEY DISEASE, WITH LONG-TERM CURRENT USE OF INSULIN (HCC): ICD-10-CM

## 2025-04-28 DIAGNOSIS — Z79.4 TYPE 2 DIABETES MELLITUS WITH STAGE 3B CHRONIC KIDNEY DISEASE, WITH LONG-TERM CURRENT USE OF INSULIN (HCC): ICD-10-CM

## 2025-04-28 DIAGNOSIS — E11.22 TYPE 2 DIABETES MELLITUS WITH STAGE 3B CHRONIC KIDNEY DISEASE, WITH LONG-TERM CURRENT USE OF INSULIN (HCC): ICD-10-CM

## 2025-04-28 RX ORDER — INSULIN DEGLUDEC 200 U/ML
INJECTION, SOLUTION SUBCUTANEOUS
Qty: 9 ML | Refills: 3 | Status: SHIPPED | OUTPATIENT
Start: 2025-04-28

## 2025-04-28 RX ORDER — INSULIN ASPART INJECTION 100 [IU]/ML
12 INJECTION, SOLUTION SUBCUTANEOUS
Qty: 12 ML | Refills: 3 | Status: SHIPPED | OUTPATIENT
Start: 2025-04-28

## 2025-04-28 NOTE — TELEPHONE ENCOUNTER
Patient needs a refill of his aspart insulin sent to [facility]. Patient also is letting us know that his insurance is not going to be covering his Tresiba .

## 2025-04-29 ENCOUNTER — TELEPHONE (OUTPATIENT)
Dept: INTERNAL MEDICINE | Age: 80
End: 2025-04-29

## 2025-04-29 RX ORDER — INSULIN ASPART 100 [IU]/ML
12 INJECTION, SOLUTION INTRAVENOUS; SUBCUTANEOUS
Qty: 5 ADJUSTABLE DOSE PRE-FILLED PEN SYRINGE | Refills: 3 | Status: SHIPPED | OUTPATIENT
Start: 2025-04-29 | End: 2025-05-02 | Stop reason: SDUPTHER

## 2025-04-29 NOTE — TELEPHONE ENCOUNTER
[facility] sent a notice that the insulin aspart is not on formulary.  Requesting insulin aspart flex pen.     Last Appt:  3/19/2025  Next Appt:  7/28/2025

## 2025-05-02 RX ORDER — INSULIN ASPART 100 [IU]/ML
12 INJECTION, SOLUTION INTRAVENOUS; SUBCUTANEOUS
Qty: 5 ADJUSTABLE DOSE PRE-FILLED PEN SYRINGE | Refills: 3 | Status: SHIPPED | OUTPATIENT
Start: 2025-05-02

## 2025-05-02 NOTE — TELEPHONE ENCOUNTER
Refill request insulin aspart sent to [facility]     Medication pended if agreeable     Last Appt:  4/10/2025  Next Appt:   7/28/2025  Med verified in Epic

## 2025-06-05 ENCOUNTER — CLINICAL SUPPORT (OUTPATIENT)
Dept: INTERNAL MEDICINE | Age: 80
End: 2025-06-05

## 2025-06-05 VITALS — SYSTOLIC BLOOD PRESSURE: 134 MMHG | HEART RATE: 66 BPM | DIASTOLIC BLOOD PRESSURE: 76 MMHG

## 2025-06-05 DIAGNOSIS — I10 HYPERTENSION, UNSPECIFIED TYPE: Primary | ICD-10-CM

## 2025-06-05 NOTE — PROGRESS NOTES
Call and ask patient what is most recent blood pressure numbers are.  If systolic blood pressure staying greater than 180 or diastolic blood pressure staying greater than 95, then he should go into the emergency room today.    If systolic blood pressure is less than 180, and diastolic is less than 95, then pend Rx to increase hydralazine from 25 mg    to 50 mg twice daily.  Remove the 25 from med list.  Also confirm that he is taking his Coreg, etc.    Tell him continue to check blood pressure twice a day and report the numbers

## 2025-06-05 NOTE — PROGRESS NOTES
Patient reports that his blood pressure has been running high and he is concerned he also reported at one point his blood pressure was over 200/100. He came to make sure his machine was accurate. I checked the accuracy of his machine and it is within a couple of points so it is pretty accurate.

## 2025-06-06 RX ORDER — HYDRALAZINE HYDROCHLORIDE 50 MG/1
50 TABLET, FILM COATED ORAL 2 TIMES DAILY
Qty: 180 TABLET | Refills: 3 | Status: SHIPPED | OUTPATIENT
Start: 2025-06-06

## 2025-06-06 NOTE — PROGRESS NOTES
Please be clear.  What is his blood pressure for us here?  As per my recommendations from yesterday, if systolic blood pressure greater than 180 or diastolic blood pressure greater than 95 then tell him he needs to go to the emergency room immediately.    If blood pressure is less than 180 systolic and less than 95 diastolic, then pend Rx to increase hydralazine from 25 mg to 50 mg.  Also make sure he has and is taking his Coreg, etc.  Please let me know.

## 2025-06-06 NOTE — PROGRESS NOTES
Patient only report his top number today as 176.  Patient then rechecked his blood pressure  and again only report  this top number at 170. Patient confirmed taking his coreg. Patient was told your recommendations and agree if his blood pressure goes above the 180 for the top number and the 95 for this bottom number he will go to ER.

## 2025-06-06 NOTE — PROGRESS NOTES
Patient called in say his blood pressure today was for the top number was 176 . He ddi rest for about 10 minutes  before checking his blood pressure the first time. He said he did recheck  his blood pressure again around 20 minutes later on his other arm it was 170. Patient is agreement with increase the dose of his hydralazine . Patient was told to recheck his blood pressure about 2 times daily. Writer went over the symptoms of high blood pressure and when to go to ER. Patient was told if blood pressure go above 200 for the top number and the bottom number go about 95 to go to ER. Patient was in agreement with this.  He will call back in with his reading later next week.     Medication pended and medlist updated

## 2025-06-27 ENCOUNTER — TELEPHONE (OUTPATIENT)
Dept: INTERNAL MEDICINE | Age: 80
End: 2025-06-27

## 2025-06-27 NOTE — TELEPHONE ENCOUNTER
Patient called in stating that his blood pressure has been 180/83 and consistently high in the morning and then gradually comes down and is normal in the evenings. Patient wanting to know what he can do to lower BP, he also informed writer that he is taking his BP before his medication. Please advise

## 2025-06-27 NOTE — TELEPHONE ENCOUNTER
Patient is checking his BP before taking any of his BP meds. Then a few hours after taking his medications his BP is coming down to 135/68

## 2025-06-27 NOTE — TELEPHONE ENCOUNTER
Pend Rx to increase hydralazine from 50 mg twice a day    to 75 mg twice a day, and update med list

## 2025-06-30 RX ORDER — HYDRALAZINE HYDROCHLORIDE 50 MG/1
75 TABLET, FILM COATED ORAL 2 TIMES DAILY
Qty: 270 TABLET | Refills: 3 | Status: SHIPPED | OUTPATIENT
Start: 2025-06-30

## 2025-06-30 NOTE — TELEPHONE ENCOUNTER
Patient called back and instructions given. Patient would like new prescription sent to [facility].

## 2025-07-08 ENCOUNTER — HOSPITAL ENCOUNTER (EMERGENCY)
Age: 80
Discharge: HOME | End: 2025-07-08
Attending: EMERGENCY MEDICINE
Payer: MEDICARE

## 2025-07-08 ENCOUNTER — APPOINTMENT (OUTPATIENT)
Dept: GENERAL RADIOLOGY | Age: 80
End: 2025-07-08
Payer: MEDICARE

## 2025-07-08 VITALS
BODY MASS INDEX: 29.04 KG/M2 | SYSTOLIC BLOOD PRESSURE: 133 MMHG | OXYGEN SATURATION: 95 % | TEMPERATURE: 97.2 F | DIASTOLIC BLOOD PRESSURE: 73 MMHG | WEIGHT: 191 LBS | HEART RATE: 68 BPM | RESPIRATION RATE: 18 BRPM

## 2025-07-08 DIAGNOSIS — I10 ASYMPTOMATIC HYPERTENSION: Primary | ICD-10-CM

## 2025-07-08 LAB
ALBUMIN SERPL-MCNC: 3.9 G/DL (ref 3.5–5.2)
ALBUMIN/GLOB SERPL: 1.6 {RATIO} (ref 1–2.5)
ALP SERPL-CCNC: 82 U/L (ref 40–129)
ALT SERPL-CCNC: 33 U/L (ref 10–50)
ANION GAP SERPL CALCULATED.3IONS-SCNC: 10 MMOL/L (ref 9–16)
AST SERPL-CCNC: 29 U/L (ref 10–50)
BASOPHILS # BLD: 0.05 K/UL (ref 0–0.2)
BASOPHILS NFR BLD: 1 % (ref 0–2)
BILIRUB SERPL-MCNC: 1 MG/DL (ref 0–1.2)
BUN SERPL-MCNC: 36 MG/DL (ref 8–23)
BUN/CREAT SERPL: 17 (ref 9–20)
CALCIUM SERPL-MCNC: 9.1 MG/DL (ref 8.6–10.4)
CHLORIDE SERPL-SCNC: 108 MMOL/L (ref 98–107)
CO2 SERPL-SCNC: 24 MMOL/L (ref 20–31)
CREAT SERPL-MCNC: 2.1 MG/DL (ref 0.7–1.2)
EOSINOPHIL # BLD: 0.42 K/UL (ref 0–0.44)
EOSINOPHILS RELATIVE PERCENT: 7 % (ref 1–4)
ERYTHROCYTE [DISTWIDTH] IN BLOOD BY AUTOMATED COUNT: 15.4 % (ref 11.8–14.4)
GFR, ESTIMATED: 31 ML/MIN/1.73M2
GLUCOSE SERPL-MCNC: 176 MG/DL (ref 74–99)
HCT VFR BLD AUTO: 35.4 % (ref 40.7–50.3)
HGB BLD-MCNC: 11.8 G/DL (ref 13–17)
IMM GRANULOCYTES # BLD AUTO: <0.03 K/UL (ref 0–0.3)
IMM GRANULOCYTES NFR BLD: 0 %
LYMPHOCYTES NFR BLD: 1.59 K/UL (ref 1.1–3.7)
LYMPHOCYTES RELATIVE PERCENT: 27 % (ref 24–43)
MCH RBC QN AUTO: 32.5 PG (ref 25.2–33.5)
MCHC RBC AUTO-ENTMCNC: 33.3 G/DL (ref 25.2–33.5)
MCV RBC AUTO: 97.5 FL (ref 82.6–102.9)
MONOCYTES NFR BLD: 0.37 K/UL (ref 0.1–1.2)
MONOCYTES NFR BLD: 6 % (ref 3–12)
NEUTROPHILS NFR BLD: 59 % (ref 36–65)
NEUTS SEG NFR BLD: 3.56 K/UL (ref 1.5–8.1)
NRBC BLD-RTO: 0 PER 100 WBC
PLATELET # BLD AUTO: 156 K/UL (ref 138–453)
PMV BLD AUTO: 10.1 FL (ref 8.1–13.5)
POTASSIUM SERPL-SCNC: 4.4 MMOL/L (ref 3.7–5.3)
PROT SERPL-MCNC: 6.3 G/DL (ref 6.6–8.7)
RBC # BLD AUTO: 3.63 M/UL (ref 4.21–5.77)
RBC # BLD: ABNORMAL 10*6/UL
SODIUM SERPL-SCNC: 142 MMOL/L (ref 136–145)
TROPONIN I SERPL HS-MCNC: 26 NG/L (ref 0–22)
WBC OTHER # BLD: 6 K/UL (ref 3.5–11.3)

## 2025-07-08 PROCEDURE — 80053 COMPREHEN METABOLIC PANEL: CPT

## 2025-07-08 PROCEDURE — 93005 ELECTROCARDIOGRAM TRACING: CPT | Performed by: EMERGENCY MEDICINE

## 2025-07-08 PROCEDURE — 84484 ASSAY OF TROPONIN QUANT: CPT

## 2025-07-08 PROCEDURE — 85025 COMPLETE CBC W/AUTO DIFF WBC: CPT

## 2025-07-08 PROCEDURE — 99285 EMERGENCY DEPT VISIT HI MDM: CPT

## 2025-07-08 PROCEDURE — 36415 COLL VENOUS BLD VENIPUNCTURE: CPT

## 2025-07-08 PROCEDURE — 71045 X-RAY EXAM CHEST 1 VIEW: CPT

## 2025-07-08 ASSESSMENT — ENCOUNTER SYMPTOMS
ABDOMINAL PAIN: 0
SORE THROAT: 0
NAUSEA: 0
BLOOD IN STOOL: 0
WHEEZING: 0
BACK PAIN: 0
TROUBLE SWALLOWING: 0
DIARRHEA: 0
VOMITING: 0
CONSTIPATION: 0
SHORTNESS OF BREATH: 0

## 2025-07-08 ASSESSMENT — PAIN - FUNCTIONAL ASSESSMENT: PAIN_FUNCTIONAL_ASSESSMENT: NONE - DENIES PAIN

## 2025-07-08 NOTE — ED PROVIDER NOTES
[facility]  eMERGENCY dEPARTMENT eNCOUnter      Pt Name: Brant Tirado  MRN: [id number]  Birthdate 1945  Date of evaluation: 7/8/2025      CHIEF COMPLAINT       Chief Complaint   Patient presents with    Hypertension     Denies SOB, Denies Chest pain, elevated bp for about 4wks, follows nataliia Chowdhury         HISTORY OF PRESENT ILLNESS    Brant Tirado is a 79 y.o. male who presents with a chief complaint of elevated blood pressure he said for the last month or so it has been elevated in the morning before he takes his meds it does come down to the day in the days it into the 130 range they recently increased his hydralazine from 50-75 twice daily on 27 June.  He has no headache no chest pain he has some swelling to his feet but that is gone on for some time no fevers chills or cough no headaches      REVIEW OF SYSTEMS         Review of Systems   Constitutional:  Negative for chills and fever.   HENT:  Negative for congestion, dental problem, sore throat and trouble swallowing.    Eyes:  Negative for visual disturbance.   Respiratory:  Negative for shortness of breath and wheezing.    Cardiovascular:  Negative for chest pain, palpitations and leg swelling.   Gastrointestinal:  Negative for abdominal pain, blood in stool, constipation, diarrhea, nausea and vomiting.   Genitourinary:  Negative for difficulty urinating, dysuria and testicular pain.   Musculoskeletal:  Negative for back pain, joint swelling and neck pain.   Skin:  Negative for rash.   Neurological:  Negative for dizziness, syncope, weakness and headaches.   Hematological:  Negative for adenopathy. Does not bruise/bleed easily.   Psychiatric/Behavioral:  Negative for confusion and suicidal ideas.          PAST MEDICAL HISTORY    has a past medical history of Benign prostatic hypertrophy, BPPV (benign paroxysmal positional vertigo), Chest pain, Coronary heart disease, Diverticulosis, Dupuytren's contracture, Erectile dysfunction, Bowens

## 2025-07-10 LAB
EKG ATRIAL RATE: 66 BPM
EKG P AXIS: 11 DEGREES
EKG P-R INTERVAL: 188 MS
EKG Q-T INTERVAL: 400 MS
EKG QRS DURATION: 86 MS
EKG QTC CALCULATION (BAZETT): 419 MS
EKG R AXIS: -8 DEGREES
EKG T AXIS: 56 DEGREES
EKG VENTRICULAR RATE: 66 BPM

## 2025-07-11 ENCOUNTER — OFFICE VISIT (OUTPATIENT)
Dept: INTERNAL MEDICINE | Age: 80
End: 2025-07-11
Payer: MEDICARE

## 2025-07-11 VITALS
SYSTOLIC BLOOD PRESSURE: 116 MMHG | WEIGHT: 195 LBS | HEART RATE: 68 BPM | RESPIRATION RATE: 16 BRPM | DIASTOLIC BLOOD PRESSURE: 64 MMHG | BODY MASS INDEX: 29.55 KG/M2 | OXYGEN SATURATION: 97 % | HEIGHT: 68 IN

## 2025-07-11 DIAGNOSIS — Z79.4 TYPE 2 DIABETES MELLITUS WITH STAGE 3B CHRONIC KIDNEY DISEASE, WITH LONG-TERM CURRENT USE OF INSULIN (HCC): ICD-10-CM

## 2025-07-11 DIAGNOSIS — N18.32 TYPE 2 DIABETES MELLITUS WITH STAGE 3B CHRONIC KIDNEY DISEASE, WITH LONG-TERM CURRENT USE OF INSULIN (HCC): ICD-10-CM

## 2025-07-11 DIAGNOSIS — I10 PRIMARY HYPERTENSION: Primary | ICD-10-CM

## 2025-07-11 DIAGNOSIS — E11.22 TYPE 2 DIABETES MELLITUS WITH STAGE 3B CHRONIC KIDNEY DISEASE, WITH LONG-TERM CURRENT USE OF INSULIN (HCC): ICD-10-CM

## 2025-07-11 DIAGNOSIS — E78.49 OTHER HYPERLIPIDEMIA: ICD-10-CM

## 2025-07-11 PROCEDURE — 1159F MED LIST DOCD IN RCRD: CPT | Performed by: INTERNAL MEDICINE

## 2025-07-11 PROCEDURE — G8427 DOCREV CUR MEDS BY ELIG CLIN: HCPCS | Performed by: INTERNAL MEDICINE

## 2025-07-11 PROCEDURE — 1160F RVW MEDS BY RX/DR IN RCRD: CPT | Performed by: INTERNAL MEDICINE

## 2025-07-11 PROCEDURE — 1123F ACP DISCUSS/DSCN MKR DOCD: CPT | Performed by: INTERNAL MEDICINE

## 2025-07-11 PROCEDURE — 99214 OFFICE O/P EST MOD 30 MIN: CPT | Performed by: INTERNAL MEDICINE

## 2025-07-11 PROCEDURE — 1036F TOBACCO NON-USER: CPT | Performed by: INTERNAL MEDICINE

## 2025-07-11 PROCEDURE — 3074F SYST BP LT 130 MM HG: CPT | Performed by: INTERNAL MEDICINE

## 2025-07-11 PROCEDURE — 3078F DIAST BP <80 MM HG: CPT | Performed by: INTERNAL MEDICINE

## 2025-07-11 PROCEDURE — G8417 CALC BMI ABV UP PARAM F/U: HCPCS | Performed by: INTERNAL MEDICINE

## 2025-07-11 PROCEDURE — 99212 OFFICE O/P EST SF 10 MIN: CPT | Performed by: INTERNAL MEDICINE

## 2025-07-11 PROCEDURE — 3051F HG A1C>EQUAL 7.0%<8.0%: CPT | Performed by: INTERNAL MEDICINE

## 2025-07-11 ASSESSMENT — ENCOUNTER SYMPTOMS
CONSTIPATION: 0
BLOOD IN STOOL: 0
DIARRHEA: 0
BACK PAIN: 0
COUGH: 0
ABDOMINAL PAIN: 0
SHORTNESS OF BREATH: 0
VOMITING: 0
NAUSEA: 0
EYE PAIN: 0

## 2025-07-11 NOTE — PROGRESS NOTES
[facility]  MDCX INTERNAL MED A DEPARTMENT OF [facility]  [address] [facility] [address]  Dept: [phone]  Dept Fax: [phone]  Loc: [phone]     Brant Tirado is a 79 y.o. male who presents today for his medical conditions/complaintsas noted below.  Brant Tirado is c/o of   Chief Complaint   Patient presents with    Hypertension    Hyperlipidemia    Diabetes         HPI:     Hypertension  This is a chronic problem. The current episode started more than 1 year ago. The problem has been waxing and waning since onset. The problem is uncontrolled (Most home blood pressure numbers are high with systolic usually between 156 and 183 diastolic is generally normal.). Pertinent negatives include no chest pain, headaches, neck pain, palpitations or shortness of breath.   Hyperlipidemia  This is a chronic problem. The current episode started more than 1 year ago. The problem is controlled. Recent lipid tests were reviewed and are variable. Pertinent negatives include no chest pain or shortness of breath.   Diabetes  He presents for his follow-up diabetic visit. He has type 2 diabetes mellitus. The initial diagnosis of diabetes was made 15 years ago. His disease course has been fluctuating. Pertinent negatives for hypoglycemia include no confusion, dizziness, headaches, nervousness/anxiousness or pallor. Pertinent negatives for diabetes include no chest pain, no polydipsia, no polyuria and no weakness.       Hemoglobin A1C (%)   Date Value   04/07/2025 7.4 (H)   12/30/2024 8.0 (H)   09/17/2024 7.9 (H)            No components found for: \"LABMICR\"  No components found for: \"LDLCHOLESTEROL\", \"LDLCALC\"      AST (U/L)   Date Value   07/08/2025 29     ALT (U/L)   Date Value   07/08/2025 33     BUN (mg/dL)   Date Value   07/08/2025 36 (H)     BP Readings from Last 3 Encounters:   07/11/25 116/64   07/08/25 133/73   06/05/25 134/76              Past Medical History:   Diagnosis

## 2025-07-20 ENCOUNTER — PATIENT MESSAGE (OUTPATIENT)
Dept: INTERNAL MEDICINE | Age: 80
End: 2025-07-20

## 2025-07-21 RX ORDER — HYDRALAZINE HYDROCHLORIDE 25 MG/1
TABLET, FILM COATED ORAL
Qty: 270 TABLET | Refills: 3 | Status: SHIPPED | OUTPATIENT
Start: 2025-07-21

## 2025-07-22 ENCOUNTER — HOSPITAL ENCOUNTER (OUTPATIENT)
Age: 80
Discharge: HOME | End: 2025-07-22
Payer: MEDICARE

## 2025-07-22 DIAGNOSIS — E11.22 TYPE 2 DIABETES MELLITUS WITH STAGE 3B CHRONIC KIDNEY DISEASE, WITH LONG-TERM CURRENT USE OF INSULIN (HCC): ICD-10-CM

## 2025-07-22 DIAGNOSIS — Z79.4 TYPE 2 DIABETES MELLITUS WITH STAGE 3B CHRONIC KIDNEY DISEASE, WITH LONG-TERM CURRENT USE OF INSULIN (HCC): ICD-10-CM

## 2025-07-22 DIAGNOSIS — N18.32 TYPE 2 DIABETES MELLITUS WITH STAGE 3B CHRONIC KIDNEY DISEASE, WITH LONG-TERM CURRENT USE OF INSULIN (HCC): ICD-10-CM

## 2025-07-22 DIAGNOSIS — I10 PRIMARY HYPERTENSION: ICD-10-CM

## 2025-07-22 LAB
ANION GAP SERPL CALCULATED.3IONS-SCNC: 10 MMOL/L (ref 9–16)
BUN SERPL-MCNC: 45 MG/DL (ref 8–23)
BUN/CREAT SERPL: 21 (ref 9–20)
CALCIUM SERPL-MCNC: 9 MG/DL (ref 8.6–10.4)
CHLORIDE SERPL-SCNC: 106 MMOL/L (ref 98–107)
CO2 SERPL-SCNC: 21 MMOL/L (ref 20–31)
CREAT SERPL-MCNC: 2.1 MG/DL (ref 0.7–1.2)
EST. AVERAGE GLUCOSE BLD GHB EST-MCNC: 171 MG/DL
GFR, ESTIMATED: 31 ML/MIN/1.73M2
GLUCOSE SERPL-MCNC: 305 MG/DL (ref 74–99)
HBA1C MFR BLD: 7.6 % (ref 4–6)
POTASSIUM SERPL-SCNC: 4.7 MMOL/L (ref 3.7–5.3)
SODIUM SERPL-SCNC: 137 MMOL/L (ref 136–145)

## 2025-07-22 PROCEDURE — 80048 BASIC METABOLIC PNL TOTAL CA: CPT

## 2025-07-22 PROCEDURE — 36415 COLL VENOUS BLD VENIPUNCTURE: CPT

## 2025-07-22 PROCEDURE — 83036 HEMOGLOBIN GLYCOSYLATED A1C: CPT

## 2025-07-28 ENCOUNTER — OFFICE VISIT (OUTPATIENT)
Dept: DIABETES SERVICES | Age: 80
End: 2025-07-28
Payer: MEDICARE

## 2025-07-28 VITALS
BODY MASS INDEX: 29.7 KG/M2 | SYSTOLIC BLOOD PRESSURE: 130 MMHG | HEIGHT: 68 IN | DIASTOLIC BLOOD PRESSURE: 78 MMHG | WEIGHT: 196 LBS | HEART RATE: 68 BPM | OXYGEN SATURATION: 98 %

## 2025-07-28 DIAGNOSIS — N18.32 TYPE 2 DIABETES MELLITUS WITH STAGE 3B CHRONIC KIDNEY DISEASE, WITH LONG-TERM CURRENT USE OF INSULIN (HCC): Primary | ICD-10-CM

## 2025-07-28 DIAGNOSIS — E78.49 OTHER HYPERLIPIDEMIA: ICD-10-CM

## 2025-07-28 DIAGNOSIS — I10 PRIMARY HYPERTENSION: ICD-10-CM

## 2025-07-28 DIAGNOSIS — Z79.4 TYPE 2 DIABETES MELLITUS WITH STAGE 3B CHRONIC KIDNEY DISEASE, WITH LONG-TERM CURRENT USE OF INSULIN (HCC): Primary | ICD-10-CM

## 2025-07-28 DIAGNOSIS — E11.22 TYPE 2 DIABETES MELLITUS WITH STAGE 3B CHRONIC KIDNEY DISEASE, WITH LONG-TERM CURRENT USE OF INSULIN (HCC): Primary | ICD-10-CM

## 2025-07-28 PROCEDURE — 95251 CONT GLUC MNTR ANALYSIS I&R: CPT | Performed by: NURSE PRACTITIONER

## 2025-07-28 PROCEDURE — 3075F SYST BP GE 130 - 139MM HG: CPT | Performed by: NURSE PRACTITIONER

## 2025-07-28 PROCEDURE — 99212 OFFICE O/P EST SF 10 MIN: CPT | Performed by: NURSE PRACTITIONER

## 2025-07-28 PROCEDURE — 1160F RVW MEDS BY RX/DR IN RCRD: CPT | Performed by: NURSE PRACTITIONER

## 2025-07-28 PROCEDURE — 3051F HG A1C>EQUAL 7.0%<8.0%: CPT | Performed by: NURSE PRACTITIONER

## 2025-07-28 PROCEDURE — G8427 DOCREV CUR MEDS BY ELIG CLIN: HCPCS | Performed by: NURSE PRACTITIONER

## 2025-07-28 PROCEDURE — G2211 COMPLEX E/M VISIT ADD ON: HCPCS | Performed by: NURSE PRACTITIONER

## 2025-07-28 PROCEDURE — 1036F TOBACCO NON-USER: CPT | Performed by: NURSE PRACTITIONER

## 2025-07-28 PROCEDURE — 1123F ACP DISCUSS/DSCN MKR DOCD: CPT | Performed by: NURSE PRACTITIONER

## 2025-07-28 PROCEDURE — 3078F DIAST BP <80 MM HG: CPT | Performed by: NURSE PRACTITIONER

## 2025-07-28 PROCEDURE — 1159F MED LIST DOCD IN RCRD: CPT | Performed by: NURSE PRACTITIONER

## 2025-07-28 PROCEDURE — G8417 CALC BMI ABV UP PARAM F/U: HCPCS | Performed by: NURSE PRACTITIONER

## 2025-07-28 PROCEDURE — 99214 OFFICE O/P EST MOD 30 MIN: CPT | Performed by: NURSE PRACTITIONER

## 2025-07-28 RX ORDER — INSULIN ASPART INJECTION 100 [IU]/ML
14 INJECTION, SOLUTION SUBCUTANEOUS
Qty: 12 ML | Refills: 3
Start: 2025-07-28

## 2025-07-28 ASSESSMENT — ENCOUNTER SYMPTOMS
SHORTNESS OF BREATH: 0
RESPIRATORY NEGATIVE: 1

## 2025-07-28 NOTE — PROGRESS NOTES
(4/7/2025)    Housing Stability Vital Sign     Unable to Pay for Housing in the Last Year: Not on file     Number of Times Moved in the Last Year: 0     Homeless in the Last Year: No     No Known Allergies   Current Outpatient Medications   Medication Sig Dispense Refill    Insulin Aspart, w/Niacinamide, (FIASP FLEXTOUCH) 100 UNIT/ML SOPN Inject 14 Units into the skin daily (with breakfast) 12 mL 3    hydrALAZINE (APRESOLINE) 25 MG tablet Take 1 tablet by mouth 3 x daily with the 50 mg tablet to equal 75 mg 3 x daily 270 tablet 3    hydrALAZINE (APRESOLINE) 50 MG tablet Take 1.5 tablets by mouth 2 times daily 270 tablet 3    TRESIBA FLEXTOUCH 200 UNIT/ML SOPN INJECT 10 UNITS SUBCUTANEOUSLY ONCE DAILY. 9 mL 3    furosemide (LASIX) 40 MG tablet TAKE 1 TABLET DAILY AS     NEEDED FOR LEG SWELLING 90 tablet 3    atorvastatin (LIPITOR) 80 MG tablet Take 1 tablet by mouth daily 90 tablet 3    allopurinol (ZYLOPRIM) 300 MG tablet Take 1 tablet by mouth daily 90 tablet 3    carvedilol (COREG) 12.5 MG tablet Take 1 tablet by mouth 2 times daily 180 tablet 3    lisinopril (PRINIVIL;ZESTRIL) 40 MG tablet Take 1 tablet by mouth daily 90 tablet 3    glimepiride (AMARYL) 4 MG tablet Take 1 tablet by mouth 2 times daily at 0800 and 1400 180 tablet 3    NONFORMULARY Blood glucose monitor, pt not sure what type it is      Alcohol Swabs PADS Use 2 times a day with in insulin injection  DX: E11.9 200 each 5    isosorbide mononitrate (IMDUR) 30 MG extended release tablet Take 1 tablet by mouth daily 90 tablet 3    blood glucose test strips (CONTOUR TEST) strip Use to test blood sugars once daily. DX: E11.9 100 each 3    ONE TOUCH ULTRASOFT LANCETS MISC 1 each by Does not apply route daily 100 each 3    nitroGLYCERIN (NITROSTAT) 0.4 MG SL tablet Place 1 tablet under the tongue every 5 minutes as needed for Chest pain 25 tablet 3    Insulin Pen Needle (PEN NEEDLES) 32G X 5 MM MISC Use 2 pen needle daily 200 each 3    aspirin 81 MG EC

## 2025-08-04 ENCOUNTER — OFFICE VISIT (OUTPATIENT)
Dept: INTERNAL MEDICINE | Age: 80
End: 2025-08-04
Payer: MEDICARE

## 2025-08-04 VITALS
DIASTOLIC BLOOD PRESSURE: 60 MMHG | WEIGHT: 196 LBS | HEIGHT: 68 IN | HEART RATE: 66 BPM | OXYGEN SATURATION: 98 % | BODY MASS INDEX: 29.7 KG/M2 | SYSTOLIC BLOOD PRESSURE: 126 MMHG | RESPIRATION RATE: 16 BRPM

## 2025-08-04 DIAGNOSIS — E11.22 TYPE 2 DIABETES MELLITUS WITH STAGE 3B CHRONIC KIDNEY DISEASE, WITH LONG-TERM CURRENT USE OF INSULIN (HCC): ICD-10-CM

## 2025-08-04 DIAGNOSIS — N18.32 TYPE 2 DIABETES MELLITUS WITH STAGE 3B CHRONIC KIDNEY DISEASE, WITH LONG-TERM CURRENT USE OF INSULIN (HCC): ICD-10-CM

## 2025-08-04 DIAGNOSIS — E78.49 OTHER HYPERLIPIDEMIA: ICD-10-CM

## 2025-08-04 DIAGNOSIS — Z79.4 TYPE 2 DIABETES MELLITUS WITH STAGE 3B CHRONIC KIDNEY DISEASE, WITH LONG-TERM CURRENT USE OF INSULIN (HCC): ICD-10-CM

## 2025-08-04 DIAGNOSIS — I10 PRIMARY HYPERTENSION: Primary | ICD-10-CM

## 2025-08-04 PROCEDURE — 3074F SYST BP LT 130 MM HG: CPT | Performed by: INTERNAL MEDICINE

## 2025-08-04 PROCEDURE — 1036F TOBACCO NON-USER: CPT | Performed by: INTERNAL MEDICINE

## 2025-08-04 PROCEDURE — 1160F RVW MEDS BY RX/DR IN RCRD: CPT | Performed by: INTERNAL MEDICINE

## 2025-08-04 PROCEDURE — 99212 OFFICE O/P EST SF 10 MIN: CPT | Performed by: INTERNAL MEDICINE

## 2025-08-04 PROCEDURE — G8427 DOCREV CUR MEDS BY ELIG CLIN: HCPCS | Performed by: INTERNAL MEDICINE

## 2025-08-04 PROCEDURE — G8417 CALC BMI ABV UP PARAM F/U: HCPCS | Performed by: INTERNAL MEDICINE

## 2025-08-04 PROCEDURE — 1159F MED LIST DOCD IN RCRD: CPT | Performed by: INTERNAL MEDICINE

## 2025-08-04 PROCEDURE — 1123F ACP DISCUSS/DSCN MKR DOCD: CPT | Performed by: INTERNAL MEDICINE

## 2025-08-04 PROCEDURE — 3078F DIAST BP <80 MM HG: CPT | Performed by: INTERNAL MEDICINE

## 2025-08-04 PROCEDURE — 3051F HG A1C>EQUAL 7.0%<8.0%: CPT | Performed by: INTERNAL MEDICINE

## 2025-08-04 PROCEDURE — 99214 OFFICE O/P EST MOD 30 MIN: CPT | Performed by: INTERNAL MEDICINE

## 2025-08-04 RX ORDER — CARVEDILOL 25 MG/1
25 TABLET ORAL 2 TIMES DAILY
Qty: 180 TABLET | Refills: 3 | Status: SHIPPED | OUTPATIENT
Start: 2025-08-04

## 2025-08-04 ASSESSMENT — ENCOUNTER SYMPTOMS
CONSTIPATION: 0
DIARRHEA: 0
BACK PAIN: 0
EYE PAIN: 0
VOMITING: 0
COUGH: 0
NAUSEA: 0
SHORTNESS OF BREATH: 0
BLOOD IN STOOL: 0
ABDOMINAL PAIN: 0

## 2025-08-24 DIAGNOSIS — N18.4 TYPE 2 DIABETES MELLITUS WITH STAGE 4 CHRONIC KIDNEY DISEASE, WITHOUT LONG-TERM CURRENT USE OF INSULIN (HCC): ICD-10-CM

## 2025-08-24 DIAGNOSIS — E11.22 TYPE 2 DIABETES MELLITUS WITH STAGE 4 CHRONIC KIDNEY DISEASE, WITHOUT LONG-TERM CURRENT USE OF INSULIN (HCC): ICD-10-CM

## 2025-08-24 RX ORDER — GLIMEPIRIDE 4 MG/1
4 TABLET ORAL 2 TIMES DAILY
Qty: 180 TABLET | Refills: 3 | Status: CANCELLED | OUTPATIENT
Start: 2025-08-24

## 2025-08-25 RX ORDER — GLIMEPIRIDE 4 MG/1
4 TABLET ORAL 2 TIMES DAILY
Qty: 180 TABLET | Refills: 3 | Status: CANCELLED | OUTPATIENT
Start: 2025-08-25

## 2025-08-25 RX ORDER — GLIMEPIRIDE 4 MG/1
4 TABLET ORAL 2 TIMES DAILY
Qty: 180 TABLET | Refills: 3 | Status: SHIPPED | OUTPATIENT
Start: 2025-08-25

## 2025-08-25 RX ORDER — ISOSORBIDE MONONITRATE 30 MG/1
30 TABLET, EXTENDED RELEASE ORAL DAILY
Qty: 90 TABLET | Refills: 3 | Status: SHIPPED | OUTPATIENT
Start: 2025-08-25

## 2025-09-02 DIAGNOSIS — N18.32 TYPE 2 DIABETES MELLITUS WITH STAGE 3B CHRONIC KIDNEY DISEASE, WITH LONG-TERM CURRENT USE OF INSULIN (HCC): ICD-10-CM

## 2025-09-02 DIAGNOSIS — Z79.4 TYPE 2 DIABETES MELLITUS WITH STAGE 3B CHRONIC KIDNEY DISEASE, WITH LONG-TERM CURRENT USE OF INSULIN (HCC): ICD-10-CM

## 2025-09-02 DIAGNOSIS — E11.22 TYPE 2 DIABETES MELLITUS WITH STAGE 3B CHRONIC KIDNEY DISEASE, WITH LONG-TERM CURRENT USE OF INSULIN (HCC): ICD-10-CM

## 2025-09-02 RX ORDER — INSULIN ASPART 100 [IU]/ML
INJECTION, SOLUTION INTRAVENOUS; SUBCUTANEOUS
Qty: 12 ML | Refills: 2 | Status: SHIPPED | OUTPATIENT
Start: 2025-09-02

## 2025-09-02 RX ORDER — INSULIN ASPART INJECTION 100 [IU]/ML
14 INJECTION, SOLUTION SUBCUTANEOUS
Qty: 12 ML | Refills: 3 | Status: CANCELLED | OUTPATIENT
Start: 2025-09-02

## 2025-09-04 ENCOUNTER — HOSPITAL ENCOUNTER (OUTPATIENT)
Dept: LAB | Age: 80
Discharge: HOME | End: 2025-09-04
Payer: MEDICARE

## 2025-09-04 DIAGNOSIS — D64.9 ANEMIA, UNSPECIFIED TYPE: ICD-10-CM

## 2025-09-04 LAB
BASOPHILS # BLD: 0.05 K/UL (ref 0–0.2)
BASOPHILS NFR BLD: 1 % (ref 0–2)
EOSINOPHIL # BLD: 0.47 K/UL (ref 0–0.44)
EOSINOPHILS RELATIVE PERCENT: 7 % (ref 1–4)
ERYTHROCYTE [DISTWIDTH] IN BLOOD BY AUTOMATED COUNT: 14.8 % (ref 11.8–14.4)
HCT VFR BLD AUTO: 34.8 % (ref 40.7–50.3)
HGB BLD-MCNC: 11.2 G/DL (ref 13–17)
IMM GRANULOCYTES # BLD AUTO: <0.03 K/UL (ref 0–0.3)
IMM GRANULOCYTES NFR BLD: 0 %
LYMPHOCYTES NFR BLD: 1.71 K/UL (ref 1.1–3.7)
LYMPHOCYTES RELATIVE PERCENT: 27 % (ref 24–43)
MCH RBC QN AUTO: 32.7 PG (ref 25.2–33.5)
MCHC RBC AUTO-ENTMCNC: 32.2 G/DL (ref 25.2–33.5)
MCV RBC AUTO: 101.8 FL (ref 82.6–102.9)
MONOCYTES NFR BLD: 0.5 K/UL (ref 0.1–1.2)
MONOCYTES NFR BLD: 8 % (ref 3–12)
NEUTROPHILS NFR BLD: 57 % (ref 36–65)
NEUTS SEG NFR BLD: 3.57 K/UL (ref 1.5–8.1)
NRBC BLD-RTO: 0 PER 100 WBC
PLATELET # BLD AUTO: 144 K/UL (ref 138–453)
PMV BLD AUTO: 10.2 FL (ref 8.1–13.5)
RBC # BLD AUTO: 3.42 M/UL (ref 4.21–5.77)
RBC # BLD: ABNORMAL 10*6/UL
WBC OTHER # BLD: 6.3 K/UL (ref 3.5–11.3)

## 2025-09-04 PROCEDURE — 85025 COMPLETE CBC W/AUTO DIFF WBC: CPT

## 2025-09-04 PROCEDURE — 36415 COLL VENOUS BLD VENIPUNCTURE: CPT

## (undated) DEVICE — COLONOSCOPE ENDOSCP ABSORBENT SM PEDIATRIC 104 MM VISION

## (undated) DEVICE — DRAPE,REIN 53X77,STERILE: Brand: MEDLINE

## (undated) DEVICE — 60 ML SYRINGE REGULAR TIP: Brand: MONOJECT

## (undated) DEVICE — DRAINBAG,ANTI-REFLUX TOWER,L/F,2000ML,LL: Brand: MEDLINE

## (undated) DEVICE — GLOVE ORANGE PI 8   MSG9080

## (undated) DEVICE — CANNULA NSL AD L2IN ETCO2 SAMP SFT CRUSH RESIST FEM AIRLFE

## (undated) DEVICE — 1200CC GUARDIAN II: Brand: GUARDIAN

## (undated) DEVICE — RENTAL CYSTOSCOPE CONT FLOW

## (undated) DEVICE — CONNECTOR TBNG AUX H2O JET DISP FOR OLY 160/180 SER

## (undated) DEVICE — RENTAL LASER XPS CASE

## (undated) DEVICE — PACK PROCEDURE SURG CYSTO SVMMC LF

## (undated) DEVICE — 60 ML SYRINGE,CATHETER TIP: Brand: MONOJECT

## (undated) DEVICE — STRAP,CATHETER,ELASTIC,HOOK&LOOP: Brand: MEDLINE

## (undated) DEVICE — CYSTO/BLADDER IRRIGATION SET, REGULATING CLAMP

## (undated) DEVICE — PLUG,CATHETER,DRAINAGE PROTECTOR,TUBE: Brand: MEDLINE

## (undated) DEVICE — CATHETER URETH 22FR BLLN 30CC 3 W F SPEC INF CTRL BARDX

## (undated) DEVICE — LASER SURG W22XH58IN D36IN 475LB SLD STATE FREQ DOUBLED

## (undated) DEVICE — MERCY DEFIANCE ENDO KIT: Brand: MEDLINE INDUSTRIES, INC.